# Patient Record
Sex: MALE | Race: WHITE | NOT HISPANIC OR LATINO | Employment: OTHER | RURAL
[De-identification: names, ages, dates, MRNs, and addresses within clinical notes are randomized per-mention and may not be internally consistent; named-entity substitution may affect disease eponyms.]

---

## 2017-01-05 ENCOUNTER — OFFICE VISIT (OUTPATIENT)
Dept: FAMILY MEDICINE CLINIC | Facility: CLINIC | Age: 67
End: 2017-01-05

## 2017-01-05 VITALS
HEART RATE: 99 BPM | OXYGEN SATURATION: 97 % | TEMPERATURE: 98.4 F | DIASTOLIC BLOOD PRESSURE: 77 MMHG | WEIGHT: 291.2 LBS | HEIGHT: 68 IN | BODY MASS INDEX: 44.13 KG/M2 | SYSTOLIC BLOOD PRESSURE: 132 MMHG

## 2017-01-05 DIAGNOSIS — N40.0 BENIGN PROSTATIC HYPERPLASIA WITHOUT LOWER URINARY TRACT SYMPTOMS, UNSPECIFIED MORPHOLOGY: ICD-10-CM

## 2017-01-05 DIAGNOSIS — E78.2 MIXED HYPERLIPIDEMIA: Primary | ICD-10-CM

## 2017-01-05 DIAGNOSIS — E11.9 TYPE 2 DIABETES MELLITUS WITHOUT COMPLICATION, WITHOUT LONG-TERM CURRENT USE OF INSULIN (HCC): ICD-10-CM

## 2017-01-05 DIAGNOSIS — E55.9 UNSPECIFIED VITAMIN D DEFICIENCY: ICD-10-CM

## 2017-01-05 DIAGNOSIS — R53.83 FATIGUE, UNSPECIFIED TYPE: ICD-10-CM

## 2017-01-05 PROBLEM — M15.9 OSTEOARTHRITIS OF MULTIPLE JOINTS: Status: ACTIVE | Noted: 2017-01-05

## 2017-01-05 PROBLEM — Z86.010 HX OF COLONIC POLYPS: Status: ACTIVE | Noted: 2017-01-05

## 2017-01-05 PROBLEM — I10 ESSENTIAL HYPERTENSION: Status: ACTIVE | Noted: 2017-01-05

## 2017-01-05 PROBLEM — Z86.0100 HX OF COLONIC POLYPS: Status: ACTIVE | Noted: 2017-01-05

## 2017-01-05 PROBLEM — E66.09 OBESITY DUE TO EXCESS CALORIES: Status: ACTIVE | Noted: 2017-01-05

## 2017-01-05 PROBLEM — I48.91 ATRIAL FIBRILLATION: Status: ACTIVE | Noted: 2017-01-05

## 2017-01-05 PROCEDURE — 99213 OFFICE O/P EST LOW 20 MIN: CPT | Performed by: FAMILY MEDICINE

## 2017-01-05 RX ORDER — DIAZEPAM 5 MG/1
TABLET ORAL
COMMUNITY
Start: 2016-08-17 | End: 2019-05-31

## 2017-01-05 RX ORDER — LOSARTAN POTASSIUM 100 MG/1
TABLET ORAL
COMMUNITY
End: 2017-04-17 | Stop reason: SDUPTHER

## 2017-01-05 RX ORDER — POTASSIUM CHLORIDE 750 MG/1
10 TABLET, FILM COATED, EXTENDED RELEASE ORAL DAILY
COMMUNITY
Start: 2016-12-12 | End: 2019-05-31

## 2017-01-05 RX ORDER — GLIPIZIDE 5 MG/1
TABLET ORAL
COMMUNITY
End: 2017-03-16 | Stop reason: SDUPTHER

## 2017-01-05 RX ORDER — HYDROCODONE BITARTRATE AND ACETAMINOPHEN 10; 325 MG/1; MG/1
1 TABLET ORAL EVERY 6 HOURS
Qty: 90 TABLET | Refills: 0 | Status: SHIPPED | OUTPATIENT
Start: 2017-01-05 | End: 2017-02-21 | Stop reason: SDUPTHER

## 2017-01-05 RX ORDER — CLOBETASOL PROPIONATE 0.5 MG/G
CREAM TOPICAL
COMMUNITY
Start: 2016-10-18 | End: 2017-12-13 | Stop reason: SDUPTHER

## 2017-01-05 RX ORDER — INDAPAMIDE 2.5 MG/1
TABLET, FILM COATED ORAL
COMMUNITY
End: 2017-11-16 | Stop reason: SDUPTHER

## 2017-01-05 RX ORDER — AMLODIPINE BESYLATE 5 MG/1
TABLET ORAL
COMMUNITY
End: 2018-03-01 | Stop reason: SDUPTHER

## 2017-01-05 RX ORDER — APIXABAN 5 MG/1
5 TABLET, FILM COATED ORAL EVERY 12 HOURS SCHEDULED
COMMUNITY
Start: 2016-12-19

## 2017-01-05 RX ORDER — HYDROCODONE BITARTRATE AND ACETAMINOPHEN 10; 325 MG/1; MG/1
TABLET ORAL EVERY 6 HOURS
COMMUNITY
End: 2017-01-05 | Stop reason: SDUPTHER

## 2017-01-05 NOTE — PROGRESS NOTES
"Ephraim Quinones    1950    Chief Complaint   Patient presents with   • Med Reflita SANCHEZ       Vitals:    01/05/17 0823   BP: 132/77   Pulse: 99   Temp: 98.4 °F (36.9 °C)   SpO2: 97%   Weight: 291 lb 3.2 oz (132 kg)   Height: 68\" (172.7 cm)       Knee Pain    Incident onset: [facility] for knee replacement in March. There was no injury mechanism. The pain is present in the left knee. The quality of the pain is described as aching. The pain is at a severity of 3/10. The pain is mild. The pain has been constant since onset. Associated symptoms include a loss of motion. Pertinent negatives include no inability to bear weight. The symptoms are aggravated by weight bearing. Treatments tried: Narcotics. The treatment provided mild relief.       Review of Systems   Musculoskeletal: Positive for back pain and joint swelling.        Scheduled for knee replacement March       Past Medical History   Diagnosis Date   • Arthritis    • Diabetes mellitus    • Hyperlipidemia    • Hypertension    • Obesity          Current Outpatient Prescriptions:   •  amLODIPine (NORVASC) 5 MG tablet, Take 5 mg by mouth 2 times daily , Disp: , Rfl:   •  carbidopa-levodopa (SINEMET)  MG per tablet, , Disp: , Rfl:   •  clobetasol (TEMOVATE) 0.05 % cream, , Disp: , Rfl:   •  diazePAM (VALIUM) 5 MG tablet, One tablet po q 8 hours prn spasms, muscle aches., Disp: , Rfl:   •  ELIQUIS 5 MG tablet tablet, , Disp: , Rfl:   •  glipiZIDE (GLUCOTROL) 5 MG tablet, Take 5 mg by mouth daily, Disp: , Rfl:   •  HYDROcodone-acetaminophen (NORCO)  MG per tablet, Take 1 tablet by mouth Every 6 (Six) Hours., Disp: 90 tablet, Rfl: 0  •  indapamide (LOZOL) 2.5 MG tablet, Take 2.5 mg by mouth every morning, Disp: , Rfl:   •  losartan (COZAAR) 100 MG tablet, Take 100 mg by mouth daily, Disp: , Rfl:   •  potassium chloride (K-DUR) 10 MEQ CR tablet, , Disp: , Rfl:     Allergies   Allergen Reactions   • Percocet [Oxycodone-Acetaminophen]    • Percodan " "[Oxycodone-Aspirin]        Patient Active Problem List   Diagnosis   • Type 2 diabetes mellitus without complication   • Obesity due to excess calories   • Hx of colonic polyps   • Essential hypertension   • Mixed hyperlipidemia   • Osteoarthritis of multiple joints   • Atrial fibrillation       Social History     Social History   • Marital status:      Spouse name: N/A   • Number of children: N/A   • Years of education: N/A     Social History Main Topics   • Smoking status: Former Smoker   • Smokeless tobacco: None   • Alcohol use Yes      Comment: occ   • Drug use: No   • Sexual activity: Defer     Other Topics Concern   • None     Social History Narrative   • None       Past Surgical History   Procedure Laterality Date   • Replacement total knee         Physical Exam   Constitutional:   Morbidly obese   Cardiovascular:   Atrial fib   Pulmonary/Chest: Effort normal and breath sounds normal.   Musculoskeletal:   Enlargement left knee with effusion   Neurological: He is alert.   Psychiatric: He has a normal mood and affect.   Vitals reviewed.      Vitals:    01/05/17 0823   BP: 132/77   Pulse: 99   Temp: 98.4 °F (36.9 °C)   SpO2: 97%   Weight: 291 lb 3.2 oz (132 kg)   Height: 68\" (172.7 cm)       Ephraim was seen today for med refill.    Diagnoses and all orders for this visit:    Mixed hyperlipidemia  -     TSH  -     T4, free  -     Comprehensive Metabolic Panel  -     Lipid Panel    Type 2 diabetes mellitus without complication, without long-term current use of insulin  -     Comprehensive Metabolic Panel  -     Hemoglobin A1c  -     Lipid Panel    Fatigue, unspecified type  -     TSH  -     T4, free  -     CBC & Differential    Unspecified vitamin D deficiency  -     Vitamin D 25 Hydroxy    Benign prostatic hyperplasia without lower urinary tract symptoms, unspecified morphology  -     PSA    Other orders  -     HYDROcodone-acetaminophen (NORCO)  MG per tablet; Take 1 tablet by mouth Every 6 (Six) " Hours.        Problem List Items Addressed This Visit        Endocrine    Type 2 diabetes mellitus without complication    Relevant Medications    glipiZIDE (GLUCOTROL) 5 MG tablet    Other Relevant Orders    Comprehensive Metabolic Panel    Hemoglobin A1c    Lipid Panel       Other    Mixed hyperlipidemia - Primary    Relevant Orders    TSH    T4, free    Comprehensive Metabolic Panel    Lipid Panel      Other Visit Diagnoses     Fatigue, unspecified type        Relevant Orders    TSH    T4, free    CBC & Differential    Unspecified vitamin D deficiency        Relevant Orders    Vitamin D 25 Hydroxy    Benign prostatic hyperplasia without lower urinary tract symptoms, unspecified morphology        Relevant Orders    PSA                  [facility] 10/325 refill #90; continues to have back pain and left knee pain-scheduled for knee replacement in March    Plan fasting lab return for annual physical                    Jim Hopkins MD  1/5/2017

## 2017-01-05 NOTE — MR AVS SNAPSHOT
Ephraim Quinones   1/5/2017 8:15 AM   Office Visit    Dept Phone:  [phone]   Encounter #:  [id number]    Provider:  Jim Hopkins MD   Department:  [facility]                Your Full Care Plan              Today's Medication Changes          These changes are accurate as of: 1/5/17  8:50 AM.  If you have any questions, ask your nurse or doctor.               Medication(s)that have changed:     HYDROcodone-acetaminophen  MG per tablet   Commonly known as:  NORCO   Take 1 tablet by mouth Every 6 (Six) Hours.   What changed:    - how much to take  - how to take this   Changed by:  Jim Hopkins MD            Where to Get Your Medications      You can get these medications from any pharmacy     Bring a paper prescription for each of these medications     HYDROcodone-acetaminophen  MG per tablet                  Your Updated Medication List          This list is accurate as of: 1/5/17  8:50 AM.  Always use your most recent med list.                amLODIPine 5 MG tablet   Commonly known as:  NORVASC       carbidopa-levodopa  MG per tablet   Commonly known as:  SINEMET       clobetasol 0.05 % cream   Commonly known as:  TEMOVATE       ELIQUIS 5 MG tablet tablet   Generic drug:  apixaban       glipiZIDE 5 MG tablet   Commonly known as:  GLUCOTROL       HYDROcodone-acetaminophen  MG per tablet   Commonly known as:  NORCO   Take 1 tablet by mouth Every 6 (Six) Hours.       indapamide 2.5 MG tablet   Commonly known as:  LOZOL       losartan 100 MG tablet   Commonly known as:  COZAAR       potassium chloride 10 MEQ CR tablet   Commonly known as:  K-DUR       VALIUM 5 MG tablet   Generic drug:  diazePAM               We Performed the Following     CBC & Differential     Comprehensive Metabolic Panel     Hemoglobin A1c     Lipid Panel     PSA     T4, free     TSH     Vitamin D 25 Hydroxy       You Were Diagnosed With        "   Codes Comments    Mixed hyperlipidemia    -  Primary ICD-10-CM: E78.2  ICD-9-CM: 272.2     Type 2 diabetes mellitus without complication, without long-term current use of insulin     ICD-10-CM: E11.9  ICD-9-CM: 250.00     Fatigue, unspecified type     ICD-10-CM: R53.83  ICD-9-CM: 780.79     Unspecified vitamin D deficiency     ICD-10-CM: E55.9  ICD-9-CM: 268.9     Benign prostatic hyperplasia without lower urinary tract symptoms, unspecified morphology     ICD-10-CM: N40.0  ICD-9-CM: 600.00       Instructions     None    Patient Instructions History      Upcoming Appointments     Visit Type Date Time Department    OFFICE VISIT 2017  8:15 AM JANNETH CARRILL[facility]rm Signup     [employer] technology allows you to send messages to your doctor, view your test results, renew your prescriptions, schedule appointments, and more. To sign up, go to [employer] and click on the Sign Up Now link in the New User? box. Enter your [employer] Activation Code exactly as it appears below along with the last four digits of your Social Security Number and your Date of Birth () to complete the sign-up process. If you do not sign up before the expiration date, you must request a new code.    [employer] Activation Code: [email or web address]  Expires: 2017  8:50 AM    If you have questions, you can email [email or web address] or call [phone] to talk to our [employer] staff. Remember, [employer] is NOT to be used for urgent needs. For medical emergencies, dial 911.               Other Info from Your Visit           Allergies     Percocet [Oxycodone-acetaminophen]      Percodan [Oxycodone-aspirin]        Reason for Visit     Med Refill DANIEL      Vital Signs     Blood Pressure Pulse Temperature Height Weight Oxygen Saturation    132/77 99 98.4 °F (36.9 °C) 68\" (172.7 cm) 291 lb 3.2 oz (132 kg) 97%    Body Mass Index Smoking Status                44.28 kg/m2 Former Smoker          Problems and Diagnoses Noted "     Atrial fibrillation (irregular heartbeat)    High blood pressure    History of colonic polyps    Mixed hyperlipidemia    Obesity due to excess calories    Osteoarthritis (arthritis due to wear and tear of joints)    Type 2 diabetes mellitus without complication    Tiredness        Vitamin D deficiency        Enlarged prostate

## 2017-01-06 LAB
25(OH)D3+25(OH)D2 SERPL-MCNC: 23 NG/ML (ref 30–100)
ALBUMIN SERPL-MCNC: 4.4 G/DL (ref 3.5–5)
ALBUMIN/GLOB SERPL: 1.6 G/DL (ref 1.1–2.5)
ALP SERPL-CCNC: 80 U/L (ref 24–120)
ALT SERPL-CCNC: 54 U/L (ref 0–54)
AST SERPL-CCNC: 26 U/L (ref 7–45)
BASOPHILS # BLD AUTO: 0.04 10*3/MM3 (ref 0–0.2)
BASOPHILS NFR BLD AUTO: 0.5 % (ref 0–2)
BILIRUB SERPL-MCNC: 0.9 MG/DL (ref 0.1–1)
BUN SERPL-MCNC: 31 MG/DL (ref 5–21)
BUN/CREAT SERPL: 32.6 (ref 7–25)
CALCIUM SERPL-MCNC: 9.9 MG/DL (ref 8.4–10.4)
CHLORIDE SERPL-SCNC: 96 MMOL/L (ref 98–110)
CHOLEST SERPL-MCNC: 194 MG/DL (ref 130–200)
CO2 SERPL-SCNC: 29 MMOL/L (ref 24–31)
CREAT SERPL-MCNC: 0.95 MG/DL (ref 0.5–1.4)
EOSINOPHIL # BLD AUTO: 0.16 10*3/MM3 (ref 0–0.7)
EOSINOPHIL NFR BLD AUTO: 2 % (ref 0–4)
ERYTHROCYTE [DISTWIDTH] IN BLOOD BY AUTOMATED COUNT: 13 % (ref 12–15)
GLOBULIN SER CALC-MCNC: 2.7 GM/DL
GLUCOSE SERPL-MCNC: 172 MG/DL (ref 70–100)
HBA1C MFR BLD: 6.1 %
HCT VFR BLD AUTO: 45.1 % (ref 40–52)
HDLC SERPL-MCNC: 53 MG/DL
HGB BLD-MCNC: 15.2 G/DL (ref 14–18)
IMM GRANULOCYTES # BLD: 0.03 10*3/MM3 (ref 0–0.03)
IMM GRANULOCYTES NFR BLD: 0.4 % (ref 0–5)
LDLC SERPL CALC-MCNC: 113 MG/DL (ref 0–99)
LYMPHOCYTES # BLD AUTO: 1.23 10*3/MM3 (ref 0.72–4.86)
LYMPHOCYTES NFR BLD AUTO: 15.6 % (ref 15–45)
MCH RBC QN AUTO: 32.5 PG (ref 28–32)
MCHC RBC AUTO-ENTMCNC: 33.7 G/DL (ref 33–36)
MCV RBC AUTO: 96.6 FL (ref 82–95)
MONOCYTES # BLD AUTO: 0.66 10*3/MM3 (ref 0.19–1.3)
MONOCYTES NFR BLD AUTO: 8.4 % (ref 4–12)
NEUTROPHILS # BLD AUTO: 5.74 10*3/MM3 (ref 1.87–8.4)
NEUTROPHILS NFR BLD AUTO: 73.1 % (ref 39–78)
NRBC BLD AUTO-RTO: 0 /100 WBC (ref 0–0)
PLATELET # BLD AUTO: 231 10*3/MM3 (ref 130–400)
POTASSIUM SERPL-SCNC: 4.2 MMOL/L (ref 3.5–5.3)
PROT SERPL-MCNC: 7.1 G/DL (ref 6.3–8.7)
PSA SERPL-MCNC: 1.24 NG/ML (ref 0–4)
RBC # BLD AUTO: 4.67 10*6/MM3 (ref 4.8–5.9)
SODIUM SERPL-SCNC: 140 MMOL/L (ref 135–145)
T4 FREE SERPL-MCNC: 1.27 NG/DL (ref 0.78–2.19)
TRIGL SERPL-MCNC: 139 MG/DL (ref 0–149)
TSH SERPL DL<=0.005 MIU/L-ACNC: 0.7 MIU/ML (ref 0.47–4.68)
VLDLC SERPL CALC-MCNC: 27.8 MG/DL
WBC # BLD AUTO: 7.86 10*3/MM3 (ref 4.8–10.8)

## 2017-01-25 ENCOUNTER — OFFICE VISIT (OUTPATIENT)
Dept: FAMILY MEDICINE CLINIC | Facility: CLINIC | Age: 67
End: 2017-01-25

## 2017-01-25 VITALS
HEART RATE: 62 BPM | HEIGHT: 68 IN | SYSTOLIC BLOOD PRESSURE: 139 MMHG | TEMPERATURE: 98 F | WEIGHT: 292.6 LBS | OXYGEN SATURATION: 98 % | BODY MASS INDEX: 44.34 KG/M2 | DIASTOLIC BLOOD PRESSURE: 72 MMHG

## 2017-01-25 DIAGNOSIS — Z00.00 MEDICARE ANNUAL WELLNESS VISIT, SUBSEQUENT: ICD-10-CM

## 2017-01-25 DIAGNOSIS — E11.9 DIABETES MELLITUS WITHOUT COMPLICATION (HCC): ICD-10-CM

## 2017-01-25 DIAGNOSIS — Z23 NEED FOR PROPHYLACTIC VACCINATION AGAINST STREPTOCOCCUS PNEUMONIAE (PNEUMOCOCCUS): ICD-10-CM

## 2017-01-25 DIAGNOSIS — Z12.11 SCREENING FOR COLORECTAL CANCER: Primary | ICD-10-CM

## 2017-01-25 DIAGNOSIS — Z12.12 SCREENING FOR COLORECTAL CANCER: Primary | ICD-10-CM

## 2017-01-25 LAB
EXPIRATION DATE 2: NORMAL
EXPIRATION DATE 3: NORMAL
EXPIRATION DATE: NORMAL
GASTROCULT GAST QL: NEGATIVE
HEMOCCULT SP2 STL QL: NEGATIVE
HEMOCCULT SP3 STL QL: NEGATIVE
Lab: NORMAL

## 2017-01-25 PROCEDURE — G0009 ADMIN PNEUMOCOCCAL VACCINE: HCPCS | Performed by: FAMILY MEDICINE

## 2017-01-25 PROCEDURE — 81003 URINALYSIS AUTO W/O SCOPE: CPT | Performed by: FAMILY MEDICINE

## 2017-01-25 PROCEDURE — 90670 PCV13 VACCINE IM: CPT | Performed by: FAMILY MEDICINE

## 2017-01-25 PROCEDURE — G0438 PPPS, INITIAL VISIT: HCPCS | Performed by: FAMILY MEDICINE

## 2017-01-25 NOTE — MR AVS SNAPSHOT
Ephraim Quinones   1/25/2017 2:00 PM   Office Visit    Dept Phone:  [phone]   Encounter #:  [id number]    Provider:  Jim Hopkins MD   Department:  [facility]                Your Full Care Plan              Your Updated Medication List          This list is accurate as of: 1/25/17  2:17 PM.  Always use your most recent med list.                amLODIPine 5 MG tablet   Commonly known as:  NORVASC       carbidopa-levodopa  MG per tablet   Commonly known as:  SINEMET       clobetasol 0.05 % cream   Commonly known as:  TEMOVATE       ELIQUIS 5 MG tablet tablet   Generic drug:  apixaban       glipiZIDE 5 MG tablet   Commonly known as:  GLUCOTROL       HYDROcodone-acetaminophen  MG per tablet   Commonly known as:  NORCO   Take 1 tablet by mouth Every 6 (Six) Hours.       indapamide 2.5 MG tablet   Commonly known as:  LOZOL       losartan 100 MG tablet   Commonly known as:  COZAAR       potassium chloride 10 MEQ CR tablet   Commonly known as:  K-DUR       VALIUM 5 MG tablet   Generic drug:  diazePAM               We Performed the Following     POC Occult Blood X 3, Stool       You Were Diagnosed With        Codes Comments    Screening for colorectal cancer    -  Primary ICD-10-CM: Z12.11, Z12.12  ICD-9-CM: V76.51     Need for prophylactic vaccination against Streptococcus pneumoniae (pneumococcus)     ICD-10-CM: Z23  ICD-9-CM: V03.82       Medications to be Given to You by a Medical Professional     Due       Frequency    (none) pneumococcal conj. 13-valent (PREVNAR-13) vaccine 0.5 mL  Once      Instructions        Exercising to Lose Weight  Exercising can help you to lose weight. In order to lose weight through exercise, you need to do vigorous-intensity exercise. You can tell that you are exercising with vigorous intensity if you are breathing very hard and fast and cannot hold a conversation while exercising.  Moderate-intensity  exercise helps to maintain your current weight. You can tell that you are exercising at a moderate level if you have a higher heart rate and faster breathing, but you are still able to hold a conversation.  HOW OFTEN SHOULD I EXERCISE?  Choose an activity that you enjoy and set realistic goals. Your health care provider can help you to make an activity plan that works for you. Exercise regularly as directed by your health care provider. This may include:  · Doing resistance training twice each week, such as:    Push-ups.    Sit-ups.    Lifting weights.    Using resistance bands.  · Doing a given intensity of exercise for a given amount of time. Choose from these options:    150 minutes of moderate-intensity exercise every week.    75 minutes of vigorous-intensity exercise every week.    A mix of moderate-intensity and vigorous-intensity exercise every week.  Children, pregnant women, people who are out of shape, people who are overweight, and older adults may need to consult a health care provider for individual recommendations. If you have any sort of medical condition, be sure to consult your health care provider before starting a new exercise program.  WHAT ARE SOME ACTIVITIES THAT CAN HELP ME TO LOSE WEIGHT?   · Walking at a rate of at least 4.5 miles an hour.  · Jogging or running at a rate of 5 miles per hour.  · Biking at a rate of at least 10 miles per hour.  · Lap swimming.  · Roller-skating or in-line skating.  · Cross-country skiing.  · Vigorous competitive sports, such as football, basketball, and soccer.  · Jumping rope.  · Aerobic dancing.  HOW CAN I BE MORE ACTIVE IN MY DAY-TO-DAY ACTIVITIES?  · Use the stairs instead of the elevator.  · Take a walk during your lunch break.  · If you drive, park your car farther away from work or school.  · If you take public transportation, get off one stop early and walk the rest of the way.  · Make all of your phone calls while standing up and walking  around.  · Get up, stretch, and walk around every 30 minutes throughout the day.  WHAT GUIDELINES SHOULD I FOLLOW WHILE EXERCISING?  · Do not exercise so much that you hurt yourself, feel dizzy, or get very short of breath.  · Consult your health care provider prior to starting a new exercise program.  · Wear comfortable clothes and shoes with good support.  · Drink plenty of water while you exercise to prevent dehydration or heat stroke. Body water is lost during exercise and must be replaced.  · Work out until you breathe faster and your heart beats faster.     This information is not intended to replace advice given to you by your health care provider. Make sure you discuss any questions you have with your health care provider.     Document Released: 01/20/2012 Document Revised: 01/08/2016 Document Reviewed: 05/21/2015  [employer] Patient Education ©2016 [employer]    Fall Prevention in the Home   Falls can cause injuries and can affect people from all age groups. There are many simple things that you can do to make your home safe and to help prevent falls.  WHAT CAN I DO ON THE OUTSIDE OF MY HOME?  · Regularly repair the edges of walkways and driveways and fix any cracks.  · Remove high doorway thresholds.  · Trim any shrubbery on the main path into your home.  · Use bright outdoor lighting.  · Clear walkways of debris and clutter, including tools and rocks.  · Regularly check that handrails are securely fastened and in good repair. Both sides of any steps should have handrails.  · Install guardrails along the edges of any raised decks or porches.  · Have leaves, snow, and ice cleared regularly.  · Use sand or salt on walkways during winter months.  · In the garage, clean up any spills right away, including grease or oil spills.  WHAT CAN I DO IN THE BATHROOM?  · Use night lights.  · Install grab bars by the toilet and in the tub and shower. Do not use towel bars as grab bars.  · Use non-skid mats  or decals on the floor of the tub or shower.  · If you need to sit down while you are in the shower, use a plastic, non-slip stool..  · Keep the floor dry. Immediately clean up any water that spills on the floor.  · Remove soap buildup in the tub or shower on a regular basis.  · Attach bath mats securely with double-sided non-slip rug tape.  · Remove throw rugs and other tripping hazards from the floor.  WHAT CAN I DO IN THE BEDROOM?  · Use night lights.  · Make sure that a bedside light is easy to reach.  · Do not use oversized bedding that drapes onto the floor.  · Have a firm chair that has side arms to use for getting dressed.  · Remove throw rugs and other tripping hazards from the floor.  WHAT CAN I DO IN THE KITCHEN?   · Clean up any spills right away.  · Avoid walking on wet floors.  · Place frequently used items in easy-to-reach places.  · If you need to reach for something above you, use a sturdy step stool that has a grab bar.  · Keep electrical cables out of the way.  · Do not use floor polish or wax that makes floors slippery. If you have to use wax, make sure that it is non-skid floor wax.  · Remove throw rugs and other tripping hazards from the floor.  WHAT CAN I DO IN THE STAIRWAYS?  · Do not leave any items on the stairs.  · Make sure that there are handrails on both sides of the stairs. Fix handrails that are broken or loose. Make sure that handrails are as long as the stairways.  · Check any carpeting to make sure that it is firmly attached to the stairs. Fix any carpet that is loose or worn.  · Avoid having throw rugs at the top or bottom of stairways, or secure the rugs with carpet tape to prevent them from moving.  · Make sure that you have a light switch at the top of the stairs and the bottom of the stairs. If you do not have them, have them installed.  WHAT ARE SOME OTHER FALL PREVENTION TIPS?  · Wear closed-toe shoes that fit well and support your feet. Wear shoes that have rubber soles or  low heels.  · When you use a stepladder, make sure that it is completely opened and that the sides are firmly locked. Have someone hold the ladder while you are using it. Do not climb a closed stepladder.  · Add color or contrast paint or tape to grab bars and handrails in your home. Place contrasting color strips on the first and last steps.  · Use mobility aids as needed, such as canes, walkers, scooters, and crutches.  · Turn on lights if it is dark. Replace any light bulbs that burn out.  · Set up furniture so that there are clear paths. Keep the furniture in the same spot.  · Fix any uneven floor surfaces.  · Choose a carpet design that does not hide the edge of steps of a stairway.  · Be aware of any and all pets.  · Review your medicines with your healthcare provider. Some medicines can cause dizziness or changes in blood pressure, which increase your risk of falling.  Talk with your health care provider about other ways that you can decrease your risk of falls. This may include working with a physical therapist or  to improve your strength, balance, and endurance.     This information is not intended to replace advice given to you by your health care provider. Make sure you discuss any questions you have with your health care provider.     Document Released: 2003 Document Revised: 2016 Document Reviewed: 2016  [employer] Interactive Patient Education © [employer]    Medicare Wellness  Personal Prevention Plan of Service     Date of Office Visit:  2017  Encounter Provider:  Jim Hopkins MD  Place of Service:  [facility]  Patient Name: Ephraim Quinones  :  1950    As part of the Medicare Wellness portion of your visit today, we are providing you with this personalized preventive plan of services (PPPS). This plan is based upon recommendations of the [place] Preventive Services Task Force (USPSTF) and the Advisory  Committee on Immunization Practices (ACIP).    This lists the preventive care services that should be considered, and provides dates of when you are due. Items listed as completed are up-to-date and do not require any further intervention.    Health Maintenance   Topic Date Due   • TDAP/TD VACCINES (1 - Tdap) 1969   • PNEUMOCOCCAL VACCINES (65+ LOW/MEDIUM RISK) (1 of 2 - PCV13) 2015   • HEPATITIS C SCREENING  10/15/2016   • ZOSTER VACCINE  10/15/2016   • COLONOSCOPY  10/25/2016   • DIABETIC FOOT EXAM  2017   • DIABETIC EYE EXAM  2017   • URINE MICROALBUMIN  2017   • MEDICARE ANNUAL WELLNESS  10/15/2016   • AAA SCREEN (ONE-TIME)  2017   • HEMOGLOBIN A1C  2017   • LIPID PANEL  2018   • INFLUENZA VACCINE  Completed                 Patient Instructions History      Upcoming Appointments     Visit Type Date Time Department    SUBSEQUENT MEDICARE WELLNESS 2017  2:00 PM [facility]  [employer] Signup     [employer] allows you to send messages to your doctor, view your test results, renew your prescriptions, schedule appointments, and more. To sign up, go to [employer] and click on the Sign Up Now link in the New User? box. Enter your [employer] Activation Code exactly as it appears below along with the last four digits of your Social Security Number and your Date of Birth () to complete the sign-up process. If you do not sign up before the expiration date, you must request a new code.    [employer] Activation Code: [email or web address]  Expires: 2017  4:34 AM    If you have questions, you can email [email or web address] or call [phone] to talk to our [employer] staff. Remember, [employer] is NOT to be used for urgent needs. For medical emergencies, dial 911.               Other Info from Your Visit           Allergies     Percocet [Oxycodone-acetaminophen]      Percodan [Oxycodone-aspirin]        Reason for Visit     Annual Exam  "Medicare Wellness      Vital Signs     Blood Pressure Pulse Temperature Height Weight Oxygen Saturation    139/72 62 98 °F (36.7 °C) 68\" (172.7 cm) 292 lb 9.6 oz (133 kg) 98%    Body Mass Index Smoking Status                44.49 kg/m2 Former Smoker          Problems and Diagnoses Noted     Screen for colon cancer    -  Primary    Need for pneumococcal vaccine          Immunizations Administered     Name Date    Pneumococcal Conjugate 13-Valent       Medications Administered     pneumococcal conj. 13-valent (PREVNAR-13) vaccine 0.5 mL                    Results         "

## 2017-01-25 NOTE — PROGRESS NOTES
QUICK REFERENCE INFORMATION:  The ABCs of the Annual Wellness Visit    Subsequent Medicare Wellness Visit    HEALTH RISK ASSESSMENT    Recent Hospitalizations:  No recent hospitalization(s)..        Current Medical Providers:  Patient Care Team:  Jim Hopkins MD as PCP - General  Noah Ireland MD as Consulting Physician (Orthopedic Surgery)        Smoking Status:  History   Smoking Status   • Former Smoker   Smokeless Tobacco   • Not on file       Alcohol Consumption:  History   Alcohol Use   • Yes     Comment: occ       Depression Screen:   PHQ-9 Depression Screening 1/25/2017   Little interest or pleasure in doing things 0   Feeling down, depressed, or hopeless 0   Trouble falling or staying asleep, or sleeping too much 0   Feeling tired or having little energy 0   Poor appetite or overeating 0   Feeling bad about yourself - or that you are a failure or have let yourself or your family down 0   Trouble concentrating on things, such as reading the newspaper or watching television 0   Moving or speaking so slowly that other people could have noticed. Or the opposite - being so fidgety or restless that you have been moving around a lot more than usual 0   Thoughts that you would be better off dead, or of hurting yourself in some way 0   PHQ-9 Total Score 0   If you checked off any problems, how difficult have these problems made it for you to do your work, take care of things at home, or get along with other people? Not difficult at all       Health Habits and Functional and Cognitive Screening:  Functional & Cognitive Status 1/25/2017   Do you have difficulty preparing food and eating? No   Do you have difficulty bathing yourself? No   Do you have difficulty getting dressed? No   Do you have difficulty using the toilet? No   Do you have difficulty moving around from place to place? No   In the past year have you fallen or experienced a near fall? No   Do you need help using the phone?  No   Are  "you deaf or do you have serious difficulty hearing?  No   Do you need help with transportation? No   Do you need help shopping? No   Do you need help preparing meals?  No   Do you need help with housework?  No   Do you need help with laundry? No   Do you need help taking your medications? No   Do you need help managing money? No   Do you have difficulty concentrating, remembering or making decisions? No         -- The Timed Up and Go (TUG) Test (CDC Version)                  - Testing directions adhered to:                                  1) Patients wears regular footwear and may use walking aid(s) if    needed.                                  2) Patient to sit back in standard arm chair and identify a line 10 feet    away from patient on floor.                                  3) Test time begins at \"Go\" and stops when patient reseated in arm    chair.                    - Instructions read aloud (and demonstrated as applicable) to patient:                                      When I say \"Go,\" I want you to:                                    1) Stand up from the chair.                                  2) Walk to the line on the floor (10 feet away) at your normal pace.                                  3) Turn.                                  4) Walk back to the chair at your normal pace.                                  5) Sit down again.                    - Result: 3                    - Observations during test:Normal gait            Does the patient have evidence of cognitive impairment? No    Asprin use counseling:not applicable    Finger Rub Hearing Test (right ear):passed  Finger Rub Hearing Test (left ear):passed    Recent Lab Results:  CMP:  Lab Results   Component Value Date     (H) 01/05/2017    BUN 31 (H) 01/05/2017    CREATININE 0.95 01/05/2017    EGFRIFNONA 79 01/05/2017    EGFRIFAFRI 96 01/05/2017    BCR 32.6 (H) 01/05/2017     01/05/2017    K 4.2 01/05/2017    CO2 29.0 01/05/2017 "    CALCIUM 9.9 01/05/2017    PROTENTOTREF 7.1 01/05/2017    ALBUMIN 4.40 01/05/2017    LABGLOBREF 2.7 01/05/2017    LABIL2 1.6 01/05/2017    BILITOT 0.9 01/05/2017    ALKPHOS 80 01/05/2017    AST 26 01/05/2017    ALT 54 01/05/2017     Lipid Panel:  Lab Results   Component Value Date    CHLPL 194 01/05/2017    TRIG 139 01/05/2017    HDL 53 01/05/2017    VLDL 27.8 01/05/2017     (H) 01/05/2017     LDL:     HbA1c:  Lab Results   Component Value Date    HGBA1C 6.10 01/05/2017     Urine Microalbumin:     Visual Acuity:  No exam data present    Age-appropriate Screening Schedule:  Refer to the list below for future screening recommendations based on patient's age, sex and/or medical conditions. Orders for these recommended tests are listed in the plan section. The patient has been provided with a written plan.    Health Maintenance   Topic Date Due   • TDAP/TD VACCINES (1 - Tdap) 03/19/1969   • PNEUMOCOCCAL VACCINES (65+ LOW/MEDIUM RISK) (1 of 2 - PCV13) 03/19/2015   • ZOSTER VACCINE  10/15/2016   • COLONOSCOPY  10/25/2016   • DIABETIC FOOT EXAM  01/05/2017   • DIABETIC EYE EXAM  01/05/2017   • URINE MICROALBUMIN  01/05/2017   • HEMOGLOBIN A1C  07/05/2017   • LIPID PANEL  01/05/2018   • INFLUENZA VACCINE  Completed        Subjective   History of Present Illness    Ephraim Quinones is a 66 y.o. male who presents for an Subsequent Wellness Visit. In addition, we addressed the following health issues:    The following portions of the patient's history were reviewed and updated as appropriate: allergies, current medications, past family history, past medical history, past social history, past surgical history and problem list.    Outpatient Medications Prior to Visit   Medication Sig Dispense Refill   • amLODIPine (NORVASC) 5 MG tablet Take 5 mg by mouth 2 times daily      • carbidopa-levodopa (SINEMET)  MG per tablet      • clobetasol (TEMOVATE) 0.05 % cream      • diazePAM (VALIUM) 5 MG tablet One tablet po q  8 hours prn spasms, muscle aches.     • ELIQUIS 5 MG tablet tablet      • glipiZIDE (GLUCOTROL) 5 MG tablet Take 5 mg by mouth daily     • HYDROcodone-acetaminophen (NORCO)  MG per tablet Take 1 tablet by mouth Every 6 (Six) Hours. 90 tablet 0   • indapamide (LOZOL) 2.5 MG tablet Take 2.5 mg by mouth every morning     • losartan (COZAAR) 100 MG tablet Take 100 mg by mouth daily     • potassium chloride (K-DUR) 10 MEQ CR tablet        No facility-administered medications prior to visit.        Patient Active Problem List   Diagnosis   • Type 2 diabetes mellitus without complication   • Obesity due to excess calories   • Hx of colonic polyps   • Essential hypertension   • Mixed hyperlipidemia   • Osteoarthritis of multiple joints   • Atrial fibrillation       Identification of Risk Factors:  Risk factors include: weight , cardiovascular risk and chronic pain.    Review of Systems   Constitutional: Negative.    HENT: Negative.    Cardiovascular:        On Eliquis for PAT-sees Dr. Villalta   Endocrine: Negative for polydipsia, polyphagia and polyuria.   Musculoskeletal:        Knee replacement scheduled in March   Neurological: Negative.    Psychiatric/Behavioral: Negative.        Compared to one year ago, the patient feels his physical health is the same.  Compared to one year ago, the patient feels his mental health is the same.    Objective     Physical Exam   Constitutional: He is oriented to person, place, and time.   Morbidly obese   HENT:   Head: Normocephalic.   Right Ear: External ear normal.   Left Ear: External ear normal.   Mouth/Throat: Oropharynx is clear and moist.   Eyes: EOM are normal. Pupils are equal, round, and reactive to light.   Neck:   No neck masses good carotid pulses   Cardiovascular: Normal rate, regular rhythm and normal heart sounds.    Pulmonary/Chest: Effort normal and breath sounds normal.   Abdominal: Soft. Bowel sounds are normal.   Pendulous abdomen   Genitourinary: Rectum normal,  "prostate normal and penis normal.   Lymphadenopathy:     He has no cervical adenopathy.   Neurological: He is alert and oriented to person, place, and time.   Skin: Skin is warm and dry.   Psychiatric: He has a normal mood and affect. His behavior is normal. Judgment and thought content normal.   Vitals reviewed.      Vitals:    01/25/17 1349   BP: 139/72   Pulse: 62   Temp: 98 °F (36.7 °C)   SpO2: 98%   Weight: 292 lb 9.6 oz (133 kg)   Height: 68\" (172.7 cm)   PainSc: 2  Comment: Left knee pain       Body mass index is 44.49 kg/(m^2).  Discussed the patient's BMI with him. The BMI is above average; no BMI management plan is appropriate..    Assessment/Plan   Patient Self-Management and Personalized Health Advice  The patient has been provided with information about: diet, exercise, weight management, prevention of cardiac or vascular disease and fall prevention and preventive services including:   · Advanced directives: has NO advanced directive - not interested in additional information, Colorectal cancer screening, fecal occult blood test.    Visit Diagnoses:    ICD-10-CM ICD-9-CM   1. Screening for colorectal cancer Z12.11 V76.51    Z12.12        Orders Placed This Encounter   Procedures   • POC Occult Blood X 3, Stool       Outpatient Encounter Prescriptions as of 1/25/2017   Medication Sig Dispense Refill   • amLODIPine (NORVASC) 5 MG tablet Take 5 mg by mouth 2 times daily      • carbidopa-levodopa (SINEMET)  MG per tablet      • clobetasol (TEMOVATE) 0.05 % cream      • diazePAM (VALIUM) 5 MG tablet One tablet po q 8 hours prn spasms, muscle aches.     • ELIQUIS 5 MG tablet tablet      • glipiZIDE (GLUCOTROL) 5 MG tablet Take 5 mg by mouth daily     • HYDROcodone-acetaminophen (NORCO)  MG per tablet Take 1 tablet by mouth Every 6 (Six) Hours. 90 tablet 0   • indapamide (LOZOL) 2.5 MG tablet Take 2.5 mg by mouth every morning     • losartan (COZAAR) 100 MG tablet Take 100 mg by mouth daily     • " potassium chloride (K-DUR) 10 MEQ CR tablet        No facility-administered encounter medications on file as of 1/25/2017.        Reviewed use of high risk medication in the elderly: yes  Reviewed for potential of harmful drug interactions in the elderly: yes    Follow Up:  No Follow-up on file.     An After Visit Summary and PPPS with all of these plans were given to the patient.       plan weight management fall prevention knee surgery

## 2017-01-25 NOTE — PATIENT INSTRUCTIONS
Exercising to Lose Weight  Exercising can help you to lose weight. In order to lose weight through exercise, you need to do vigorous-intensity exercise. You can tell that you are exercising with vigorous intensity if you are breathing very hard and fast and cannot hold a conversation while exercising.  Moderate-intensity exercise helps to maintain your current weight. You can tell that you are exercising at a moderate level if you have a higher heart rate and faster breathing, but you are still able to hold a conversation.  HOW OFTEN SHOULD I EXERCISE?  Choose an activity that you enjoy and set realistic goals. Your health care provider can help you to make an activity plan that works for you. Exercise regularly as directed by your health care provider. This may include:  · Doing resistance training twice each week, such as:    Push-ups.    Sit-ups.    Lifting weights.    Using resistance bands.  · Doing a given intensity of exercise for a given amount of time. Choose from these options:    150 minutes of moderate-intensity exercise every week.    75 minutes of vigorous-intensity exercise every week.    A mix of moderate-intensity and vigorous-intensity exercise every week.  Children, pregnant women, people who are out of shape, people who are overweight, and older adults may need to consult a health care provider for individual recommendations. If you have any sort of medical condition, be sure to consult your health care provider before starting a new exercise program.  WHAT ARE SOME ACTIVITIES THAT CAN HELP ME TO LOSE WEIGHT?   · Walking at a rate of at least 4.5 miles an hour.  · Jogging or running at a rate of 5 miles per hour.  · Biking at a rate of at least 10 miles per hour.  · Lap swimming.  · Roller-skating or in-line skating.  · Cross-country skiing.  · Vigorous competitive sports, such as football, basketball, and soccer.  · Jumping rope.  · Aerobic dancing.  HOW CAN I BE MORE ACTIVE IN MY DAY-TO-DAY  ACTIVITIES?  · Use the stairs instead of the elevator.  · Take a walk during your lunch break.  · If you drive, park your car farther away from work or school.  · If you take public transportation, get off one stop early and walk the rest of the way.  · Make all of your phone calls while standing up and walking around.  · Get up, stretch, and walk around every 30 minutes throughout the day.  WHAT GUIDELINES SHOULD I FOLLOW WHILE EXERCISING?  · Do not exercise so much that you hurt yourself, feel dizzy, or get very short of breath.  · Consult your health care provider prior to starting a new exercise program.  · Wear comfortable clothes and shoes with good support.  · Drink plenty of water while you exercise to prevent dehydration or heat stroke. Body water is lost during exercise and must be replaced.  · Work out until you breathe faster and your heart beats faster.     This information is not intended to replace advice given to you by your health care provider. Make sure you discuss any questions you have with your health care provider.     Document Released: 01/20/2012 Document Revised: 01/08/2016 Document Reviewed: 05/21/2015  [employer] Interactive Patient Education ©2016 [employer]    Fall Prevention in the Home   Falls can cause injuries and can affect people from all age groups. There are many simple things that you can do to make your home safe and to help prevent falls.  WHAT CAN I DO ON THE OUTSIDE OF MY HOME?  · Regularly repair the edges of walkways and driveways and fix any cracks.  · Remove high doorway thresholds.  · Trim any shrubbery on the main path into your home.  · Use bright outdoor lighting.  · Clear walkways of debris and clutter, including tools and rocks.  · Regularly check that handrails are securely fastened and in good repair. Both sides of any steps should have handrails.  · Install guardrails along the edges of any raised decks or porches.  · Have leaves, snow, and ice cleared  regularly.  · Use sand or salt on walkways during winter months.  · In the garage, clean up any spills right away, including grease or oil spills.  WHAT CAN I DO IN THE BATHROOM?  · Use night lights.  · Install grab bars by the toilet and in the tub and shower. Do not use towel bars as grab bars.  · Use non-skid mats or decals on the floor of the tub or shower.  · If you need to sit down while you are in the shower, use a plastic, non-slip stool..  · Keep the floor dry. Immediately clean up any water that spills on the floor.  · Remove soap buildup in the tub or shower on a regular basis.  · Attach bath mats securely with double-sided non-slip rug tape.  · Remove throw rugs and other tripping hazards from the floor.  WHAT CAN I DO IN THE BEDROOM?  · Use night lights.  · Make sure that a bedside light is easy to reach.  · Do not use oversized bedding that drapes onto the floor.  · Have a firm chair that has side arms to use for getting dressed.  · Remove throw rugs and other tripping hazards from the floor.  WHAT CAN I DO IN THE KITCHEN?   · Clean up any spills right away.  · Avoid walking on wet floors.  · Place frequently used items in easy-to-reach places.  · If you need to reach for something above you, use a sturdy step stool that has a grab bar.  · Keep electrical cables out of the way.  · Do not use floor polish or wax that makes floors slippery. If you have to use wax, make sure that it is non-skid floor wax.  · Remove throw rugs and other tripping hazards from the floor.  WHAT CAN I DO IN THE STAIRWAYS?  · Do not leave any items on the stairs.  · Make sure that there are handrails on both sides of the stairs. Fix handrails that are broken or loose. Make sure that handrails are as long as the stairways.  · Check any carpeting to make sure that it is firmly attached to the stairs. Fix any carpet that is loose or worn.  · Avoid having throw rugs at the top or bottom of stairways, or secure the rugs with carpet  tape to prevent them from moving.  · Make sure that you have a light switch at the top of the stairs and the bottom of the stairs. If you do not have them, have them installed.  WHAT ARE SOME OTHER FALL PREVENTION TIPS?  · Wear closed-toe shoes that fit well and support your feet. Wear shoes that have rubber soles or low heels.  · When you use a stepladder, make sure that it is completely opened and that the sides are firmly locked. Have someone hold the ladder while you are using it. Do not climb a closed stepladder.  · Add color or contrast paint or tape to grab bars and handrails in your home. Place contrasting color strips on the first and last steps.  · Use mobility aids as needed, such as canes, walkers, scooters, and crutches.  · Turn on lights if it is dark. Replace any light bulbs that burn out.  · Set up furniture so that there are clear paths. Keep the furniture in the same spot.  · Fix any uneven floor surfaces.  · Choose a carpet design that does not hide the edge of steps of a stairway.  · Be aware of any and all pets.  · Review your medicines with your healthcare provider. Some medicines can cause dizziness or changes in blood pressure, which increase your risk of falling.  Talk with your health care provider about other ways that you can decrease your risk of falls. This may include working with a physical therapist or  to improve your strength, balance, and endurance.     This information is not intended to replace advice given to you by your health care provider. Make sure you discuss any questions you have with your health care provider.     Document Released: 12/08/2003 Document Revised: 05/03/2016 Document Reviewed: 01/22/2016  [employer] Interactive Patient Education ©2016 [employer]    Medicare Wellness  Personal Prevention Plan of Service     Date of Office Visit:  01/25/2017  Encounter Provider:  Jim Hopkins MD  Place of Service:  [facility]  MEDICINE  Patient Name: Ephraim Quinones  :  1950    As part of the Medicare Wellness portion of your visit today, we are providing you with this personalized preventive plan of services (PPPS). This plan is based upon recommendations of the [place] Preventive Services Task Force (USPSTF) and the Advisory Committee on Immunization Practices (ACIP).    This lists the preventive care services that should be considered, and provides dates of when you are due. Items listed as completed are up-to-date and do not require any further intervention.    Health Maintenance   Topic Date Due   • TDAP/TD VACCINES (1 - Tdap) 1969   • PNEUMOCOCCAL VACCINES (65+ LOW/MEDIUM RISK) (1 of 2 - PCV13) 2015   • HEPATITIS C SCREENING  10/15/2016   • ZOSTER VACCINE  10/15/2016   • COLONOSCOPY  10/25/2016   • DIABETIC FOOT EXAM  2017   • DIABETIC EYE EXAM  2017   • URINE MICROALBUMIN  2017   • MEDICARE ANNUAL WELLNESS  10/15/2016   • AAA SCREEN (ONE-TIME)  2017   • HEMOGLOBIN A1C  2017   • LIPID PANEL  2018   • INFLUENZA VACCINE  Completed

## 2017-01-26 LAB
BILIRUB BLD-MCNC: NEGATIVE MG/DL
CLARITY, POC: CLEAR
COLOR UR: YELLOW
GLUCOSE UR STRIP-MCNC: NEGATIVE MG/DL
KETONES UR QL: NEGATIVE
LEUKOCYTE EST, POC: NEGATIVE
NITRITE UR-MCNC: NEGATIVE MG/ML
PH UR: 6.5 [PH] (ref 5–8)
PROT UR STRIP-MCNC: NEGATIVE MG/DL
RBC # UR STRIP: ABNORMAL /UL
SP GR UR: 1.02 (ref 1–1.03)
UROBILINOGEN UR QL: NORMAL

## 2017-02-21 RX ORDER — HYDROCODONE BITARTRATE AND ACETAMINOPHEN 10; 325 MG/1; MG/1
1 TABLET ORAL EVERY 6 HOURS
Qty: 90 TABLET | Refills: 0 | Status: SHIPPED | OUTPATIENT
Start: 2017-02-21 | End: 2017-09-20 | Stop reason: SDUPTHER

## 2017-02-21 RX ORDER — COLCHICINE 0.6 MG/1
0.6 TABLET ORAL DAILY
Qty: 30 TABLET | Refills: 0 | Status: SHIPPED | OUTPATIENT
Start: 2017-02-21 | End: 2018-04-16 | Stop reason: SDUPTHER

## 2017-03-03 ENCOUNTER — HOSPITAL ENCOUNTER (OUTPATIENT)
Dept: PREADMISSION TESTING | Age: 67
Discharge: HOME | End: 2017-03-03
Payer: MEDICARE

## 2017-03-03 ENCOUNTER — HOSPITAL ENCOUNTER (OUTPATIENT)
Dept: GENERAL RADIOLOGY | Age: 67
Discharge: HOME | End: 2017-03-03
Payer: MEDICARE

## 2017-03-03 VITALS — WEIGHT: 295 LBS | BODY MASS INDEX: 44.71 KG/M2 | HEIGHT: 68 IN

## 2017-03-03 LAB
ALBUMIN SERPL-MCNC: 4.5 G/DL (ref 3.5–5.2)
ALP BLD-CCNC: 67 U/L (ref 40–130)
ALT SERPL-CCNC: 49 U/L (ref 5–41)
ANION GAP SERPL CALCULATED.3IONS-SCNC: 20 MMOL/L (ref 7–19)
APTT: 33.9 SEC (ref 26–36.2)
AST SERPL-CCNC: 25 U/L (ref 5–40)
BASOPHILS ABSOLUTE: 0 K/UL (ref 0–0.2)
BASOPHILS RELATIVE PERCENT: 0.5 % (ref 0–1)
BILIRUB SERPL-MCNC: 0.7 MG/DL (ref 0.2–1.2)
BILIRUBIN URINE: NEGATIVE
BLOOD, URINE: NEGATIVE
BUN BLDV-MCNC: 24 MG/DL (ref 8–23)
CALCIUM SERPL-MCNC: 9.6 MG/DL (ref 8.8–10.2)
CHLORIDE BLD-SCNC: 95 MMOL/L (ref 98–111)
CLARITY: CLEAR
CO2: 25 MMOL/L (ref 22–29)
COLOR: YELLOW
CREAT SERPL-MCNC: 0.7 MG/DL (ref 0.5–1.2)
EOSINOPHILS ABSOLUTE: 0.3 K/UL (ref 0–0.6)
EOSINOPHILS RELATIVE PERCENT: 3.3 % (ref 0–5)
GFR NON-AFRICAN AMERICAN: >60
GLOBULIN: 2.6 G/DL
GLUCOSE BLD-MCNC: 127 MG/DL (ref 74–109)
GLUCOSE URINE: NEGATIVE MG/DL
HBA1C MFR BLD: 6 %
HCT VFR BLD CALC: 47.2 % (ref 42–52)
HEMOGLOBIN: 16 G/DL (ref 14–18)
INR BLD: 1.09 (ref 0.88–1.18)
KETONES, URINE: NEGATIVE MG/DL
LEUKOCYTE ESTERASE, URINE: NEGATIVE
LYMPHOCYTES ABSOLUTE: 2.2 K/UL (ref 1.1–4.5)
LYMPHOCYTES RELATIVE PERCENT: 27.8 % (ref 20–40)
MCH RBC QN AUTO: 32.7 PG (ref 27–31)
MCHC RBC AUTO-ENTMCNC: 33.9 G/DL (ref 33–37)
MCV RBC AUTO: 96.3 FL (ref 80–94)
MONOCYTES ABSOLUTE: 0.6 K/UL (ref 0–0.9)
MONOCYTES RELATIVE PERCENT: 7.9 % (ref 0–10)
NEUTROPHILS ABSOLUTE: 4.8 K/UL (ref 1.5–7.5)
NEUTROPHILS RELATIVE PERCENT: 60.1 % (ref 50–65)
NITRITE, URINE: NEGATIVE
PDW BLD-RTO: 12.5 % (ref 11.5–14.5)
PH UA: 7.5
PLATELET # BLD: 223 K/UL (ref 130–400)
PMV BLD AUTO: 11.1 FL (ref 7.4–10.4)
POTASSIUM SERPL-SCNC: 3.7 MMOL/L (ref 3.5–5)
PROTEIN UA: NEGATIVE MG/DL
PROTHROMBIN TIME: 14.1 SEC (ref 12–14.6)
RBC # BLD: 4.9 M/UL (ref 4.7–6.1)
SODIUM BLD-SCNC: 140 MMOL/L (ref 136–145)
SPECIFIC GRAVITY UA: 1.01
TOTAL PROTEIN: 7.1 G/DL (ref 6.6–8.7)
UROBILINOGEN, URINE: 0.2 E.U./DL
WBC # BLD: 7.9 K/UL (ref 4.8–10.8)

## 2017-03-03 PROCEDURE — 93005 ELECTROCARDIOGRAM TRACING: CPT

## 2017-03-03 PROCEDURE — 71020 XR CHEST STANDARD TWO VW: CPT

## 2017-03-03 PROCEDURE — 87070 CULTURE OTHR SPECIMN AEROBIC: CPT

## 2017-03-03 PROCEDURE — 85025 COMPLETE CBC W/AUTO DIFF WBC: CPT

## 2017-03-03 PROCEDURE — 85610 PROTHROMBIN TIME: CPT

## 2017-03-03 PROCEDURE — 85730 THROMBOPLASTIN TIME PARTIAL: CPT

## 2017-03-03 PROCEDURE — 83036 HEMOGLOBIN GLYCOSYLATED A1C: CPT

## 2017-03-03 PROCEDURE — 80053 COMPREHEN METABOLIC PANEL: CPT

## 2017-03-03 PROCEDURE — 81003 URINALYSIS AUTO W/O SCOPE: CPT

## 2017-03-03 RX ORDER — RANITIDINE 150 MG/1
150 TABLET ORAL DAILY
COMMUNITY

## 2017-03-03 RX ORDER — PREGABALIN 75 MG/1
75 CAPSULE ORAL ONCE
Status: CANCELLED | OUTPATIENT
Start: 2017-03-22

## 2017-03-03 RX ORDER — DEXAMETHASONE SODIUM PHOSPHATE 10 MG/ML
10 INJECTION INTRAMUSCULAR; INTRAVENOUS ONCE
Status: CANCELLED | OUTPATIENT
Start: 2017-03-22

## 2017-03-03 RX ORDER — MULTIVIT-MIN/IRON/FOLIC ACID/K 18-600-40
2000 CAPSULE ORAL DAILY
COMMUNITY

## 2017-03-03 RX ORDER — CELECOXIB 200 MG/1
200 CAPSULE ORAL ONCE
Status: CANCELLED | OUTPATIENT
Start: 2017-03-22

## 2017-03-04 LAB
EKG P AXIS: NORMAL DEGREES
EKG P-R INTERVAL: NORMAL MS
EKG Q-T INTERVAL: 378 MS
EKG QRS DURATION: 82 MS
EKG QTC CALCULATION (BAZETT): 420 MS
EKG T AXIS: -15 DEGREES
MRSA CULTURE ONLY: NORMAL

## 2017-03-16 RX ORDER — GLIPIZIDE 5 MG/1
5 TABLET, FILM COATED, EXTENDED RELEASE ORAL DAILY
Qty: 90 TABLET | Refills: 1 | Status: SHIPPED | OUTPATIENT
Start: 2017-03-16 | End: 2017-09-18 | Stop reason: SDUPTHER

## 2017-03-22 ENCOUNTER — ANESTHESIA (OUTPATIENT)
Dept: OPERATING ROOM | Age: 67
DRG: 470 | End: 2017-03-22
Payer: MEDICARE

## 2017-03-22 ENCOUNTER — ANESTHESIA EVENT (OUTPATIENT)
Dept: OPERATING ROOM | Age: 67
DRG: 470 | End: 2017-03-22
Payer: MEDICARE

## 2017-03-22 ENCOUNTER — HOSPITAL ENCOUNTER (INPATIENT)
Age: 67
LOS: 2 days | Discharge: HOME HEALTH | DRG: 470 | End: 2017-03-24
Attending: ORTHOPAEDIC SURGERY | Admitting: ORTHOPAEDIC SURGERY
Payer: MEDICARE

## 2017-03-22 ENCOUNTER — APPOINTMENT (OUTPATIENT)
Dept: GENERAL RADIOLOGY | Age: 67
DRG: 470 | End: 2017-03-22
Attending: ORTHOPAEDIC SURGERY
Payer: MEDICARE

## 2017-03-22 VITALS
DIASTOLIC BLOOD PRESSURE: 65 MMHG | RESPIRATION RATE: 6 BRPM | OXYGEN SATURATION: 82 % | TEMPERATURE: 97 F | SYSTOLIC BLOOD PRESSURE: 133 MMHG

## 2017-03-22 LAB
ABO/RH: NORMAL
ANTIBODY SCREEN: NORMAL
GLUCOSE BLD-MCNC: 143 MG/DL (ref 70–99)
GLUCOSE BLD-MCNC: 190 MG/DL (ref 70–99)
GLUCOSE BLD-MCNC: 207 MG/DL (ref 70–99)
HBA1C MFR BLD: 5.7 %
PERFORMED ON: ABNORMAL

## 2017-03-22 PROCEDURE — 3700000000 HC ANESTHESIA ATTENDED CARE: Performed by: ORTHOPAEDIC SURGERY

## 2017-03-22 PROCEDURE — 7100000001 HC PACU RECOVERY - ADDTL 15 MIN: Performed by: ORTHOPAEDIC SURGERY

## 2017-03-22 PROCEDURE — 36415 COLL VENOUS BLD VENIPUNCTURE: CPT

## 2017-03-22 PROCEDURE — 6360000002 HC RX W HCPCS: Performed by: ORTHOPAEDIC SURGERY

## 2017-03-22 PROCEDURE — 2500000003 HC RX 250 WO HCPCS: Performed by: NURSE ANESTHETIST, CERTIFIED REGISTERED

## 2017-03-22 PROCEDURE — 3700000001 HC ADD 15 MINUTES (ANESTHESIA): Performed by: ORTHOPAEDIC SURGERY

## 2017-03-22 PROCEDURE — 86901 BLOOD TYPING SEROLOGIC RH(D): CPT

## 2017-03-22 PROCEDURE — 0SRD0J9 REPLACEMENT OF LEFT KNEE JOINT WITH SYNTHETIC SUBSTITUTE, CEMENTED, OPEN APPROACH: ICD-10-PCS | Performed by: ORTHOPAEDIC SURGERY

## 2017-03-22 PROCEDURE — 3600000015 HC SURGERY LEVEL 5 ADDTL 15MIN: Performed by: ORTHOPAEDIC SURGERY

## 2017-03-22 PROCEDURE — 2580000003 HC RX 258: Performed by: ORTHOPAEDIC SURGERY

## 2017-03-22 PROCEDURE — 3600000005 HC SURGERY LEVEL 5 BASE: Performed by: ORTHOPAEDIC SURGERY

## 2017-03-22 PROCEDURE — 2580000003 HC RX 258: Performed by: ANESTHESIOLOGY

## 2017-03-22 PROCEDURE — 7100000000 HC PACU RECOVERY - FIRST 15 MIN: Performed by: ORTHOPAEDIC SURGERY

## 2017-03-22 PROCEDURE — 76942 ECHO GUIDE FOR BIOPSY: CPT | Performed by: NURSE ANESTHETIST, CERTIFIED REGISTERED

## 2017-03-22 PROCEDURE — 2720000001 HC MISC SURG SUPPLY STERILE $51-500: Performed by: ORTHOPAEDIC SURGERY

## 2017-03-22 PROCEDURE — 6360000002 HC RX W HCPCS: Performed by: NURSE ANESTHETIST, CERTIFIED REGISTERED

## 2017-03-22 PROCEDURE — 2500000003 HC RX 250 WO HCPCS: Performed by: ORTHOPAEDIC SURGERY

## 2017-03-22 PROCEDURE — 83036 HEMOGLOBIN GLYCOSYLATED A1C: CPT

## 2017-03-22 PROCEDURE — 6370000000 HC RX 637 (ALT 250 FOR IP): Performed by: ORTHOPAEDIC SURGERY

## 2017-03-22 PROCEDURE — 2720000003 HC MISC SUTURE/STAPLES/RELOADS/ETC: Performed by: ORTHOPAEDIC SURGERY

## 2017-03-22 PROCEDURE — C1776 JOINT DEVICE (IMPLANTABLE): HCPCS | Performed by: ORTHOPAEDIC SURGERY

## 2017-03-22 PROCEDURE — 73560 X-RAY EXAM OF KNEE 1 OR 2: CPT

## 2017-03-22 PROCEDURE — 86900 BLOOD TYPING SEROLOGIC ABO: CPT

## 2017-03-22 PROCEDURE — 86850 RBC ANTIBODY SCREEN: CPT

## 2017-03-22 PROCEDURE — 1210000000 HC MED SURG R&B

## 2017-03-22 PROCEDURE — 82948 REAGENT STRIP/BLOOD GLUCOSE: CPT

## 2017-03-22 PROCEDURE — C1763 CONN TISS, NON-HUMAN: HCPCS | Performed by: ORTHOPAEDIC SURGERY

## 2017-03-22 PROCEDURE — C9290 INJ, BUPIVACAINE LIPOSOME: HCPCS | Performed by: ORTHOPAEDIC SURGERY

## 2017-03-22 PROCEDURE — 6360000002 HC RX W HCPCS: Performed by: ANESTHESIOLOGY

## 2017-03-22 PROCEDURE — C1713 ANCHOR/SCREW BN/BN,TIS/BN: HCPCS | Performed by: ORTHOPAEDIC SURGERY

## 2017-03-22 PROCEDURE — 94664 DEMO&/EVAL PT USE INHALER: CPT

## 2017-03-22 DEVICE — COMPONENT ARTC SURF PS 10-11 EF 10 MM LT TIB FIX BEAR: Type: IMPLANTABLE DEVICE | Site: KNEE | Status: FUNCTIONAL

## 2017-03-22 DEVICE — DISCONTINUED USE 415964 CEMENT PALACOS R + G SING DOSE 40GR: Type: IMPLANTABLE DEVICE | Site: KNEE | Status: FUNCTIONAL

## 2017-03-22 DEVICE — PSN TIB STM 5 DEG SZ F L: Type: IMPLANTABLE DEVICE | Site: TIBIA | Status: FUNCTIONAL

## 2017-03-22 DEVICE — COMPONENT PAT DIA35MM THK9MM KNEE POLY CEM CONVENTIONAL: Type: IMPLANTABLE DEVICE | Site: PATELLA | Status: FUNCTIONAL

## 2017-03-22 DEVICE — EXTENSION STEM L30MM DIA14MM KNEE TAPR CEM PERSONA: Type: IMPLANTABLE DEVICE | Site: TIBIA | Status: FUNCTIONAL

## 2017-03-22 DEVICE — IMPL KNEE FEM PSN PS CMT MRW SZ10 L: Type: IMPLANTABLE DEVICE | Site: FEMUR | Status: FUNCTIONAL

## 2017-03-22 RX ORDER — MEPERIDINE HYDROCHLORIDE 25 MG/ML
12.5 INJECTION INTRAMUSCULAR; INTRAVENOUS; SUBCUTANEOUS EVERY 5 MIN PRN
Status: DISCONTINUED | OUTPATIENT
Start: 2017-03-22 | End: 2017-03-22 | Stop reason: HOSPADM

## 2017-03-22 RX ORDER — LIDOCAINE HYDROCHLORIDE 10 MG/ML
INJECTION, SOLUTION EPIDURAL; INFILTRATION; INTRACAUDAL; PERINEURAL PRN
Status: DISCONTINUED | OUTPATIENT
Start: 2017-03-22 | End: 2017-03-22 | Stop reason: SDUPTHER

## 2017-03-22 RX ORDER — DEXTROSE MONOHYDRATE 25 G/50ML
12.5 INJECTION, SOLUTION INTRAVENOUS PRN
Status: DISCONTINUED | OUTPATIENT
Start: 2017-03-22 | End: 2017-03-24 | Stop reason: HOSPADM

## 2017-03-22 RX ORDER — TRANEXAMIC ACID 100 MG/ML
INJECTION, SOLUTION INTRAVENOUS PRN
Status: DISCONTINUED | OUTPATIENT
Start: 2017-03-22 | End: 2017-03-22 | Stop reason: SDUPTHER

## 2017-03-22 RX ORDER — HYDROCODONE BITARTRATE AND ACETAMINOPHEN 10; 325 MG/1; MG/1
1 TABLET ORAL EVERY 6 HOURS PRN
Status: DISCONTINUED | OUTPATIENT
Start: 2017-03-22 | End: 2017-03-23

## 2017-03-22 RX ORDER — 0.9 % SODIUM CHLORIDE 0.9 %
500 INTRAVENOUS SOLUTION INTRAVENOUS PRN
Status: DISCONTINUED | OUTPATIENT
Start: 2017-03-22 | End: 2017-03-24 | Stop reason: HOSPADM

## 2017-03-22 RX ORDER — GLYCOPYRROLATE 0.2 MG/ML
INJECTION INTRAMUSCULAR; INTRAVENOUS PRN
Status: DISCONTINUED | OUTPATIENT
Start: 2017-03-22 | End: 2017-03-22 | Stop reason: SDUPTHER

## 2017-03-22 RX ORDER — DEXTROSE MONOHYDRATE 50 MG/ML
100 INJECTION, SOLUTION INTRAVENOUS PRN
Status: DISCONTINUED | OUTPATIENT
Start: 2017-03-22 | End: 2017-03-24 | Stop reason: HOSPADM

## 2017-03-22 RX ORDER — LIDOCAINE HYDROCHLORIDE 10 MG/ML
1 INJECTION, SOLUTION EPIDURAL; INFILTRATION; INTRACAUDAL; PERINEURAL
Status: DISCONTINUED | OUTPATIENT
Start: 2017-03-22 | End: 2017-03-22 | Stop reason: HOSPADM

## 2017-03-22 RX ORDER — DIPHENHYDRAMINE HYDROCHLORIDE 50 MG/ML
12.5 INJECTION INTRAMUSCULAR; INTRAVENOUS
Status: DISCONTINUED | OUTPATIENT
Start: 2017-03-22 | End: 2017-03-22 | Stop reason: HOSPADM

## 2017-03-22 RX ORDER — LABETALOL HYDROCHLORIDE 5 MG/ML
5 INJECTION, SOLUTION INTRAVENOUS EVERY 10 MIN PRN
Status: DISCONTINUED | OUTPATIENT
Start: 2017-03-22 | End: 2017-03-22 | Stop reason: HOSPADM

## 2017-03-22 RX ORDER — ROCURONIUM BROMIDE 10 MG/ML
INJECTION, SOLUTION INTRAVENOUS PRN
Status: DISCONTINUED | OUTPATIENT
Start: 2017-03-22 | End: 2017-03-22 | Stop reason: SDUPTHER

## 2017-03-22 RX ORDER — ONDANSETRON 2 MG/ML
INJECTION INTRAMUSCULAR; INTRAVENOUS PRN
Status: DISCONTINUED | OUTPATIENT
Start: 2017-03-22 | End: 2017-03-22 | Stop reason: SDUPTHER

## 2017-03-22 RX ORDER — CELECOXIB 200 MG/1
200 CAPSULE ORAL ONCE
Status: COMPLETED | OUTPATIENT
Start: 2017-03-22 | End: 2017-03-22

## 2017-03-22 RX ORDER — SODIUM CHLORIDE 9 MG/ML
INJECTION, SOLUTION INTRAVENOUS CONTINUOUS
Status: DISCONTINUED | OUTPATIENT
Start: 2017-03-22 | End: 2017-03-23

## 2017-03-22 RX ORDER — DEXAMETHASONE SODIUM PHOSPHATE 10 MG/ML
10 INJECTION INTRAMUSCULAR; INTRAVENOUS ONCE
Status: DISCONTINUED | OUTPATIENT
Start: 2017-03-22 | End: 2017-03-22 | Stop reason: HOSPADM

## 2017-03-22 RX ORDER — MIDAZOLAM HYDROCHLORIDE 1 MG/ML
INJECTION INTRAMUSCULAR; INTRAVENOUS PRN
Status: DISCONTINUED | OUTPATIENT
Start: 2017-03-22 | End: 2017-03-22 | Stop reason: SDUPTHER

## 2017-03-22 RX ORDER — FENTANYL CITRATE 50 UG/ML
25 INJECTION, SOLUTION INTRAMUSCULAR; INTRAVENOUS
Status: DISCONTINUED | OUTPATIENT
Start: 2017-03-22 | End: 2017-03-22 | Stop reason: HOSPADM

## 2017-03-22 RX ORDER — PREGABALIN 75 MG/1
75 CAPSULE ORAL ONCE
Status: COMPLETED | OUTPATIENT
Start: 2017-03-22 | End: 2017-03-22

## 2017-03-22 RX ORDER — SODIUM CHLORIDE, SODIUM LACTATE, POTASSIUM CHLORIDE, CALCIUM CHLORIDE 600; 310; 30; 20 MG/100ML; MG/100ML; MG/100ML; MG/100ML
INJECTION, SOLUTION INTRAVENOUS CONTINUOUS
Status: DISCONTINUED | OUTPATIENT
Start: 2017-03-22 | End: 2017-03-22

## 2017-03-22 RX ORDER — NICOTINE POLACRILEX 4 MG
15 LOZENGE BUCCAL PRN
Status: DISCONTINUED | OUTPATIENT
Start: 2017-03-22 | End: 2017-03-24 | Stop reason: HOSPADM

## 2017-03-22 RX ORDER — SODIUM CHLORIDE 0.9 % (FLUSH) 0.9 %
10 SYRINGE (ML) INJECTION PRN
Status: DISCONTINUED | OUTPATIENT
Start: 2017-03-22 | End: 2017-03-24 | Stop reason: HOSPADM

## 2017-03-22 RX ORDER — PROMETHAZINE HYDROCHLORIDE 25 MG/ML
6.25 INJECTION, SOLUTION INTRAMUSCULAR; INTRAVENOUS
Status: DISCONTINUED | OUTPATIENT
Start: 2017-03-22 | End: 2017-03-22 | Stop reason: HOSPADM

## 2017-03-22 RX ORDER — HYDRALAZINE HYDROCHLORIDE 20 MG/ML
5 INJECTION INTRAMUSCULAR; INTRAVENOUS EVERY 10 MIN PRN
Status: DISCONTINUED | OUTPATIENT
Start: 2017-03-22 | End: 2017-03-22 | Stop reason: HOSPADM

## 2017-03-22 RX ORDER — HYDROCHLOROTHIAZIDE 25 MG/1
25 TABLET ORAL DAILY
Status: DISCONTINUED | OUTPATIENT
Start: 2017-03-22 | End: 2017-03-24 | Stop reason: HOSPADM

## 2017-03-22 RX ORDER — MORPHINE SULFATE 4 MG/ML
4 INJECTION, SOLUTION INTRAMUSCULAR; INTRAVENOUS EVERY 5 MIN PRN
Status: DISCONTINUED | OUTPATIENT
Start: 2017-03-22 | End: 2017-03-22 | Stop reason: HOSPADM

## 2017-03-22 RX ORDER — SODIUM CHLORIDE 0.9 % (FLUSH) 0.9 %
10 SYRINGE (ML) INJECTION EVERY 12 HOURS SCHEDULED
Status: DISCONTINUED | OUTPATIENT
Start: 2017-03-22 | End: 2017-03-22 | Stop reason: HOSPADM

## 2017-03-22 RX ORDER — ONDANSETRON 2 MG/ML
INJECTION INTRAMUSCULAR; INTRAVENOUS PRN
Status: DISCONTINUED | OUTPATIENT
Start: 2017-03-22 | End: 2017-03-22

## 2017-03-22 RX ORDER — MIDAZOLAM HYDROCHLORIDE 1 MG/ML
2 INJECTION INTRAMUSCULAR; INTRAVENOUS
Status: COMPLETED | OUTPATIENT
Start: 2017-03-22 | End: 2017-03-22

## 2017-03-22 RX ORDER — SODIUM CHLORIDE 0.9 % (FLUSH) 0.9 %
10 SYRINGE (ML) INJECTION EVERY 12 HOURS SCHEDULED
Status: DISCONTINUED | OUTPATIENT
Start: 2017-03-22 | End: 2017-03-24 | Stop reason: HOSPADM

## 2017-03-22 RX ORDER — FENTANYL CITRATE 50 UG/ML
50 INJECTION, SOLUTION INTRAMUSCULAR; INTRAVENOUS
Status: DISCONTINUED | OUTPATIENT
Start: 2017-03-22 | End: 2017-03-22 | Stop reason: HOSPADM

## 2017-03-22 RX ORDER — ZOLPIDEM TARTRATE 5 MG/1
5 TABLET ORAL NIGHTLY PRN
Status: DISCONTINUED | OUTPATIENT
Start: 2017-03-22 | End: 2017-03-24 | Stop reason: HOSPADM

## 2017-03-22 RX ORDER — CLINDAMYCIN PHOSPHATE 900 MG/50ML
900 INJECTION INTRAVENOUS EVERY 8 HOURS
Status: DISCONTINUED | OUTPATIENT
Start: 2017-03-22 | End: 2017-03-24 | Stop reason: HOSPADM

## 2017-03-22 RX ORDER — FAMOTIDINE 20 MG/1
20 TABLET, FILM COATED ORAL DAILY
Status: DISCONTINUED | OUTPATIENT
Start: 2017-03-22 | End: 2017-03-24 | Stop reason: HOSPADM

## 2017-03-22 RX ORDER — SODIUM CHLORIDE 0.9 % (FLUSH) 0.9 %
10 SYRINGE (ML) INJECTION PRN
Status: DISCONTINUED | OUTPATIENT
Start: 2017-03-22 | End: 2017-03-22 | Stop reason: HOSPADM

## 2017-03-22 RX ORDER — DOCUSATE SODIUM 100 MG/1
100 CAPSULE, LIQUID FILLED ORAL 2 TIMES DAILY
Status: DISCONTINUED | OUTPATIENT
Start: 2017-03-22 | End: 2017-03-24 | Stop reason: HOSPADM

## 2017-03-22 RX ORDER — GLIPIZIDE 5 MG/1
5 TABLET ORAL DAILY
Status: DISCONTINUED | OUTPATIENT
Start: 2017-03-22 | End: 2017-03-24 | Stop reason: HOSPADM

## 2017-03-22 RX ORDER — ROPIVACAINE HYDROCHLORIDE 5 MG/ML
INJECTION, SOLUTION EPIDURAL; INFILTRATION; PERINEURAL PRN
Status: DISCONTINUED | OUTPATIENT
Start: 2017-03-22 | End: 2017-03-22 | Stop reason: SDUPTHER

## 2017-03-22 RX ORDER — TAMSULOSIN HYDROCHLORIDE 0.4 MG/1
0.4 CAPSULE ORAL DAILY
Status: DISCONTINUED | OUTPATIENT
Start: 2017-03-22 | End: 2017-03-24 | Stop reason: HOSPADM

## 2017-03-22 RX ORDER — METOCLOPRAMIDE HYDROCHLORIDE 5 MG/ML
10 INJECTION INTRAMUSCULAR; INTRAVENOUS
Status: DISCONTINUED | OUTPATIENT
Start: 2017-03-22 | End: 2017-03-22 | Stop reason: HOSPADM

## 2017-03-22 RX ORDER — ENALAPRILAT 2.5 MG/2ML
1.25 INJECTION INTRAVENOUS
Status: DISCONTINUED | OUTPATIENT
Start: 2017-03-22 | End: 2017-03-22 | Stop reason: HOSPADM

## 2017-03-22 RX ORDER — ONDANSETRON 2 MG/ML
4 INJECTION INTRAMUSCULAR; INTRAVENOUS EVERY 6 HOURS PRN
Status: DISCONTINUED | OUTPATIENT
Start: 2017-03-22 | End: 2017-03-24 | Stop reason: HOSPADM

## 2017-03-22 RX ORDER — TRAMADOL HYDROCHLORIDE 50 MG/1
50 TABLET ORAL EVERY 6 HOURS
Status: DISCONTINUED | OUTPATIENT
Start: 2017-03-22 | End: 2017-03-24 | Stop reason: HOSPADM

## 2017-03-22 RX ORDER — MORPHINE SULFATE 4 MG/ML
2 INJECTION, SOLUTION INTRAMUSCULAR; INTRAVENOUS EVERY 5 MIN PRN
Status: DISCONTINUED | OUTPATIENT
Start: 2017-03-22 | End: 2017-03-22 | Stop reason: HOSPADM

## 2017-03-22 RX ORDER — DIAZEPAM 5 MG/1
5 TABLET ORAL EVERY 6 HOURS PRN
Status: DISCONTINUED | OUTPATIENT
Start: 2017-03-22 | End: 2017-03-24 | Stop reason: HOSPADM

## 2017-03-22 RX ORDER — FENTANYL CITRATE 50 UG/ML
INJECTION, SOLUTION INTRAMUSCULAR; INTRAVENOUS PRN
Status: DISCONTINUED | OUTPATIENT
Start: 2017-03-22 | End: 2017-03-22 | Stop reason: SDUPTHER

## 2017-03-22 RX ORDER — PROPOFOL 10 MG/ML
INJECTION, EMULSION INTRAVENOUS PRN
Status: DISCONTINUED | OUTPATIENT
Start: 2017-03-22 | End: 2017-03-22 | Stop reason: SDUPTHER

## 2017-03-22 RX ADMIN — GLYCOPYRROLATE 0.4 MG: 0.2 INJECTION, SOLUTION INTRAMUSCULAR; INTRAVENOUS at 12:35

## 2017-03-22 RX ADMIN — TAMSULOSIN HYDROCHLORIDE 0.4 MG: 0.4 CAPSULE ORAL at 16:11

## 2017-03-22 RX ADMIN — HYDROMORPHONE HYDROCHLORIDE 1 MG: 1 INJECTION, SOLUTION INTRAMUSCULAR; INTRAVENOUS; SUBCUTANEOUS at 13:57

## 2017-03-22 RX ADMIN — ROPIVACAINE HYDROCHLORIDE 20 ML: 5 INJECTION, SOLUTION EPIDURAL; INFILTRATION; PERINEURAL at 08:11

## 2017-03-22 RX ADMIN — FAMOTIDINE 20 MG: 20 TABLET ORAL at 16:11

## 2017-03-22 RX ADMIN — FENTANYL CITRATE 25 MCG: 50 INJECTION INTRAMUSCULAR; INTRAVENOUS at 12:20

## 2017-03-22 RX ADMIN — FENTANYL CITRATE 100 MCG: 50 INJECTION INTRAMUSCULAR; INTRAVENOUS at 10:09

## 2017-03-22 RX ADMIN — MIDAZOLAM HYDROCHLORIDE 2 MG: 1 INJECTION, SOLUTION INTRAMUSCULAR; INTRAVENOUS at 10:03

## 2017-03-22 RX ADMIN — Medication 2 G: at 10:30

## 2017-03-22 RX ADMIN — HYDROMORPHONE HYDROCHLORIDE 1 MG: 1 INJECTION, SOLUTION INTRAMUSCULAR; INTRAVENOUS; SUBCUTANEOUS at 21:16

## 2017-03-22 RX ADMIN — SODIUM CHLORIDE, SODIUM LACTATE, POTASSIUM CHLORIDE, AND CALCIUM CHLORIDE: 600; 310; 30; 20 INJECTION, SOLUTION INTRAVENOUS at 10:45

## 2017-03-22 RX ADMIN — CLINDAMYCIN PHOSPHATE 900 MG: 18 INJECTION, SOLUTION INTRAVENOUS at 17:25

## 2017-03-22 RX ADMIN — HYDROMORPHONE HYDROCHLORIDE 0.5 MG: 1 INJECTION, SOLUTION INTRAMUSCULAR; INTRAVENOUS; SUBCUTANEOUS at 12:16

## 2017-03-22 RX ADMIN — HYDROCHLOROTHIAZIDE 25 MG: 25 TABLET ORAL at 16:11

## 2017-03-22 RX ADMIN — ONDANSETRON HYDROCHLORIDE 4 MG: 2 INJECTION, SOLUTION INTRAVENOUS at 11:55

## 2017-03-22 RX ADMIN — HYDROMORPHONE HYDROCHLORIDE 1 MG: 1 INJECTION, SOLUTION INTRAMUSCULAR; INTRAVENOUS; SUBCUTANEOUS at 15:11

## 2017-03-22 RX ADMIN — TRANEXAMIC ACID 1000 MG: 100 INJECTION, SOLUTION INTRAVENOUS at 10:15

## 2017-03-22 RX ADMIN — LIDOCAINE HYDROCHLORIDE 5 ML: 10 INJECTION, SOLUTION EPIDURAL; INFILTRATION; INTRACAUDAL; PERINEURAL at 10:09

## 2017-03-22 RX ADMIN — HYDROCODONE BITARTRATE AND ACETAMINOPHEN 1 TABLET: 10; 325 TABLET ORAL at 22:12

## 2017-03-22 RX ADMIN — NEOSTIGMINE METHYLSULFATE 3 MG: 1 INJECTION, SOLUTION INTRAMUSCULAR; INTRAVENOUS; SUBCUTANEOUS at 12:35

## 2017-03-22 RX ADMIN — HYDROCODONE BITARTRATE AND ACETAMINOPHEN 1 TABLET: 10; 325 TABLET ORAL at 16:12

## 2017-03-22 RX ADMIN — GLIPIZIDE 5 MG: 5 TABLET ORAL at 16:11

## 2017-03-22 RX ADMIN — INSULIN LISPRO 1 UNITS: 100 INJECTION, SOLUTION INTRAVENOUS; SUBCUTANEOUS at 21:16

## 2017-03-22 RX ADMIN — CEFAZOLIN 3 G: 1 INJECTION, POWDER, FOR SOLUTION INTRAMUSCULAR; INTRAVENOUS; PARENTERAL at 18:12

## 2017-03-22 RX ADMIN — DOCUSATE SODIUM 100 MG: 100 CAPSULE, LIQUID FILLED ORAL at 19:43

## 2017-03-22 RX ADMIN — ROCURONIUM BROMIDE 50 MG: 10 INJECTION INTRAVENOUS at 10:09

## 2017-03-22 RX ADMIN — HYDROMORPHONE HYDROCHLORIDE 1 MG: 1 INJECTION, SOLUTION INTRAMUSCULAR; INTRAVENOUS; SUBCUTANEOUS at 18:12

## 2017-03-22 RX ADMIN — APIXABAN 2.5 MG: 2.5 TABLET, FILM COATED ORAL at 19:43

## 2017-03-22 RX ADMIN — FENTANYL CITRATE 50 MCG: 50 INJECTION INTRAMUSCULAR; INTRAVENOUS at 10:15

## 2017-03-22 RX ADMIN — TRANEXAMIC ACID 1000 MG: 100 INJECTION, SOLUTION INTRAVENOUS at 11:55

## 2017-03-22 RX ADMIN — FENTANYL CITRATE 25 MCG: 50 INJECTION INTRAMUSCULAR; INTRAVENOUS at 12:17

## 2017-03-22 RX ADMIN — TRAMADOL HYDROCHLORIDE 50 MG: 50 TABLET ORAL at 19:43

## 2017-03-22 RX ADMIN — VITAMIN D, TAB 1000IU (100/BT) 2000 UNITS: 25 TAB at 16:12

## 2017-03-22 RX ADMIN — HYDROMORPHONE HYDROCHLORIDE 0.5 MG: 1 INJECTION, SOLUTION INTRAMUSCULAR; INTRAVENOUS; SUBCUTANEOUS at 12:20

## 2017-03-22 RX ADMIN — SODIUM CHLORIDE: 9 INJECTION, SOLUTION INTRAVENOUS at 16:15

## 2017-03-22 RX ADMIN — FENTANYL CITRATE 50 MCG: 50 INJECTION INTRAMUSCULAR; INTRAVENOUS at 10:20

## 2017-03-22 RX ADMIN — MIDAZOLAM 2 MG: 1 INJECTION INTRAMUSCULAR; INTRAVENOUS at 08:08

## 2017-03-22 RX ADMIN — SODIUM CHLORIDE, SODIUM LACTATE, POTASSIUM CHLORIDE, AND CALCIUM CHLORIDE: 600; 310; 30; 20 INJECTION, SOLUTION INTRAVENOUS at 07:55

## 2017-03-22 RX ADMIN — PREGABALIN 75 MG: 75 CAPSULE ORAL at 07:54

## 2017-03-22 RX ADMIN — PROPOFOL 200 MG: 10 INJECTION, EMULSION INTRAVENOUS at 10:09

## 2017-03-22 RX ADMIN — ROCURONIUM BROMIDE 10 MG: 10 INJECTION INTRAVENOUS at 10:25

## 2017-03-22 RX ADMIN — CELECOXIB 200 MG: 200 CAPSULE ORAL at 07:54

## 2017-03-22 RX ADMIN — ROCURONIUM BROMIDE 20 MG: 10 INJECTION INTRAVENOUS at 10:45

## 2017-03-22 ASSESSMENT — PAIN SCALES - GENERAL
PAINLEVEL_OUTOF10: 8
PAINLEVEL_OUTOF10: 5
PAINLEVEL_OUTOF10: 7
PAINLEVEL_OUTOF10: 7
PAINLEVEL_OUTOF10: 10
PAINLEVEL_OUTOF10: 8
PAINLEVEL_OUTOF10: 5
PAINLEVEL_OUTOF10: 7
PAINLEVEL_OUTOF10: 6
PAINLEVEL_OUTOF10: 6
PAINLEVEL_OUTOF10: 10
PAINLEVEL_OUTOF10: 6
PAINLEVEL_OUTOF10: 7

## 2017-03-22 ASSESSMENT — PAIN DESCRIPTION - PAIN TYPE
TYPE: ACUTE PAIN;SURGICAL PAIN
TYPE: SURGICAL PAIN

## 2017-03-22 ASSESSMENT — PAIN DESCRIPTION - LOCATION: LOCATION: KNEE

## 2017-03-22 ASSESSMENT — PAIN DESCRIPTION - PROGRESSION: CLINICAL_PROGRESSION: NOT CHANGED

## 2017-03-22 ASSESSMENT — PAIN DESCRIPTION - ORIENTATION: ORIENTATION: POSTERIOR

## 2017-03-22 ASSESSMENT — PAIN DESCRIPTION - FREQUENCY: FREQUENCY: CONTINUOUS

## 2017-03-22 ASSESSMENT — PAIN DESCRIPTION - ONSET: ONSET: ON-GOING

## 2017-03-22 ASSESSMENT — PAIN DESCRIPTION - DESCRIPTORS: DESCRIPTORS: ACHING;THROBBING

## 2017-03-23 PROBLEM — E66.01 MORBID OBESITY DUE TO EXCESS CALORIES (HCC): Status: ACTIVE | Noted: 2017-03-23

## 2017-03-23 PROBLEM — E87.6 HYPOKALEMIA: Status: ACTIVE | Noted: 2017-03-23

## 2017-03-23 PROBLEM — J41.0 SIMPLE CHRONIC BRONCHITIS (HCC): Status: ACTIVE | Noted: 2017-03-23

## 2017-03-23 LAB
ANION GAP SERPL CALCULATED.3IONS-SCNC: 13 MMOL/L (ref 7–19)
BUN BLDV-MCNC: 18 MG/DL (ref 8–23)
CALCIUM SERPL-MCNC: 8.6 MG/DL (ref 8.8–10.2)
CHLORIDE BLD-SCNC: 93 MMOL/L (ref 98–111)
CO2: 26 MMOL/L (ref 22–29)
CREAT SERPL-MCNC: 0.6 MG/DL (ref 0.5–1.2)
GFR NON-AFRICAN AMERICAN: >60
GLUCOSE BLD-MCNC: 159 MG/DL (ref 70–99)
GLUCOSE BLD-MCNC: 172 MG/DL (ref 74–109)
GLUCOSE BLD-MCNC: 180 MG/DL (ref 70–99)
GLUCOSE BLD-MCNC: 192 MG/DL (ref 70–99)
GLUCOSE BLD-MCNC: 192 MG/DL (ref 70–99)
HCT VFR BLD CALC: 41.3 % (ref 42–52)
HEMOGLOBIN: 14.2 G/DL (ref 14–18)
PERFORMED ON: ABNORMAL
POTASSIUM SERPL-SCNC: 3.4 MMOL/L (ref 3.5–5)
SODIUM BLD-SCNC: 132 MMOL/L (ref 136–145)

## 2017-03-23 PROCEDURE — 6360000002 HC RX W HCPCS: Performed by: ORTHOPAEDIC SURGERY

## 2017-03-23 PROCEDURE — G8979 MOBILITY GOAL STATUS: HCPCS

## 2017-03-23 PROCEDURE — 82948 REAGENT STRIP/BLOOD GLUCOSE: CPT

## 2017-03-23 PROCEDURE — 6370000000 HC RX 637 (ALT 250 FOR IP): Performed by: ORTHOPAEDIC SURGERY

## 2017-03-23 PROCEDURE — 1210000000 HC MED SURG R&B

## 2017-03-23 PROCEDURE — 97162 PT EVAL MOD COMPLEX 30 MIN: CPT

## 2017-03-23 PROCEDURE — 85014 HEMATOCRIT: CPT

## 2017-03-23 PROCEDURE — 6370000000 HC RX 637 (ALT 250 FOR IP): Performed by: PHYSICIAN ASSISTANT

## 2017-03-23 PROCEDURE — G8978 MOBILITY CURRENT STATUS: HCPCS

## 2017-03-23 PROCEDURE — G8988 SELF CARE GOAL STATUS: HCPCS

## 2017-03-23 PROCEDURE — 36415 COLL VENOUS BLD VENIPUNCTURE: CPT

## 2017-03-23 PROCEDURE — 2580000003 HC RX 258: Performed by: ORTHOPAEDIC SURGERY

## 2017-03-23 PROCEDURE — 85018 HEMOGLOBIN: CPT

## 2017-03-23 PROCEDURE — 2500000003 HC RX 250 WO HCPCS: Performed by: ORTHOPAEDIC SURGERY

## 2017-03-23 PROCEDURE — 97166 OT EVAL MOD COMPLEX 45 MIN: CPT

## 2017-03-23 PROCEDURE — G8987 SELF CARE CURRENT STATUS: HCPCS

## 2017-03-23 PROCEDURE — 80048 BASIC METABOLIC PNL TOTAL CA: CPT

## 2017-03-23 PROCEDURE — 6370000000 HC RX 637 (ALT 250 FOR IP): Performed by: FAMILY MEDICINE

## 2017-03-23 RX ORDER — HYDROMORPHONE HYDROCHLORIDE 2 MG/1
4 TABLET ORAL
Status: DISCONTINUED | OUTPATIENT
Start: 2017-03-23 | End: 2017-03-24 | Stop reason: HOSPADM

## 2017-03-23 RX ORDER — CLONIDINE HYDROCHLORIDE 0.1 MG/1
0.1 TABLET ORAL EVERY 4 HOURS PRN
Status: DISCONTINUED | OUTPATIENT
Start: 2017-03-23 | End: 2017-03-24 | Stop reason: HOSPADM

## 2017-03-23 RX ORDER — MORPHINE SULFATE 15 MG/1
30 TABLET, FILM COATED, EXTENDED RELEASE ORAL EVERY 12 HOURS SCHEDULED
Status: DISCONTINUED | OUTPATIENT
Start: 2017-03-23 | End: 2017-03-24 | Stop reason: HOSPADM

## 2017-03-23 RX ORDER — HYDROMORPHONE HYDROCHLORIDE 2 MG/1
2 TABLET ORAL
Status: DISCONTINUED | OUTPATIENT
Start: 2017-03-23 | End: 2017-03-24 | Stop reason: HOSPADM

## 2017-03-23 RX ORDER — POTASSIUM CHLORIDE 20 MEQ/1
20 TABLET, EXTENDED RELEASE ORAL
Status: DISCONTINUED | OUTPATIENT
Start: 2017-03-23 | End: 2017-03-24 | Stop reason: HOSPADM

## 2017-03-23 RX ADMIN — TRAMADOL HYDROCHLORIDE 50 MG: 50 TABLET ORAL at 09:29

## 2017-03-23 RX ADMIN — APIXABAN 2.5 MG: 2.5 TABLET, FILM COATED ORAL at 20:19

## 2017-03-23 RX ADMIN — DIAZEPAM 5 MG: 5 TABLET ORAL at 06:12

## 2017-03-23 RX ADMIN — HYDROMORPHONE HYDROCHLORIDE 4 MG: 2 TABLET ORAL at 08:02

## 2017-03-23 RX ADMIN — HYDROMORPHONE HYDROCHLORIDE 4 MG: 2 TABLET ORAL at 17:09

## 2017-03-23 RX ADMIN — TRAMADOL HYDROCHLORIDE 50 MG: 50 TABLET ORAL at 20:18

## 2017-03-23 RX ADMIN — DOCUSATE SODIUM 100 MG: 100 CAPSULE, LIQUID FILLED ORAL at 20:18

## 2017-03-23 RX ADMIN — TRAMADOL HYDROCHLORIDE 50 MG: 50 TABLET ORAL at 02:18

## 2017-03-23 RX ADMIN — MORPHINE SULFATE 30 MG: 15 TABLET, EXTENDED RELEASE ORAL at 08:01

## 2017-03-23 RX ADMIN — HYDROMORPHONE HYDROCHLORIDE 4 MG: 2 TABLET ORAL at 14:06

## 2017-03-23 RX ADMIN — HYDROMORPHONE HYDROCHLORIDE 1 MG: 1 INJECTION, SOLUTION INTRAMUSCULAR; INTRAVENOUS; SUBCUTANEOUS at 06:13

## 2017-03-23 RX ADMIN — CLINDAMYCIN PHOSPHATE 900 MG: 18 INJECTION, SOLUTION INTRAVENOUS at 09:28

## 2017-03-23 RX ADMIN — HYDROCODONE BITARTRATE AND ACETAMINOPHEN 1 TABLET: 10; 325 TABLET ORAL at 04:25

## 2017-03-23 RX ADMIN — GLIPIZIDE 5 MG: 5 TABLET ORAL at 09:30

## 2017-03-23 RX ADMIN — HYDROMORPHONE HYDROCHLORIDE 1 MG: 1 INJECTION, SOLUTION INTRAMUSCULAR; INTRAVENOUS; SUBCUTANEOUS at 12:27

## 2017-03-23 RX ADMIN — HYDROMORPHONE HYDROCHLORIDE 1 MG: 1 INJECTION, SOLUTION INTRAMUSCULAR; INTRAVENOUS; SUBCUTANEOUS at 00:10

## 2017-03-23 RX ADMIN — CLINDAMYCIN PHOSPHATE 900 MG: 18 INJECTION, SOLUTION INTRAVENOUS at 16:09

## 2017-03-23 RX ADMIN — VITAMIN D, TAB 1000IU (100/BT) 2000 UNITS: 25 TAB at 09:28

## 2017-03-23 RX ADMIN — HYDROCHLOROTHIAZIDE 25 MG: 25 TABLET ORAL at 09:29

## 2017-03-23 RX ADMIN — CLINDAMYCIN PHOSPHATE 900 MG: 18 INJECTION, SOLUTION INTRAVENOUS at 23:54

## 2017-03-23 RX ADMIN — DIAZEPAM 5 MG: 5 TABLET ORAL at 14:06

## 2017-03-23 RX ADMIN — APIXABAN 2.5 MG: 2.5 TABLET, FILM COATED ORAL at 09:29

## 2017-03-23 RX ADMIN — POTASSIUM CHLORIDE 20 MEQ: 20 TABLET, EXTENDED RELEASE ORAL at 09:29

## 2017-03-23 RX ADMIN — Medication 10 ML: at 20:20

## 2017-03-23 RX ADMIN — HYDROMORPHONE HYDROCHLORIDE 2 MG: 1 INJECTION, SOLUTION INTRAMUSCULAR; INTRAVENOUS; SUBCUTANEOUS at 15:53

## 2017-03-23 RX ADMIN — INSULIN LISPRO 1 UNITS: 100 INJECTION, SOLUTION INTRAVENOUS; SUBCUTANEOUS at 21:52

## 2017-03-23 RX ADMIN — FAMOTIDINE 20 MG: 20 TABLET ORAL at 09:28

## 2017-03-23 RX ADMIN — HYDROMORPHONE HYDROCHLORIDE 4 MG: 2 TABLET ORAL at 11:14

## 2017-03-23 RX ADMIN — DIAZEPAM 5 MG: 5 TABLET ORAL at 00:11

## 2017-03-23 RX ADMIN — TAMSULOSIN HYDROCHLORIDE 0.4 MG: 0.4 CAPSULE ORAL at 09:29

## 2017-03-23 RX ADMIN — CEFAZOLIN 3 G: 1 INJECTION, POWDER, FOR SOLUTION INTRAMUSCULAR; INTRAVENOUS; PARENTERAL at 02:17

## 2017-03-23 RX ADMIN — CLINDAMYCIN PHOSPHATE 900 MG: 18 INJECTION, SOLUTION INTRAVENOUS at 00:11

## 2017-03-23 RX ADMIN — HYDROMORPHONE HYDROCHLORIDE 4 MG: 2 TABLET ORAL at 20:19

## 2017-03-23 RX ADMIN — HYDROMORPHONE HYDROCHLORIDE 1 MG: 1 INJECTION, SOLUTION INTRAMUSCULAR; INTRAVENOUS; SUBCUTANEOUS at 03:10

## 2017-03-23 RX ADMIN — DOCUSATE SODIUM 100 MG: 100 CAPSULE, LIQUID FILLED ORAL at 09:29

## 2017-03-23 RX ADMIN — HYDROMORPHONE HYDROCHLORIDE 1 MG: 1 INJECTION, SOLUTION INTRAMUSCULAR; INTRAVENOUS; SUBCUTANEOUS at 09:15

## 2017-03-23 RX ADMIN — SODIUM CHLORIDE: 9 INJECTION, SOLUTION INTRAVENOUS at 02:24

## 2017-03-23 RX ADMIN — TRAMADOL HYDROCHLORIDE 50 MG: 50 TABLET ORAL at 16:00

## 2017-03-23 RX ADMIN — MORPHINE SULFATE 30 MG: 15 TABLET, EXTENDED RELEASE ORAL at 20:18

## 2017-03-23 RX ADMIN — MAGNESIUM HYDROXIDE 30 ML: 400 SUSPENSION ORAL at 16:08

## 2017-03-23 RX ADMIN — HYDROMORPHONE HYDROCHLORIDE 4 MG: 2 TABLET ORAL at 23:54

## 2017-03-23 ASSESSMENT — PAIN SCALES - GENERAL
PAINLEVEL_OUTOF10: 10
PAINLEVEL_OUTOF10: 6
PAINLEVEL_OUTOF10: 7
PAINLEVEL_OUTOF10: 10
PAINLEVEL_OUTOF10: 8
PAINLEVEL_OUTOF10: 10
PAINLEVEL_OUTOF10: 10
PAINLEVEL_OUTOF10: 8
PAINLEVEL_OUTOF10: 10
PAINLEVEL_OUTOF10: 6
PAINLEVEL_OUTOF10: 10
PAINLEVEL_OUTOF10: 10
PAINLEVEL_OUTOF10: 7
PAINLEVEL_OUTOF10: 10
PAINLEVEL_OUTOF10: 6
PAINLEVEL_OUTOF10: 7
PAINLEVEL_OUTOF10: 7
PAINLEVEL_OUTOF10: 3

## 2017-03-23 ASSESSMENT — PAIN DESCRIPTION - LOCATION
LOCATION: KNEE
LOCATION: KNEE

## 2017-03-23 ASSESSMENT — PAIN DESCRIPTION - PAIN TYPE
TYPE: SURGICAL PAIN
TYPE: SURGICAL PAIN

## 2017-03-23 ASSESSMENT — PAIN DESCRIPTION - ORIENTATION
ORIENTATION: LEFT
ORIENTATION: LEFT

## 2017-03-24 VITALS
SYSTOLIC BLOOD PRESSURE: 155 MMHG | OXYGEN SATURATION: 97 % | DIASTOLIC BLOOD PRESSURE: 85 MMHG | WEIGHT: 297 LBS | HEIGHT: 67 IN | RESPIRATION RATE: 18 BRPM | HEART RATE: 86 BPM | TEMPERATURE: 98.4 F | BODY MASS INDEX: 46.62 KG/M2

## 2017-03-24 LAB
ANION GAP SERPL CALCULATED.3IONS-SCNC: 15 MMOL/L (ref 7–19)
BUN BLDV-MCNC: 10 MG/DL (ref 8–23)
CALCIUM SERPL-MCNC: 9 MG/DL (ref 8.8–10.2)
CHLORIDE BLD-SCNC: 88 MMOL/L (ref 98–111)
CO2: 26 MMOL/L (ref 22–29)
CREAT SERPL-MCNC: 0.5 MG/DL (ref 0.5–1.2)
GFR NON-AFRICAN AMERICAN: >60
GLUCOSE BLD-MCNC: 170 MG/DL (ref 74–109)
GLUCOSE BLD-MCNC: 183 MG/DL (ref 70–99)
HCT VFR BLD CALC: 42.4 % (ref 42–52)
HEMOGLOBIN: 14.3 G/DL (ref 14–18)
IRON SATURATION: 16 % (ref 14–50)
IRON: 29 UG/DL (ref 59–158)
PERFORMED ON: ABNORMAL
POTASSIUM SERPL-SCNC: 3.6 MMOL/L (ref 3.5–5)
SODIUM BLD-SCNC: 129 MMOL/L (ref 136–145)
TOTAL IRON BINDING CAPACITY: 182 UG/DL (ref 250–400)

## 2017-03-24 PROCEDURE — 6360000002 HC RX W HCPCS: Performed by: ORTHOPAEDIC SURGERY

## 2017-03-24 PROCEDURE — 83550 IRON BINDING TEST: CPT

## 2017-03-24 PROCEDURE — 97116 GAIT TRAINING THERAPY: CPT

## 2017-03-24 PROCEDURE — 6370000000 HC RX 637 (ALT 250 FOR IP): Performed by: PHYSICIAN ASSISTANT

## 2017-03-24 PROCEDURE — 97535 SELF CARE MNGMENT TRAINING: CPT

## 2017-03-24 PROCEDURE — 82948 REAGENT STRIP/BLOOD GLUCOSE: CPT

## 2017-03-24 PROCEDURE — 6370000000 HC RX 637 (ALT 250 FOR IP): Performed by: FAMILY MEDICINE

## 2017-03-24 PROCEDURE — 85014 HEMATOCRIT: CPT

## 2017-03-24 PROCEDURE — 85018 HEMOGLOBIN: CPT

## 2017-03-24 PROCEDURE — 97530 THERAPEUTIC ACTIVITIES: CPT

## 2017-03-24 PROCEDURE — 80048 BASIC METABOLIC PNL TOTAL CA: CPT

## 2017-03-24 PROCEDURE — 6370000000 HC RX 637 (ALT 250 FOR IP): Performed by: ORTHOPAEDIC SURGERY

## 2017-03-24 PROCEDURE — 83540 ASSAY OF IRON: CPT

## 2017-03-24 PROCEDURE — 36415 COLL VENOUS BLD VENIPUNCTURE: CPT

## 2017-03-24 RX ORDER — IRON POLYSACCHARIDE COMPLEX 150 MG
150 CAPSULE ORAL 2 TIMES DAILY
Status: DISCONTINUED | OUTPATIENT
Start: 2017-03-24 | End: 2017-03-24 | Stop reason: HOSPADM

## 2017-03-24 RX ORDER — MORPHINE SULFATE 30 MG/1
30 TABLET, FILM COATED, EXTENDED RELEASE ORAL EVERY 12 HOURS SCHEDULED
Qty: 28 TABLET | Refills: 0
Start: 2017-03-24 | End: 2017-04-07

## 2017-03-24 RX ORDER — HYDROMORPHONE HYDROCHLORIDE 2 MG/1
2 TABLET ORAL EVERY 4 HOURS PRN
Qty: 80 TABLET | Refills: 0
Start: 2017-03-24 | End: 2017-03-31

## 2017-03-24 RX ORDER — LOSARTAN POTASSIUM 100 MG/1
100 TABLET ORAL DAILY
Status: DISCONTINUED | OUTPATIENT
Start: 2017-03-24 | End: 2017-03-24 | Stop reason: HOSPADM

## 2017-03-24 RX ORDER — AMLODIPINE BESYLATE 5 MG/1
5 TABLET ORAL 2 TIMES DAILY
Status: DISCONTINUED | OUTPATIENT
Start: 2017-03-24 | End: 2017-03-24 | Stop reason: HOSPADM

## 2017-03-24 RX ADMIN — HYDROMORPHONE HYDROCHLORIDE 1 MG: 1 INJECTION, SOLUTION INTRAMUSCULAR; INTRAVENOUS; SUBCUTANEOUS at 08:45

## 2017-03-24 RX ADMIN — DOCUSATE SODIUM 100 MG: 100 CAPSULE, LIQUID FILLED ORAL at 08:50

## 2017-03-24 RX ADMIN — Medication 150 MG: at 08:50

## 2017-03-24 RX ADMIN — TAMSULOSIN HYDROCHLORIDE 0.4 MG: 0.4 CAPSULE ORAL at 08:50

## 2017-03-24 RX ADMIN — MAGNESIUM HYDROXIDE 30 ML: 400 SUSPENSION ORAL at 08:51

## 2017-03-24 RX ADMIN — GLIPIZIDE 5 MG: 5 TABLET ORAL at 08:50

## 2017-03-24 RX ADMIN — LOSARTAN POTASSIUM 100 MG: 100 TABLET ORAL at 08:50

## 2017-03-24 RX ADMIN — HYDROMORPHONE HYDROCHLORIDE 4 MG: 2 TABLET ORAL at 06:35

## 2017-03-24 RX ADMIN — HYDROMORPHONE HYDROCHLORIDE 4 MG: 2 TABLET ORAL at 10:23

## 2017-03-24 RX ADMIN — TRAMADOL HYDROCHLORIDE 50 MG: 50 TABLET ORAL at 02:58

## 2017-03-24 RX ADMIN — MORPHINE SULFATE 30 MG: 15 TABLET, EXTENDED RELEASE ORAL at 08:49

## 2017-03-24 RX ADMIN — DIAZEPAM 5 MG: 5 TABLET ORAL at 02:06

## 2017-03-24 RX ADMIN — HYDROCHLOROTHIAZIDE 25 MG: 25 TABLET ORAL at 08:49

## 2017-03-24 RX ADMIN — FAMOTIDINE 20 MG: 20 TABLET ORAL at 08:50

## 2017-03-24 RX ADMIN — VITAMIN D, TAB 1000IU (100/BT) 2000 UNITS: 25 TAB at 08:50

## 2017-03-24 RX ADMIN — POTASSIUM CHLORIDE 20 MEQ: 20 TABLET, EXTENDED RELEASE ORAL at 08:50

## 2017-03-24 RX ADMIN — DIAZEPAM 5 MG: 5 TABLET ORAL at 08:49

## 2017-03-24 RX ADMIN — APIXABAN 2.5 MG: 2.5 TABLET, FILM COATED ORAL at 08:50

## 2017-03-24 RX ADMIN — AMLODIPINE BESYLATE 5 MG: 5 TABLET ORAL at 08:50

## 2017-03-24 ASSESSMENT — PAIN SCALES - GENERAL
PAINLEVEL_OUTOF10: 5
PAINLEVEL_OUTOF10: 5
PAINLEVEL_OUTOF10: 2
PAINLEVEL_OUTOF10: 10
PAINLEVEL_OUTOF10: 7
PAINLEVEL_OUTOF10: 10
PAINLEVEL_OUTOF10: 5

## 2017-04-17 RX ORDER — LOSARTAN POTASSIUM 100 MG/1
100 TABLET ORAL DAILY
Qty: 90 TABLET | Refills: 1 | Status: SHIPPED | OUTPATIENT
Start: 2017-04-17 | End: 2017-10-16 | Stop reason: SDUPTHER

## 2017-09-18 RX ORDER — GLIPIZIDE 5 MG/1
TABLET, FILM COATED, EXTENDED RELEASE ORAL
Qty: 90 TABLET | Refills: 1 | Status: SHIPPED | OUTPATIENT
Start: 2017-09-18 | End: 2018-03-16 | Stop reason: SDUPTHER

## 2017-09-20 ENCOUNTER — OFFICE VISIT (OUTPATIENT)
Dept: FAMILY MEDICINE CLINIC | Facility: CLINIC | Age: 67
End: 2017-09-20

## 2017-09-20 VITALS
BODY MASS INDEX: 44.41 KG/M2 | SYSTOLIC BLOOD PRESSURE: 112 MMHG | OXYGEN SATURATION: 98 % | HEIGHT: 68 IN | DIASTOLIC BLOOD PRESSURE: 80 MMHG | TEMPERATURE: 98.1 F | WEIGHT: 293 LBS | HEART RATE: 78 BPM

## 2017-09-20 DIAGNOSIS — Z23 NEED FOR INFLUENZA VACCINATION: ICD-10-CM

## 2017-09-20 DIAGNOSIS — R73.9 HYPERGLYCEMIA: ICD-10-CM

## 2017-09-20 DIAGNOSIS — E78.2 MIXED HYPERLIPIDEMIA: Primary | ICD-10-CM

## 2017-09-20 LAB
ALBUMIN SERPL-MCNC: 4.3 G/DL (ref 3.5–5)
ALBUMIN/GLOB SERPL: 1.8 G/DL (ref 1.1–2.5)
ALP SERPL-CCNC: 80 U/L (ref 24–120)
ALT SERPL-CCNC: 48 U/L (ref 0–54)
AST SERPL-CCNC: 27 U/L (ref 7–45)
BILIRUB SERPL-MCNC: 0.8 MG/DL (ref 0.1–1)
BUN SERPL-MCNC: 26 MG/DL (ref 5–21)
BUN/CREAT SERPL: 31 (ref 7–25)
CALCIUM SERPL-MCNC: 9.7 MG/DL (ref 8.4–10.4)
CHLORIDE SERPL-SCNC: 98 MMOL/L (ref 98–110)
CHOLEST SERPL-MCNC: 181 MG/DL (ref 130–200)
CO2 SERPL-SCNC: 29 MMOL/L (ref 24–31)
CREAT SERPL-MCNC: 0.84 MG/DL (ref 0.5–1.4)
GLOBULIN SER CALC-MCNC: 2.4 GM/DL
GLUCOSE SERPL-MCNC: 139 MG/DL (ref 70–100)
HBA1C MFR BLD: 6 %
HDLC SERPL-MCNC: 51 MG/DL
LDLC SERPL CALC-MCNC: 102 MG/DL (ref 0–99)
POTASSIUM SERPL-SCNC: 4 MMOL/L (ref 3.5–5.3)
PROT SERPL-MCNC: 6.7 G/DL (ref 6.3–8.7)
SODIUM SERPL-SCNC: 137 MMOL/L (ref 135–145)
TRIGL SERPL-MCNC: 142 MG/DL (ref 0–149)
VLDLC SERPL CALC-MCNC: 28.4 MG/DL

## 2017-09-20 PROCEDURE — 90662 IIV NO PRSV INCREASED AG IM: CPT | Performed by: FAMILY MEDICINE

## 2017-09-20 PROCEDURE — 99213 OFFICE O/P EST LOW 20 MIN: CPT | Performed by: FAMILY MEDICINE

## 2017-09-20 PROCEDURE — 90471 IMMUNIZATION ADMIN: CPT | Performed by: FAMILY MEDICINE

## 2017-09-20 RX ORDER — HYDROCODONE BITARTRATE AND ACETAMINOPHEN 10; 325 MG/1; MG/1
1 TABLET ORAL EVERY 6 HOURS
Qty: 90 TABLET | Refills: 0 | Status: SHIPPED | OUTPATIENT
Start: 2017-09-20 | End: 2017-12-05 | Stop reason: SDUPTHER

## 2017-09-20 NOTE — PROGRESS NOTES
Subjective   Ephraim Quinones is a 67 y.o. male.     Hyperlipidemia   This is a chronic problem. The current episode started more than 1 year ago. The problem is controlled. Recent lipid tests were reviewed and are normal. There are no known factors aggravating his hyperlipidemia. Pertinent negatives include no chest pain. Current antihyperlipidemic treatment includes diet change. The current treatment provides moderate improvement of lipids. Compliance problems include adherence to diet and adherence to exercise.  Risk factors for coronary artery disease include dyslipidemia, male sex, obesity and a sedentary lifestyle.       The following portions of the patient's history were reviewed and updated as appropriate: allergies, current medications, past family history, past medical history, past social history, past surgical history and problem list.    Review of Systems   Cardiovascular: Negative for chest pain and leg swelling.   Musculoskeletal:        Chronic back pain-Dr. Bustos said hold off as long as we can do surgery       Objective   Physical Exam   Constitutional: He is oriented to person, place, and time.   Morbid obesity   Cardiovascular: Normal rate and regular rhythm.    Pulmonary/Chest: Effort normal and breath sounds normal.   Neurological: He is alert and oriented to person, place, and time.   Psychiatric: He has a normal mood and affect. His behavior is normal. Judgment and thought content normal.   Nursing note and vitals reviewed.      Assessment/Plan   Ephraim was seen today for follow-up and shoulder pain.    Diagnoses and all orders for this visit:    Mixed hyperlipidemia  -     Comprehensive Metabolic Panel  -     Lipid Panel    Hyperglycemia  -     Hemoglobin A1c    Other orders  -     HYDROcodone-acetaminophen (NORCO)  MG per tablet; Take 1 tablet by mouth Every 6 (Six) Hours.         Plan-narco refill for chronic back problems fasting lab flu shot

## 2017-10-16 RX ORDER — LOSARTAN POTASSIUM 100 MG/1
TABLET ORAL
Qty: 90 TABLET | Refills: 1 | Status: SHIPPED | OUTPATIENT
Start: 2017-10-16 | End: 2018-04-30 | Stop reason: SDUPTHER

## 2017-11-16 RX ORDER — INDAPAMIDE 2.5 MG/1
TABLET, FILM COATED ORAL
Qty: 90 TABLET | Refills: 0 | Status: SHIPPED | OUTPATIENT
Start: 2017-11-16 | End: 2018-02-14 | Stop reason: SDUPTHER

## 2017-12-05 RX ORDER — HYDROCODONE BITARTRATE AND ACETAMINOPHEN 10; 325 MG/1; MG/1
1 TABLET ORAL EVERY 6 HOURS
Qty: 90 TABLET | Refills: 0 | Status: SHIPPED | OUTPATIENT
Start: 2017-12-05 | End: 2018-01-26 | Stop reason: SDUPTHER

## 2017-12-13 ENCOUNTER — OFFICE VISIT (OUTPATIENT)
Dept: FAMILY MEDICINE CLINIC | Facility: CLINIC | Age: 67
End: 2017-12-13

## 2017-12-13 VITALS
SYSTOLIC BLOOD PRESSURE: 110 MMHG | BODY MASS INDEX: 45.98 KG/M2 | HEART RATE: 67 BPM | TEMPERATURE: 97.6 F | WEIGHT: 303.4 LBS | OXYGEN SATURATION: 98 % | DIASTOLIC BLOOD PRESSURE: 62 MMHG | HEIGHT: 68 IN

## 2017-12-13 DIAGNOSIS — G47.33 OSA (OBSTRUCTIVE SLEEP APNEA): Primary | ICD-10-CM

## 2017-12-13 PROCEDURE — 99213 OFFICE O/P EST LOW 20 MIN: CPT | Performed by: FAMILY MEDICINE

## 2017-12-13 RX ORDER — CLOBETASOL PROPIONATE 0.5 MG/G
CREAM TOPICAL 2 TIMES DAILY
Qty: 15 G | Refills: 3 | Status: SHIPPED | OUTPATIENT
Start: 2017-12-13 | End: 2019-07-22 | Stop reason: SDUPTHER

## 2017-12-13 RX ORDER — MELATONIN
1000 DAILY
COMMUNITY
End: 2018-01-29 | Stop reason: DRUGHIGH

## 2017-12-13 NOTE — PROGRESS NOTES
Subjective   Ephraim Quinones is a 67 y.o. male.     Fatigue   This is a chronic problem. The current episode started more than 1 year ago. The problem occurs daily. The problem has been gradually improving. Associated symptoms include fatigue. Pertinent negatives include no chest pain. Associated symptoms comments: PHILLIP. Exacerbated by: Morbid obesity. Treatments tried: CPAP. The treatment provided moderate relief.       The following portions of the patient's history were reviewed and updated as appropriate: allergies, current medications, past family history, past medical history, past social history, past surgical history and problem list.    Review of Systems   Constitutional: Positive for fatigue.   Respiratory:        Much improved since his compliance with CPAP   Cardiovascular: Negative for chest pain and leg swelling.       Objective   Physical Exam   Constitutional: He is oriented to person, place, and time.   Morbidly obese   HENT:   Mouth/Throat: Oropharynx is clear and moist.   Eyes: Conjunctivae are normal.   Cardiovascular: Normal rate and regular rhythm.    Pulmonary/Chest: Effort normal and breath sounds normal.   Lymphadenopathy:     He has no cervical adenopathy.   Neurological: He is alert and oriented to person, place, and time.   Psychiatric: He has a normal mood and affect. His behavior is normal. Thought content normal.   Nursing note and vitals reviewed.      Assessment/Plan   Ephraim was seen today for consult.    Diagnoses and all orders for this visit:    PHILLIP (obstructive sleep apnea)     Plan above

## 2018-01-26 ENCOUNTER — OFFICE VISIT (OUTPATIENT)
Dept: FAMILY MEDICINE CLINIC | Facility: CLINIC | Age: 68
End: 2018-01-26

## 2018-01-26 VITALS
DIASTOLIC BLOOD PRESSURE: 64 MMHG | OXYGEN SATURATION: 98 % | HEIGHT: 68 IN | SYSTOLIC BLOOD PRESSURE: 120 MMHG | BODY MASS INDEX: 44.77 KG/M2 | WEIGHT: 295.4 LBS | HEART RATE: 80 BPM | TEMPERATURE: 97.8 F

## 2018-01-26 DIAGNOSIS — R53.83 FATIGUE, UNSPECIFIED TYPE: ICD-10-CM

## 2018-01-26 DIAGNOSIS — E55.9 VITAMIN D DEFICIENCY: ICD-10-CM

## 2018-01-26 DIAGNOSIS — R73.9 HYPERGLYCEMIA: ICD-10-CM

## 2018-01-26 DIAGNOSIS — Z00.00 ANNUAL PHYSICAL EXAM: Primary | ICD-10-CM

## 2018-01-26 DIAGNOSIS — Z79.899 ENCOUNTER FOR LONG-TERM (CURRENT) USE OF MEDICATIONS: ICD-10-CM

## 2018-01-26 DIAGNOSIS — E78.2 MIXED HYPERLIPIDEMIA: ICD-10-CM

## 2018-01-26 DIAGNOSIS — Z72.89 OTHER PROBLEMS RELATED TO LIFESTYLE: ICD-10-CM

## 2018-01-26 DIAGNOSIS — Z12.5 SPECIAL SCREENING FOR MALIGNANT NEOPLASM OF PROSTATE: ICD-10-CM

## 2018-01-26 PROBLEM — E66.01 MORBID OBESITY DUE TO EXCESS CALORIES: Status: RESOLVED | Noted: 2017-03-23 | Resolved: 2018-01-26

## 2018-01-26 PROBLEM — J41.0 SIMPLE CHRONIC BRONCHITIS (HCC): Status: ACTIVE | Noted: 2017-03-23

## 2018-01-26 PROBLEM — I10 ESSENTIAL HYPERTENSION: Status: RESOLVED | Noted: 2017-01-05 | Resolved: 2018-01-26

## 2018-01-26 PROBLEM — E66.09 OBESITY DUE TO EXCESS CALORIES: Status: RESOLVED | Noted: 2017-01-05 | Resolved: 2018-01-26

## 2018-01-26 PROBLEM — Z86.0101 HX OF ADENOMATOUS COLONIC POLYPS: Status: RESOLVED | Noted: 2018-01-26 | Resolved: 2018-01-26

## 2018-01-26 PROBLEM — Z86.010 HX OF ADENOMATOUS COLONIC POLYPS: Status: ACTIVE | Noted: 2018-01-26

## 2018-01-26 PROBLEM — Z86.010 HX OF ADENOMATOUS COLONIC POLYPS: Status: RESOLVED | Noted: 2018-01-26 | Resolved: 2018-01-26

## 2018-01-26 PROBLEM — E11.9 TYPE 2 DIABETES MELLITUS WITHOUT COMPLICATION (HCC): Status: RESOLVED | Noted: 2017-01-05 | Resolved: 2018-01-26

## 2018-01-26 PROBLEM — J41.0 SIMPLE CHRONIC BRONCHITIS: Status: RESOLVED | Noted: 2017-03-23 | Resolved: 2018-01-26

## 2018-01-26 PROBLEM — E87.6 HYPOKALEMIA: Status: RESOLVED | Noted: 2017-03-23 | Resolved: 2018-01-26

## 2018-01-26 PROBLEM — E66.01 MORBID OBESITY DUE TO EXCESS CALORIES (HCC): Status: ACTIVE | Noted: 2017-03-23

## 2018-01-26 PROBLEM — M15.9 OSTEOARTHRITIS OF MULTIPLE JOINTS: Status: RESOLVED | Noted: 2017-01-05 | Resolved: 2018-01-26

## 2018-01-26 PROBLEM — I48.91 ATRIAL FIBRILLATION: Status: RESOLVED | Noted: 2017-01-05 | Resolved: 2018-01-26

## 2018-01-26 PROBLEM — Z86.0101 HX OF ADENOMATOUS COLONIC POLYPS: Status: ACTIVE | Noted: 2018-01-26

## 2018-01-26 PROBLEM — E87.6 HYPOKALEMIA: Status: ACTIVE | Noted: 2017-03-23

## 2018-01-26 LAB
BILIRUB BLD-MCNC: NEGATIVE MG/DL
CLARITY, POC: CLEAR
COLOR UR: YELLOW
GLUCOSE UR STRIP-MCNC: NEGATIVE MG/DL
KETONES UR QL: NEGATIVE
LEUKOCYTE EST, POC: NEGATIVE
NITRITE UR-MCNC: NEGATIVE MG/ML
PH UR: 7.5 [PH] (ref 5–8)
PROT UR STRIP-MCNC: NEGATIVE MG/DL
RBC # UR STRIP: NEGATIVE /UL
SP GR UR: 1.01 (ref 1–1.03)
UROBILINOGEN UR QL: NORMAL

## 2018-01-26 PROCEDURE — G0439 PPPS, SUBSEQ VISIT: HCPCS | Performed by: FAMILY MEDICINE

## 2018-01-26 PROCEDURE — 96160 PT-FOCUSED HLTH RISK ASSMT: CPT | Performed by: FAMILY MEDICINE

## 2018-01-26 PROCEDURE — 81003 URINALYSIS AUTO W/O SCOPE: CPT | Performed by: FAMILY MEDICINE

## 2018-01-26 RX ORDER — HYDROCODONE BITARTRATE AND ACETAMINOPHEN 10; 325 MG/1; MG/1
1 TABLET ORAL EVERY 6 HOURS
Qty: 90 TABLET | Refills: 0 | Status: SHIPPED | OUTPATIENT
Start: 2018-01-26 | End: 2018-04-16 | Stop reason: SDUPTHER

## 2018-01-26 NOTE — PROGRESS NOTES
QUICK REFERENCE INFORMATION:  The ABCs of the Annual Wellness Visit    Subsequent Medicare Wellness Visit    HEALTH RISK ASSESSMENT    1950    Recent Hospitalizations:  No hospitalization(s) within the last year..        Current Medical Providers:  Patient Care Team:  Jim Hopkins MD as PCP - General  Noah Ireland MD as Consulting Physician (Orthopedic Surgery)        Smoking Status:  History   Smoking Status   • Former Smoker   • Packs/day: 1.00   • Years: 20.00   • Quit date: 6/1/1996   Smokeless Tobacco   • Never Used       Alcohol Consumption:  History   Alcohol Use   • Yes     Comment: occ       Depression Screen:   PHQ-2/PHQ-9 Depression Screening 1/26/2018   Little interest or pleasure in doing things 0   Feeling down, depressed, or hopeless 0   Trouble falling or staying asleep, or sleeping too much -   Feeling tired or having little energy -   Poor appetite or overeating -   Feeling bad about yourself - or that you are a failure or have let yourself or your family down -   Trouble concentrating on things, such as reading the newspaper or watching television -   Moving or speaking so slowly that other people could have noticed. Or the opposite - being so fidgety or restless that you have been moving around a lot more than usual -   Thoughts that you would be better off dead, or of hurting yourself in some way -   Total Score 0   If you checked off any problems, how difficult have these problems made it for you to do your work, take care of things at home, or get along with other people? -       Health Habits and Functional and Cognitive Screening:  Functional & Cognitive Status 1/26/2018   Do you have difficulty preparing food and eating? No   Do you have difficulty bathing yourself, getting dressed or grooming yourself? No   Do you have difficulty using the toilet? No   Do you have difficulty moving around from place to place? No   Do you have trouble with steps or getting out of a  "bed or a chair? No   In the past year have you fallen or experienced a near fall? No   Current Diet Well Balanced Diet   Dental Exam Up to date   Eye Exam Not up to date   Exercise (times per week) 4 times per week   Current Exercise Activities Include Stationary Bicycling/Spin Class   Do you need help using the phone?  No   Are you deaf or do you have serious difficulty hearing?  No   Do you need help with transportation? No   Do you need help shopping? No   Do you need help preparing meals?  No   Do you need help with housework?  No   Do you need help with laundry? No   Do you need help taking your medications? No   Do you need help managing money? No   Have you felt unusual stress, anger or loneliness in the last month? No   Who do you live with? Spouse   If you need help, do you have trouble finding someone available to you? No   Have you been bothered in the last four weeks by sexual problems? No   Do you have difficulty concentrating, remembering or making decisions? No     -- The Timed Up and Go (TUG) Test ([facility] Version)                  - Testing directions adhered to:                                  1) Patients wears regular footwear and may use walking aid(s) if    needed.                                  2) Patient to sit back in standard arm chair and identify a line 10 feet    away from patient on floor.                                  3) Test time begins at \"Go\" and stops when patient reseated in arm    chair.                    - Instructions read aloud (and demonstrated as applicable) to patient:                                      When I say \"Go,\" I want you to:                                    1) Stand up from the chair.                                  2) Walk to the line on the floor (10 feet away) at your normal pace.                                  3) Turn.                                  4) Walk back to the chair at your normal pace.                                  5) Sit down " again.                    - Result: 3                    - Observations during test:Normal gait            Does the patient have evidence of cognitive impairment? No    Aspirin use counseling: Does not need ASA (and currently is not on it)      Recent Lab Results:  CMP:  Lab Results   Component Value Date     (H) 09/20/2017    BUN 26 (H) 09/20/2017    CREATININE 0.84 09/20/2017    EGFRIFNONA 91 09/20/2017    EGFRIFAFRI 110 09/20/2017    BCR 31.0 (H) 09/20/2017     09/20/2017    K 4.0 09/20/2017    CO2 29.0 09/20/2017    CALCIUM 9.7 09/20/2017    PROTENTOTREF 6.7 09/20/2017    ALBUMIN 4.30 09/20/2017    LABGLOBREF 2.4 09/20/2017    LABIL2 1.8 09/20/2017    BILITOT 0.8 09/20/2017    ALKPHOS 80 09/20/2017    AST 27 09/20/2017    ALT 48 09/20/2017     Lipid Panel:  Lab Results   Component Value Date    TRIG 142 09/20/2017    HDL 51 09/20/2017    VLDL 28.4 09/20/2017     HbA1c:  Lab Results   Component Value Date    HGBA1C 6.00 09/20/2017       Visual Acuity:  Right eye 20/20 and Left eye 20/20 w/glasses.    Age-appropriate Screening Schedule:  Refer to the list below for future screening recommendations based on patient's age, sex and/or medical conditions. Orders for these recommended tests are listed in the plan section. The patient has been provided with a written plan.    Health Maintenance   Topic Date Due   • TDAP/TD VACCINES (1 - Tdap) 03/19/1969   • DIABETIC FOOT EXAM  01/05/2017   • DIABETIC EYE EXAM  01/05/2017   • URINE MICROALBUMIN  01/05/2017   • HEMOGLOBIN A1C  03/20/2018   • LIPID PANEL  09/20/2018   • PNEUMOCOCCAL VACCINES (65+ LOW/MEDIUM RISK) (2 of 2 - PPSV23) 08/20/2019   • COLONOSCOPY  09/23/2024   • INFLUENZA VACCINE  Completed   • ZOSTER VACCINE  Completed        Subjective   History of Present Illness    Ephraim Quinones is a 67 y.o. male who presents for an Subsequent Wellness Visit.    The following portions of the patient's history were reviewed and updated as appropriate:  allergies, current medications, past family history, past medical history, past social history, past surgical history and problem list.    Outpatient Medications Prior to Visit   Medication Sig Dispense Refill   • amLODIPine (NORVASC) 5 MG tablet Take 5 mg by mouth 2 times daily      • carbidopa-levodopa (SINEMET)  MG per tablet      • cholecalciferol (VITAMIN D3) 1000 units tablet Take 1,000 Units by mouth Daily.     • clobetasol (TEMOVATE) 0.05 % cream Apply  topically 2 (Two) Times a Day. 15 g 3   • colchicine 0.6 MG tablet Take 1 tablet by mouth Daily. 30 tablet 0   • diazePAM (VALIUM) 5 MG tablet One tablet po q 8 hours prn spasms, muscle aches.     • ELIQUIS 5 MG tablet tablet      • glipiZIDE (GLUCOTROL) 5 MG ER tablet TAKE 1 TABLET BY MOUTH DAILY. 90 tablet 1   • HYDROcodone-acetaminophen (NORCO)  MG per tablet Take 1 tablet by mouth Every 6 (Six) Hours. 90 tablet 0   • indapamide (LOZOL) 2.5 MG tablet TAKE 1 TABLET BY MOUTH EVERY MORNING 90 tablet 0   • losartan (COZAAR) 100 MG tablet TAKE 1 TABLET BY MOUTH DAILY 90 tablet 1   • potassium chloride (K-DUR) 10 MEQ CR tablet        No facility-administered medications prior to visit.        Patient Active Problem List   Diagnosis   • HTN (hypertension)       Advance Care Planning:  has NO advance directive - information provided to the patient today    Identification of Risk Factors:  Risk factors include: weight , unhealthy diet, cardiovascular risk, inactivity and increased fall risk.    Review of Systems   Eyes:        Advised I exam   Respiratory: Negative for chest tightness.    Cardiovascular: Negative for chest pain and leg swelling.   Endocrine: Negative for polydipsia, polyphagia and polyuria.   Musculoskeletal:        Skeletal system stable   All other systems reviewed and are negative.      Compared to one year ago, the patient feels his physical health is the same.  Compared to one year ago, the patient feels his mental health is the  same.    Objective     Physical Exam   Constitutional: He is oriented to person, place, and time.   Morbidly obese   HENT:   Right Ear: External ear normal.   Left Ear: External ear normal.   Mouth/Throat: Oropharynx is clear and moist.   Eyes: EOM are normal. Pupils are equal, round, and reactive to light.   Neck: No thyromegaly present.   Good carotid pulses   Cardiovascular: Normal rate and regular rhythm.    Pulmonary/Chest: Effort normal and breath sounds normal.   Abdominal: Soft. Bowel sounds are normal.   Genitourinary: Rectum normal, prostate normal and penis normal. Rectal exam shows guaiac negative stool.   Genitourinary Comments: Testicles normal no inguinal hernias or adenopathy-prostate normal no nodules raphae maintained guaiac-negative   Musculoskeletal: He exhibits no edema.    Ephraim had a diabetic foot exam performed today.   During the foot exam he had a monofilament test not performed.    Vascular Status -  His exam exhibits right foot vasculature normal. His exam exhibits no right foot edema. His exam exhibits left foot vasculature normal. His exam exhibits no left foot edema.   Skin Integrity  -  His right foot skin is intact.     Ephraim 's left foot skin is intact. .   Foot Structure and Biomechanics -  His right foot has no hallux valgus, no pes cavus, no claw toes and no hammer toes present. His left foot has no hallux valgus, no pes cavus, no claw toes and no hammer toes.      Lymphadenopathy:     He has no cervical adenopathy.   Neurological: He is alert and oriented to person, place, and time.   Skin: Skin is warm and dry.   Psychiatric: He has a normal mood and affect. His behavior is normal. Thought content normal.   Nursing note and vitals reviewed.      Vitals:    01/26/18 0757   BP: 120/64   BP Location: Left arm   Patient Position: Sitting   Cuff Size: Large Adult   Pulse: 80   Temp: 97.8 °F (36.6 °C)   TempSrc: Oral   SpO2: 98%   Weight: 134 kg (295 lb 6.4 oz)   Height: 172.7 cm  "(67.99\")   PainSc: 0-No pain       Body mass index is 44.93 kg/(m^2).  Discussed the patient's BMI with him. BMI is above normal parameters. Follow-up plan includes:  exercise counseling.    Assessment/Plan   Patient Self-Management and Personalized Health Advice  The patient has been provided with information about: diet, exercise, weight management, prevention of cardiac or vascular disease and fall prevention and preventive services including:   · Advance directive, Colorectal cancer screening, fecal occult blood test, Fall Risk plan of care done, Prostate cancer screening discussed.    Visit Diagnoses:    ICD-10-CM ICD-9-CM   1. Annual physical exam Z00.00 V70.0   2. Special screening for malignant neoplasm of prostate Z12.5 V76.44   3. Fatigue, unspecified type R53.83 780.79   4. Mixed hyperlipidemia E78.2 272.2   5. Hyperglycemia R73.9 790.29   6. Vitamin D deficiency E55.9 268.9   7. Other problems related to lifestyle Z72.89 V69.8       No orders of the defined types were placed in this encounter.      Outpatient Encounter Prescriptions as of 1/26/2018   Medication Sig Dispense Refill   • amLODIPine (NORVASC) 5 MG tablet Take 5 mg by mouth 2 times daily      • carbidopa-levodopa (SINEMET)  MG per tablet      • cholecalciferol (VITAMIN D3) 1000 units tablet Take 1,000 Units by mouth Daily.     • clobetasol (TEMOVATE) 0.05 % cream Apply  topically 2 (Two) Times a Day. 15 g 3   • colchicine 0.6 MG tablet Take 1 tablet by mouth Daily. 30 tablet 0   • diazePAM (VALIUM) 5 MG tablet One tablet po q 8 hours prn spasms, muscle aches.     • ELIQUIS 5 MG tablet tablet      • glipiZIDE (GLUCOTROL) 5 MG ER tablet TAKE 1 TABLET BY MOUTH DAILY. 90 tablet 1   • HYDROcodone-acetaminophen (NORCO)  MG per tablet Take 1 tablet by mouth Every 6 (Six) Hours. 90 tablet 0   • indapamide (LOZOL) 2.5 MG tablet TAKE 1 TABLET BY MOUTH EVERY MORNING 90 tablet 0   • losartan (COZAAR) 100 MG tablet TAKE 1 TABLET BY MOUTH DAILY " 90 tablet 1   • potassium chloride (K-DUR) 10 MEQ CR tablet        No facility-administered encounter medications on file as of 1/26/2018.        Reviewed use of high risk medication in the elderly: yes  Reviewed for potential of harmful drug interactions in the elderly: yes    Follow Up:  Return in 6 months (on 7/26/2018).     An After Visit Summary and PPPS with all of these plans were given to the patient.       plan above-encouraged to start exercising-get eyes exam  CONTROLLED SUBSTANCE TRACKING 1/26/2018   Last Gregory 1/26/2018   Last UDS 1/26/2018   Last Controlled Substance Agreement 1/26/2018

## 2018-01-26 NOTE — PATIENT INSTRUCTIONS
Exercising to Stay Healthy  Introduction  Exercising regularly is important. It has many health benefits, such as:  · Improving your overall fitness, flexibility, and endurance.  · Increasing your bone density.  · Helping with weight control.  · Decreasing your body fat.  · Increasing your muscle strength.  · Reducing stress and tension.  · Improving your overall health.  In order to become healthy and stay healthy, it is recommended that you do moderate-intensity and vigorous-intensity exercise. You can tell that you are exercising at a moderate intensity if you have a higher heart rate and faster breathing, but you are still able to hold a conversation. You can tell that you are exercising at a vigorous intensity if you are breathing much harder and faster and cannot hold a conversation while exercising.  How often should I exercise?  Choose an activity that you enjoy and set realistic goals. Your health care provider can help you to make an activity plan that works for you. Exercise regularly as directed by your health care provider. This may include:  · Doing resistance training twice each week, such as:  ¨ Push-ups.  ¨ Sit-ups.  ¨ Lifting weights.  ¨ Using resistance bands.  · Doing a given intensity of exercise for a given amount of time. Choose from these options:  ¨ 150 minutes of moderate-intensity exercise every week.  ¨ 75 minutes of vigorous-intensity exercise every week.  ¨ A mix of moderate-intensity and vigorous-intensity exercise every week.  Children, pregnant women, people who are out of shape, people who are overweight, and older adults may need to consult a health care provider for individual recommendations. If you have any sort of medical condition, be sure to consult your health care provider before starting a new exercise program.  What are some exercise ideas?  Some moderate-intensity exercise ideas include:  · Walking at a rate of 1 mile in 15  minutes.  · Biking.  · Hiking.  · Golfing.  · Dancing.  Some vigorous-intensity exercise ideas include:  · Walking at a rate of at least 4.5 miles per hour.  · Jogging or running at a rate of 5 miles per hour.  · Biking at a rate of at least 10 miles per hour.  · Lap swimming.  · Roller-skating or in-line skating.  · Cross-country skiing.  · Vigorous competitive sports, such as football, basketball, and soccer.  · Jumping rope.  · Aerobic dancing.  What are some everyday activities that can help me to get exercise?  · Yard work, such as:  ¨ Pushing a .  ¨ Raking and bagging leaves.  · Washing and waxing your car.  · Pushing a stroller.  · Shoveling snow.  · Gardening.  · Washing windows or floors.  How can I be more active in my day-to-day activities?  · Use the stairs instead of the elevator.  · Take a walk during your lunch break.  · If you drive, park your car farther away from work or school.  · If you take public transportation, get off one stop early and walk the rest of the way.  · Make all of your phone calls while standing up and walking around.  · Get up, stretch, and walk around every 30 minutes throughout the day.  What guidelines should I follow while exercising?  · Do not exercise so much that you hurt yourself, feel dizzy, or get very short of breath.  · Consult your health care provider before starting a new exercise program.  · Wear comfortable clothes and shoes with good support.  · Drink plenty of water while you exercise to prevent dehydration or heat stroke. Body water is lost during exercise and must be replaced.  · Work out until you breathe faster and your heart beats faster.  This information is not intended to replace advice given to you by your health care provider. Make sure you discuss any questions you have with your health care provider.  Document Released: 01/20/2012 Document Revised: 05/25/2017 Document Reviewed: 05/21/2015  © 2017 [employer]    Fall Prevention in the  Home  Falls can cause injuries and can affect people from all age groups. There are many simple things that you can do to make your home safe and to help prevent falls.  What can I do on the outside of my home?  · Regularly repair the edges of walkways and driveways and fix any cracks.  · Remove high doorway thresholds.  · Trim any shrubbery on the main path into your home.  · Use bright outdoor lighting.  · Clear walkways of debris and clutter, including tools and rocks.  · Regularly check that handrails are securely fastened and in good repair. Both sides of any steps should have handrails.  · Install guardrails along the edges of any raised decks or porches.  · Have leaves, snow, and ice cleared regularly.  · Use sand or salt on walkways during winter months.  · In the garage, clean up any spills right away, including grease or oil spills.  What can I do in the bathroom?  · Use night lights.  · Install grab bars by the toilet and in the tub and shower. Do not use towel bars as grab bars.  · Use non-skid mats or decals on the floor of the tub or shower.  · If you need to sit down while you are in the shower, use a plastic, non-slip stool.  · Keep the floor dry. Immediately clean up any water that spills on the floor.  · Remove soap buildup in the tub or shower on a regular basis.  · Attach bath mats securely with double-sided non-slip rug tape.  · Remove throw rugs and other tripping hazards from the floor.  What can I do in the bedroom?  · Use night lights.  · Make sure that a bedside light is easy to reach.  · Do not use oversized bedding that drapes onto the floor.  · Have a firm chair that has side arms to use for getting dressed.  · Remove throw rugs and other tripping hazards from the floor.  What can I do in the kitchen?  · Clean up any spills right away.  · Avoid walking on wet floors.  · Place frequently used items in easy-to-reach places.  · If you need to reach for something above you, use a sturdy step  stool that has a grab bar.  · Keep electrical cables out of the way.  · Do not use floor polish or wax that makes floors slippery. If you have to use wax, make sure that it is non-skid floor wax.  · Remove throw rugs and other tripping hazards from the floor.  What can I do in the stairways?  · Do not leave any items on the stairs.  · Make sure that there are handrails on both sides of the stairs. Fix handrails that are broken or loose. Make sure that handrails are as long as the stairways.  · Check any carpeting to make sure that it is firmly attached to the stairs. Fix any carpet that is loose or worn.  · Avoid having throw rugs at the top or bottom of stairways, or secure the rugs with carpet tape to prevent them from moving.  · Make sure that you have a light switch at the top of the stairs and the bottom of the stairs. If you do not have them, have them installed.  What are some other fall prevention tips?  · Wear closed-toe shoes that fit well and support your feet. Wear shoes that have rubber soles or low heels.  · When you use a stepladder, make sure that it is completely opened and that the sides are firmly locked. Have someone hold the ladder while you are using it. Do not climb a closed stepladder.  · Add color or contrast paint or tape to grab bars and handrails in your home. Place contrasting color strips on the first and last steps.  · Use mobility aids as needed, such as canes, walkers, scooters, and crutches.  · Turn on lights if it is dark. Replace any light bulbs that burn out.  · Set up furniture so that there are clear paths. Keep the furniture in the same spot.  · Fix any uneven floor surfaces.  · Choose a carpet design that does not hide the edge of steps of a stairway.  · Be aware of any and all pets.  · Review your medicines with your healthcare provider. Some medicines can cause dizziness or changes in blood pressure, which increase your risk of falling.  Talk with your health care provider  about other ways that you can decrease your risk of falls. This may include working with a physical therapist or  to improve your strength, balance, and endurance.  This information is not intended to replace advice given to you by your health care provider. Make sure you discuss any questions you have with your health care provider.  Document Released: 2003 Document Revised: 2017 Document Reviewed: 2016  [employer] Interactive Patient Education ©  [employer]    Medicare Wellness  Personal Prevention Plan of Service     Date of Office Visit:  2018  Encounter Provider:  Jim Hopkins MD  Place of Service:  [facility]  Patient Name: Ephraim Quinones  :  1950    As part of the Medicare Wellness portion of your visit today, we are providing you with this personalized preventive plan of services (PPPS). This plan is based upon recommendations of the [place] Preventive Services Task Force (USPSTF) and the Advisory Committee on Immunization Practices (ACIP).    This lists the preventive care services that should be considered, and provides dates of when you are due. Items listed as completed are up-to-date and do not require any further intervention.    Health Maintenance   Topic Date Due   • TDAP/TD VACCINES (1 - Tdap) 1969   • HEPATITIS C SCREENING  10/15/2016   • DIABETIC FOOT EXAM  2017   • DIABETIC EYE EXAM  2017   • URINE MICROALBUMIN  2017   • AAA SCREEN (ONE-TIME)  2017   • MEDICARE ANNUAL WELLNESS  2018   • HEMOGLOBIN A1C  2018   • LIPID PANEL  2018   • PNEUMOCOCCAL VACCINES (65+ LOW/MEDIUM RISK) (2 of 2 - PPSV23) 2019   • COLONOSCOPY  2024   • INFLUENZA VACCINE  Completed   • ZOSTER VACCINE  Completed       No orders of the defined types were placed in this encounter.      Return in 6 months (on 2018).

## 2018-01-27 LAB
25(OH)D3+25(OH)D2 SERPL-MCNC: 20.7 NG/ML (ref 30–100)
ALBUMIN SERPL-MCNC: 4.3 G/DL (ref 3.5–5)
ALBUMIN/GLOB SERPL: 1.6 G/DL (ref 1.1–2.5)
ALP SERPL-CCNC: 70 U/L (ref 24–120)
ALT SERPL-CCNC: 86 U/L (ref 0–54)
AST SERPL-CCNC: 46 U/L (ref 7–45)
BASOPHILS # BLD AUTO: 0.08 10*3/MM3 (ref 0–0.2)
BASOPHILS NFR BLD AUTO: 1.2 % (ref 0–2)
BILIRUB SERPL-MCNC: 0.7 MG/DL (ref 0.1–1)
BUN SERPL-MCNC: 20 MG/DL (ref 5–21)
BUN/CREAT SERPL: 23.8 (ref 7–25)
CALCIUM SERPL-MCNC: 10.7 MG/DL (ref 8.4–10.4)
CHLORIDE SERPL-SCNC: 99 MMOL/L (ref 98–110)
CHOLEST SERPL-MCNC: 196 MG/DL (ref 130–200)
CO2 SERPL-SCNC: 29 MMOL/L (ref 24–31)
CREAT SERPL-MCNC: 0.84 MG/DL (ref 0.5–1.4)
EOSINOPHIL # BLD AUTO: 0.21 10*3/MM3 (ref 0–0.7)
EOSINOPHIL NFR BLD AUTO: 3.1 % (ref 0–4)
ERYTHROCYTE [DISTWIDTH] IN BLOOD BY AUTOMATED COUNT: 12.2 % (ref 12–15)
GFR SERPLBLD CREATININE-BSD FMLA CKD-EPI: 110 ML/MIN/1.73
GFR SERPLBLD CREATININE-BSD FMLA CKD-EPI: 91 ML/MIN/1.73
GLOBULIN SER CALC-MCNC: 2.7 GM/DL
GLUCOSE SERPL-MCNC: 159 MG/DL (ref 70–100)
HBA1C MFR BLD: 6 %
HCT VFR BLD AUTO: 49 % (ref 40–52)
HCV AB S/CO SERPL IA: 0.1 S/CO RATIO (ref 0–0.9)
HDLC SERPL-MCNC: 54 MG/DL
HGB BLD-MCNC: 16.5 G/DL (ref 14–18)
IMM GRANULOCYTES # BLD: 0.03 10*3/MM3 (ref 0–0.03)
IMM GRANULOCYTES NFR BLD: 0.4 % (ref 0–5)
LDLC SERPL CALC-MCNC: 119 MG/DL (ref 0–99)
LYMPHOCYTES # BLD AUTO: 1.41 10*3/MM3 (ref 0.72–4.86)
LYMPHOCYTES NFR BLD AUTO: 20.8 % (ref 15–45)
MCH RBC QN AUTO: 32.5 PG (ref 28–32)
MCHC RBC AUTO-ENTMCNC: 33.7 G/DL (ref 33–36)
MCV RBC AUTO: 96.5 FL (ref 82–95)
MICROALBUMIN UR-MCNC: <3 UG/ML
MONOCYTES # BLD AUTO: 0.63 10*3/MM3 (ref 0.19–1.3)
MONOCYTES NFR BLD AUTO: 9.3 % (ref 4–12)
NEUTROPHILS # BLD AUTO: 4.43 10*3/MM3 (ref 1.87–8.4)
NEUTROPHILS NFR BLD AUTO: 65.2 % (ref 39–78)
NRBC BLD AUTO-RTO: 0 /100 WBC (ref 0–0)
PLATELET # BLD AUTO: 235 10*3/MM3 (ref 130–400)
POTASSIUM SERPL-SCNC: 4.7 MMOL/L (ref 3.5–5.3)
PROT SERPL-MCNC: 7 G/DL (ref 6.3–8.7)
PSA SERPL-MCNC: 0.92 NG/ML (ref 0–4)
RBC # BLD AUTO: 5.08 10*6/MM3 (ref 4.8–5.9)
SODIUM SERPL-SCNC: 138 MMOL/L (ref 135–145)
T4 FREE SERPL-MCNC: 1.16 NG/DL (ref 0.78–2.19)
TRIGL SERPL-MCNC: 114 MG/DL (ref 0–149)
TSH SERPL DL<=0.005 MIU/L-ACNC: 0.54 MIU/ML (ref 0.47–4.68)
VLDLC SERPL CALC-MCNC: 22.8 MG/DL
WBC # BLD AUTO: 6.79 10*3/MM3 (ref 4.8–10.8)

## 2018-01-29 DIAGNOSIS — R79.89 ELEVATED LFTS: Primary | ICD-10-CM

## 2018-01-29 DIAGNOSIS — E55.9 VITAMIN D DEFICIENCY: ICD-10-CM

## 2018-01-29 RX ORDER — ACETAMINOPHEN 160 MG
2000 TABLET,DISINTEGRATING ORAL DAILY
COMMUNITY
End: 2018-05-03 | Stop reason: DRUGHIGH

## 2018-02-01 DIAGNOSIS — Z12.5 SPECIAL SCREENING FOR MALIGNANT NEOPLASM OF PROSTATE: ICD-10-CM

## 2018-02-02 ENCOUNTER — TELEPHONE (OUTPATIENT)
Dept: FAMILY MEDICINE CLINIC | Facility: CLINIC | Age: 68
End: 2018-02-02

## 2018-02-02 LAB
HEMOCCULT STL QL IA: NEGATIVE
Lab: NORMAL

## 2018-02-02 RX ORDER — AZITHROMYCIN 250 MG/1
TABLET, FILM COATED ORAL
Qty: 6 TABLET | Refills: 0 | Status: SHIPPED | OUTPATIENT
Start: 2018-02-02 | End: 2018-04-16

## 2018-02-02 NOTE — TELEPHONE ENCOUNTER
SINUS INFECTION, EARS PLUGGED UP, YELLOW DRAINAGE-CANNOT LEAVE WORK.      [facility] sent to [facility]

## 2018-02-04 LAB
AMPHETAMINES UR QL SCN: NEGATIVE NG/ML
BARBITURATES UR QL SCN: NEGATIVE NG/ML
BENZODIAZ UR QL: NEGATIVE NG/ML
BZE UR QL: NEGATIVE NG/ML
CANNABINOIDS UR QL SCN: NEGATIVE NG/ML
METHADONE UR QL SCN: NEGATIVE NG/ML
OPIATES UR QL: NEGATIVE
PCP UR QL: NEGATIVE NG/ML
PROPOXYPH UR QL: NEGATIVE NG/ML

## 2018-02-14 RX ORDER — INDAPAMIDE 2.5 MG/1
TABLET, FILM COATED ORAL
Qty: 90 TABLET | Refills: 3 | Status: SHIPPED | OUTPATIENT
Start: 2018-02-14 | End: 2019-02-11 | Stop reason: SDUPTHER

## 2018-03-01 RX ORDER — AMLODIPINE BESYLATE 5 MG/1
TABLET ORAL
Qty: 180 TABLET | Refills: 3 | Status: SHIPPED | OUTPATIENT
Start: 2018-03-01 | End: 2019-03-05 | Stop reason: SDUPTHER

## 2018-03-16 RX ORDER — GLIPIZIDE 5 MG/1
TABLET, FILM COATED, EXTENDED RELEASE ORAL
Qty: 90 TABLET | Refills: 3 | Status: SHIPPED | OUTPATIENT
Start: 2018-03-16 | End: 2019-03-05 | Stop reason: SDUPTHER

## 2018-04-16 ENCOUNTER — OFFICE VISIT (OUTPATIENT)
Dept: FAMILY MEDICINE CLINIC | Facility: CLINIC | Age: 68
End: 2018-04-16

## 2018-04-16 VITALS
OXYGEN SATURATION: 98 % | WEIGHT: 309.6 LBS | SYSTOLIC BLOOD PRESSURE: 118 MMHG | HEIGHT: 68 IN | DIASTOLIC BLOOD PRESSURE: 60 MMHG | HEART RATE: 67 BPM | BODY MASS INDEX: 46.92 KG/M2 | TEMPERATURE: 98.1 F

## 2018-04-16 DIAGNOSIS — R52 PAIN: Primary | ICD-10-CM

## 2018-04-16 PROCEDURE — 99213 OFFICE O/P EST LOW 20 MIN: CPT | Performed by: FAMILY MEDICINE

## 2018-04-16 RX ORDER — COLCHICINE 0.6 MG/1
0.6 TABLET ORAL DAILY
Qty: 30 TABLET | Refills: 0 | Status: SHIPPED | OUTPATIENT
Start: 2018-04-16 | End: 2019-09-16 | Stop reason: SDUPTHER

## 2018-04-16 RX ORDER — HYDROCODONE BITARTRATE AND ACETAMINOPHEN 10; 325 MG/1; MG/1
1 TABLET ORAL EVERY 6 HOURS
Qty: 90 TABLET | Refills: 0 | Status: SHIPPED | OUTPATIENT
Start: 2018-04-16 | End: 2018-07-05 | Stop reason: SDUPTHER

## 2018-04-16 NOTE — PROGRESS NOTES
Subjective   Ephraim Quinones is a 68 y.o. male.   CONTROLLED SUBSTANCE TRACKING 1/26/2018   Last Gregory 1/26/2018   Last UDS 1/26/2018   Last Controlled Substance Agreement 1/26/2018     Back Pain   This is a chronic problem. The current episode started more than 1 year ago. The problem occurs daily. The problem has been gradually improving since onset. The pain is present in the sacro-iliac. The quality of the pain is described as aching. The pain does not radiate. The pain is moderate. The symptoms are aggravated by position. Stiffness is present at night. Pertinent negatives include no chest pain. Risk factors include obesity, poor posture and sedentary lifestyle. He has tried analgesics (Surgery) for the symptoms. The treatment provided mild relief.       The following portions of the patient's history were reviewed and updated as appropriate: allergies, current medications, past family history, past medical history, past social history, past surgical history and problem list.    Review of Systems   Cardiovascular: Negative for chest pain and leg swelling.   Musculoskeletal: Positive for back pain.       Objective   Physical Exam   Constitutional: He is oriented to person, place, and time.   Morbid obesity   Musculoskeletal: He exhibits tenderness. He exhibits no edema.   Right sacroiliac joint   Neurological: He is alert and oriented to person, place, and time. He has normal reflexes.   Psychiatric: He has a normal mood and affect. His behavior is normal. Judgment and thought content normal.   Nursing note and vitals reviewed.      Assessment/Plan   Ephraim was seen today for med refill.    Diagnoses and all orders for this visit:    Pain    Other orders  -     HYDROcodone-acetaminophen (NORCO)  MG per tablet; Take 1 tablet by mouth Every 6 (Six) Hours.  -     colchicine 0.6 MG tablet; Take 1 tablet by mouth Daily.         Plan above plus flare up with gout right foot

## 2018-04-29 ENCOUNTER — RESULTS ENCOUNTER (OUTPATIENT)
Dept: FAMILY MEDICINE CLINIC | Facility: CLINIC | Age: 68
End: 2018-04-29

## 2018-04-29 DIAGNOSIS — R79.89 ELEVATED LFTS: ICD-10-CM

## 2018-04-29 DIAGNOSIS — E55.9 VITAMIN D DEFICIENCY: ICD-10-CM

## 2018-04-30 RX ORDER — LOSARTAN POTASSIUM 100 MG/1
TABLET ORAL
Qty: 90 TABLET | Refills: 3 | Status: SHIPPED | OUTPATIENT
Start: 2018-04-30 | End: 2019-04-27 | Stop reason: SDUPTHER

## 2018-05-03 DIAGNOSIS — R73.9 HYPERGLYCEMIA: Primary | ICD-10-CM

## 2018-05-03 DIAGNOSIS — E55.9 VITAMIN D DEFICIENCY: ICD-10-CM

## 2018-05-03 LAB
25(OH)D3+25(OH)D2 SERPL-MCNC: 25.3 NG/ML (ref 30–100)
ALBUMIN SERPL-MCNC: 3.9 G/DL (ref 3.5–5)
ALBUMIN/GLOB SERPL: 1.6 G/DL (ref 1.1–2.5)
ALP SERPL-CCNC: 61 U/L (ref 24–120)
ALT SERPL-CCNC: 59 U/L (ref 0–54)
AST SERPL-CCNC: 35 U/L (ref 7–45)
BILIRUB SERPL-MCNC: 1 MG/DL (ref 0.1–1)
BUN SERPL-MCNC: 27 MG/DL (ref 5–21)
BUN/CREAT SERPL: 30.7 (ref 7–25)
CALCIUM SERPL-MCNC: 10 MG/DL (ref 8.4–10.4)
CHLORIDE SERPL-SCNC: 97 MMOL/L (ref 98–110)
CO2 SERPL-SCNC: 27 MMOL/L (ref 24–31)
CREAT SERPL-MCNC: 0.88 MG/DL (ref 0.5–1.4)
GFR SERPLBLD CREATININE-BSD FMLA CKD-EPI: 104 ML/MIN/1.73
GFR SERPLBLD CREATININE-BSD FMLA CKD-EPI: 86 ML/MIN/1.73
GLOBULIN SER CALC-MCNC: 2.5 GM/DL
GLUCOSE SERPL-MCNC: 172 MG/DL (ref 70–100)
POTASSIUM SERPL-SCNC: 4 MMOL/L (ref 3.5–5.3)
PROT SERPL-MCNC: 6.4 G/DL (ref 6.3–8.7)
SODIUM SERPL-SCNC: 136 MMOL/L (ref 135–145)

## 2018-07-05 RX ORDER — HYDROCODONE BITARTRATE AND ACETAMINOPHEN 10; 325 MG/1; MG/1
1 TABLET ORAL EVERY 6 HOURS
Qty: 90 TABLET | Refills: 0 | Status: SHIPPED | OUTPATIENT
Start: 2018-07-05 | End: 2018-09-24 | Stop reason: SDUPTHER

## 2018-08-03 ENCOUNTER — RESULTS ENCOUNTER (OUTPATIENT)
Dept: FAMILY MEDICINE CLINIC | Facility: CLINIC | Age: 68
End: 2018-08-03

## 2018-08-03 DIAGNOSIS — R73.9 HYPERGLYCEMIA: ICD-10-CM

## 2018-08-03 DIAGNOSIS — E55.9 VITAMIN D DEFICIENCY: ICD-10-CM

## 2018-08-11 LAB
25(OH)D3+25(OH)D2 SERPL-MCNC: 34.7 NG/ML (ref 30–100)
ALBUMIN SERPL-MCNC: 4 G/DL (ref 3.5–5)
ALBUMIN/GLOB SERPL: 1.6 G/DL (ref 1.1–2.5)
ALP SERPL-CCNC: 67 U/L (ref 24–120)
ALT SERPL-CCNC: 73 U/L (ref 0–54)
AST SERPL-CCNC: 40 U/L (ref 7–45)
BILIRUB SERPL-MCNC: 0.7 MG/DL (ref 0.1–1)
BUN SERPL-MCNC: 25 MG/DL (ref 5–21)
BUN/CREAT SERPL: 25.8 (ref 7–25)
CALCIUM SERPL-MCNC: 9.8 MG/DL (ref 8.4–10.4)
CHLORIDE SERPL-SCNC: 99 MMOL/L (ref 98–110)
CO2 SERPL-SCNC: 28 MMOL/L (ref 24–31)
CREAT SERPL-MCNC: 0.97 MG/DL (ref 0.5–1.4)
GLOBULIN SER CALC-MCNC: 2.5 GM/DL
GLUCOSE SERPL-MCNC: 172 MG/DL (ref 70–100)
HBA1C MFR BLD: 6.6 %
POTASSIUM SERPL-SCNC: 4.2 MMOL/L (ref 3.5–5.3)
PROT SERPL-MCNC: 6.5 G/DL (ref 6.3–8.7)
SODIUM SERPL-SCNC: 141 MMOL/L (ref 135–145)

## 2018-08-13 ENCOUNTER — TELEPHONE (OUTPATIENT)
Dept: FAMILY MEDICINE CLINIC | Facility: CLINIC | Age: 68
End: 2018-08-13

## 2018-08-13 NOTE — TELEPHONE ENCOUNTER
Contacted patient to see if he was taking the mediation Metformin.  Patient stated he was not sure and would look at his medications when he got home and contact the office.

## 2018-08-14 DIAGNOSIS — E11.9 TYPE 2 DIABETES MELLITUS WITHOUT COMPLICATION, WITHOUT LONG-TERM CURRENT USE OF INSULIN (HCC): Primary | ICD-10-CM

## 2018-08-24 RX ORDER — LANCETS 33 GAUGE
1 EACH MISCELLANEOUS DAILY
Qty: 100 EACH | Refills: 3 | Status: SHIPPED | OUTPATIENT
Start: 2018-08-24

## 2018-09-24 ENCOUNTER — TELEPHONE (OUTPATIENT)
Dept: FAMILY MEDICINE CLINIC | Facility: CLINIC | Age: 68
End: 2018-09-24

## 2018-09-24 ENCOUNTER — OFFICE VISIT (OUTPATIENT)
Dept: FAMILY MEDICINE CLINIC | Facility: CLINIC | Age: 68
End: 2018-09-24

## 2018-09-24 VITALS
SYSTOLIC BLOOD PRESSURE: 126 MMHG | DIASTOLIC BLOOD PRESSURE: 64 MMHG | WEIGHT: 308.8 LBS | TEMPERATURE: 97.8 F | OXYGEN SATURATION: 98 % | HEIGHT: 68 IN | BODY MASS INDEX: 46.8 KG/M2 | HEART RATE: 85 BPM

## 2018-09-24 DIAGNOSIS — R52 PAIN: ICD-10-CM

## 2018-09-24 DIAGNOSIS — Z23 NEED FOR INFLUENZA VACCINATION: Primary | ICD-10-CM

## 2018-09-24 PROCEDURE — 90662 IIV NO PRSV INCREASED AG IM: CPT | Performed by: FAMILY MEDICINE

## 2018-09-24 PROCEDURE — 90471 IMMUNIZATION ADMIN: CPT | Performed by: FAMILY MEDICINE

## 2018-09-24 PROCEDURE — 99213 OFFICE O/P EST LOW 20 MIN: CPT | Performed by: FAMILY MEDICINE

## 2018-09-24 RX ORDER — LANCING DEVICE
EACH MISCELLANEOUS
Refills: 0 | COMMUNITY
Start: 2018-08-24

## 2018-09-24 RX ORDER — HYDROCODONE BITARTRATE AND ACETAMINOPHEN 10; 325 MG/1; MG/1
1 TABLET ORAL EVERY 6 HOURS
Qty: 90 TABLET | Refills: 0 | Status: SHIPPED | OUTPATIENT
Start: 2018-09-24 | End: 2019-01-08 | Stop reason: SDUPTHER

## 2018-09-24 NOTE — PATIENT INSTRUCTIONS
"Fat and Cholesterol Restricted Diet  Getting too much fat and cholesterol in your diet may cause health problems. Following this diet helps keep your fat and cholesterol at normal levels. This can keep you from getting sick.  What types of fat should I choose?  · Choose monosaturated and polyunsaturated fats. These are found in foods such as olive oil, canola oil, flaxseeds, walnuts, almonds, and seeds.  · Eat more omega-3 fats. Good choices include salmon, mackerel, sardines, tuna, flaxseed oil, and ground flaxseeds.  · Limit saturated fats. These are in animal products such as meats, butter, and cream. They can also be in plant products such as palm oil, palm kernel oil, and coconut oil.  · Avoid foods with partially hydrogenated oils in them. These contain trans fats. Examples of foods that have trans fats are stick margarine, some tub margarines, cookies, crackers, and other baked goods.  What general guidelines do I need to follow?  · Check food labels. Look for the words \"trans fat\" and \"saturated fat.\"  · When preparing a meal:  ? Fill half of your plate with vegetables and green salads.  ? Fill one fourth of your plate with whole grains. Look for the word \"whole\" as the first word in the ingredient list.  ? Fill one fourth of your plate with lean protein foods.  · Eat more foods that have fiber, like apples, carrots, beans, peas, and barley.  · Eat more home-cooked foods. Eat less at restaurants and buffets.  · Limit or avoid alcohol.  · Limit foods high in starch and sugar.  · Limit fried foods.  · Cook foods without frying them. Baking, boiling, grilling, and broiling are all great options.  · Lose weight if you are overweight. Losing even a small amount of weight can help your overall health. It can also help prevent diseases such as diabetes and heart disease.  What foods can I eat?  Grains  Whole grains, such as whole wheat or whole grain breads, crackers, cereals, and pasta. Unsweetened oatmeal, " bulgur, barley, quinoa, or brown rice. Corn or whole wheat flour tortillas.  Vegetables  Fresh or frozen vegetables (raw, steamed, roasted, or grilled). Green salads.  Fruits  All fresh, canned (in natural juice), or frozen fruits.  Meat and Other Protein Products  Ground beef (85% or leaner), grass-fed beef, or beef trimmed of fat. Skinless chicken or turkey. Ground chicken or turkey. Pork trimmed of fat. All fish and seafood. Eggs. Dried beans, peas, or lentils. Unsalted nuts or seeds. Unsalted canned or dry beans.  Dairy  Low-fat dairy products, such as skim or 1% milk, 2% or reduced-fat cheeses, low-fat ricotta or cottage cheese, or plain low-fat yogurt.  Fats and Oils  Tub margarines without trans fats. Light or reduced-fat mayonnaise and salad dressings. Avocado. Olive, canola, sesame, or safflower oils. Natural peanut or almond butter (choose ones without added sugar and oil).  The items listed above may not be a complete list of recommended foods or beverages. Contact your dietitian for more options.  What foods are not recommended?  Grains  White bread. White pasta. White rice. Cornbread. Bagels, pastries, and croissants. Crackers that contain trans fat.  Vegetables  White potatoes. Corn. Creamed or fried vegetables. Vegetables in a cheese sauce.  Fruits  Dried fruits. Canned fruit in light or heavy syrup. Fruit juice.  Meat and Other Protein Products  Fatty cuts of meat. Ribs, chicken wings, almaguer, sausage, bologna, salami, chitterlings, fatback, hot dogs, bratwurst, and packaged luncheon meats. Liver and organ meats.  Dairy  Whole or 2% milk, cream, half-and-half, and cream cheese. Whole milk cheeses. Whole-fat or sweetened yogurt. Full-fat cheeses. Nondairy creamers and whipped toppings. Processed cheese, cheese spreads, or cheese curds.  Sweets and Desserts  Corn syrup, sugars, honey, and molasses. Candy. Jam and jelly. Syrup. Sweetened cereals. Cookies, pies, cakes, donuts, muffins, and ice  cream.  Fats and Oils  Butter, stick margarine, lard, shortening, ghee, or almaguer fat. Coconut, palm kernel, or palm oils.  Beverages  Alcohol. Sweetened drinks (such as sodas, lemonade, and fruit drinks or punches).  The items listed above may not be a complete list of foods and beverages to avoid. Contact your dietitian for more information.  This information is not intended to replace advice given to you by your health care provider. Make sure you discuss any questions you have with your health care provider.  Document Released: 06/18/2013 Document Revised: 08/24/2017 Document Reviewed: 03/19/2015  [employer] Interactive Patient Education © 2018 [employer]

## 2018-09-24 NOTE — PROGRESS NOTES
Subjective   Ephraim Quinones is a 68 y.o. male.     Pain   This is a chronic problem. The current episode started more than 1 year ago. The problem occurs daily. The problem has been waxing and waning. Associated symptoms include arthralgias. Associated symptoms comments: Obesity and weightbearing joints. The symptoms are aggravated by walking. He has tried oral narcotics for the symptoms. The treatment provided moderate relief.       The following portions of the patient's history were reviewed and updated as appropriate: allergies, current medications, past family history, past medical history, past social history, past surgical history and problem list.    Review of Systems   Respiratory: Positive for apnea.         This patient has ongoing sleep apnea.  He has morbid obesity fatigability hypertension all the comorbidities of sleep apnea.  He uses CPAP with regularity and needs to continue.  He has noticed improvement and I concur that the need for usage will be lifetime   Musculoskeletal: Positive for arthralgias and back pain.   Skin:        SK in front of right ear       Objective   Physical Exam   Constitutional: He is oriented to person, place, and time.   Obesity   Musculoskeletal: He exhibits tenderness.   Neurological: He is alert and oriented to person, place, and time.   Skin:   SK in front of right and left anterior   Psychiatric: He has a normal mood and affect. His behavior is normal. Judgment and thought content normal.   Nursing note and vitals reviewed.      Assessment/Plan   Ephraim was seen today for med refill and skin lesion.    Diagnoses and all orders for this visit:    Need for influenza vaccination    Pain    Other orders  -     HYDROcodone-acetaminophen (NORCO)  MG per tablet; Take 1 tablet by mouth Every 6 (Six) Hours.           Plan-diet instruction of morbid obesity plus above  CONTROLLED SUBSTANCE TRACKING 1/26/2018 4/16/2018 9/24/2018   Rohit Jenkins 1/26/2018 4/16/2018  9/24/2018   Report Number - [id number]   Last UDS 1/26/2018 1/26/2018 1/26/2018   Last Controlled Substance Agreement 1/26/2018 1/30/2018 1/30/2018

## 2018-10-14 ENCOUNTER — RESULTS ENCOUNTER (OUTPATIENT)
Dept: FAMILY MEDICINE CLINIC | Facility: CLINIC | Age: 68
End: 2018-10-14

## 2018-10-14 DIAGNOSIS — E11.9 TYPE 2 DIABETES MELLITUS WITHOUT COMPLICATION, WITHOUT LONG-TERM CURRENT USE OF INSULIN (HCC): ICD-10-CM

## 2018-10-18 LAB
BUN SERPL-MCNC: 26 MG/DL (ref 5–21)
BUN/CREAT SERPL: 30.2 (ref 7–25)
CALCIUM SERPL-MCNC: 9.8 MG/DL (ref 8.4–10.4)
CHLORIDE SERPL-SCNC: 100 MMOL/L (ref 98–110)
CO2 SERPL-SCNC: 26 MMOL/L (ref 24–31)
CREAT SERPL-MCNC: 0.86 MG/DL (ref 0.5–1.4)
GLUCOSE SERPL-MCNC: 133 MG/DL (ref 70–100)
HBA1C MFR BLD: 6.2 %
POTASSIUM SERPL-SCNC: 3.8 MMOL/L (ref 3.5–5.3)
SODIUM SERPL-SCNC: 137 MMOL/L (ref 135–145)

## 2018-11-27 DIAGNOSIS — Z23 NEED FOR SHINGLES VACCINE: Primary | ICD-10-CM

## 2018-12-03 ENCOUNTER — OFFICE VISIT (OUTPATIENT)
Dept: FAMILY MEDICINE CLINIC | Facility: CLINIC | Age: 68
End: 2018-12-03

## 2018-12-03 VITALS
OXYGEN SATURATION: 98 % | HEIGHT: 68 IN | HEART RATE: 72 BPM | SYSTOLIC BLOOD PRESSURE: 126 MMHG | TEMPERATURE: 98.2 F | WEIGHT: 298.6 LBS | BODY MASS INDEX: 45.25 KG/M2 | DIASTOLIC BLOOD PRESSURE: 74 MMHG

## 2018-12-03 DIAGNOSIS — J01.00 ACUTE NON-RECURRENT MAXILLARY SINUSITIS: Primary | ICD-10-CM

## 2018-12-03 PROCEDURE — 96372 THER/PROPH/DIAG INJ SC/IM: CPT | Performed by: FAMILY MEDICINE

## 2018-12-03 PROCEDURE — 99213 OFFICE O/P EST LOW 20 MIN: CPT | Performed by: FAMILY MEDICINE

## 2018-12-03 RX ORDER — RANITIDINE 150 MG/1
150 TABLET ORAL EVERY MORNING
COMMUNITY
End: 2020-05-12

## 2018-12-03 RX ORDER — METHYLPREDNISOLONE ACETATE 40 MG/ML
20 INJECTION, SUSPENSION INTRA-ARTICULAR; INTRALESIONAL; INTRAMUSCULAR; SOFT TISSUE ONCE
Status: COMPLETED | OUTPATIENT
Start: 2018-12-03 | End: 2018-12-03

## 2018-12-03 RX ORDER — AZITHROMYCIN 250 MG/1
TABLET, FILM COATED ORAL
Qty: 6 TABLET | Refills: 0 | Status: SHIPPED | OUTPATIENT
Start: 2018-12-03 | End: 2019-01-04 | Stop reason: SDUPTHER

## 2018-12-03 RX ORDER — METFORMIN HYDROCHLORIDE 1000 MG/1
1000 TABLET, FILM COATED, EXTENDED RELEASE ORAL DAILY
COMMUNITY
Start: 2018-11-23 | End: 2019-03-07 | Stop reason: SDUPTHER

## 2018-12-03 RX ADMIN — METHYLPREDNISOLONE ACETATE 20 MG: 40 INJECTION, SUSPENSION INTRA-ARTICULAR; INTRALESIONAL; INTRAMUSCULAR; SOFT TISSUE at 08:56

## 2018-12-03 NOTE — PROGRESS NOTES
Subjective   Ephraim Quinones is a 68 y.o. male.     Sinusitis   This is a new problem. The current episode started in the past 7 days. The problem has been gradually worsening since onset. There has been no fever. Associated symptoms include congestion, coughing, headaches, a hoarse voice, sinus pressure and a sore throat. Past treatments include saline sprays. The treatment provided no relief.       The following portions of the patient's history were reviewed and updated as appropriate: allergies, current medications, past family history, past medical history, past social history, past surgical history and problem list.    Review of Systems   HENT: Positive for congestion, hoarse voice, sinus pressure and sore throat.    Respiratory: Positive for cough.    Neurological: Positive for headaches.       Objective   Physical Exam   Constitutional: He is oriented to person, place, and time.   Morbidly obese   HENT:   Right Ear: External ear normal.   Left Ear: External ear normal.   Mouth/Throat: Oropharyngeal exudate present.   Cardiovascular: Normal rate and regular rhythm.   Pulmonary/Chest: Effort normal. He has rales.   Musculoskeletal: He exhibits no edema.   Lymphadenopathy:     He has no cervical adenopathy.   Neurological: He is alert and oriented to person, place, and time.   Skin: Skin is warm.   Psychiatric: He has a normal mood and affect. His behavior is normal. Judgment and thought content normal.   Nursing note and vitals reviewed.      Assessment/Plan   Ephraim was seen today for sinusitis and cough.    Diagnoses and all orders for this visit:    Acute non-recurrent maxillary sinusitis  -     methylPREDNISolone acetate (DEPO-medrol) injection 20 mg; Inject 0.5 mL into the appropriate muscle as directed by prescriber 1 (One) Time.    Other orders  -     azithromycin (ZITHROMAX Z-TRACIE) 250 MG tablet; Take 2 tablets the first day, then 1 tablet daily for 4 days.           Plan above plus Mucinex and  Benadryl-comorbidity of obesity an issue with pulmonary congestion advised to move

## 2018-12-07 ENCOUNTER — OFFICE VISIT (OUTPATIENT)
Dept: FAMILY MEDICINE CLINIC | Facility: CLINIC | Age: 68
End: 2018-12-07

## 2018-12-07 VITALS
HEART RATE: 93 BPM | WEIGHT: 304 LBS | DIASTOLIC BLOOD PRESSURE: 82 MMHG | OXYGEN SATURATION: 98 % | HEIGHT: 68 IN | SYSTOLIC BLOOD PRESSURE: 124 MMHG | TEMPERATURE: 97.9 F | BODY MASS INDEX: 46.07 KG/M2

## 2018-12-07 DIAGNOSIS — R05.9 COUGH: Primary | ICD-10-CM

## 2018-12-07 DIAGNOSIS — J40 BRONCHITIS: ICD-10-CM

## 2018-12-07 PROCEDURE — 96372 THER/PROPH/DIAG INJ SC/IM: CPT | Performed by: FAMILY MEDICINE

## 2018-12-07 PROCEDURE — 96160 PT-FOCUSED HLTH RISK ASSMT: CPT | Performed by: FAMILY MEDICINE

## 2018-12-07 PROCEDURE — 99213 OFFICE O/P EST LOW 20 MIN: CPT | Performed by: FAMILY MEDICINE

## 2018-12-07 RX ORDER — CEFTRIAXONE SODIUM 250 MG/1
500 INJECTION, POWDER, FOR SOLUTION INTRAMUSCULAR; INTRAVENOUS ONCE
Status: COMPLETED | OUTPATIENT
Start: 2018-12-07 | End: 2018-12-07

## 2018-12-07 RX ORDER — PREDNISONE 20 MG/1
TABLET ORAL
Qty: 12 TABLET | Refills: 0 | Status: SHIPPED | OUTPATIENT
Start: 2018-12-07 | End: 2019-04-05

## 2018-12-07 RX ORDER — CEFUROXIME AXETIL 500 MG/1
500 TABLET ORAL 2 TIMES DAILY
Qty: 14 TABLET | Refills: 0 | Status: SHIPPED | OUTPATIENT
Start: 2018-12-07 | End: 2018-12-14

## 2018-12-07 RX ADMIN — CEFTRIAXONE SODIUM 500 MG: 250 INJECTION, POWDER, FOR SOLUTION INTRAMUSCULAR; INTRAVENOUS at 10:20

## 2018-12-07 NOTE — PROGRESS NOTES
Subjective   Ephraim Quinones is a 68 y.o. male.     Cough   This is a new problem. The current episode started in the past 7 days. The problem has been gradually worsening. The cough is non-productive. Associated symptoms include shortness of breath. Pertinent negatives include no chest pain or fever. Nothing aggravates the symptoms. Treatments tried: Mucinex and antibiotics. The treatment provided mild relief. His past medical history is significant for asthma. Obesity       The following portions of the patient's history were reviewed and updated as appropriate: allergies, current medications, past family history, past medical history, past social history, past surgical history and problem list.    Review of Systems   Constitutional: Negative for fever.   Respiratory: Positive for cough and shortness of breath.    Cardiovascular: Negative for chest pain.       Objective   Physical Exam   Constitutional: He is oriented to person, place, and time.   HENT:   Right Ear: External ear normal.   Left Ear: External ear normal.   Mouth/Throat: Oropharynx is clear and moist.   Cardiovascular: Normal rate and regular rhythm.   Pulmonary/Chest: Effort normal. He has wheezes. He has rales.   Musculoskeletal: He exhibits no edema.   Neurological: He is alert and oriented to person, place, and time.   Psychiatric: He has a normal mood and affect. His behavior is normal. Judgment and thought content normal.   Nursing note and vitals reviewed.      Assessment/Plan   Patient Active Problem List   Diagnosis   • HTN (hypertension)     Ephraim was seen today for sinusitis.    Diagnoses and all orders for this visit:    Cough  -     XR Chest PA & Lateral; Future  -     cefTRIAXone (ROCEPHIN) injection 500 mg; Inject 500 mg into the appropriate muscle as directed by prescriber 1 (One) Time.    Bronchitis    Other orders  -     cefuroxime (CEFTIN) 500 MG tablet; Take 1 tablet by mouth 2 (Two) Times a Day for 7 days.  -     predniSONE  (DELTASONE) 20 MG tablet; 2 pills now and Saturday morning then 1 pill till medicine gone       No Follow-up on file.       plan above plus chest x-ray should advise high risk because of weight

## 2018-12-13 NOTE — TELEPHONE ENCOUNTER
COUGH, CONGESTION, SOME BETTER-MENTIONED INHALER?    [facility]          generic albuterol inhaler 2 sprays couple minutes apart every 6 hours as needed

## 2018-12-14 RX ORDER — ALBUTEROL SULFATE 90 UG/1
AEROSOL, METERED RESPIRATORY (INHALATION)
Qty: 1 INHALER | Refills: 3 | Status: SHIPPED | OUTPATIENT
Start: 2018-12-14 | End: 2020-05-12 | Stop reason: SDUPTHER

## 2019-01-04 ENCOUNTER — TELEPHONE (OUTPATIENT)
Dept: FAMILY MEDICINE CLINIC | Facility: CLINIC | Age: 69
End: 2019-01-04

## 2019-01-04 RX ORDER — AZITHROMYCIN 250 MG/1
TABLET, FILM COATED ORAL
Qty: 6 TABLET | Refills: 0 | Status: SHIPPED | OUTPATIENT
Start: 2019-01-04 | End: 2019-04-05

## 2019-01-08 RX ORDER — HYDROCODONE BITARTRATE AND ACETAMINOPHEN 10; 325 MG/1; MG/1
1 TABLET ORAL EVERY 6 HOURS
Qty: 90 TABLET | Refills: 0 | Status: SHIPPED | OUTPATIENT
Start: 2019-01-08 | End: 2019-04-05 | Stop reason: SDUPTHER

## 2019-01-14 ENCOUNTER — PRIOR AUTHORIZATION (OUTPATIENT)
Dept: FAMILY MEDICINE CLINIC | Facility: CLINIC | Age: 69
End: 2019-01-14

## 2019-02-11 RX ORDER — INDAPAMIDE 2.5 MG/1
TABLET, FILM COATED ORAL
Qty: 90 TABLET | Refills: 0 | Status: SHIPPED | OUTPATIENT
Start: 2019-02-11 | End: 2019-05-13 | Stop reason: SDUPTHER

## 2019-03-05 RX ORDER — GLIPIZIDE 5 MG/1
TABLET, FILM COATED, EXTENDED RELEASE ORAL
Qty: 90 TABLET | Refills: 0 | Status: SHIPPED | OUTPATIENT
Start: 2019-03-05 | End: 2019-04-08 | Stop reason: SDUPTHER

## 2019-03-05 RX ORDER — AMLODIPINE BESYLATE 5 MG/1
TABLET ORAL
Qty: 180 TABLET | Refills: 0 | Status: SHIPPED | OUTPATIENT
Start: 2019-03-05 | End: 2019-06-05 | Stop reason: SDUPTHER

## 2019-03-06 ENCOUNTER — OFFICE VISIT (OUTPATIENT)
Dept: FAMILY MEDICINE CLINIC | Facility: CLINIC | Age: 69
End: 2019-03-06

## 2019-03-06 VITALS
TEMPERATURE: 97.7 F | OXYGEN SATURATION: 97 % | BODY MASS INDEX: 48.15 KG/M2 | DIASTOLIC BLOOD PRESSURE: 76 MMHG | HEART RATE: 87 BPM | SYSTOLIC BLOOD PRESSURE: 132 MMHG | HEIGHT: 67 IN | WEIGHT: 306.8 LBS

## 2019-03-06 DIAGNOSIS — J01.00 ACUTE NON-RECURRENT MAXILLARY SINUSITIS: Primary | ICD-10-CM

## 2019-03-06 DIAGNOSIS — R73.9 HYPERGLYCEMIA: ICD-10-CM

## 2019-03-06 PROCEDURE — 99213 OFFICE O/P EST LOW 20 MIN: CPT | Performed by: FAMILY MEDICINE

## 2019-03-06 PROCEDURE — 96372 THER/PROPH/DIAG INJ SC/IM: CPT | Performed by: FAMILY MEDICINE

## 2019-03-06 RX ORDER — METHYLPREDNISOLONE ACETATE 40 MG/ML
20 INJECTION, SUSPENSION INTRA-ARTICULAR; INTRALESIONAL; INTRAMUSCULAR; SOFT TISSUE ONCE
Status: COMPLETED | OUTPATIENT
Start: 2019-03-06 | End: 2019-03-06

## 2019-03-06 RX ORDER — AMOXICILLIN 500 MG/1
500 CAPSULE ORAL 3 TIMES DAILY
Qty: 21 CAPSULE | Refills: 0 | Status: SHIPPED | OUTPATIENT
Start: 2019-03-06 | End: 2019-03-13

## 2019-03-06 RX ADMIN — METHYLPREDNISOLONE ACETATE 20 MG: 40 INJECTION, SUSPENSION INTRA-ARTICULAR; INTRALESIONAL; INTRAMUSCULAR; SOFT TISSUE at 15:29

## 2019-03-06 NOTE — PROGRESS NOTES
Subjective   Ephraim Quinones is a 68 y.o. male.     Sinusitis      Sinusitis   This is a new problem. The current episode started in the past 7 days. The problem has been waxing and waning since onset. There has been no fever. Associated symptoms include congestion, coughing, a hoarse voice, sinus pressure and sneezing. Past treatments include nothing. The treatment provided no relief.       The following portions of the patient's history were reviewed and updated as appropriate: allergies, current medications, past family history, past medical history, past social history, past surgical history and problem list.    Review of Systems   HENT: Positive for congestion, hoarse voice, sinus pressure and sneezing.    Respiratory: Positive for cough.        Objective   Physical Exam   Constitutional: He is oriented to person, place, and time.   Morbidly obese   HENT:   Left Ear: External ear normal.   Mouth/Throat: Oropharyngeal exudate present.   Cardiovascular: Normal rate and regular rhythm.   Pulmonary/Chest: Effort normal and breath sounds normal. He has no wheezes. He has no rales.   Musculoskeletal: He exhibits no edema.   Neurological: He is alert and oriented to person, place, and time.   Psychiatric: He has a normal mood and affect. His behavior is normal. Judgment and thought content normal.   Nursing note and vitals reviewed.      Assessment/Plan   Patient Active Problem List   Diagnosis   • HTN (hypertension)     Ephraim was seen today for cough and congestion.    Diagnoses and all orders for this visit:    Acute non-recurrent maxillary sinusitis  -     CBC & Differential  -     methylPREDNISolone acetate (DEPO-medrol) injection 20 mg; Inject 0.5 mL into the appropriate muscle as directed by prescriber 1 (One) Time.    Hyperglycemia  -     Hemoglobin A1c  -     Basic Metabolic Panel    Other orders  -     amoxicillin (AMOXIL) 500 MG capsule; Take 1 capsule by mouth 3 (Three) Times a Day for 7 days.        Return if symptoms worsen or fail to improve, for Next scheduled follow up.    Plan-above

## 2019-03-07 LAB
BASOPHILS # BLD AUTO: 0.06 10*3/MM3 (ref 0–0.2)
BASOPHILS NFR BLD AUTO: 1 % (ref 0–2)
BUN SERPL-MCNC: 23 MG/DL (ref 5–21)
BUN/CREAT SERPL: 28.8 (ref 7–25)
CALCIUM SERPL-MCNC: 9.6 MG/DL (ref 8.4–10.4)
CHLORIDE SERPL-SCNC: 94 MMOL/L (ref 98–110)
CO2 SERPL-SCNC: 30 MMOL/L (ref 24–31)
CREAT SERPL-MCNC: 0.8 MG/DL (ref 0.5–1.4)
EOSINOPHIL # BLD AUTO: 0.29 10*3/MM3 (ref 0–0.7)
EOSINOPHIL NFR BLD AUTO: 4.8 % (ref 0–4)
ERYTHROCYTE [DISTWIDTH] IN BLOOD BY AUTOMATED COUNT: 12.7 % (ref 12–15)
GLUCOSE SERPL-MCNC: 148 MG/DL (ref 70–100)
HBA1C MFR BLD: 6.2 %
HCT VFR BLD AUTO: 46.5 % (ref 40–52)
HGB BLD-MCNC: 15.9 G/DL (ref 14–18)
IMM GRANULOCYTES # BLD AUTO: 0.03 10*3/MM3 (ref 0–0.05)
IMM GRANULOCYTES NFR BLD AUTO: 0.5 % (ref 0–5)
LYMPHOCYTES # BLD AUTO: 1.1 10*3/MM3 (ref 0.72–4.86)
LYMPHOCYTES NFR BLD AUTO: 18.3 % (ref 15–45)
MCH RBC QN AUTO: 32.7 PG (ref 28–32)
MCHC RBC AUTO-ENTMCNC: 34.2 G/DL (ref 33–36)
MCV RBC AUTO: 95.7 FL (ref 82–95)
MONOCYTES # BLD AUTO: 0.69 10*3/MM3 (ref 0.19–1.3)
MONOCYTES NFR BLD AUTO: 11.5 % (ref 4–12)
NEUTROPHILS # BLD AUTO: 3.84 10*3/MM3 (ref 1.87–8.4)
NEUTROPHILS NFR BLD AUTO: 63.9 % (ref 39–78)
NRBC BLD AUTO-RTO: 0.5 /100 WBC (ref 0–0)
PLATELET # BLD AUTO: 214 10*3/MM3 (ref 130–400)
POTASSIUM SERPL-SCNC: 3.7 MMOL/L (ref 3.5–5.3)
RBC # BLD AUTO: 4.86 10*6/MM3 (ref 4.8–5.9)
SODIUM SERPL-SCNC: 136 MMOL/L (ref 135–145)
WBC # BLD AUTO: 6.01 10*3/MM3 (ref 4.8–10.8)

## 2019-03-07 RX ORDER — METFORMIN HYDROCHLORIDE 1000 MG/1
1000 TABLET, FILM COATED, EXTENDED RELEASE ORAL DAILY
Qty: 90 TABLET | Refills: 0 | Status: SHIPPED | OUTPATIENT
Start: 2019-03-07 | End: 2019-06-10 | Stop reason: SDUPTHER

## 2019-04-05 ENCOUNTER — OFFICE VISIT (OUTPATIENT)
Dept: FAMILY MEDICINE CLINIC | Facility: CLINIC | Age: 69
End: 2019-04-05

## 2019-04-05 VITALS
BODY MASS INDEX: 48.21 KG/M2 | DIASTOLIC BLOOD PRESSURE: 80 MMHG | HEIGHT: 67 IN | OXYGEN SATURATION: 98 % | WEIGHT: 307.2 LBS | TEMPERATURE: 97.9 F | SYSTOLIC BLOOD PRESSURE: 138 MMHG | HEART RATE: 86 BPM

## 2019-04-05 DIAGNOSIS — Z12.12 SCREENING FOR COLORECTAL CANCER: ICD-10-CM

## 2019-04-05 DIAGNOSIS — Z00.00 ANNUAL PHYSICAL EXAM: ICD-10-CM

## 2019-04-05 DIAGNOSIS — Z12.11 SCREENING FOR COLORECTAL CANCER: ICD-10-CM

## 2019-04-05 DIAGNOSIS — Z79.899 ON LONG TERM DRUG THERAPY: Primary | ICD-10-CM

## 2019-04-05 DIAGNOSIS — I10 HYPERTENSION, UNSPECIFIED TYPE: ICD-10-CM

## 2019-04-05 DIAGNOSIS — R73.9 HYPERGLYCEMIA: Primary | ICD-10-CM

## 2019-04-05 DIAGNOSIS — E66.01 MORBIDLY OBESE (HCC): ICD-10-CM

## 2019-04-05 DIAGNOSIS — E55.9 VITAMIN D DEFICIENCY: ICD-10-CM

## 2019-04-05 DIAGNOSIS — R53.83 FATIGUE, UNSPECIFIED TYPE: ICD-10-CM

## 2019-04-05 DIAGNOSIS — Z12.5 SPECIAL SCREENING FOR MALIGNANT NEOPLASM OF PROSTATE: ICD-10-CM

## 2019-04-05 DIAGNOSIS — E78.2 MIXED HYPERLIPIDEMIA: ICD-10-CM

## 2019-04-05 LAB
BILIRUB BLD-MCNC: NEGATIVE MG/DL
CLARITY, POC: CLEAR
COLOR UR: YELLOW
GLUCOSE UR STRIP-MCNC: NEGATIVE MG/DL
KETONES UR QL: NEGATIVE
LEUKOCYTE EST, POC: NEGATIVE
NITRITE UR-MCNC: NEGATIVE MG/ML
PH UR: 7 [PH] (ref 5–8)
PROT UR STRIP-MCNC: NEGATIVE MG/DL
RBC # UR STRIP: NEGATIVE /UL
SP GR UR: 1.02 (ref 1–1.03)
UROBILINOGEN UR QL: NORMAL

## 2019-04-05 PROCEDURE — 96160 PT-FOCUSED HLTH RISK ASSMT: CPT | Performed by: FAMILY MEDICINE

## 2019-04-05 PROCEDURE — 81003 URINALYSIS AUTO W/O SCOPE: CPT | Performed by: FAMILY MEDICINE

## 2019-04-05 PROCEDURE — G0439 PPPS, SUBSEQ VISIT: HCPCS | Performed by: FAMILY MEDICINE

## 2019-04-05 RX ORDER — HYDROCODONE BITARTRATE AND ACETAMINOPHEN 10; 325 MG/1; MG/1
1 TABLET ORAL EVERY 6 HOURS
Qty: 90 TABLET | Refills: 0 | Status: SHIPPED | OUTPATIENT
Start: 2019-04-05 | End: 2019-07-22 | Stop reason: SDUPTHER

## 2019-04-05 NOTE — PROGRESS NOTES
Subsequent Medicare Wellness Visit   CONTROLLED SUBSTANCE TRACKING 2018   Last Gregory 2018   Report Number - [id number]   Last UDS 2018   Last Controlled Substance Agreement 2018     The ABC's of the Annual Wellness Visit    Chief Complaint   Patient presents with   • Medicare Wellness-subsequent     Fasting.  Gregory, [employer] and Contract done today.       HPI:  Ephraim Quinones, -1950, is a 69 y.o. male who presents for a Subsequent Medicare Wellness Visit.    Recent Hospitalizations:  No hospitalization(s) within the last year..    Current Medical Providers:  Patient Care Team:  Jim Hopkins MD as PCP - Noah Linder MD as Consulting Physician (Orthopedic Surgery)    Health Habits and Functional and Cognitive Screening and Depression Screening:  Functional & Cognitive Status 2019   Do you have difficulty preparing food and eating? No   Do you have difficulty bathing yourself, getting dressed or grooming yourself? No   Do you have difficulty using the toilet? No   Do you have difficulty moving around from place to place? No   Do you have trouble with steps or getting out of a bed or a chair? No   In the past year have you fallen or experienced a near fall? No   Current Diet Diabetic Diet   Dental Exam Not up to date   Eye Exam Not up to date   Exercise (times per week) 0 times per week   Current Exercise Activities Include Yard Work   Do you need help using the phone?  No   Are you deaf or do you have serious difficulty hearing?  Yes   Do you need help with transportation? Yes   Do you need help shopping? No   Do you need help preparing meals?  No   Do you need help with housework?  No   Do you need help with laundry? No   Do you need help taking your medications? No   Do you need help managing money? No   Do you ever drive or  ride in a car without wearing a seat belt? No   Have you felt unusual stress, anger or loneliness in the last month? No   Who do you live with? Spouse   If you need help, do you have trouble finding someone available to you? No   Have you been bothered in the last four weeks by sexual problems? No   Do you have difficulty concentrating, remembering or making decisions? No       Compared to one year ago, the patient feels his physical health is worse and his mental health is worse.    Depression Screen:  PHQ-2/PHQ-9 Depression Screening 4/5/2019   Little interest or pleasure in doing things 0   Feeling down, depressed, or hopeless 0   Trouble falling or staying asleep, or sleeping too much -   Feeling tired or having little energy -   Poor appetite or overeating -   Feeling bad about yourself - or that you are a failure or have let yourself or your family down -   Trouble concentrating on things, such as reading the newspaper or watching television -   Moving or speaking so slowly that other people could have noticed. Or the opposite - being so fidgety or restless that you have been moving around a lot more than usual -   Thoughts that you would be better off dead, or of hurting yourself in some way -   Total Score 0   If you checked off any problems, how difficult have these problems made it for you to do your work, take care of things at home, or get along with other people? -         Past Medical/Family/Social History:  The following portions of the patient's history were reviewed and updated as appropriate: allergies, current medications, past family history, past medical history, past social history, past surgical history and problem list.    Allergies   Allergen Reactions   • Percocet [Oxycodone-Acetaminophen] Itching   • Percodan [Oxycodone-Aspirin] Itching         Current Outpatient Medications:   •  carbidopa-levodopa (SINEMET)  MG per tablet, , Disp: , Rfl:   •  Cholecalciferol (VITAMIN D3) 5000 units  capsule capsule, Take 5,000 Units by mouth Daily., Disp: , Rfl:   •  clobetasol (TEMOVATE) 0.05 % cream, Apply  topically 2 (Two) Times a Day., Disp: 15 g, Rfl: 3  •  colchicine 0.6 MG tablet, Take 1 tablet by mouth Daily., Disp: 30 tablet, Rfl: 0  •  diazePAM (VALIUM) 5 MG tablet, One tablet po q 8 hours prn spasms, muscle aches., Disp: , Rfl:   •  ELIQUIS 5 MG tablet tablet, Take 5 mg by mouth Every 12 (Twelve) Hours., Disp: , Rfl:   •  glipiZIDE (GLUCOTROL XL) 5 MG ER tablet, TAKE 1 TABLET BY MOUTH DAILY., Disp: 90 tablet, Rfl: 0  •  glucose blood test strip, 1 each by Other route Daily. Use as instructed, Disp: 100 each, Rfl: 3  •  HYDROcodone-acetaminophen (NORCO)  MG per tablet, Take 1 tablet by mouth Every 6 (Six) Hours., Disp: 90 tablet, Rfl: 0  •  indapamide (LOZOL) 2.5 MG tablet, TAKE 1 TABLET BY MOUTH EVERY MORNING, Disp: 90 tablet, Rfl: 0  •  Lancet Devices (EASY TOUCH LANCING DEVICE) misc, USE TO TEST DAILY, Disp: , Rfl: 0  •  losartan (COZAAR) 100 MG tablet, TAKE 1 TABLET BY MOUTH DAILY, Disp: 90 tablet, Rfl: 3  •  metFORMIN (GLUMETZA) 1000 MG (MOD) 24 hr tablet, Take 1 tablet by mouth Daily., Disp: 90 tablet, Rfl: 0  •  ONETOUCH DELICA LANCETS 33G misc, 1 each Daily., Disp: 100 each, Rfl: 3  •  potassium chloride (K-DUR) 10 MEQ CR tablet, Take 10 mEq by mouth Daily., Disp: , Rfl:   •  raNITIdine (ZANTAC) 150 MG tablet, Take 150 mg by mouth Every Morning., Disp: , Rfl:   •  albuterol 108 (90 Base) MCG/ACT inhaler, 2 sprays 2 minutes apart every 6 hours as needed, Disp: 1 inhaler, Rfl: 3  •  amLODIPine (NORVASC) 5 MG tablet, TAKE 1 TABLET BY MOUTH TWICE DAILY, Disp: 180 tablet, Rfl: 0    Aspirin use counseling: Start ASA 81 mg daily     Current medication list contains no high risk medications.  No harmful drug interactions have been identified.     Family History   Problem Relation Age of Onset   • Colon cancer Mother    • Prostate cancer Father    • No Known Problems Maternal Grandmother    • No  "Known Problems Maternal Grandfather    • No Known Problems Paternal Grandmother    • No Known Problems Paternal Grandfather        Social History     Tobacco Use   • Smoking status: Former Smoker     Packs/day: 1.00     Years: 20.00     Pack years: 20.00     Last attempt to quit: 1996     Years since quittin.8   • Smokeless tobacco: Never Used   Substance Use Topics   • Alcohol use: Yes     Comment: occ       Past Surgical History:   Procedure Laterality Date   • BACK SURGERY     • FINGER SURGERY     • REPLACEMENT TOTAL KNEE         Patient Active Problem List   Diagnosis   • HTN (hypertension)   • Morbidly obese (CMS/[facility])       Review of Systems   Constitutional:        Major emphasis on low impact exercise in weight reduction–glycemic index advised   Cardiovascular: Negative for chest pain and leg swelling.        Cardiology referral   Musculoskeletal: Positive for arthralgias and back pain.   All other systems reviewed and are negative.      Objective     Vitals:    19 0821   BP: 138/80   BP Location: Left arm   Patient Position: Sitting   Cuff Size: Large Adult   Pulse: 86   Temp: 97.9 °F (36.6 °C)   SpO2: 98%   Weight: (!) 139 kg (307 lb 3.2 oz)   Height: 170.2 cm (67\")   PainSc:   2       Patient's Body mass index is 48.11 kg/m². BMI is above normal parameters. Recommendations include: educational material and exercise counseling.      No exam data present    The patient has no evidence of cognitve impairment.     Physical Exam   Constitutional: He is oriented to person, place, and time.   Morbidly obese   HENT:   Right Ear: External ear normal.   Left Ear: External ear normal.   Mouth/Throat: Oropharynx is clear and moist.   Eyes: EOM are normal. Pupils are equal, round, and reactive to light.   Neck: No thyromegaly present.   Good carotid pulses   Cardiovascular: Normal rate and regular rhythm.   Pulmonary/Chest: Effort normal and breath sounds normal.   Abdominal: Soft. Bowel sounds are " normal. He exhibits distension.   Genitourinary: Rectum normal, prostate normal and penis normal.   Genitourinary Comments: No testicular masses inguinal hernias or adenopathy–prostate normal guaiac negative   Musculoskeletal: He exhibits no edema.   Lymphadenopathy:     He has no cervical adenopathy.   Neurological: He is oriented to person, place, and time.   Skin: Skin is warm and dry. Capillary refill takes less than 2 seconds.   Psychiatric: He has a normal mood and affect. His behavior is normal. Judgment and thought content normal.   Nursing note and vitals reviewed.      Recent Lab Results:  Lab Results   Component Value Date     (H) 03/06/2019     Lab Results   Component Value Date    TRIG 114 01/26/2018    HDL 54 01/26/2018    VLDL 22.8 01/26/2018       Assessment/Plan   Age-appropriate Screening Schedule:  Refer to the list below for future screening recommendations based on patient's age, sex and/or medical conditions.      Health Maintenance   Topic Date Due   • ZOSTER VACCINE (2 of 3) 04/16/2013   • DIABETIC EYE EXAM  10/15/2016   • DIABETIC FOOT EXAM  01/26/2019   • LIPID PANEL  01/26/2019   • URINE MICROALBUMIN  01/26/2019   • INFLUENZA VACCINE  08/01/2019   • PNEUMOCOCCAL VACCINES (65+ LOW/MEDIUM RISK) (2 of 2 - PPSV23) 08/20/2019   • HEMOGLOBIN A1C  09/06/2019   • COLONOSCOPY  09/23/2024   • TDAP/TD VACCINES  Discontinued       Medicare Risks and Personalized Health Plan:  Colon Cancer screening is due;  Cologuard Ordered  Obesity/Overweight condition;  Diet information included in the after visit summary (AVS)      CMS-Preventive Services Quick Reference  Medicare Preventive Services Addressed:  Annual Wellness Visit (AWV)  Colorectal Cancer Screening, Cologuard Test   Diabetes Screening-Lab Order for either glucose quantitative blood (except reagent strip), glucose;post glucose dose(includes glucose), or glucose tolerance test-3 specimens(includes glucose)    Advance Care  Planning:  Patient does not have an advance directive - information provided to the patient today    Diagnoses and all orders for this visit:    1. Hyperglycemia (Primary)  -     Comprehensive Metabolic Panel  -     Hemoglobin A1c  -     POC Urinalysis Dipstick, Multipro  -     Ambulatory Referral to Cardiology    2. Morbidly obese (CMS/HCC)  -     Ambulatory Referral to Cardiology    3. Vitamin D deficiency  -     Vitamin D 25 Hydroxy    4. Fatigue, unspecified type  -     TSH  -     T4, free  -     CBC & Differential    5. Mixed hyperlipidemia  -     Comprehensive Metabolic Panel  -     Lipid Panel    6. Hypertension, unspecified type  -     POC Urinalysis Dipstick, Multipro  -     Ambulatory Referral to Cardiology    7. Special screening for malignant neoplasm of prostate  -     PSA Screen    8. Screening for colorectal cancer  -     Cologuard - Stool, Per Rectum; Future    9. Annual physical exam        An After Visit Summary and PPPS with all of these plans were given to the patient.      Follow Up:  Return in 6 months (on 10/5/2019).                    Cardiac referral, weight reduction effort exercise effort

## 2019-04-05 NOTE — PATIENT INSTRUCTIONS
Exercising to Stay Healthy  Exercising regularly is important. It has many health benefits, such as:  · Improving your overall fitness, flexibility, and endurance.  · Increasing your bone density.  · Helping with weight control.  · Decreasing your body fat.  · Increasing your muscle strength.  · Reducing stress and tension.  · Improving your overall health.    In order to become healthy and stay healthy, it is recommended that you do moderate-intensity and vigorous-intensity exercise. You can tell that you are exercising at a moderate intensity if you have a higher heart rate and faster breathing, but you are still able to hold a conversation. You can tell that you are exercising at a vigorous intensity if you are breathing much harder and faster and cannot hold a conversation while exercising.  How often should I exercise?  Choose an activity that you enjoy and set realistic goals. Your health care provider can help you to make an activity plan that works for you. Exercise regularly as directed by your health care provider. This may include:  · Doing resistance training twice each week, such as:  ? Push-ups.  ? Sit-ups.  ? Lifting weights.  ? Using resistance bands.  · Doing a given intensity of exercise for a given amount of time. Choose from these options:  ? 150 minutes of moderate-intensity exercise every week.  ? 75 minutes of vigorous-intensity exercise every week.  ? A mix of moderate-intensity and vigorous-intensity exercise every week.    Children, pregnant women, people who are out of shape, people who are overweight, and older adults may need to consult a health care provider for individual recommendations. If you have any sort of medical condition, be sure to consult your health care provider before starting a new exercise program.  What are some exercise ideas?  Some moderate-intensity exercise ideas include:  · Walking at a rate of 1 mile in 15  minutes.  · Biking.  · Hiking.  · Golfing.  · Dancing.    Some vigorous-intensity exercise ideas include:  · Walking at a rate of at least 4.5 miles per hour.  · Jogging or running at a rate of 5 miles per hour.  · Biking at a rate of at least 10 miles per hour.  · Lap swimming.  · Roller-skating or in-line skating.  · Cross-country skiing.  · Vigorous competitive sports, such as football, basketball, and soccer.  · Jumping rope.  · Aerobic dancing.    What are some everyday activities that can help me to get exercise?  · Yard work, such as:  ? Pushing a .  ? Raking and bagging leaves.  · Washing and waxing your car.  · Pushing a stroller.  · Shoveling snow.  · Gardening.  · Washing windows or floors.  How can I be more active in my day-to-day activities?  · Use the stairs instead of the elevator.  · Take a walk during your lunch break.  · If you drive, park your car farther away from work or school.  · If you take public transportation, get off one stop early and walk the rest of the way.  · Make all of your phone calls while standing up and walking around.  · Get up, stretch, and walk around every 30 minutes throughout the day.  What guidelines should I follow while exercising?  · Do not exercise so much that you hurt yourself, feel dizzy, or get very short of breath.  · Consult your health care provider before starting a new exercise program.  · Wear comfortable clothes and shoes with good support.  · Drink plenty of water while you exercise to prevent dehydration or heat stroke. Body water is lost during exercise and must be replaced.  · Work out until you breathe faster and your heart beats faster.  This information is not intended to replace advice given to you by your health care provider. Make sure you discuss any questions you have with your health care provider.  Document Released: 01/20/2012 Document Revised: 05/25/2017 Document Reviewed: 05/21/2015  [employer] Interactive Patient Education © 2018  [employer]      Fall Prevention in the Home, Adult  Falls can cause injuries and can affect people from all age groups. There are many simple things that you can do to make your home safe and to help prevent falls. Ask for help when making these changes, if needed.  What actions can I take to prevent falls?  General instructions  · Use good lighting in all rooms. Replace any light bulbs that burn out.  · Turn on lights if it is dark. Use night-lights.  · Place frequently used items in easy-to-reach places. Lower the shelves around your home if necessary.  · Set up furniture so that there are clear paths around it. Avoid moving your furniture around.  · Remove throw rugs and other tripping hazards from the floor.  · Avoid walking on wet floors.  · Fix any uneven floor surfaces.  · Add color or contrast paint or tape to grab bars and handrails in your home. Place contrasting color strips on the first and last steps of stairways.  · When you use a stepladder, make sure that it is completely opened and that the sides are firmly locked. Have someone hold the ladder while you are using it. Do not climb a closed stepladder.  · Be aware of any and all pets.  What can I do in the bathroom?  · Keep the floor dry. Immediately clean up any water that spills onto the floor.  · Remove soap buildup in the tub or shower on a regular basis.  · Use non-skid mats or decals on the floor of the tub or shower.  · Attach bath mats securely with double-sided, non-slip rug tape.  · If you need to sit down while you are in the shower, use a plastic, non-slip stool.  · Install grab bars by the toilet and in the tub and shower. Do not use towel bars as grab bars.  What can I do in the bedroom?  · Make sure that a bedside light is easy to reach.  · Do not use oversized bedding that drapes onto the floor.  · Have a firm chair that has side arms to use for getting dressed.  What can I do in the kitchen?  · Clean up any spills right away.  · If  you need to reach for something above you, use a sturdy step stool that has a grab bar.  · Keep electrical cables out of the way.  · Do not use floor polish or wax that makes floors slippery. If you must use wax, make sure that it is non-skid floor wax.  What can I do in the stairways?  · Do not leave any items on the stairs.  · Make sure that you have a light switch at the top of the stairs and the bottom of the stairs. Have them installed if you do not have them.  · Make sure that there are handrails on both sides of the stairs. Fix handrails that are broken or loose. Make sure that handrails are as long as the stairways.  · Install non-slip stair treads on all stairs in your home.  · Avoid having throw rugs at the top or bottom of stairways, or secure the rugs with carpet tape to prevent them from moving.  · Choose a carpet design that does not hide the edge of steps on the stairway.  · Check any carpeting to make sure that it is firmly attached to the stairs. Fix any carpet that is loose or worn.  What can I do on the outside of my home?  · Use bright outdoor lighting.  · Regularly repair the edges of walkways and driveways and fix any cracks.  · Remove high doorway thresholds.  · Trim any shrubbery on the main path into your home.  · Regularly check that handrails are securely fastened and in good repair. Both sides of any steps should have handrails.  · Install guardrails along the edges of any raised decks or porches.  · Clear walkways of debris and clutter, including tools and rocks.  · Have leaves, snow, and ice cleared regularly.  · Use sand or salt on walkways during winter months.  · In the garage, clean up any spills right away, including grease or oil spills.  What other actions can I take?  · Wear closed-toe shoes that fit well and support your feet. Wear shoes that have rubber soles or low heels.  · Use mobility aids as needed, such as canes, walkers, scooters, and crutches.  · Review your medicines  with your health care provider. Some medicines can cause dizziness or changes in blood pressure, which increase your risk of falling.  Talk with your health care provider about other ways that you can decrease your risk of falls. This may include working with a physical therapist or  to improve your strength, balance, and endurance.  Where to find more information  · Centers for Disease Control and Prevention, [place]: https://www.cdc.gov  · [employer]: https://vv5sjue.connie.nih.gov  Contact a health care provider if:  · You are afraid of falling at home.  · You feel weak, drowsy, or dizzy at home.  · You fall at home.  Summary  · There are many simple things that you can do to make your home safe and to help prevent falls.  · Ways to make your home safe include removing tripping hazards and installing grab bars in the bathroom.  · Ask for help when making these changes in your home.  This information is not intended to replace advice given to you by your health care provider. Make sure you discuss any questions you have with your health care provider.  Document Released: 2003 Document Revised: 2018 Document Reviewed: 2018  [employer] Interactive Patient Education © 2019 [employer].    Medicare Wellness  Personal Prevention Plan of Service     Date of Office Visit:  2019  Encounter Provider:  Jim Hopkins MD  Place of Service:  [facility]  Patient Name: Ephraim Quinones  :  1950    As part of the Medicare Wellness portion of your visit today, we are providing you with this personalized preventive plan of services (PPPS). This plan is based upon recommendations of the [place] Preventive Services Task Force (USPSTF) and the Advisory Committee on Immunization Practices (ACIP).    This lists the preventive care services that should be considered, and provides dates of when you are due. Items listed as completed are  up-to-date and do not require any further intervention.    Health Maintenance   Topic Date Due   • ZOSTER VACCINE (2 of 3) 04/16/2013   • DIABETIC EYE EXAM  10/15/2016   • MEDICARE ANNUAL WELLNESS  01/26/2019   • DIABETIC FOOT EXAM  01/26/2019   • LIPID PANEL  01/26/2019   • URINE MICROALBUMIN  01/26/2019   • INFLUENZA VACCINE  08/01/2019   • PNEUMOCOCCAL VACCINES (65+ LOW/MEDIUM RISK) (2 of 2 - PPSV23) 08/20/2019   • HEMOGLOBIN A1C  09/06/2019   • COLONOSCOPY  09/23/2024   • HEPATITIS C SCREENING  Completed   • AAA SCREEN (ONE-TIME)  Completed   • TDAP/TD VACCINES  Discontinued       Orders Placed This Encounter   Procedures   • TSH   • T4, free   • Cologuard - Stool, Per Rectum     Standing Status:   Future     Standing Expiration Date:   4/5/2020   • Comprehensive Metabolic Panel   • Hemoglobin A1c   • Lipid Panel   • PSA Screen   • Vitamin D 25 Hydroxy   • POC Urinalysis Dipstick, Multipro   • CBC & Differential     Order Specific Question:   Manual Differential     Answer:   No       Return in 6 months (on 10/5/2019).        Mediterranean Diet  A Mediterranean diet refers to food and lifestyle choices that are based on the traditions of countries located on the [place]. This way of eating has been shown to help prevent certain conditions and improve outcomes for people who have chronic diseases, like kidney disease and heart disease.  What are tips for following this plan?  Lifestyle  · Cook and eat meals together with your family, when possible.  · Drink enough fluid to keep your urine clear or pale yellow.  · Be physically active every day. This includes:  ? Aerobic exercise like running or swimming.  ? Leisure activities like gardening, walking, or housework.  · Get 7–8 hours of sleep each night.  · If recommended by your health care provider, drink red wine in moderation. This means 1 glass a day for nonpregnant women and 2 glasses a day for men. A glass of wine equals 5 oz (150 mL).  Reading food  labels  · Check the serving size of packaged foods. For foods such as rice and pasta, the serving size refers to the amount of cooked product, not dry.  · Check the total fat in packaged foods. Avoid foods that have saturated fat or trans fats.  · Check the ingredients list for added sugars, such as corn syrup.  Shopping  · At the grocery store, buy most of your food from the areas near the walls of the store. This includes:  ? Fresh fruits and vegetables (produce).  ? Grains, beans, nuts, and seeds. Some of these may be available in unpackaged forms or large amounts (in bulk).  ? Fresh seafood.  ? Poultry and eggs.  ? Low-fat dairy products.  · Buy whole ingredients instead of prepackaged foods.  · Buy fresh fruits and vegetables in-season from local [employer] markets.  · Buy frozen fruits and vegetables in resealable bags.  · If you do not have access to quality fresh seafood, buy precooked frozen shrimp or canned fish, such as tuna, salmon, or sardines.  · Buy small amounts of raw or cooked vegetables, salads, or olives from the deli or salad bar at your store.  · Stock your pantry so you always have certain foods on hand, such as olive oil, canned tuna, canned tomatoes, rice, pasta, and beans.  Cooking  · Cook foods with extra-virgin olive oil instead of using butter or other vegetable oils.  · Have meat as a side dish, and have vegetables or grains as your main dish. This means having meat in small portions or adding small amounts of meat to foods like pasta or stew.  · Use beans or vegetables instead of meat in common dishes like chili or lasagna.  · [place] with different cooking methods. Try roasting or broiling vegetables instead of steaming or sautéeing them.  · Add frozen vegetables to soups, stews, pasta, or rice.  · Add nuts or seeds for added healthy fat at each meal. You can add these to yogurt, salads, or vegetable dishes.  · Marinate fish or vegetables using olive oil, lemon juice, garlic, and  fresh herbs.  Meal planning  · Plan to eat 1 vegetarian meal one day each week. Try to work up to 2 vegetarian meals, if possible.  · Eat seafood 2 or more times a week.  · Have healthy snacks readily available, such as:  ? Vegetable sticks with hummus.  ? Greek yogurt.  ? Fruit and nut trail mix.  · Eat balanced meals throughout the week. This includes:  ? Fruit: 2–3 servings a day  ? Vegetables: 4–5 servings a day  ? Low-fat dairy: 2 servings a day  ? Fish, poultry, or lean meat: 1 serving a day  ? Beans and legumes: 2 or more servings a week  ? Nuts and seeds: 1–2 servings a day  ? Whole grains: 6–8 servings a day  ? Extra-virgin olive oil: 3–4 servings a day  · Limit red meat and sweets to only a few servings a month  What are my food choices?  · Mediterranean diet  ? Recommended  ? Grains: Whole-grain pasta. Brown rice. Bulgar wheat. Polenta. Couscous. Whole-wheat bread. Oatmeal. Quinoa.  ? Vegetables: Artichokes. Beets. Broccoli. Cabbage. Carrots. Eggplant. Green beans. Chard. Kale. Spinach. Onions. Leeks. Peas. Squash. Tomatoes. Peppers. Radishes.  ? Fruits: Apples. Apricots. Avocado. Berries. Bananas. Cherries. Dates. Figs. Grapes. Erich. Melon. Oranges. Peaches. Plums. Pomegranate.  ? Meats and other protein foods: Beans. Almonds. Sunflower seeds. Pine nuts. Peanuts. Cod. [place]. Scallops. Shrimp. Tuna. Tilapia. Clams. Oysters. Eggs.  ? Dairy: Low-fat milk. Cheese. Greek yogurt.  ? Beverages: Water. Red wine. Herbal tea.  ? Fats and oils: Extra virgin olive oil. Avocado oil. Grape seed oil.  ? Sweets and desserts: Greek yogurt with honey. Baked apples. Poached pears. Trail mix.  ? Seasoning and other foods: Basil. Cilantro. Coriander. Cumin. Mint. Parsley. Melchor. Rosemary. Tarragon. Garlic. Oregano. Thyme. Pepper. Balsalmic vinegar. Tahini. Hummus. Tomato sauce. Olives. Mushrooms.  ? Limit these  ? Grains: Prepackaged pasta or rice dishes. Prepackaged cereal with added sugar.  ? Vegetables: Deep fried  potatoes (french fries).  ? Fruits: Fruit canned in syrup.  ? Meats and other protein foods: Beef. Pork. Lamb. Poultry with skin. Hot dogs. Gimenez.  ? Dairy: Ice cream. Sour cream. Whole milk.  ? Beverages: Juice. Sugar-sweetened soft drinks. Beer. Liquor and spirits.  ? Fats and oils: Butter. Canola oil. Vegetable oil. Beef fat (tallow). Lard.  ? Sweets and desserts: Cookies. Cakes. Pies. Candy.  ? Seasoning and other foods: Mayonnaise. Premade sauces and marinades.  ? The items listed may not be a complete list. Talk with your dietitian about what dietary choices are right for you.  Summary  · The Mediterranean diet includes both food and lifestyle choices.  · Eat a variety of fresh fruits and vegetables, beans, nuts, seeds, and whole grains.  · Limit the amount of red meat and sweets that you eat.  · Talk with your health care provider about whether it is safe for you to drink red wine in moderation. This means 1 glass a day for nonpregnant women and 2 glasses a day for men. A glass of wine equals 5 oz (150 mL).  This information is not intended to replace advice given to you by your health care provider. Make sure you discuss any questions you have with your health care provider.  Document Released: 08/10/2017 Document Revised: 09/12/2017 Document Reviewed: 08/10/2017  [employer] Interactive Patient Education © 2019 [employer]

## 2019-04-06 LAB
25(OH)D3+25(OH)D2 SERPL-MCNC: 26.6 NG/ML (ref 30–100)
ALBUMIN SERPL-MCNC: 4.3 G/DL (ref 3.5–5.2)
ALBUMIN/GLOB SERPL: 1.9 G/DL
ALP SERPL-CCNC: 69 U/L (ref 39–117)
ALT SERPL-CCNC: 60 U/L (ref 1–41)
AST SERPL-CCNC: 32 U/L (ref 1–40)
BASOPHILS # BLD AUTO: 0.07 10*3/MM3 (ref 0–0.2)
BASOPHILS NFR BLD AUTO: 1 % (ref 0–1.5)
BILIRUB SERPL-MCNC: 0.5 MG/DL (ref 0.2–1.2)
BUN SERPL-MCNC: 25 MG/DL (ref 8–23)
BUN/CREAT SERPL: 32.5 (ref 7–25)
CALCIUM SERPL-MCNC: 10.1 MG/DL (ref 8.6–10.5)
CHLORIDE SERPL-SCNC: 96 MMOL/L (ref 98–107)
CHOLEST SERPL-MCNC: 177 MG/DL (ref 0–200)
CO2 SERPL-SCNC: 25.2 MMOL/L (ref 22–29)
CREAT SERPL-MCNC: 0.77 MG/DL (ref 0.76–1.27)
EOSINOPHIL # BLD AUTO: 0.48 10*3/MM3 (ref 0–0.4)
EOSINOPHIL NFR BLD AUTO: 7.1 % (ref 0.3–6.2)
ERYTHROCYTE [DISTWIDTH] IN BLOOD BY AUTOMATED COUNT: 13.1 % (ref 12.3–15.4)
GLOBULIN SER CALC-MCNC: 2.3 GM/DL
GLUCOSE SERPL-MCNC: 174 MG/DL (ref 65–99)
HBA1C MFR BLD: 6.82 % (ref 4.8–5.6)
HCT VFR BLD AUTO: 49.2 % (ref 37.5–51)
HDLC SERPL-MCNC: 64 MG/DL (ref 40–60)
HGB BLD-MCNC: 15.8 G/DL (ref 13–17.7)
IMM GRANULOCYTES # BLD AUTO: 0.06 10*3/MM3 (ref 0–0.05)
IMM GRANULOCYTES NFR BLD AUTO: 0.9 % (ref 0–0.5)
LDLC SERPL CALC-MCNC: 92 MG/DL (ref 0–100)
LYMPHOCYTES # BLD AUTO: 1.46 10*3/MM3 (ref 0.7–3.1)
LYMPHOCYTES NFR BLD AUTO: 21.6 % (ref 19.6–45.3)
MCH RBC QN AUTO: 32.3 PG (ref 26.6–33)
MCHC RBC AUTO-ENTMCNC: 32.1 G/DL (ref 31.5–35.7)
MCV RBC AUTO: 100.6 FL (ref 79–97)
MONOCYTES # BLD AUTO: 0.55 10*3/MM3 (ref 0.1–0.9)
MONOCYTES NFR BLD AUTO: 8.1 % (ref 5–12)
NEUTROPHILS # BLD AUTO: 4.15 10*3/MM3 (ref 1.4–7)
NEUTROPHILS NFR BLD AUTO: 61.3 % (ref 42.7–76)
NRBC BLD AUTO-RTO: 0 /100 WBC (ref 0–0)
PLATELET # BLD AUTO: 191 10*3/MM3 (ref 140–450)
POTASSIUM SERPL-SCNC: 4.1 MMOL/L (ref 3.5–5.2)
PROT SERPL-MCNC: 6.6 G/DL (ref 6–8.5)
PSA SERPL-MCNC: 0.92 NG/ML (ref 0–4)
RBC # BLD AUTO: 4.89 10*6/MM3 (ref 4.14–5.8)
SODIUM SERPL-SCNC: 139 MMOL/L (ref 136–145)
T4 FREE SERPL-MCNC: 1.17 NG/DL (ref 0.93–1.7)
TRIGL SERPL-MCNC: 104 MG/DL (ref 0–150)
TSH SERPL DL<=0.005 MIU/L-ACNC: 0.73 MIU/ML (ref 0.27–4.2)
VLDLC SERPL CALC-MCNC: 20.8 MG/DL (ref 5–40)
WBC # BLD AUTO: 6.77 10*3/MM3 (ref 3.4–10.8)

## 2019-04-08 DIAGNOSIS — E55.9 VITAMIN D DEFICIENCY: ICD-10-CM

## 2019-04-08 DIAGNOSIS — R73.9 HYPERGLYCEMIA: Primary | ICD-10-CM

## 2019-04-08 RX ORDER — GLIPIZIDE 5 MG/1
10 TABLET, FILM COATED, EXTENDED RELEASE ORAL DAILY
Qty: 180 TABLET | Refills: 0 | Status: SHIPPED | OUTPATIENT
Start: 2019-04-08 | End: 2019-08-12 | Stop reason: SDUPTHER

## 2019-04-12 LAB — DRUGS UR: NORMAL

## 2019-04-25 DIAGNOSIS — R19.5 POSITIVE COLORECTAL CANCER SCREENING USING COLOGUARD TEST: Primary | ICD-10-CM

## 2019-04-29 RX ORDER — LOSARTAN POTASSIUM 100 MG/1
TABLET ORAL
Qty: 90 TABLET | Refills: 3 | Status: SHIPPED | OUTPATIENT
Start: 2019-04-29 | End: 2020-04-21

## 2019-05-07 ENCOUNTER — TELEPHONE (OUTPATIENT)
Dept: FAMILY MEDICINE CLINIC | Facility: CLINIC | Age: 69
End: 2019-05-07

## 2019-05-07 DIAGNOSIS — N47.1 PHIMOSIS: Primary | ICD-10-CM

## 2019-05-13 RX ORDER — INDAPAMIDE 2.5 MG/1
TABLET, FILM COATED ORAL
Qty: 90 TABLET | Refills: 3 | Status: SHIPPED | OUTPATIENT
Start: 2019-05-13 | End: 2020-05-07

## 2019-05-16 NOTE — PROGRESS NOTES
Subjective    Mr. Quinones is 69 y.o. male    Chief Complaint: Phimosis/Discuss circumcision     History of Present Illness     Genital Lesion  Patient is here for a genital lesion.  The location of the lesion is penis.  The lesion has been present for 3month(s). The description is phimosis.  The courseworsening.  Treatment response is none.  Associated symptoms include unable to retract to foreskin problem.       The following portions of the patient's history were reviewed and updated as appropriate: allergies, current medications, past family history, past medical history, past social history, past surgical history and problem list.    Review of Systems   Constitutional: Negative for appetite change, chills, fever and unexpected weight change.   HENT: Negative for congestion, ear pain, facial swelling, hearing loss, nosebleeds, trouble swallowing and voice change.    Eyes: Negative for photophobia, pain, discharge and visual disturbance.   Respiratory: Negative for cough, choking, chest tightness and shortness of breath.    Cardiovascular: Negative for chest pain and palpitations.   Gastrointestinal: Negative for abdominal distention, abdominal pain, blood in stool, constipation, diarrhea, nausea and vomiting.   Endocrine: Negative for cold intolerance, heat intolerance and polydipsia.   Genitourinary: Negative for decreased urine volume, difficulty urinating, discharge, dysuria, enuresis, flank pain, frequency, genital sores, hematuria, penile pain, penile swelling, scrotal swelling, testicular pain and urgency.   Musculoskeletal: Negative for arthralgias, joint swelling, neck pain and neck stiffness.   Skin: Negative for pallor and rash.   Allergic/Immunologic: Negative for immunocompromised state.   Neurological: Negative for dizziness, tremors, seizures, syncope, light-headedness and headaches.   Hematological: Negative for adenopathy. Does not bruise/bleed easily.   Psychiatric/Behavioral: Negative for  agitation, confusion, dysphoric mood, hallucinations, self-injury and suicidal ideas.         Current Outpatient Medications:   •  albuterol 108 (90 Base) MCG/ACT inhaler, 2 sprays 2 minutes apart every 6 hours as needed, Disp: 1 inhaler, Rfl: 3  •  amLODIPine (NORVASC) 5 MG tablet, TAKE 1 TABLET BY MOUTH TWICE DAILY, Disp: 180 tablet, Rfl: 0  •  carbidopa-levodopa (SINEMET)  MG per tablet, , Disp: , Rfl:   •  Cholecalciferol (VITAMIN D3) 5000 units capsule capsule, Take 5,000 Units by mouth Daily., Disp: , Rfl:   •  clobetasol (TEMOVATE) 0.05 % cream, Apply  topically 2 (Two) Times a Day., Disp: 15 g, Rfl: 3  •  colchicine 0.6 MG tablet, Take 1 tablet by mouth Daily., Disp: 30 tablet, Rfl: 0  •  diazePAM (VALIUM) 5 MG tablet, One tablet po q 8 hours prn spasms, muscle aches., Disp: , Rfl:   •  ELIQUIS 5 MG tablet tablet, Take 5 mg by mouth Every 12 (Twelve) Hours., Disp: , Rfl:   •  glipiZIDE (GLUCOTROL XL) 5 MG ER tablet, Take 2 tablets by mouth Daily., Disp: 180 tablet, Rfl: 0  •  glucose blood test strip, 1 each by Other route Daily. Use as instructed, Disp: 100 each, Rfl: 3  •  HYDROcodone-acetaminophen (NORCO)  MG per tablet, Take 1 tablet by mouth Every 6 (Six) Hours., Disp: 90 tablet, Rfl: 0  •  indapamide (LOZOL) 2.5 MG tablet, TAKE 1 TABLET BY MOUTH EVERY MORNING, Disp: 90 tablet, Rfl: 3  •  Lancet Devices (EASY TOUCH LANCING DEVICE) misc, USE TO TEST DAILY, Disp: , Rfl: 0  •  losartan (COZAAR) 100 MG tablet, TAKE 1 TABLET BY MOUTH DAILY, Disp: 90 tablet, Rfl: 3  •  metFORMIN (GLUMETZA) 1000 MG (MOD) 24 hr tablet, Take 1 tablet by mouth Daily., Disp: 90 tablet, Rfl: 0  •  ONETOUCH DELICA LANCETS 33G misc, 1 each Daily., Disp: 100 each, Rfl: 3  •  potassium chloride (K-DUR) 10 MEQ CR tablet, Take 10 mEq by mouth Daily., Disp: , Rfl:   •  raNITIdine (ZANTAC) 150 MG tablet, Take 150 mg by mouth Every Morning., Disp: , Rfl:     Past Medical History:   Diagnosis Date   • Anemia 5/3/2016   • Diabetes  "mellitus (CMS/HCC)    • Hyperlipidemia    • Hypertension        Past Surgical History:   Procedure Laterality Date   • BACK SURGERY     • FINGER SURGERY     • REPLACEMENT TOTAL KNEE         Social History     Socioeconomic History   • Marital status:      Spouse name: Not on file   • Number of children: Not on file   • Years of education: Not on file   • Highest education level: Not on file   Tobacco Use   • Smoking status: Former Smoker     Packs/day: 1.00     Years: 20.00     Pack years: 20.00     Last attempt to quit: 1996     Years since quittin.9   • Smokeless tobacco: Never Used   Substance and Sexual Activity   • Alcohol use: Yes     Comment: occ   • Drug use: No   • Sexual activity: Defer       Family History   Problem Relation Age of Onset   • Colon cancer Mother    • Prostate cancer Father    • No Known Problems Maternal Grandmother    • No Known Problems Maternal Grandfather    • No Known Problems Paternal Grandmother    • No Known Problems Paternal Grandfather        Objective    Temp 97.3 °F (36.3 °C)   Ht 170.2 cm (67.01\")   Wt (!) 139 kg (307 lb)   BMI 48.07 kg/m²     Physical Exam   Constitutional: He is oriented to person, place, and time. He appears well-developed and well-nourished. No distress.   HENT:   Nose: Nose normal.   Neck: Normal range of motion. Neck supple. No tracheal deviation present. No thyromegaly present.   Cardiovascular: Normal rate, regular rhythm and intact distal pulses.   No significant edema or tenderness    Pulmonary/Chest: Breath sounds normal. No accessory muscle usage. No respiratory distress.   Abdominal: Soft. Bowel sounds are normal. He exhibits no distension, no ascites and no mass. There is no hepatosplenomegaly. There is no tenderness. There is no rebound, no guarding and no CVA tenderness. No hernia.   Stool specimen is not indicated for my portion of the exam   Genitourinary: Testes normal and penis normal. Rectal exam shows no mass, no " tenderness, anal tone normal and guaiac negative stool. Tender: no nodules. Right testis shows no mass, no swelling and no tenderness. Left testis shows no mass, no swelling and no tenderness. No penile tenderness (no lesion or deformities).   Genitourinary Comments:  Phimotic foreskin unable to retract.  Epididymis without mass or tenderness. Vas Deferens is palpably normal.   Lymphadenopathy:     He has no cervical adenopathy. No inguinal adenopathy noted on the right or left side.        Right: No inguinal adenopathy present.        Left: No inguinal adenopathy present.   Neurological: He is alert and oriented to person, place, and time.   Skin: Skin is warm and dry. No rash noted. He is not diaphoretic. No pallor.   Psychiatric: He has a normal mood and affect. His behavior is normal.   Vitals reviewed.          Results for orders placed or performed in visit on 05/17/19   POC Urinalysis Dipstick, Multipro   Result Value Ref Range    Color Yellow Yellow, Straw, Dark Yellow, Alexandra    Clarity, UA Clear Clear    Glucose, UA Negative Negative, 1000 mg/dL (3+) mg/dL    Bilirubin Negative Negative    Ketones, UA Negative Negative    Specific Gravity  1.020 1.005 - 1.030    Blood, UA Negative Negative    pH, Urine 6.0 5.0 - 8.0    Protein, POC Negative Negative mg/dL    Urobilinogen, UA Normal Normal    Nitrite, UA Negative Negative    Leukocytes Negative Negative   Patient's Body mass index is 48.07 kg/m². BMI is above normal parameters. Recommendations include: educational material.    Assessment and Plan    Diagnoses and all orders for this visit:    Phimosis  -     POC Urinalysis Dipstick, Multipro    Impotence of organic origin          Patient with severely phimotic foreskin this is not responded to cream.  We will plan for circumcision on June 6.  He will have to come off of his blood thinner prior.

## 2019-05-17 ENCOUNTER — OFFICE VISIT (OUTPATIENT)
Dept: UROLOGY | Facility: CLINIC | Age: 69
End: 2019-05-17

## 2019-05-17 VITALS — HEIGHT: 67 IN | BODY MASS INDEX: 48.18 KG/M2 | WEIGHT: 307 LBS | TEMPERATURE: 97.3 F

## 2019-05-17 DIAGNOSIS — N47.1 PHIMOSIS: Primary | ICD-10-CM

## 2019-05-17 DIAGNOSIS — N52.9 IMPOTENCE OF ORGANIC ORIGIN: ICD-10-CM

## 2019-05-17 LAB
BILIRUB BLD-MCNC: NEGATIVE MG/DL
CLARITY, POC: CLEAR
COLOR UR: YELLOW
GLUCOSE UR STRIP-MCNC: NEGATIVE MG/DL
KETONES UR QL: NEGATIVE
LEUKOCYTE EST, POC: NEGATIVE
NITRITE UR-MCNC: NEGATIVE MG/ML
PH UR: 6 [PH] (ref 5–8)
PROT UR STRIP-MCNC: NEGATIVE MG/DL
RBC # UR STRIP: NEGATIVE /UL
SP GR UR: 1.02 (ref 1–1.03)
UROBILINOGEN UR QL: NORMAL

## 2019-05-17 PROCEDURE — 99203 OFFICE O/P NEW LOW 30 MIN: CPT | Performed by: UROLOGY

## 2019-05-17 PROCEDURE — 81001 URINALYSIS AUTO W/SCOPE: CPT | Performed by: UROLOGY

## 2019-05-17 RX ORDER — SODIUM CHLORIDE 9 MG/ML
100 INJECTION, SOLUTION INTRAVENOUS CONTINUOUS
Status: CANCELLED | OUTPATIENT
Start: 2019-05-17

## 2019-05-17 NOTE — PATIENT INSTRUCTIONS

## 2019-05-20 PROBLEM — N47.1 PHIMOSIS: Status: ACTIVE | Noted: 2019-05-20

## 2019-05-31 ENCOUNTER — TELEPHONE (OUTPATIENT)
Dept: FAMILY MEDICINE CLINIC | Facility: CLINIC | Age: 69
End: 2019-05-31

## 2019-05-31 ENCOUNTER — APPOINTMENT (OUTPATIENT)
Dept: PREADMISSION TESTING | Facility: HOSPITAL | Age: 69
End: 2019-05-31

## 2019-05-31 VITALS
HEART RATE: 77 BPM | OXYGEN SATURATION: 100 % | HEIGHT: 68 IN | DIASTOLIC BLOOD PRESSURE: 64 MMHG | WEIGHT: 310.41 LBS | SYSTOLIC BLOOD PRESSURE: 141 MMHG | BODY MASS INDEX: 47.04 KG/M2

## 2019-05-31 DIAGNOSIS — J06.9 UPPER RESPIRATORY TRACT INFECTION, UNSPECIFIED TYPE: Primary | ICD-10-CM

## 2019-05-31 LAB
ANION GAP SERPL CALCULATED.3IONS-SCNC: 8 MMOL/L (ref 4–13)
BUN BLD-MCNC: 21 MG/DL (ref 5–21)
BUN/CREAT SERPL: 25.6 (ref 7–25)
CALCIUM SPEC-SCNC: 8.8 MG/DL (ref 8.4–10.4)
CHLORIDE SERPL-SCNC: 101 MMOL/L (ref 98–110)
CO2 SERPL-SCNC: 28 MMOL/L (ref 24–31)
CREAT BLD-MCNC: 0.82 MG/DL (ref 0.5–1.4)
DEPRECATED RDW RBC AUTO: 41.4 FL (ref 40–54)
ERYTHROCYTE [DISTWIDTH] IN BLOOD BY AUTOMATED COUNT: 12.3 % (ref 12–15)
GFR SERPL CREATININE-BSD FRML MDRD: 93 ML/MIN/1.73
GLUCOSE BLD-MCNC: 190 MG/DL (ref 70–100)
HCT VFR BLD AUTO: 41.8 % (ref 40–52)
HGB BLD-MCNC: 14.7 G/DL (ref 14–18)
MCH RBC QN AUTO: 32.3 PG (ref 28–32)
MCHC RBC AUTO-ENTMCNC: 35.2 G/DL (ref 33–36)
MCV RBC AUTO: 91.9 FL (ref 82–95)
PLATELET # BLD AUTO: 189 10*3/MM3 (ref 130–400)
PMV BLD AUTO: 10.9 FL (ref 6–12)
POTASSIUM BLD-SCNC: 3.4 MMOL/L (ref 3.5–5.3)
RBC # BLD AUTO: 4.55 10*6/MM3 (ref 4.8–5.9)
SODIUM BLD-SCNC: 137 MMOL/L (ref 135–145)
WBC NRBC COR # BLD: 7.18 10*3/MM3 (ref 4.8–10.8)

## 2019-05-31 PROCEDURE — 80048 BASIC METABOLIC PNL TOTAL CA: CPT | Performed by: UROLOGY

## 2019-05-31 PROCEDURE — 36415 COLL VENOUS BLD VENIPUNCTURE: CPT

## 2019-05-31 PROCEDURE — 85027 COMPLETE CBC AUTOMATED: CPT | Performed by: UROLOGY

## 2019-05-31 RX ORDER — AZITHROMYCIN 250 MG/1
TABLET, FILM COATED ORAL
Qty: 6 TABLET | Refills: 0 | Status: SHIPPED | OUTPATIENT
Start: 2019-05-31 | End: 2019-05-31

## 2019-05-31 NOTE — TELEPHONE ENCOUNTER
COUGH, SORE THROAT, CONGESTION    SURGERY SCHEDULED FOR CIRCUMCISION 06.06.19. DOING PRE-OP TODAY.    PT IS REQUESTING A [facility]

## 2019-06-05 RX ORDER — AMLODIPINE BESYLATE 5 MG/1
TABLET ORAL
Qty: 180 TABLET | Refills: 0 | Status: SHIPPED | OUTPATIENT
Start: 2019-06-05 | End: 2019-09-10 | Stop reason: SDUPTHER

## 2019-06-06 ENCOUNTER — HOSPITAL ENCOUNTER (OUTPATIENT)
Facility: HOSPITAL | Age: 69
Setting detail: HOSPITAL OUTPATIENT SURGERY
Discharge: HOME | End: 2019-06-06
Attending: UROLOGY | Admitting: UROLOGY

## 2019-06-06 ENCOUNTER — ANESTHESIA EVENT (OUTPATIENT)
Dept: PERIOP | Facility: HOSPITAL | Age: 69
End: 2019-06-06

## 2019-06-06 ENCOUNTER — ANESTHESIA (OUTPATIENT)
Dept: PERIOP | Facility: HOSPITAL | Age: 69
End: 2019-06-06

## 2019-06-06 VITALS
DIASTOLIC BLOOD PRESSURE: 56 MMHG | OXYGEN SATURATION: 93 % | RESPIRATION RATE: 15 BRPM | SYSTOLIC BLOOD PRESSURE: 128 MMHG | TEMPERATURE: 98.1 F | HEART RATE: 78 BPM

## 2019-06-06 DIAGNOSIS — N47.1 PHIMOSIS: ICD-10-CM

## 2019-06-06 LAB
GLUCOSE BLDC GLUCOMTR-MCNC: 127 MG/DL (ref 70–130)
GLUCOSE BLDC GLUCOMTR-MCNC: 140 MG/DL (ref 70–130)

## 2019-06-06 PROCEDURE — 25010000002 FENTANYL CITRATE (PF) 100 MCG/2ML SOLUTION: Performed by: NURSE ANESTHETIST, CERTIFIED REGISTERED

## 2019-06-06 PROCEDURE — 88304 TISSUE EXAM BY PATHOLOGIST: CPT | Performed by: UROLOGY

## 2019-06-06 PROCEDURE — 25010000002 SUCCINYLCHOLINE PER 20 MG: Performed by: NURSE ANESTHETIST, CERTIFIED REGISTERED

## 2019-06-06 PROCEDURE — 54161 CIRCUM 28 DAYS OR OLDER: CPT | Performed by: UROLOGY

## 2019-06-06 PROCEDURE — 82962 GLUCOSE BLOOD TEST: CPT

## 2019-06-06 PROCEDURE — 25010000002 ONDANSETRON PER 1 MG: Performed by: NURSE ANESTHETIST, CERTIFIED REGISTERED

## 2019-06-06 PROCEDURE — 25010000002 PROPOFOL 10 MG/ML EMULSION: Performed by: NURSE ANESTHETIST, CERTIFIED REGISTERED

## 2019-06-06 RX ORDER — NALOXONE HCL 0.4 MG/ML
0.4 VIAL (ML) INJECTION AS NEEDED
Status: DISCONTINUED | OUTPATIENT
Start: 2019-06-06 | End: 2019-06-06 | Stop reason: HOSPADM

## 2019-06-06 RX ORDER — LABETALOL HYDROCHLORIDE 5 MG/ML
5 INJECTION, SOLUTION INTRAVENOUS
Status: DISCONTINUED | OUTPATIENT
Start: 2019-06-06 | End: 2019-06-06 | Stop reason: HOSPADM

## 2019-06-06 RX ORDER — SODIUM CHLORIDE 0.9 % (FLUSH) 0.9 %
3 SYRINGE (ML) INJECTION EVERY 12 HOURS SCHEDULED
Status: DISCONTINUED | OUTPATIENT
Start: 2019-06-06 | End: 2019-06-06 | Stop reason: HOSPADM

## 2019-06-06 RX ORDER — ROCURONIUM BROMIDE 10 MG/ML
INJECTION, SOLUTION INTRAVENOUS AS NEEDED
Status: DISCONTINUED | OUTPATIENT
Start: 2019-06-06 | End: 2019-06-06 | Stop reason: SURG

## 2019-06-06 RX ORDER — SODIUM CHLORIDE, SODIUM LACTATE, POTASSIUM CHLORIDE, CALCIUM CHLORIDE 600; 310; 30; 20 MG/100ML; MG/100ML; MG/100ML; MG/100ML
100 INJECTION, SOLUTION INTRAVENOUS CONTINUOUS
Status: DISCONTINUED | OUTPATIENT
Start: 2019-06-06 | End: 2019-06-06 | Stop reason: HOSPADM

## 2019-06-06 RX ORDER — IPRATROPIUM BROMIDE AND ALBUTEROL SULFATE 2.5; .5 MG/3ML; MG/3ML
3 SOLUTION RESPIRATORY (INHALATION) ONCE AS NEEDED
Status: DISCONTINUED | OUTPATIENT
Start: 2019-06-06 | End: 2019-06-06 | Stop reason: HOSPADM

## 2019-06-06 RX ORDER — SODIUM CHLORIDE 9 MG/ML
INJECTION, SOLUTION INTRAVENOUS AS NEEDED
Status: DISCONTINUED | OUTPATIENT
Start: 2019-06-06 | End: 2019-06-06 | Stop reason: HOSPADM

## 2019-06-06 RX ORDER — IBUPROFEN 200 MG
TABLET ORAL AS NEEDED
Status: DISCONTINUED | OUTPATIENT
Start: 2019-06-06 | End: 2019-06-06 | Stop reason: HOSPADM

## 2019-06-06 RX ORDER — MIDAZOLAM HYDROCHLORIDE 1 MG/ML
1 INJECTION INTRAMUSCULAR; INTRAVENOUS
Status: DISCONTINUED | OUTPATIENT
Start: 2019-06-06 | End: 2019-06-06 | Stop reason: HOSPADM

## 2019-06-06 RX ORDER — SUCCINYLCHOLINE CHLORIDE 20 MG/ML
INJECTION INTRAMUSCULAR; INTRAVENOUS AS NEEDED
Status: DISCONTINUED | OUTPATIENT
Start: 2019-06-06 | End: 2019-06-06 | Stop reason: SURG

## 2019-06-06 RX ORDER — SODIUM CHLORIDE 0.9 % (FLUSH) 0.9 %
3 SYRINGE (ML) INJECTION AS NEEDED
Status: DISCONTINUED | OUTPATIENT
Start: 2019-06-06 | End: 2019-06-06 | Stop reason: HOSPADM

## 2019-06-06 RX ORDER — SODIUM CHLORIDE 9 MG/ML
100 INJECTION, SOLUTION INTRAVENOUS CONTINUOUS
Status: DISCONTINUED | OUTPATIENT
Start: 2019-06-06 | End: 2019-06-06 | Stop reason: HOSPADM

## 2019-06-06 RX ORDER — LIDOCAINE HYDROCHLORIDE 20 MG/ML
INJECTION, SOLUTION INFILTRATION; PERINEURAL AS NEEDED
Status: DISCONTINUED | OUTPATIENT
Start: 2019-06-06 | End: 2019-06-06 | Stop reason: SURG

## 2019-06-06 RX ORDER — ONDANSETRON 4 MG/1
4 TABLET, FILM COATED ORAL ONCE AS NEEDED
Status: DISCONTINUED | OUTPATIENT
Start: 2019-06-06 | End: 2019-06-06 | Stop reason: HOSPADM

## 2019-06-06 RX ORDER — HYDROCODONE BITARTRATE AND ACETAMINOPHEN 10; 325 MG/1; MG/1
1 TABLET ORAL ONCE AS NEEDED
Status: DISCONTINUED | OUTPATIENT
Start: 2019-06-06 | End: 2019-06-06 | Stop reason: HOSPADM

## 2019-06-06 RX ORDER — LIDOCAINE HYDROCHLORIDE 40 MG/ML
SOLUTION TOPICAL AS NEEDED
Status: DISCONTINUED | OUTPATIENT
Start: 2019-06-06 | End: 2019-06-06 | Stop reason: SURG

## 2019-06-06 RX ORDER — HYDROCODONE BITARTRATE AND ACETAMINOPHEN 5; 325 MG/1; MG/1
1 TABLET ORAL ONCE AS NEEDED
Status: CANCELLED | OUTPATIENT
Start: 2019-06-06 | End: 2019-06-16

## 2019-06-06 RX ORDER — ONDANSETRON 2 MG/ML
4 INJECTION INTRAMUSCULAR; INTRAVENOUS ONCE AS NEEDED
Status: DISCONTINUED | OUTPATIENT
Start: 2019-06-06 | End: 2019-06-06 | Stop reason: HOSPADM

## 2019-06-06 RX ORDER — FENTANYL CITRATE 50 UG/ML
25 INJECTION, SOLUTION INTRAMUSCULAR; INTRAVENOUS AS NEEDED
Status: DISCONTINUED | OUTPATIENT
Start: 2019-06-06 | End: 2019-06-06 | Stop reason: HOSPADM

## 2019-06-06 RX ORDER — PROPOFOL 10 MG/ML
VIAL (ML) INTRAVENOUS AS NEEDED
Status: DISCONTINUED | OUTPATIENT
Start: 2019-06-06 | End: 2019-06-06 | Stop reason: SURG

## 2019-06-06 RX ORDER — HYDROCODONE BITARTRATE AND ACETAMINOPHEN 5; 325 MG/1; MG/1
1-2 TABLET ORAL EVERY 4 HOURS PRN
Qty: 12 TABLET | Refills: 0 | Status: SHIPPED | OUTPATIENT
Start: 2019-06-06 | End: 2019-07-22 | Stop reason: SDUPTHER

## 2019-06-06 RX ORDER — MIDAZOLAM HYDROCHLORIDE 1 MG/ML
2 INJECTION INTRAMUSCULAR; INTRAVENOUS
Status: DISCONTINUED | OUTPATIENT
Start: 2019-06-06 | End: 2019-06-06 | Stop reason: HOSPADM

## 2019-06-06 RX ORDER — IBUPROFEN 600 MG/1
600 TABLET ORAL ONCE AS NEEDED
Status: DISCONTINUED | OUTPATIENT
Start: 2019-06-06 | End: 2019-06-06 | Stop reason: HOSPADM

## 2019-06-06 RX ORDER — ACETAMINOPHEN 500 MG
1000 TABLET ORAL ONCE
Status: COMPLETED | OUTPATIENT
Start: 2019-06-06 | End: 2019-06-06

## 2019-06-06 RX ORDER — METOCLOPRAMIDE HYDROCHLORIDE 5 MG/ML
5 INJECTION INTRAMUSCULAR; INTRAVENOUS
Status: DISCONTINUED | OUTPATIENT
Start: 2019-06-06 | End: 2019-06-06 | Stop reason: HOSPADM

## 2019-06-06 RX ORDER — CEFAZOLIN SODIUM IN 0.9 % NACL 3 G/100 ML
3 INTRAVENOUS SOLUTION, PIGGYBACK (ML) INTRAVENOUS ONCE
Status: COMPLETED | OUTPATIENT
Start: 2019-06-06 | End: 2019-06-06

## 2019-06-06 RX ORDER — SODIUM CHLORIDE, SODIUM LACTATE, POTASSIUM CHLORIDE, CALCIUM CHLORIDE 600; 310; 30; 20 MG/100ML; MG/100ML; MG/100ML; MG/100ML
1000 INJECTION, SOLUTION INTRAVENOUS CONTINUOUS
Status: DISCONTINUED | OUTPATIENT
Start: 2019-06-06 | End: 2019-06-06 | Stop reason: HOSPADM

## 2019-06-06 RX ORDER — SODIUM CHLORIDE 0.9 % (FLUSH) 0.9 %
1-10 SYRINGE (ML) INJECTION AS NEEDED
Status: DISCONTINUED | OUTPATIENT
Start: 2019-06-06 | End: 2019-06-06 | Stop reason: HOSPADM

## 2019-06-06 RX ORDER — FENTANYL CITRATE 50 UG/ML
INJECTION, SOLUTION INTRAMUSCULAR; INTRAVENOUS AS NEEDED
Status: DISCONTINUED | OUTPATIENT
Start: 2019-06-06 | End: 2019-06-06 | Stop reason: SURG

## 2019-06-06 RX ORDER — ONDANSETRON 2 MG/ML
INJECTION INTRAMUSCULAR; INTRAVENOUS AS NEEDED
Status: DISCONTINUED | OUTPATIENT
Start: 2019-06-06 | End: 2019-06-06 | Stop reason: SURG

## 2019-06-06 RX ADMIN — ONDANSETRON HYDROCHLORIDE 4 MG: 2 SOLUTION INTRAMUSCULAR; INTRAVENOUS at 13:13

## 2019-06-06 RX ADMIN — LIDOCAINE HYDROCHLORIDE 100 MG: 20 INJECTION, SOLUTION INFILTRATION; PERINEURAL at 12:22

## 2019-06-06 RX ADMIN — FENTANYL CITRATE 100 MCG: 50 INJECTION, SOLUTION INTRAMUSCULAR; INTRAVENOUS at 12:34

## 2019-06-06 RX ADMIN — PROPOFOL 150 MG: 10 INJECTION, EMULSION INTRAVENOUS at 12:22

## 2019-06-06 RX ADMIN — ACETAMINOPHEN 1000 MG: 500 TABLET, FILM COATED ORAL at 11:50

## 2019-06-06 RX ADMIN — ROCURONIUM BROMIDE 10 MG: 10 INJECTION INTRAVENOUS at 12:22

## 2019-06-06 RX ADMIN — LIDOCAINE HYDROCHLORIDE 1 EACH: 40 SOLUTION TOPICAL at 12:23

## 2019-06-06 RX ADMIN — SUCCINYLCHOLINE CHLORIDE 140 MG: 20 INJECTION, SOLUTION INTRAMUSCULAR; INTRAVENOUS at 12:22

## 2019-06-06 RX ADMIN — SODIUM CHLORIDE, POTASSIUM CHLORIDE, SODIUM LACTATE AND CALCIUM CHLORIDE 1000 ML: 600; 310; 30; 20 INJECTION, SOLUTION INTRAVENOUS at 10:55

## 2019-06-06 RX ADMIN — Medication 3 G: at 12:33

## 2019-06-06 NOTE — OP NOTE
CIRCUMCISION  Procedure Note    Ephrami Quinones  6/6/2019    Pre-op Diagnosis:   Phimosis [N47.1]    Post-op Diagnosis:     Post-Op Diagnosis Codes:     * Phimosis [N47.1]    Procedure/CPT® Codes:      Procedure(s):  CIRCUMCISION    Surgeon(s):  Yon Sloan MD    Anesthesia: General    Staff:   Circulator: Lillian Marks RN  Scrub Person: Ondina Gonzales  Assistant: Ephraim Damian    Estimated Blood Loss: 100ml    Specimens:                A: Foreskin      Drains:      Findings: Phimotic foreskin, no glans lesions    Complications: None    Indications: 69-year-old male with phimosis options were discussed the opted for circumcision.    Description of Procedure: After informed consent was obtained, patient brought the operating room where he underwent general endotracheal anesthesia in supine position. Penis and scrotum were then shaved. Patient was prepped in the usual sterile fashion. Timeout was done to ensure the correct patient, procedure and site. He did receive preoperative marks in the holding area.     A dorsal slit was performed first clamping the foreskin and cutting then with Metzenbaum scissors.  Foreskin was retracted.  I cleaned off the glands with Betadine.  There is no glans lesions noted. I marked my distal incision leaving a 1cm coronal collar. I then reduced the foreskin and marked my proximal incision along the indentation of the glans. I made sure that there was not excessive penoscrotal tethering with these and there was not. I used a 15 blade to make my distal incision. I then used a 15 blade to make my proximal incision. The tumor connected with Metzenbaum scissors. The remaining foreskin was then cut using Metzenbaum scissors. Foreskin was sent off for pathologic analysis. Hemostasis was achieved with Bovie cautery.  There was some brisk bleeding noted and this was actually controlled with a 2-0 silk free-tie.  I then used a 3-0 chromic and did a U stitch on the ventral  aspect of the penis.I then placed 3-0 chromic sutures in the remaining quadrant fashion. The gaps were then filled in with 3-0 chromic in interrupted fashion. There was no chordee or penoscrotal tethering. I then placed Vaseline gauze over the incision and this was wrapped with a pressure type dressing. Patient was then awakened from anesthesia and transferred to the recovery room in satisfactory condition.    Yon Sloan MD     Date: 6/6/2019  Time: 1:30 PM

## 2019-06-06 NOTE — ANESTHESIA POSTPROCEDURE EVALUATION
Patient: Ephraim Quinones    Procedure Summary     Date:  06/06/19 Room / Location:   [facility] OR  /  [facility]    Anesthesia Start:  1218 Anesthesia Stop:  1330    Procedure:  CIRCUMCISION (N/A Penis) Diagnosis:       Phimosis      (Phimosis [N47.1])    Surgeon:  Yon Sloan MD Provider:  Sandeep Dubon CRNA    Anesthesia Type:  general ASA Status:  3          Anesthesia Type: general  Last vitals  BP   134/76 (06/06/19 1410)   Temp   98.1 °F (36.7 °C) (06/06/19 1348)   Pulse   80 (06/06/19 1410)   Resp   15 (06/06/19 1410)     SpO2   92 % (06/06/19 1410)     Post Anesthesia Care and Evaluation    Patient location during evaluation: PACU  Patient participation: complete - patient participated  Level of consciousness: awake and alert  Pain management: adequate  Airway patency: patent  Anesthetic complications: No anesthetic complications    Cardiovascular status: acceptable  Respiratory status: acceptable  Hydration status: acceptable    Comments: Blood pressure 134/76, pulse 80, temperature 98.1 °F (36.7 °C), temperature source Temporal, resp. rate 15, SpO2 92 %.    Pt discharged from PACU based on michael score >8

## 2019-06-06 NOTE — ANESTHESIA PREPROCEDURE EVALUATION
Anesthesia Evaluation     Patient summary reviewed   no history of anesthetic complications:  NPO Solid Status: > 8 hours  NPO Liquid Status: > 8 hours           Airway   Mallampati: II  TM distance: >3 FB  Neck ROM: full  No difficulty expected  Dental - normal exam     Pulmonary    (+) sleep apnea on CPAP,   (-) asthma, not a smoker    ROS comment: Uses albuterol following working in allergens, raked hay yesterday and used last pm  Cardiovascular     PT is on anticoagulation therapy    (+) hypertension, dysrhythmias Atrial Fib, hyperlipidemia,   (-) past MI, CAD, cardiac stents, CABG    ROS comment: Last eliquis 5/23    Neuro/Psych  (+) TIA,     (-) seizures, CVA  GI/Hepatic/Renal/Endo    (+) morbid obesity, GERD,  diabetes mellitus,   (-) liver disease, no renal disease    Musculoskeletal (-) negative ROS    Abdominal    Substance History      OB/GYN          Other                        Anesthesia Plan    ASA 3     general     intravenous induction   Anesthetic plan, all risks, benefits, and alternatives have been provided, discussed and informed consent has been obtained with: patient.

## 2019-06-06 NOTE — ANESTHESIA PROCEDURE NOTES
Airway  Urgency: elective    Airway not difficult    General Information and Staff    Patient location during procedure: OR  CRNA: Sandeep Dubon CRNA    Indications and Patient Condition  Indications for airway management: airway protection    Preoxygenated: yes  Mask difficulty assessment: 0 - not attempted    Final Airway Details  Final airway type: endotracheal airway      Successful airway: ETT  Cuffed: yes   Successful intubation technique: direct laryngoscopy  Facilitating devices/methods: intubating stylet and Bougie  Endotracheal tube insertion site: oral  Blade: Morrow  Blade size: 2  ETT size (mm): 7.5  Cormack-Lehane Classification: grade IIb - view of arytenoids or posterior of glottis only  Placement verified by: chest auscultation and capnometry   Cuff volume (mL): 8  Measured from: teeth  Number of attempts at approach: 1

## 2019-06-06 NOTE — DISCHARGE INSTRUCTIONS

## 2019-06-08 ENCOUNTER — RESULTS ENCOUNTER (OUTPATIENT)
Dept: FAMILY MEDICINE CLINIC | Facility: CLINIC | Age: 69
End: 2019-06-08

## 2019-06-08 DIAGNOSIS — E55.9 VITAMIN D DEFICIENCY: ICD-10-CM

## 2019-06-08 DIAGNOSIS — R73.9 HYPERGLYCEMIA: ICD-10-CM

## 2019-06-10 LAB
CYTO UR: NORMAL
LAB AP CASE REPORT: NORMAL
PATH REPORT.FINAL DX SPEC: NORMAL
PATH REPORT.GROSS SPEC: NORMAL

## 2019-06-10 RX ORDER — METFORMIN HYDROCHLORIDE 1000 MG/1
1000 TABLET, FILM COATED, EXTENDED RELEASE ORAL DAILY
Qty: 90 TABLET | Refills: 2 | Status: SHIPPED | OUTPATIENT
Start: 2019-06-10 | End: 2020-03-23

## 2019-06-25 LAB
25(OH)D3+25(OH)D2 SERPL-MCNC: 37.3 NG/ML (ref 30–100)
BUN SERPL-MCNC: 22 MG/DL (ref 8–23)
BUN/CREAT SERPL: 25.3 (ref 7–25)
CALCIUM SERPL-MCNC: 9.5 MG/DL (ref 8.6–10.5)
CHLORIDE SERPL-SCNC: 99 MMOL/L (ref 98–107)
CO2 SERPL-SCNC: 25.9 MMOL/L (ref 22–29)
CREAT SERPL-MCNC: 0.87 MG/DL (ref 0.76–1.27)
GLUCOSE SERPL-MCNC: 144 MG/DL (ref 65–99)
HBA1C MFR BLD: 6.7 % (ref 4.8–5.6)
POTASSIUM SERPL-SCNC: 4.3 MMOL/L (ref 3.5–5.2)
SODIUM SERPL-SCNC: 140 MMOL/L (ref 136–145)

## 2019-06-25 RX ORDER — ACETAMINOPHEN 160 MG
2000 TABLET,DISINTEGRATING ORAL DAILY
COMMUNITY

## 2019-07-03 NOTE — PROGRESS NOTES
Subjective    Mr. Quinones is 69 y.o. male    Chief Complaint: Phimosis/Discuss circumcision     History of Present Illness   Genital Lesion  Patient is here for a genital lesion.  The location of the lesion is penis.  The lesion has been present for 3month(s). The description is phimosis.  The courseworsening.  Treatment response is none.  Associated symptoms include unable to retract to foreskin problem.         The following portions of the patient's history were reviewed and updated as appropriate: allergies, current medications, past family history, past medical history, past social history, past surgical history and problem list.    Review of Systems   Constitutional: Negative for chills and fever.   Gastrointestinal: Negative for abdominal pain, anal bleeding and blood in stool.   Genitourinary: Negative for decreased urine volume, difficulty urinating, discharge, dysuria, enuresis, flank pain, frequency, genital sores, hematuria, penile pain, penile swelling, scrotal swelling, testicular pain and urgency.         Current Outpatient Medications:   •  albuterol 108 (90 Base) MCG/ACT inhaler, 2 sprays 2 minutes apart every 6 hours as needed, Disp: 1 inhaler, Rfl: 3  •  amLODIPine (NORVASC) 5 MG tablet, TAKE 1 TABLET BY MOUTH TWICE DAILY, Disp: 180 tablet, Rfl: 0  •  Cholecalciferol (VITAMIN D3) 2000 units capsule, Take 2,000 Units by mouth Daily., Disp: , Rfl:   •  Cholecalciferol (VITAMIN D3) 5000 units capsule capsule, Take 5,000 Units by mouth Daily., Disp: , Rfl:   •  clobetasol (TEMOVATE) 0.05 % cream, Apply  topically 2 (Two) Times a Day. (Patient taking differently: Apply  topically to the appropriate area as directed As Needed (for itching).), Disp: 15 g, Rfl: 3  •  colchicine 0.6 MG tablet, Take 1 tablet by mouth Daily. (Patient taking differently: Take 0.6 mg by mouth As Needed (for gout).), Disp: 30 tablet, Rfl: 0  •  ELIQUIS 5 MG tablet tablet, Take 5 mg by mouth Every 12 (Twelve) Hours., Disp: ,  Rfl:   •  glipiZIDE (GLUCOTROL XL) 5 MG ER tablet, Take 2 tablets by mouth Daily. (Patient taking differently: Take 5 mg by mouth 2 (Two) Times a Day.), Disp: 180 tablet, Rfl: 0  •  glucose blood test strip, 1 each by Other route Daily. Use as instructed, Disp: 100 each, Rfl: 3  •  HYDROcodone-acetaminophen (NORCO)  MG per tablet, Take 1 tablet by mouth Every 6 (Six) Hours. (Patient taking differently: Take 1 tablet by mouth Every 6 (Six) Hours As Needed for Moderate Pain .), Disp: 90 tablet, Rfl: 0  •  HYDROcodone-acetaminophen (NORCO) 5-325 MG per tablet, Take 1-2 tablets by mouth Every 4 (Four) Hours As Needed (Pain)., Disp: 12 tablet, Rfl: 0  •  indapamide (LOZOL) 2.5 MG tablet, TAKE 1 TABLET BY MOUTH EVERY MORNING, Disp: 90 tablet, Rfl: 3  •  Lancet Devices (EASY TOUCH LANCING DEVICE) misc, USE TO TEST DAILY, Disp: , Rfl: 0  •  losartan (COZAAR) 100 MG tablet, TAKE 1 TABLET BY MOUTH DAILY, Disp: 90 tablet, Rfl: 3  •  metFORMIN (GLUMETZA) 1000 MG (MOD) 24 hr tablet, TAKE 1 TABLET BY MOUTH DAILY., Disp: 90 tablet, Rfl: 2  •  ONETOUCH DELICA LANCETS 33G misc, 1 each Daily., Disp: 100 each, Rfl: 3  •  raNITIdine (ZANTAC) 150 MG tablet, Take 150 mg by mouth Every Morning., Disp: , Rfl:     Past Medical History:   Diagnosis Date   • A-fib (CMS/HCC)    • Acid reflux    • Anemia 5/3/2016   • Diabetes mellitus (CMS/HCC)    • Gout    • Hyperlipidemia    • Hypertension    • Sleep apnea with use of continuous positive airway pressure (CPAP)        Past Surgical History:   Procedure Laterality Date   • BACK SURGERY      x2   • CIRCUMCISION N/A 6/6/2019    Procedure: CIRCUMCISION;  Surgeon: Yon Sloan MD;  Location: [facility];  Service: Urology   • FINGER SURGERY     • REPLACEMENT TOTAL KNEE Bilateral        Social History     Socioeconomic History   • Marital status:      Spouse name: Not on file   • Number of children: Not on file   • Years of education: Not on file   • Highest education level: Not  "on file   Tobacco Use   • Smoking status: Former Smoker     Packs/day: 1.00     Years: 20.00     Pack years: 20.00     Last attempt to quit: 1996     Years since quittin.1   • Smokeless tobacco: Never Used   Substance and Sexual Activity   • Alcohol use: Yes     Comment: occ   • Drug use: No   • Sexual activity: Defer       Family History   Problem Relation Age of Onset   • Colon cancer Mother    • Prostate cancer Father    • No Known Problems Maternal Grandmother    • No Known Problems Maternal Grandfather    • No Known Problems Paternal Grandmother    • No Known Problems Paternal Grandfather        Objective    Temp 98 °F (36.7 °C)   Ht 172 cm (67.72\")   Wt (!) 141 kg (310 lb)   BMI 47.53 kg/m²     Physical Exam   Constitutional: He is oriented to person, place, and time. He appears well-developed and well-nourished. No distress.   Pulmonary/Chest: Effort normal.   Abdominal: Soft. He exhibits no distension and no mass. There is no tenderness. There is no rebound and no guarding. No hernia.   Genitourinary:   Genitourinary Comments: Wound healed with no evidence of infection   Neurological: He is alert and oriented to person, place, and time.   Skin: Skin is warm and dry. He is not diaphoretic.   Psychiatric: He has a normal mood and affect.   Vitals reviewed.          Results for orders placed or performed in visit on 19   Basic metabolic panel   Result Value Ref Range    Glucose 144 (H) 65 - 99 mg/dL    BUN 22 8 - 23 mg/dL    Creatinine 0.87 0.76 - 1.27 mg/dL    eGFR Non African Am 87 >60 mL/min/1.73    eGFR African Am 105 >60 mL/min/1.73    BUN/Creatinine Ratio 25.3 (H) 7.0 - 25.0    Sodium 140 136 - 145 mmol/L    Potassium 4.3 3.5 - 5.2 mmol/L    Chloride 99 98 - 107 mmol/L    Total CO2 25.9 22.0 - 29.0 mmol/L    Calcium 9.5 8.6 - 10.5 mg/dL   Hemoglobin A1c   Result Value Ref Range    Hemoglobin A1C 6.70 (H) 4.80 - 5.60 %   Vitamin D 25 hydroxy   Result Value Ref Range    25 Hydroxy, Vitamin " D 37.3 30.0 - 100.0 ng/ml   Patient's Body mass index is 47.53 kg/m². BMI is above normal parameters. Recommendations include: educational material.    Assessment and Plan    Diagnoses and all orders for this visit:    Phimosis  -     Cancel: POC Urinalysis Dipstick, Multipro    Impotence of organic origin    sp circumcision.  No issues postoperative.  Follow up in one year.

## 2019-07-08 ENCOUNTER — OFFICE VISIT (OUTPATIENT)
Dept: UROLOGY | Facility: CLINIC | Age: 69
End: 2019-07-08

## 2019-07-08 VITALS — WEIGHT: 310 LBS | HEIGHT: 68 IN | BODY MASS INDEX: 46.98 KG/M2 | TEMPERATURE: 98 F

## 2019-07-08 DIAGNOSIS — N47.1 PHIMOSIS: Primary | ICD-10-CM

## 2019-07-08 DIAGNOSIS — N52.9 IMPOTENCE OF ORGANIC ORIGIN: ICD-10-CM

## 2019-07-08 PROCEDURE — 99212 OFFICE O/P EST SF 10 MIN: CPT | Performed by: UROLOGY

## 2019-07-08 NOTE — PATIENT INSTRUCTIONS

## 2019-07-22 ENCOUNTER — OFFICE VISIT (OUTPATIENT)
Dept: FAMILY MEDICINE CLINIC | Facility: CLINIC | Age: 69
End: 2019-07-22

## 2019-07-22 VITALS
DIASTOLIC BLOOD PRESSURE: 74 MMHG | HEART RATE: 52 BPM | HEIGHT: 68 IN | OXYGEN SATURATION: 98 % | WEIGHT: 308.2 LBS | TEMPERATURE: 97.6 F | SYSTOLIC BLOOD PRESSURE: 128 MMHG | BODY MASS INDEX: 46.71 KG/M2

## 2019-07-22 DIAGNOSIS — R52 PAIN: Primary | ICD-10-CM

## 2019-07-22 DIAGNOSIS — M46.1 SACROILIITIS, NOT ELSEWHERE CLASSIFIED (HCC): ICD-10-CM

## 2019-07-22 PROCEDURE — 99214 OFFICE O/P EST MOD 30 MIN: CPT | Performed by: FAMILY MEDICINE

## 2019-07-22 RX ORDER — CLOBETASOL PROPIONATE 0.5 MG/G
CREAM TOPICAL AS NEEDED
Qty: 30 G | Refills: 3 | Status: SHIPPED | OUTPATIENT
Start: 2019-07-22 | End: 2021-01-22

## 2019-07-22 RX ORDER — HYDROCODONE BITARTRATE AND ACETAMINOPHEN 10; 325 MG/1; MG/1
1 TABLET ORAL EVERY 6 HOURS PRN
Qty: 90 TABLET | Refills: 0 | Status: SHIPPED | OUTPATIENT
Start: 2019-07-22 | End: 2019-10-04 | Stop reason: SDUPTHER

## 2019-07-22 NOTE — PROGRESS NOTES
Subjective    CONTROLLED SUBSTANCE TRACKING 1/26/2018 4/16/2018 9/24/2018 4/5/2019 7/22/2019   Last Gregory 1/26/2018 4/16/2018 9/24/2018 4/5/2019 7/22/2019   Report Number - [id number]   Last UDS 1/26/2018 1/26/2018 1/26/2018 4/5/2019 4/15/2019   Last Controlled Substance Agreement 1/26/2018 1/30/2018 1/30/2018 4/5/2019 4/15/2019     Ephraim Quinones is a 69 y.o. male.     69-year-old male with diabetes morbid obesity hypertension and chronic back pain      Pain   This is a chronic problem. The current episode started more than 1 year ago. The problem occurs daily. The problem has been waxing and waning. Associated symptoms include arthralgias. Pertinent negatives include no chest pain or headaches. Associated symptoms comments: No bladder or bowel symptoms. Exacerbated by: Weight. He has tried NSAIDs and oral narcotics for the symptoms. The treatment provided moderate relief.       The following portions of the patient's history were reviewed and updated as appropriate: allergies, current medications, past family history, past medical history, past social history, past surgical history and problem list.    Review of Systems   Cardiovascular: Negative for chest pain.   Musculoskeletal: Positive for arthralgias and back pain.   Neurological: Negative for dizziness and headaches.       Objective   Physical Exam   Constitutional: He is oriented to person, place, and time.   Morbidly obese   Cardiovascular: Normal rate and regular rhythm.   Pulmonary/Chest: Effort normal and breath sounds normal.   Musculoskeletal: He exhibits tenderness. He exhibits no edema.   Sacroiliac tenderness   Neurological: He is alert and oriented to person, place, and time.   Psychiatric: He has a normal mood and affect. His behavior is normal. Judgment and thought content normal.   Nursing note and vitals reviewed.      Assessment/Plan   Ephraim was seen today for med refill.    Diagnoses and all orders for this  visit:    Pain    Sacroiliitis, not elsewhere classified (CMS/HCC)    Other orders  -     HYDROcodone-acetaminophen (NORCO)  MG per tablet; Take 1 tablet by mouth Every 6 (Six) Hours As Needed for Moderate Pain .  -     clobetasol (TEMOVATE) 0.05 % cream; Apply  topically to the appropriate area as directed As Needed (for itching).       Patient advised to minimize narcotic exposure-try to leave instead of Advil twice a day- no effort with weight reduction

## 2019-08-12 RX ORDER — GLIPIZIDE 5 MG/1
10 TABLET, FILM COATED, EXTENDED RELEASE ORAL DAILY
Qty: 180 TABLET | Refills: 1 | Status: SHIPPED | OUTPATIENT
Start: 2019-08-12 | End: 2020-02-11

## 2019-09-10 RX ORDER — AMLODIPINE BESYLATE 5 MG/1
TABLET ORAL
Qty: 180 TABLET | Refills: 1 | Status: SHIPPED | OUTPATIENT
Start: 2019-09-10 | End: 2020-03-09

## 2019-09-16 RX ORDER — COLCHICINE 0.6 MG/1
0.6 TABLET ORAL AS NEEDED
Qty: 30 TABLET | Refills: 1 | Status: SHIPPED | OUTPATIENT
Start: 2019-09-16 | End: 2020-09-28

## 2019-10-04 ENCOUNTER — OFFICE VISIT (OUTPATIENT)
Dept: FAMILY MEDICINE CLINIC | Facility: CLINIC | Age: 69
End: 2019-10-04

## 2019-10-04 VITALS
HEIGHT: 68 IN | SYSTOLIC BLOOD PRESSURE: 139 MMHG | WEIGHT: 311 LBS | OXYGEN SATURATION: 98 % | DIASTOLIC BLOOD PRESSURE: 77 MMHG | BODY MASS INDEX: 47.13 KG/M2 | TEMPERATURE: 97.9 F | HEART RATE: 75 BPM

## 2019-10-04 DIAGNOSIS — R73.9 HYPERGLYCEMIA: Primary | ICD-10-CM

## 2019-10-04 DIAGNOSIS — Z23 NEED FOR INFLUENZA VACCINATION: ICD-10-CM

## 2019-10-04 DIAGNOSIS — E78.2 MIXED HYPERLIPIDEMIA: ICD-10-CM

## 2019-10-04 PROCEDURE — 90653 IIV ADJUVANT VACCINE IM: CPT | Performed by: FAMILY MEDICINE

## 2019-10-04 PROCEDURE — 99214 OFFICE O/P EST MOD 30 MIN: CPT | Performed by: FAMILY MEDICINE

## 2019-10-04 PROCEDURE — G0008 ADMIN INFLUENZA VIRUS VAC: HCPCS | Performed by: FAMILY MEDICINE

## 2019-10-04 RX ORDER — HYDROCODONE BITARTRATE AND ACETAMINOPHEN 10; 325 MG/1; MG/1
1 TABLET ORAL EVERY 6 HOURS PRN
Qty: 90 TABLET | Refills: 0 | Status: SHIPPED | OUTPATIENT
Start: 2019-10-04 | End: 2019-12-19 | Stop reason: SDUPTHER

## 2019-10-04 NOTE — PROGRESS NOTES
Subjective   Ephraim Quinones is a 69 y.o. male.     [age]-year-old male with morbid obesity atrial fib diabetes hypertension      Obesity   This is a chronic problem. The current episode started more than 1 year ago. The problem occurs daily. The problem has been unchanged. Associated symptoms include arthralgias. Pertinent negatives include no chest pain. Associated symptoms comments: Sleep apnea, hypertension diabetes. The symptoms are aggravated by eating. Treatments tried: No weight loss effort.       The following portions of the patient's history were reviewed and updated as appropriate: allergies, current medications, past family history, past medical history, past social history, past surgical history and problem list.    Review of Systems   Cardiovascular: Negative for chest pain and leg swelling.   Endocrine: Negative for polydipsia, polyphagia and polyuria.   Genitourinary: Negative for difficulty urinating.   Musculoskeletal: Positive for arthralgias and back pain.        Chronic back pain requiring analgesia       Objective   Physical Exam   Constitutional: He is oriented to person, place, and time.   Morbid obesity   Cardiovascular: Normal rate and regular rhythm.   Pulmonary/Chest: Effort normal and breath sounds normal.   Musculoskeletal: He exhibits no edema.   Neurological: He is alert and oriented to person, place, and time.   Psychiatric: He has a normal mood and affect. His behavior is normal. Judgment and thought content normal.   Nursing note and vitals reviewed.      Assessment/Plan   Patient Active Problem List   Diagnosis   • HTN (hypertension)   • Hx of adenomatous colonic polyps   • Morbidly obese (CMS/HCC)   • Phimosis   • Sacroiliitis, not elsewhere classified (CMS/HCC)     Ephraim was seen today for follow-up.    Diagnoses and all orders for this visit:    Hyperglycemia  -     Comprehensive Metabolic Panel  -     Hemoglobin A1c    Mixed hyperlipidemia  -     Comprehensive Metabolic  Panel  -     Lipid Panel    Need for influenza vaccination  -     Fluad Quad >65 years    Other orders  -     HYDROcodone-acetaminophen (NORCO)  MG per tablet; Take 1 tablet by mouth Every 6 (Six) Hours As Needed for Moderate Pain .       Return in about 3 months (around 1/4/2020).       Plan above  CONTROLLED SUBSTANCE TRACKING 1/26/2018 4/16/2018 9/24/2018 4/5/2019 7/22/2019   Last Gregory 1/26/2018 4/16/2018 9/24/2018 4/5/2019 7/22/2019   Report Number - [id number]   Last UDS 1/26/2018 1/26/2018 1/26/2018 4/5/2019 4/15/2019   Last Controlled Substance Agreement 1/26/2018 1/30/2018 1/30/2018 4/5/2019 4/15/2019         Electronically signed by Jim Hopkins MD 10/04/2019

## 2019-10-05 LAB
ALBUMIN SERPL-MCNC: 4.3 G/DL (ref 3.5–5.2)
ALBUMIN/GLOB SERPL: 2 G/DL
ALP SERPL-CCNC: 61 U/L (ref 39–117)
ALT SERPL-CCNC: 65 U/L (ref 1–41)
AST SERPL-CCNC: 33 U/L (ref 1–40)
BILIRUB SERPL-MCNC: 0.6 MG/DL (ref 0.2–1.2)
BUN SERPL-MCNC: 17 MG/DL (ref 8–23)
BUN/CREAT SERPL: 20.5 (ref 7–25)
CALCIUM SERPL-MCNC: 9.6 MG/DL (ref 8.6–10.5)
CHLORIDE SERPL-SCNC: 97 MMOL/L (ref 98–107)
CHOLEST SERPL-MCNC: 177 MG/DL (ref 0–200)
CO2 SERPL-SCNC: 24.7 MMOL/L (ref 22–29)
CREAT SERPL-MCNC: 0.83 MG/DL (ref 0.76–1.27)
GLOBULIN SER CALC-MCNC: 2.1 GM/DL
GLUCOSE SERPL-MCNC: 139 MG/DL (ref 65–99)
HBA1C MFR BLD: 6.9 % (ref 4.8–5.6)
HDLC SERPL-MCNC: 53 MG/DL (ref 40–60)
LDLC SERPL CALC-MCNC: 98 MG/DL (ref 0–100)
POTASSIUM SERPL-SCNC: 4 MMOL/L (ref 3.5–5.2)
PROT SERPL-MCNC: 6.4 G/DL (ref 6–8.5)
SODIUM SERPL-SCNC: 139 MMOL/L (ref 136–145)
TRIGL SERPL-MCNC: 132 MG/DL (ref 0–150)
VLDLC SERPL CALC-MCNC: 26.4 MG/DL

## 2019-12-19 DIAGNOSIS — R52 PAIN: Primary | ICD-10-CM

## 2019-12-19 NOTE — TELEPHONE ENCOUNTER
CONTACTED PT FOR APPT CHANGE      MED REFILL REQUEST      NORCO 10MG (1) Q 6 HRS PRN PAIN      [facility]    DANIEL 10.04.19      PT WAS ADVISED RX BE SENT TOMORROW

## 2019-12-20 RX ORDER — HYDROCODONE BITARTRATE AND ACETAMINOPHEN 10; 325 MG/1; MG/1
1 TABLET ORAL EVERY 6 HOURS PRN
Qty: 90 TABLET | Refills: 0 | Status: SHIPPED | OUTPATIENT
Start: 2019-12-20 | End: 2020-02-07 | Stop reason: SDUPTHER

## 2020-01-02 ENCOUNTER — OFFICE VISIT (OUTPATIENT)
Dept: FAMILY MEDICINE CLINIC | Facility: CLINIC | Age: 70
End: 2020-01-02

## 2020-01-02 VITALS
TEMPERATURE: 97 F | WEIGHT: 315 LBS | DIASTOLIC BLOOD PRESSURE: 78 MMHG | HEIGHT: 68 IN | SYSTOLIC BLOOD PRESSURE: 130 MMHG | HEART RATE: 82 BPM | OXYGEN SATURATION: 98 % | BODY MASS INDEX: 47.74 KG/M2

## 2020-01-02 DIAGNOSIS — E11.9 TYPE 2 DIABETES MELLITUS WITHOUT COMPLICATION, WITHOUT LONG-TERM CURRENT USE OF INSULIN (HCC): Primary | ICD-10-CM

## 2020-01-02 DIAGNOSIS — I48.91 ATRIAL FIBRILLATION, UNSPECIFIED TYPE (HCC): ICD-10-CM

## 2020-01-02 DIAGNOSIS — I10 ESSENTIAL HYPERTENSION: ICD-10-CM

## 2020-01-02 DIAGNOSIS — M46.1 SACROILIITIS, NOT ELSEWHERE CLASSIFIED (HCC): ICD-10-CM

## 2020-01-02 DIAGNOSIS — E66.01 MORBIDLY OBESE (HCC): ICD-10-CM

## 2020-01-02 PROCEDURE — 99214 OFFICE O/P EST MOD 30 MIN: CPT | Performed by: NURSE PRACTITIONER

## 2020-01-02 RX ORDER — ASPIRIN 81 MG/1
81 TABLET ORAL DAILY
COMMUNITY

## 2020-01-02 NOTE — PROGRESS NOTES
CC: 3 month follow up T2DM    History:  Ephraim Quinones is a 69 y.o. male who presents today for evaluation of the above problems.    Patient reports that he has been doing well.  Admits that he has struggled with his diet and exercise especially with the holidays.  Weight is notably up for pounds today since last visit.  Remains compliant with medications, though he notes that he does not like taking Eliquis due to the easy bruising that it causes.    HPI  ROS:  Review of Systems   Respiratory: Negative for shortness of breath.    Cardiovascular: Negative for chest pain.   Gastrointestinal: Negative for constipation and diarrhea.   Neurological: Negative for dizziness, light-headedness and headaches.       Allergies   Allergen Reactions   • Percocet [Oxycodone-Acetaminophen] Itching     Past Medical History:   Diagnosis Date   • A-fib (CMS/[facility])    • Acid reflux    • Anemia 5/3/2016   • Diabetes mellitus (CMS/[facility])    • Gout    • Hyperlipidemia    • Hypertension    • Sleep apnea with use of continuous positive airway pressure (CPAP)      Past Surgical History:   Procedure Laterality Date   • BACK SURGERY      x2   • CIRCUMCISION N/A 6/6/2019    Procedure: CIRCUMCISION;  Surgeon: Yon Sloan MD;  Location: [facility] OR;  Service: Urology   • FINGER SURGERY     • REPLACEMENT TOTAL KNEE Bilateral      Family History   Problem Relation Age of Onset   • Colon cancer Mother    • Prostate cancer Father    • No Known Problems Maternal Grandmother    • No Known Problems Maternal Grandfather    • No Known Problems Paternal Grandmother    • No Known Problems Paternal Grandfather       reports that he quit smoking about 23 years ago. He has a 20.00 pack-year smoking history. He has never used smokeless tobacco. He reports that he drinks alcohol. He reports that he does not use drugs.      Current Outpatient Medications:   •  albuterol 108 (90 Base) MCG/ACT inhaler, 2 sprays 2 minutes apart every 6 hours as needed,  "Disp: 1 inhaler, Rfl: 3  •  amLODIPine (NORVASC) 5 MG tablet, TAKE 1 TABLET BY MOUTH TWICE DAILY, Disp: 180 tablet, Rfl: 1  •  aspirin 81 MG EC tablet, Take 81 mg by mouth Daily., Disp: , Rfl:   •  Cholecalciferol (VITAMIN D3) 2000 units capsule, Take 2,000 Units by mouth Daily., Disp: , Rfl:   •  clobetasol (TEMOVATE) 0.05 % cream, Apply  topically to the appropriate area as directed As Needed (for itching)., Disp: 30 g, Rfl: 3  •  colchicine 0.6 MG tablet, Take 1 tablet by mouth As Needed (for gout)., Disp: 30 tablet, Rfl: 1  •  ELIQUIS 5 MG tablet tablet, Take 5 mg by mouth Every 12 (Twelve) Hours., Disp: , Rfl:   •  glipiZIDE (GLUCOTROL XL) 5 MG ER tablet, TAKE 2 TABLETS BY MOUTH DAILY., Disp: 180 tablet, Rfl: 1  •  glucose blood test strip, 1 each by Other route Daily. Use as instructed, Disp: 100 each, Rfl: 3  •  HYDROcodone-acetaminophen (NORCO)  MG per tablet, Take 1 tablet by mouth Every 6 (Six) Hours As Needed for Moderate Pain ., Disp: 90 tablet, Rfl: 0  •  indapamide (LOZOL) 2.5 MG tablet, TAKE 1 TABLET BY MOUTH EVERY MORNING, Disp: 90 tablet, Rfl: 3  •  Lancet Devices (EASY TOUCH LANCING DEVICE) misc, USE TO TEST DAILY, Disp: , Rfl: 0  •  losartan (COZAAR) 100 MG tablet, TAKE 1 TABLET BY MOUTH DAILY, Disp: 90 tablet, Rfl: 3  •  metFORMIN (GLUMETZA) 1000 MG (MOD) 24 hr tablet, TAKE 1 TABLET BY MOUTH DAILY., Disp: 90 tablet, Rfl: 2  •  ONETOUCH DELICA LANCETS 33G misc, 1 each Daily., Disp: 100 each, Rfl: 3  •  raNITIdine (ZANTAC) 150 MG tablet, Take 150 mg by mouth Every Morning., Disp: , Rfl:     OBJECTIVE:  /78 (BP Location: Left arm, Patient Position: Sitting, Cuff Size: Large Adult)   Pulse 82   Temp 97 °F (36.1 °C) (Temporal)   Ht 172 cm (67.72\")   Wt (!) 143 kg (315 lb)   SpO2 98%   BMI 48.29 kg/m²    Physical Exam   Constitutional: He is oriented to person, place, and time. Vital signs are normal. He appears well-developed and well-nourished.   HENT:   Right Ear: Tympanic " membrane, external ear and ear canal normal.   Left Ear: Tympanic membrane, external ear and ear canal normal.   Mouth/Throat: Oropharynx is clear and moist.   Neck: Carotid bruit is not present.   Cardiovascular: Normal rate and regular rhythm.   Pulmonary/Chest: Effort normal and breath sounds normal.   Neurological: He is alert and oriented to person, place, and time.   Psychiatric: He has a normal mood and affect. His behavior is normal.   Vitals reviewed.      Assessment/Plan    Ephraim was seen today for hyperglycemia.    Diagnoses and all orders for this visit:    Type 2 diabetes mellitus without complication, without long-term current use of insulin (CMS/[facility])  -     Comprehensive Metabolic Panel  -     Hemoglobin A1c    Essential hypertension  -     Comprehensive Metabolic Panel    Morbidly obese (CMS/[facility])    Sacroiliitis, not elsewhere classified (CMS/[facility])    Atrial fibrillation, unspecified type (CMS/[facility])    Will check labs today.  Discussed need for weight loss with patient.  Patient adamantly refuses any gym membership.  Notes that he has an exercise bike at home.  Continue meds same.    An After Visit Summary was printed and given to the patient at discharge.  Return in about 3 months (around 4/2/2020).       ANSHUL Noel January 2, 2020    Electronically signed.

## 2020-01-03 LAB
ALBUMIN SERPL-MCNC: 4.2 G/DL (ref 3.5–5.2)
ALBUMIN/GLOB SERPL: 2 G/DL
ALP SERPL-CCNC: 65 U/L (ref 39–117)
ALT SERPL-CCNC: 51 U/L (ref 1–41)
AST SERPL-CCNC: 31 U/L (ref 1–40)
BILIRUB SERPL-MCNC: 0.6 MG/DL (ref 0.2–1.2)
BUN SERPL-MCNC: 19 MG/DL (ref 8–23)
BUN/CREAT SERPL: 23.2 (ref 7–25)
CALCIUM SERPL-MCNC: 9.5 MG/DL (ref 8.6–10.5)
CHLORIDE SERPL-SCNC: 98 MMOL/L (ref 98–107)
CO2 SERPL-SCNC: 24.4 MMOL/L (ref 22–29)
CREAT SERPL-MCNC: 0.82 MG/DL (ref 0.76–1.27)
GLOBULIN SER CALC-MCNC: 2.1 GM/DL
GLUCOSE SERPL-MCNC: 166 MG/DL (ref 65–99)
HBA1C MFR BLD: 6.6 % (ref 4.8–5.6)
POTASSIUM SERPL-SCNC: 4 MMOL/L (ref 3.5–5.2)
PROT SERPL-MCNC: 6.3 G/DL (ref 6–8.5)
SODIUM SERPL-SCNC: 140 MMOL/L (ref 136–145)

## 2020-02-07 ENCOUNTER — OFFICE VISIT (OUTPATIENT)
Dept: FAMILY MEDICINE CLINIC | Facility: CLINIC | Age: 70
End: 2020-02-07

## 2020-02-07 VITALS
WEIGHT: 315 LBS | SYSTOLIC BLOOD PRESSURE: 134 MMHG | OXYGEN SATURATION: 98 % | TEMPERATURE: 97.7 F | BODY MASS INDEX: 48.29 KG/M2 | DIASTOLIC BLOOD PRESSURE: 77 MMHG | HEART RATE: 86 BPM

## 2020-02-07 DIAGNOSIS — R52 PAIN: ICD-10-CM

## 2020-02-07 DIAGNOSIS — N64.4 NIPPLE PAIN: Primary | ICD-10-CM

## 2020-02-07 PROCEDURE — 99214 OFFICE O/P EST MOD 30 MIN: CPT | Performed by: NURSE PRACTITIONER

## 2020-02-07 RX ORDER — HYDROCODONE BITARTRATE AND ACETAMINOPHEN 10; 325 MG/1; MG/1
1 TABLET ORAL EVERY 6 HOURS PRN
Qty: 90 TABLET | Refills: 0 | Status: SHIPPED | OUTPATIENT
Start: 2020-02-07 | End: 2020-03-09 | Stop reason: SDUPTHER

## 2020-02-07 NOTE — PROGRESS NOTES
"CC: med refill - norco   Left breast itching and hard    History:  Ephraim Quinones is a 69 y.o. male who presents today for evaluation of the above problems.    Has had pain and itching of left nipple for about a month.  If has been waking him up at night. No family history of breast cancer.  No discharge from nipple.   No known friction causing activity.  Also needs refill on his [place].  States that this is still working well to control his back pain as well as his right shoulder pain.  Has not noticed any decline in strength or function.         CONTROLLED SUBSTANCE TRACKING 1/26/2018 4/16/2018 9/24/2018 4/5/2019 7/22/2019 10/4/2019 2/7/2020   Last Gregory 1/26/2018 4/16/2018 9/24/2018 4/5/2019 7/22/2019 10/4/2019 2/7/2020   Report Number - [id number]   Last UDS 1/26/2018 1/26/2018 1/26/2018 4/5/2019 4/15/2019 4/5/2019 4/5/2019   Last Controlled Substance Agreement 1/26/2018 1/30/2018 1/30/2018 4/5/2019 4/15/2019 4/5/2019 4/5/2019       HPI  ROS:  Review of Systems   Constitutional: Negative for fever.   Musculoskeletal: Positive for back pain.        Right shoulder pain   Skin:        Left nipple sore and \"hard\"        Allergies   Allergen Reactions   • Percocet [Oxycodone-Acetaminophen] Itching     Past Medical History:   Diagnosis Date   • A-fib (CMS/HCC)    • Acid reflux    • Anemia 5/3/2016   • Diabetes mellitus (CMS/[facility])    • Gout    • Hyperlipidemia    • Hypertension    • Sleep apnea with use of continuous positive airway pressure (CPAP)      Past Surgical History:   Procedure Laterality Date   • BACK SURGERY      x2   • CIRCUMCISION N/A 6/6/2019    Procedure: CIRCUMCISION;  Surgeon: Yon Sloan MD;  Location: [facility];  Service: Urology   • FINGER SURGERY     • REPLACEMENT TOTAL KNEE Bilateral      Family History   Problem Relation Age of Onset   • Colon cancer Mother    • Prostate cancer Father    • No Known Problems Maternal Grandmother    • No " Known Problems Maternal Grandfather    • No Known Problems Paternal Grandmother    • No Known Problems Paternal Grandfather       reports that he quit smoking about 23 years ago. He has a 20.00 pack-year smoking history. He has never used smokeless tobacco. He reports that he drinks alcohol. He reports that he does not use drugs.      Current Outpatient Medications:   •  amLODIPine (NORVASC) 5 MG tablet, TAKE 1 TABLET BY MOUTH TWICE DAILY, Disp: 180 tablet, Rfl: 1  •  aspirin 81 MG EC tablet, Take 81 mg by mouth Daily., Disp: , Rfl:   •  Cholecalciferol (VITAMIN D3) 2000 units capsule, Take 2,000 Units by mouth Daily., Disp: , Rfl:   •  clobetasol (TEMOVATE) 0.05 % cream, Apply  topically to the appropriate area as directed As Needed (for itching)., Disp: 30 g, Rfl: 3  •  ELIQUIS 5 MG tablet tablet, Take 5 mg by mouth Every 12 (Twelve) Hours., Disp: , Rfl:   •  glipiZIDE (GLUCOTROL XL) 5 MG ER tablet, TAKE 2 TABLETS BY MOUTH DAILY., Disp: 180 tablet, Rfl: 1  •  glucose blood test strip, 1 each by Other route Daily. Use as instructed, Disp: 100 each, Rfl: 3  •  HYDROcodone-acetaminophen (NORCO)  MG per tablet, Take 1 tablet by mouth Every 6 (Six) Hours As Needed for Moderate Pain ., Disp: 90 tablet, Rfl: 0  •  indapamide (LOZOL) 2.5 MG tablet, TAKE 1 TABLET BY MOUTH EVERY MORNING, Disp: 90 tablet, Rfl: 3  •  Lancet Devices (EASY TOUCH LANCING DEVICE) misc, USE TO TEST DAILY, Disp: , Rfl: 0  •  losartan (COZAAR) 100 MG tablet, TAKE 1 TABLET BY MOUTH DAILY, Disp: 90 tablet, Rfl: 3  •  metFORMIN (GLUMETZA) 1000 MG (MOD) 24 hr tablet, TAKE 1 TABLET BY MOUTH DAILY., Disp: 90 tablet, Rfl: 2  •  ONETOUCH DELICA LANCETS 33G misc, 1 each Daily., Disp: 100 each, Rfl: 3  •  raNITIdine (ZANTAC) 150 MG tablet, Take 150 mg by mouth Every Morning., Disp: , Rfl:   •  albuterol 108 (90 Base) MCG/ACT inhaler, 2 sprays 2 minutes apart every 6 hours as needed, Disp: 1 inhaler, Rfl: 3  •  colchicine 0.6 MG tablet, Take 1 tablet by  mouth As Needed (for gout)., Disp: 30 tablet, Rfl: 1    OBJECTIVE:  /77 (BP Location: Right arm, Patient Position: Sitting, Cuff Size: Large Adult)   Pulse 86   Temp 97.7 °F (36.5 °C) (Oral)   Wt (!) 143 kg (315 lb)   SpO2 98%   BMI 48.29 kg/m²    Physical Exam   Constitutional: He is oriented to person, place, and time. Vital signs are normal. He appears well-developed and well-nourished.   Morbidly obese   Cardiovascular: Normal rate.   Pulmonary/Chest: Effort normal. Right breast exhibits no inverted nipple, no mass, no nipple discharge, no skin change and no tenderness. Left breast exhibits skin change and tenderness. Left breast exhibits no inverted nipple, no mass and no nipple discharge.   Skin of left nipple appears sclerosed.  No masses noted.  No color change. Nipple is tender to palpation.    Musculoskeletal:        Lumbar back: He exhibits tenderness (right sided sacroiliac).   Neurological: He is alert and oriented to person, place, and time.   Psychiatric: He has a normal mood and affect. His behavior is normal.   Vitals reviewed.      Assessment/Plan    Ephraim was seen today for breast problem and med refill.    Diagnoses and all orders for this visit:    Nipple pain  -     Mammo Diagnostic Bilateral With CAD; Future    Pain    Gregory reviewed and appropriate.  Norco to be refilled by Dr. Hopkins.     Will evaluate breast with mammogram.  If normal will order topical treatment.     An After Visit Summary was printed and given to the patient at discharge.  Return in about 3 months (around 5/7/2020).       ANSHUL Noel 02/07/20    Electronically signed.

## 2020-02-11 RX ORDER — GLIPIZIDE 5 MG/1
10 TABLET, FILM COATED, EXTENDED RELEASE ORAL DAILY
Qty: 180 TABLET | Refills: 0 | Status: SHIPPED | OUTPATIENT
Start: 2020-02-11 | End: 2020-08-13

## 2020-03-09 DIAGNOSIS — R52 PAIN: ICD-10-CM

## 2020-03-09 RX ORDER — HYDROCODONE BITARTRATE AND ACETAMINOPHEN 10; 325 MG/1; MG/1
1 TABLET ORAL EVERY 6 HOURS PRN
Qty: 90 TABLET | Refills: 0 | Status: SHIPPED | OUTPATIENT
Start: 2020-03-09 | End: 2020-04-09

## 2020-03-09 RX ORDER — AMLODIPINE BESYLATE 5 MG/1
TABLET ORAL
Qty: 180 TABLET | Refills: 3 | Status: SHIPPED | OUTPATIENT
Start: 2020-03-09 | End: 2021-03-05

## 2020-03-09 RX ORDER — SIMVASTATIN 20 MG
20 TABLET ORAL NIGHTLY
COMMUNITY
End: 2020-11-25 | Stop reason: SINTOL

## 2020-03-23 RX ORDER — METFORMIN HYDROCHLORIDE 1000 MG/1
1000 TABLET, FILM COATED, EXTENDED RELEASE ORAL DAILY
Qty: 90 TABLET | Refills: 1 | Status: SHIPPED | OUTPATIENT
Start: 2020-03-23 | End: 2020-05-29

## 2020-03-23 NOTE — TELEPHONE ENCOUNTER
Patient requesting refill on metformin ER 1,000.  Last seen in office on 01/02/2020 by ANSHUL Noel.

## 2020-04-08 DIAGNOSIS — R52 PAIN: ICD-10-CM

## 2020-04-09 RX ORDER — HYDROCODONE BITARTRATE AND ACETAMINOPHEN 10; 325 MG/1; MG/1
TABLET ORAL
Qty: 90 TABLET | Refills: 0 | Status: SHIPPED | OUTPATIENT
Start: 2020-04-09 | End: 2020-05-12 | Stop reason: SDUPTHER

## 2020-04-21 RX ORDER — LOSARTAN POTASSIUM 100 MG/1
TABLET ORAL
Qty: 90 TABLET | Refills: 0 | Status: SHIPPED | OUTPATIENT
Start: 2020-04-21 | End: 2020-07-21

## 2020-05-07 RX ORDER — INDAPAMIDE 2.5 MG/1
TABLET, FILM COATED ORAL
Qty: 90 TABLET | Refills: 0 | Status: SHIPPED | OUTPATIENT
Start: 2020-05-07 | End: 2020-08-10

## 2020-05-12 ENCOUNTER — TELEMEDICINE (OUTPATIENT)
Dept: FAMILY MEDICINE CLINIC | Facility: CLINIC | Age: 70
End: 2020-05-12

## 2020-05-12 DIAGNOSIS — R52 PAIN: ICD-10-CM

## 2020-05-12 PROCEDURE — 99213 OFFICE O/P EST LOW 20 MIN: CPT | Performed by: FAMILY MEDICINE

## 2020-05-12 RX ORDER — ALBUTEROL SULFATE 90 UG/1
AEROSOL, METERED RESPIRATORY (INHALATION)
Qty: 1 INHALER | Refills: 3 | Status: SHIPPED | OUTPATIENT
Start: 2020-05-12 | End: 2021-06-03 | Stop reason: SDUPTHER

## 2020-05-12 RX ORDER — HYDROCODONE BITARTRATE AND ACETAMINOPHEN 10; 325 MG/1; MG/1
1 TABLET ORAL EVERY 6 HOURS PRN
Qty: 90 TABLET | Refills: 0 | Status: SHIPPED | OUTPATIENT
Start: 2020-05-12 | End: 2020-06-11 | Stop reason: SDUPTHER

## 2020-05-12 NOTE — PROGRESS NOTES
Subjective   Ephraim Quinones is a 70 y.o. male.     70-year-old male with obesity is having a video encounter for pain maintenance.      The following portions of the patient's history were reviewed and updated as appropriate: allergies, current medications, past family history, past medical history, past social history, past surgical history and problem list.    Review of Systems   Respiratory: Positive for wheezing.    Cardiovascular: Negative for chest pain and leg swelling.   Musculoskeletal: Positive for arthralgias and back pain.        Multiple joints DJD       Objective   Physical Exam   Constitutional: He is oriented to person, place, and time.   Obesity-cognitively intact   Neurological: He is alert and oriented to person, place, and time.   Psychiatric: He has a normal mood and affect. His behavior is normal. Judgment and thought content normal.       Assessment/Plan   Ephraim was seen today for pain.    Diagnoses and all orders for this visit:    Pain  -     HYDROcodone-acetaminophen (NORCO)  MG per tablet; Take 1 tablet by mouth Every 6 (Six) Hours As Needed for Moderate Pain .    Other orders  -     albuterol sulfate  (90 Base) MCG/ACT inhaler; 2 sprays 2 minutes apart every 6 hours as needed           Plan above refills    You have chosen to receive care through a telehealth visit.  Do you consent to use a video/audio connection for your medical care today? Yes

## 2020-05-28 ENCOUNTER — OFFICE VISIT (OUTPATIENT)
Dept: FAMILY MEDICINE CLINIC | Facility: CLINIC | Age: 70
End: 2020-05-28

## 2020-05-28 VITALS
HEART RATE: 77 BPM | SYSTOLIC BLOOD PRESSURE: 138 MMHG | DIASTOLIC BLOOD PRESSURE: 82 MMHG | OXYGEN SATURATION: 95 % | HEIGHT: 67 IN | WEIGHT: 315 LBS | RESPIRATION RATE: 20 BRPM | BODY MASS INDEX: 49.44 KG/M2 | TEMPERATURE: 97.9 F

## 2020-05-28 DIAGNOSIS — E78.2 MIXED HYPERLIPIDEMIA: ICD-10-CM

## 2020-05-28 DIAGNOSIS — E11.9 TYPE 2 DIABETES MELLITUS WITHOUT COMPLICATION, WITHOUT LONG-TERM CURRENT USE OF INSULIN (HCC): ICD-10-CM

## 2020-05-28 DIAGNOSIS — Z12.5 SPECIAL SCREENING FOR MALIGNANT NEOPLASM OF PROSTATE: ICD-10-CM

## 2020-05-28 DIAGNOSIS — R53.83 FATIGUE, UNSPECIFIED TYPE: ICD-10-CM

## 2020-05-28 DIAGNOSIS — Z00.00 ANNUAL PHYSICAL EXAM: ICD-10-CM

## 2020-05-28 DIAGNOSIS — R73.9 HYPERGLYCEMIA: Primary | ICD-10-CM

## 2020-05-28 LAB
BILIRUB BLD-MCNC: NEGATIVE MG/DL
CLARITY, POC: CLEAR
COLOR UR: YELLOW
GLUCOSE UR STRIP-MCNC: NEGATIVE MG/DL
KETONES UR QL: NEGATIVE
LEUKOCYTE EST, POC: NEGATIVE
NITRITE UR-MCNC: NEGATIVE MG/ML
PH UR: 5.5 [PH] (ref 5–8)
PROT UR STRIP-MCNC: NEGATIVE MG/DL
RBC # UR STRIP: ABNORMAL /UL
SP GR UR: 1.03 (ref 1–1.03)
UROBILINOGEN UR QL: NORMAL

## 2020-05-28 PROCEDURE — G0439 PPPS, SUBSEQ VISIT: HCPCS | Performed by: FAMILY MEDICINE

## 2020-05-28 PROCEDURE — 81003 URINALYSIS AUTO W/O SCOPE: CPT | Performed by: FAMILY MEDICINE

## 2020-05-28 PROCEDURE — 96160 PT-FOCUSED HLTH RISK ASSMT: CPT | Performed by: FAMILY MEDICINE

## 2020-05-28 NOTE — PATIENT INSTRUCTIONS
Exercising to Stay Healthy  To become healthy and stay healthy, it is recommended that you do moderate-intensity and vigorous-intensity exercise. You can tell that you are exercising at a moderate intensity if your heart starts beating faster and you start breathing faster but can still hold a conversation. You can tell that you are exercising at a vigorous intensity if you are breathing much harder and faster and cannot hold a conversation while exercising.  Exercising regularly is important. It has many health benefits, such as:  · Improving overall fitness, flexibility, and endurance.  · Increasing bone density.  · Helping with weight control.  · Decreasing body fat.  · Increasing muscle strength.  · Reducing stress and tension.  · Improving overall health.  How often should I exercise?  Choose an activity that you enjoy, and set realistic goals. Your health care provider can help you make an activity plan that works for you.  Exercise regularly as told by your health care provider. This may include:  · Doing strength training two times a week, such as:  ? Lifting weights.  ? Using resistance bands.  ? Push-ups.  ? Sit-ups.  ? Yoga.  · Doing a certain intensity of exercise for a given amount of time. Choose from these options:  ? A total of 150 minutes of moderate-intensity exercise every week.  ? A total of 75 minutes of vigorous-intensity exercise every week.  ? A mix of moderate-intensity and vigorous-intensity exercise every week.  Children, pregnant women, people who have not exercised regularly, people who are overweight, and older adults may need to talk with a health care provider about what activities are safe to do. If you have a medical condition, be sure to talk with your health care provider before you start a new exercise program.  What are some exercise ideas?  Moderate-intensity exercise ideas include:  · Walking 1 mile (1.6 km) in about 15  minutes.  · Biking.  · Hiking.  · Golfing.  · Dancing.  · Water aerobics.  Vigorous-intensity exercise ideas include:  · Walking 4.5 miles (7.2 km) or more in about 1 hour.  · Jogging or running 5 miles (8 km) in about 1 hour.  · Biking 10 miles (16.1 km) or more in about 1 hour.  · Lap swimming.  · Roller-skating or in-line skating.  · Cross-country skiing.  · Vigorous competitive sports, such as football, basketball, and soccer.  · Jumping rope.  · Aerobic dancing.  What are some everyday activities that can help me to get exercise?  · Yard work, such as:  ? Pushing a .  ? Raking and bagging leaves.  · Washing your car.  · Pushing a stroller.  · Shoveling snow.  · Gardening.  · Washing windows or floors.  How can I be more active in my day-to-day activities?  · Use stairs instead of an elevator.  · Take a walk during your lunch break.  · If you drive, park your car farther away from your work or school.  · If you take public transportation, get off one stop early and walk the rest of the way.  · Stand up or walk around during all of your indoor phone calls.  · Get up, stretch, and walk around every 30 minutes throughout the day.  · Enjoy exercise with a friend. Support to continue exercising will help you keep a regular routine of activity.  What guidelines can I follow while exercising?  · Before you start a new exercise program, talk with your health care provider.  · Do not exercise so much that you hurt yourself, feel dizzy, or get very short of breath.  · Wear comfortable clothes and wear shoes with good support.  · Drink plenty of water while you exercise to prevent dehydration or heat stroke.  · Work out until your breathing and your heartbeat get faster.  Where to find more information  · U.[place] Department of Health and Human Services: www.hhs.gov  · Centers for Disease Control and Prevention (CDC): www.cdc.gov  Summary  · Exercising regularly is important. It will improve your overall fitness,  flexibility, and endurance.  · Regular exercise also will improve your overall health. It can help you control your weight, reduce stress, and improve your bone density.  · Do not exercise so much that you hurt yourself, feel dizzy, or get very short of breath.  · Before you start a new exercise program, talk with your health care provider.  This information is not intended to replace advice given to you by your health care provider. Make sure you discuss any questions you have with your health care provider.  Document Released: 01/20/2012 Document Revised: 11/30/2018 Document Reviewed: 11/08/2018  Elsevier Patient Education © 2020 [employer]      Fall Prevention in the Home, Adult  Falls can cause injuries and can affect people from all age groups. There are many simple things that you can do to make your home safe and to help prevent falls. Ask for help when making these changes, if needed.  What actions can I take to prevent falls?  General instructions  · Use good lighting in all rooms. Replace any light bulbs that burn out.  · Turn on lights if it is dark. Use night-lights.  · Place frequently used items in easy-to-reach places. Lower the shelves around your home if necessary.  · Set up furniture so that there are clear paths around it. Avoid moving your furniture around.  · Remove throw rugs and other tripping hazards from the floor.  · Avoid walking on wet floors.  · Fix any uneven floor surfaces.  · Add color or contrast paint or tape to grab bars and handrails in your home. Place contrasting color strips on the first and last steps of stairways.  · When you use a stepladder, make sure that it is completely opened and that the sides are firmly locked. Have someone hold the ladder while you are using it. Do not climb a closed stepladder.  · Be aware of any and all pets.  What can I do in the bathroom?         · Keep the floor dry. Immediately clean up any water that spills onto the floor.  · Remove soap  buildup in the tub or shower on a regular basis.  · Use non-skid mats or decals on the floor of the tub or shower.  · Attach bath mats securely with double-sided, non-slip rug tape.  · If you need to sit down while you are in the shower, use a plastic, non-slip stool.  · Install grab bars by the toilet and in the tub and shower. Do not use towel bars as grab bars.  What can I do in the bedroom?  · Make sure that a bedside light is easy to reach.  · Do not use oversized bedding that drapes onto the floor.  · Have a firm chair that has side arms to use for getting dressed.  What can I do in the kitchen?  · Clean up any spills right away.  · If you need to reach for something above you, use a sturdy step stool that has a grab bar.  · Keep electrical cables out of the way.  · Do not use floor polish or wax that makes floors slippery. If you must use wax, make sure that it is non-skid floor wax.  What can I do in the stairways?  · Do not leave any items on the stairs.  · Make sure that you have a light switch at the top of the stairs and the bottom of the stairs. Have them installed if you do not have them.  · Make sure that there are handrails on both sides of the stairs. Fix handrails that are broken or loose. Make sure that handrails are as long as the stairways.  · Install non-slip stair treads on all stairs in your home.  · Avoid having throw rugs at the top or bottom of stairways, or secure the rugs with carpet tape to prevent them from moving.  · Choose a carpet design that does not hide the edge of steps on the stairway.  · Check any carpeting to make sure that it is firmly attached to the stairs. Fix any carpet that is loose or worn.  What can I do on the outside of my home?  · Use bright outdoor lighting.  · Regularly repair the edges of walkways and driveways and fix any cracks.  · Remove high doorway thresholds.  · Trim any shrubbery on the main path into your home.  · Regularly check that handrails are  securely fastened and in good repair. Both sides of any steps should have handrails.  · Install guardrails along the edges of any raised decks or porches.  · Clear walkways of debris and clutter, including tools and rocks.  · Have leaves, snow, and ice cleared regularly.  · Use sand or salt on walkways during winter months.  · In the garage, clean up any spills right away, including grease or oil spills.  What other actions can I take?  · Wear closed-toe shoes that fit well and support your feet. Wear shoes that have rubber soles or low heels.  · Use mobility aids as needed, such as canes, walkers, scooters, and crutches.  · Review your medicines with your health care provider. Some medicines can cause dizziness or changes in blood pressure, which increase your risk of falling.  Talk with your health care provider about other ways that you can decrease your risk of falls. This may include working with a physical therapist or  to improve your strength, balance, and endurance.  Where to find more information  · Centers for Disease Control and Prevention, STEADI: https://www.cdc.gov  · National [employer] on Aging: https://ke0zdnm.connie.nih.gov  Contact a health care provider if:  · You are afraid of falling at home.  · You feel weak, drowsy, or dizzy at home.  · You fall at home.  Summary  · There are many simple things that you can do to make your home safe and to help prevent falls.  · Ways to make your home safe include removing tripping hazards and installing grab bars in the bathroom.  · Ask for help when making these changes in your home.  This information is not intended to replace advice given to you by your health care provider. Make sure you discuss any questions you have with your health care provider.  Document Released: 12/08/2003 Document Revised: 11/30/2018 Document Reviewed: 08/02/2018  [facility] Patient Education © 2020 [employer]    Medicare Wellness  Personal Prevention Plan of Service      Date of Office Visit:  2020  Encounter Provider:  Jim Hopkins MD  Place of Service:  [facility]  Patient Name: Ephraim Quinones  :  1950    As part of the Medicare Wellness portion of your visit today, we are providing you with this personalized preventive plan of services (PPPS). This plan is based upon recommendations of the [place] Preventive Services Task Force (USPSTF) and the Advisory Committee on Immunization Practices (ACIP).    This lists the preventive care services that should be considered, and provides dates of when you are due. Items listed as completed are up-to-date and do not require any further intervention.    Health Maintenance   Topic Date Due   • Pneumococcal Vaccine Once at 65 Years Old  2020 (Originally 3/19/2015)   • ZOSTER VACCINE (3 of 3) 2021 (Originally 2020)   • HEMOGLOBIN A1C  2020   • INFLUENZA VACCINE  2020   • DIABETIC EYE EXAM  10/02/2020   • LIPID PANEL  10/04/2020   • MEDICARE ANNUAL WELLNESS  2021   • DIABETIC FOOT EXAM  2021   • URINE MICROALBUMIN  2021   • COLONOSCOPY  2022   • HEPATITIS C SCREENING  Completed   • AAA SCREEN (ONE-TIME)  Completed   • TDAP/TD VACCINES  Discontinued       Orders Placed This Encounter   Procedures   • TSH   • T4, free   • Comprehensive Metabolic Panel   • Hemoglobin A1c   • Lipid Panel   • PSA Screen   • MicroAlbumin, Urine, Random - Urine, Clean Catch     Standing Status:   Future     Standing Expiration Date:   2021   • POC Urinalysis Dipstick, Multipro   • CBC & Differential     Order Specific Question:   Manual Differential     Answer:   No       Return in 6 months (on 2020).

## 2020-05-28 NOTE — PROGRESS NOTES
The ABCs of the Annual Wellness Visit  Subsequent Medicare Wellness Visit    Chief Complaint   Patient presents with   • Medicare Wellness-subsequent     Fasting, CPAP papers       Subjective   History of Present Illness:  Ephraim Quinones is a 70 y.o. male who presents for a Subsequent Medicare Wellness Visit.    HEALTH RISK ASSESSMENT    Recent Hospitalizations:  No hospitalization(s) within the last year.    Current Medical Providers:  Patient Care Team:  Jim Hopkins MD as PCP - Noah Linder MD as Consulting Physician (Orthopedic Surgery)    Smoking Status:  Social History     Tobacco Use   Smoking Status Former Smoker   • Packs/day: 1.00   • Years: 20.00   • Pack years: 20.00   • Last attempt to quit: 1996   • Years since quittin.0   Smokeless Tobacco Never Used       Alcohol Consumption:  Social History     Substance and Sexual Activity   Alcohol Use Yes    Comment: occ       Depression Screen:   PHQ-2/PHQ-9 Depression Screening 2020   Little interest or pleasure in doing things 0   Feeling down, depressed, or hopeless 0   Trouble falling or staying asleep, or sleeping too much -   Feeling tired or having little energy -   Poor appetite or overeating -   Feeling bad about yourself - or that you are a failure or have let yourself or your family down -   Trouble concentrating on things, such as reading the newspaper or watching television -   Moving or speaking so slowly that other people could have noticed. Or the opposite - being so fidgety or restless that you have been moving around a lot more than usual -   Thoughts that you would be better off dead, or of hurting yourself in some way -   Total Score 0   If you checked off any problems, how difficult have these problems made it for you to do your work, take care of things at home, or get along with other people? -       Fall Risk Screen:  MARLONADI Fall Risk Assessment was completed, and patient is at HIGH risk for  falls. Assessment completed on:1/2/2020    Health Habits and Functional and Cognitive Screening:  Functional & Cognitive Status 5/28/2020   Do you have difficulty preparing food and eating? No   Do you have difficulty bathing yourself, getting dressed or grooming yourself? No   Do you have difficulty using the toilet? No   Do you have difficulty moving around from place to place? No   Do you have trouble with steps or getting out of a bed or a chair? Yes   Current Diet Low Carb Diet   Dental Exam Not up to date   Eye Exam Not up to date   Exercise (times per week) 3 times per week   Current Exercise Activities Include Yard Work   Do you need help using the phone?  No   Are you deaf or do you have serious difficulty hearing?  Yes   Do you need help with transportation? Yes   Do you need help shopping? No   Do you need help preparing meals?  No   Do you need help with housework?  No   Do you need help with laundry? No   Do you need help taking your medications? No   Do you need help managing money? No   Do you ever drive or ride in a car without wearing a seat belt? No   Have you felt unusual stress, anger or loneliness in the last month? No   Who do you live with? Spouse   If you need help, do you have trouble finding someone available to you? No   Have you been bothered in the last four weeks by sexual problems? No   Do you have difficulty concentrating, remembering or making decisions? Yes         Does the patient have evidence of cognitive impairment? No    Asprin use counseling:Does not need ASA (and currently is not on it)    Age-appropriate Screening Schedule:  Refer to the list below for future screening recommendations based on patient's age, sex and/or medical conditions. Orders for these recommended tests are listed in the plan section. The patient has been provided with a written plan.    Health Maintenance   Topic Date Due   • ZOSTER VACCINE (3 of 3) 01/02/2021 (Originally 1/2/2020)   • HEMOGLOBIN A1C   07/02/2020   • INFLUENZA VACCINE  08/01/2020   • DIABETIC EYE EXAM  10/02/2020   • LIPID PANEL  10/04/2020   • DIABETIC FOOT EXAM  05/28/2021   • URINE MICROALBUMIN  05/28/2021   • COLONOSCOPY  05/28/2022   • TDAP/TD VACCINES  Discontinued          The following portions of the patient's history were reviewed and updated as appropriate: allergies, current medications, past family history, past medical history, past social history, past surgical history and problem list.    Outpatient Medications Prior to Visit   Medication Sig Dispense Refill   • albuterol sulfate  (90 Base) MCG/ACT inhaler 2 sprays 2 minutes apart every 6 hours as needed 1 inhaler 3   • amLODIPine (NORVASC) 5 MG tablet TAKE 1 TABLET BY MOUTH TWICE DAILY 180 tablet 3   • aspirin 81 MG EC tablet Take 81 mg by mouth Daily.     • Cholecalciferol (VITAMIN D3) 2000 units capsule Take 2,000 Units by mouth Daily.     • clobetasol (TEMOVATE) 0.05 % cream Apply  topically to the appropriate area as directed As Needed (for itching). 30 g 3   • colchicine 0.6 MG tablet Take 1 tablet by mouth As Needed (for gout). 30 tablet 1   • ELIQUIS 5 MG tablet tablet Take 5 mg by mouth Every 12 (Twelve) Hours.     • glipizide (GLUCOTROL XL) 5 MG ER tablet TAKE 2 TABLETS BY MOUTH DAILY. 180 tablet 0   • glucose blood test strip 1 each by Other route Daily. Use as instructed 100 each 3   • HYDROcodone-acetaminophen (NORCO)  MG per tablet Take 1 tablet by mouth Every 6 (Six) Hours As Needed for Moderate Pain . 90 tablet 0   • indapamide (LOZOL) 2.5 MG tablet TAKE 1 TABLET BY MOUTH EVERY MORNING 90 tablet 0   • Lancet Devices (EASY TOUCH LANCING DEVICE) misc USE TO TEST DAILY  0   • losartan (COZAAR) 100 MG tablet TAKE 1 TABLET BY MOUTH DAILY 90 tablet 0   • metFORMIN (GLUMETZA) 1000 MG (MOD) 24 hr tablet TAKE 1 TABLET BY MOUTH DAILY. 90 tablet 1   • ONETOUCH DELICA LANCETS 33G misc 1 each Daily. 100 each 3   • simvastatin (ZOCOR) 20 MG tablet Take 20 mg by mouth  "Every Night.       No facility-administered medications prior to visit.        Patient Active Problem List   Diagnosis   • Type 2 diabetes mellitus without complication (CMS/[facility])   • HTN (hypertension)   • A-fib (CMS/[facility])   • Hx of adenomatous colonic polyps   • Morbidly obese (CMS/[facility])   • Phimosis   • Sacroiliitis, not elsewhere classified (CMS/[facility])       Advanced Care Planning:  ACP discussion was declined by the patient. Patient does not have an advance directive, declines further assistance.    Review of Systems   Respiratory: Positive for apnea.         Uses CPAP nightly compliant.  Humidification has noticed major improvement in his stamina since then.  Continue for lifetime   Cardiovascular:        Sees cardiologist yearly A. fib now sinus rhythm stable   Gastrointestinal:        Colonoscopy last year precancerous polyps-repeat 3 years   Genitourinary: Negative for difficulty urinating.   All other systems reviewed and are negative.      Compared to one year ago, the patient feels his physical health is the same.  Compared to one year ago, the patient feels his mental health is the same.    Reviewed chart for potential of high risk medication in the elderly: yes  Reviewed chart for potential of harmful drug interactions in the elderly:yes    Objective         Vitals:    05/28/20 0808   BP: 138/82   BP Location: Left arm   Patient Position: Sitting   Cuff Size: Large Adult   Pulse: 77   Resp: 20   Temp: 97.9 °F (36.6 °C)   TempSrc: Oral   SpO2: 95%   Weight: (!) 143 kg (315 lb)   Height: 170.2 cm (67\")       Body mass index is 49.34 kg/m².  Discussed the patient's BMI with him. The BMI is above average; no BMI management plan is appropriate..    Physical Exam   Constitutional: He is oriented to person, place, and time.   Morbidly obese   HENT:   Right Ear: External ear normal.   Left Ear: External ear normal.   Eyes: Pupils are equal, round, and reactive to light. EOM are normal.   Neck: No thyromegaly " present.   Carotid pulses present   Cardiovascular: Normal rate and regular rhythm.   Pulmonary/Chest: Effort normal and breath sounds normal.   Abdominal: Soft. Bowel sounds are normal.   Genitourinary: Rectum normal, prostate normal and penis normal.   Genitourinary Comments: No testicular masses inguinal hernia or adenopathy-prostate 2+ no nodules   Musculoskeletal: He exhibits edema.   Lymphadenopathy:     He has no cervical adenopathy.   Neurological: He is alert and oriented to person, place, and time.   Skin: Skin is warm and dry. Capillary refill takes less than 2 seconds.   Psychiatric: He has a normal mood and affect. His behavior is normal. Judgment and thought content normal.   Nursing note and vitals reviewed.            Assessment/Plan   Medicare Risks and Personalized Health Plan  CMS Preventative Services Quick Reference  Fall Risk  Prostate Cancer Screening     The above risks/problems have been discussed with the patient.  Pertinent information has been shared with the patient in the After Visit Summary.  Follow up plans and orders are seen below in the Assessment/Plan Section.    Diagnoses and all orders for this visit:    1. Hyperglycemia (Primary)  -     Comprehensive Metabolic Panel  -     Hemoglobin A1c  -     POC Urinalysis Dipstick, Multipro  -     MicroAlbumin, Urine, Random - Urine, Clean Catch; Future  -     MicroAlbumin, Urine, Random - Urine, Clean Catch    2. Mixed hyperlipidemia  -     Comprehensive Metabolic Panel  -     Lipid Panel    3. Fatigue, unspecified type  -     TSH  -     T4, free  -     CBC & Differential    4. Special screening for malignant neoplasm of prostate  -     PSA Screen    5. Type 2 diabetes mellitus without complication, without long-term current use of insulin (CMS/[facility])  -     MicroAlbumin, Urine, Random - Urine, Clean Catch; Future  -     MicroAlbumin, Urine, Random - Urine, Clean Catch    6. Annual physical exam      Follow Up:  Return in 6 months (on  11/28/2020).     An After Visit Summary and PPPS were given to the patient.     Plan above-encouraged to lose weight will not happen

## 2020-05-29 DIAGNOSIS — E11.9 TYPE 2 DIABETES MELLITUS WITHOUT COMPLICATION, WITHOUT LONG-TERM CURRENT USE OF INSULIN (HCC): Primary | ICD-10-CM

## 2020-05-29 LAB
ALBUMIN SERPL-MCNC: 4.3 G/DL (ref 3.5–5.2)
ALBUMIN/GLOB SERPL: 2.2 G/DL
ALP SERPL-CCNC: 51 U/L (ref 39–117)
ALT SERPL-CCNC: 62 U/L (ref 1–41)
AST SERPL-CCNC: 36 U/L (ref 1–40)
BASOPHILS # BLD AUTO: 0.06 10*3/MM3 (ref 0–0.2)
BASOPHILS NFR BLD AUTO: 0.8 % (ref 0–1.5)
BILIRUB SERPL-MCNC: 0.5 MG/DL (ref 0.2–1.2)
BUN SERPL-MCNC: 20 MG/DL (ref 8–23)
BUN/CREAT SERPL: 24.1 (ref 7–25)
CALCIUM SERPL-MCNC: 9.5 MG/DL (ref 8.6–10.5)
CHLORIDE SERPL-SCNC: 98 MMOL/L (ref 98–107)
CHOLEST SERPL-MCNC: 113 MG/DL (ref 0–200)
CO2 SERPL-SCNC: 27.6 MMOL/L (ref 22–29)
CREAT SERPL-MCNC: 0.83 MG/DL (ref 0.76–1.27)
EOSINOPHIL # BLD AUTO: 0.31 10*3/MM3 (ref 0–0.4)
EOSINOPHIL NFR BLD AUTO: 4.2 % (ref 0.3–6.2)
ERYTHROCYTE [DISTWIDTH] IN BLOOD BY AUTOMATED COUNT: 12.9 % (ref 12.3–15.4)
GLOBULIN SER CALC-MCNC: 2 GM/DL
GLUCOSE SERPL-MCNC: 163 MG/DL (ref 65–99)
HBA1C MFR BLD: 7.01 % (ref 4.8–5.6)
HCT VFR BLD AUTO: 45 % (ref 37.5–51)
HDLC SERPL-MCNC: 44 MG/DL (ref 40–60)
HGB BLD-MCNC: 15.3 G/DL (ref 13–17.7)
IMM GRANULOCYTES # BLD AUTO: 0.06 10*3/MM3 (ref 0–0.05)
IMM GRANULOCYTES NFR BLD AUTO: 0.8 % (ref 0–0.5)
LDLC SERPL CALC-MCNC: 43 MG/DL (ref 0–100)
LYMPHOCYTES # BLD AUTO: 1.88 10*3/MM3 (ref 0.7–3.1)
LYMPHOCYTES NFR BLD AUTO: 25.4 % (ref 19.6–45.3)
MCH RBC QN AUTO: 32.7 PG (ref 26.6–33)
MCHC RBC AUTO-ENTMCNC: 34 G/DL (ref 31.5–35.7)
MCV RBC AUTO: 96.2 FL (ref 79–97)
MICROALBUMIN UR-MCNC: 33.3 UG/ML
MONOCYTES # BLD AUTO: 0.62 10*3/MM3 (ref 0.1–0.9)
MONOCYTES NFR BLD AUTO: 8.4 % (ref 5–12)
NEUTROPHILS # BLD AUTO: 4.47 10*3/MM3 (ref 1.7–7)
NEUTROPHILS NFR BLD AUTO: 60.4 % (ref 42.7–76)
NRBC BLD AUTO-RTO: 0 /100 WBC (ref 0–0.2)
PLATELET # BLD AUTO: 219 10*3/MM3 (ref 140–450)
POTASSIUM SERPL-SCNC: 4.1 MMOL/L (ref 3.5–5.2)
PROT SERPL-MCNC: 6.3 G/DL (ref 6–8.5)
PSA SERPL-MCNC: 0.69 NG/ML (ref 0–4)
RBC # BLD AUTO: 4.68 10*6/MM3 (ref 4.14–5.8)
SODIUM SERPL-SCNC: 139 MMOL/L (ref 136–145)
T4 FREE SERPL-MCNC: 1.33 NG/DL (ref 0.93–1.7)
TRIGL SERPL-MCNC: 130 MG/DL (ref 0–150)
TSH SERPL DL<=0.005 MIU/L-ACNC: 1.36 UIU/ML (ref 0.27–4.2)
VLDLC SERPL CALC-MCNC: 26 MG/DL (ref 5–40)
WBC # BLD AUTO: 7.4 10*3/MM3 (ref 3.4–10.8)

## 2020-05-29 RX ORDER — METFORMIN HYDROCHLORIDE 1000 MG/1
1000 TABLET, FILM COATED, EXTENDED RELEASE ORAL 2 TIMES DAILY WITH MEALS
Qty: 90 TABLET | Refills: 1
Start: 2020-05-29 | End: 2020-06-11 | Stop reason: SDUPTHER

## 2020-06-03 ENCOUNTER — RESULTS ENCOUNTER (OUTPATIENT)
Dept: FAMILY MEDICINE CLINIC | Facility: CLINIC | Age: 70
End: 2020-06-03

## 2020-06-03 DIAGNOSIS — E11.9 TYPE 2 DIABETES MELLITUS WITHOUT COMPLICATION, WITHOUT LONG-TERM CURRENT USE OF INSULIN (HCC): ICD-10-CM

## 2020-06-11 DIAGNOSIS — R52 PAIN: ICD-10-CM

## 2020-06-11 RX ORDER — HYDROCODONE BITARTRATE AND ACETAMINOPHEN 10; 325 MG/1; MG/1
1 TABLET ORAL EVERY 6 HOURS PRN
Qty: 90 TABLET | Refills: 0 | Status: SHIPPED | OUTPATIENT
Start: 2020-06-11 | End: 2020-07-14 | Stop reason: SDUPTHER

## 2020-07-14 DIAGNOSIS — R52 PAIN: ICD-10-CM

## 2020-07-14 RX ORDER — HYDROCODONE BITARTRATE AND ACETAMINOPHEN 10; 325 MG/1; MG/1
1 TABLET ORAL EVERY 6 HOURS PRN
Qty: 90 TABLET | Refills: 0 | Status: SHIPPED | OUTPATIENT
Start: 2020-07-14 | End: 2020-08-24 | Stop reason: SDUPTHER

## 2020-07-21 RX ORDER — LOSARTAN POTASSIUM 100 MG/1
TABLET ORAL
Qty: 90 TABLET | Refills: 3 | Status: SHIPPED | OUTPATIENT
Start: 2020-07-21 | End: 2021-04-19

## 2020-08-10 RX ORDER — INDAPAMIDE 2.5 MG/1
TABLET, FILM COATED ORAL
Qty: 90 TABLET | Refills: 2 | Status: SHIPPED | OUTPATIENT
Start: 2020-08-10 | End: 2021-02-02 | Stop reason: SDUPTHER

## 2020-08-13 RX ORDER — GLIPIZIDE 5 MG/1
10 TABLET, FILM COATED, EXTENDED RELEASE ORAL DAILY
Qty: 180 TABLET | Refills: 2 | Status: SHIPPED | OUTPATIENT
Start: 2020-08-13 | End: 2021-05-10

## 2020-08-18 ENCOUNTER — NURSE TRIAGE (OUTPATIENT)
Dept: CALL CENTER | Facility: HOSPITAL | Age: 70
End: 2020-08-18

## 2020-08-18 DIAGNOSIS — R52 PAIN: ICD-10-CM

## 2020-08-18 RX ORDER — HYDROCODONE BITARTRATE AND ACETAMINOPHEN 10; 325 MG/1; MG/1
TABLET ORAL
Qty: 90 TABLET | Refills: 0 | OUTPATIENT
Start: 2020-08-18

## 2020-08-18 NOTE — TELEPHONE ENCOUNTER
"Caller advised call was to notify him his Hydrocodone APAP had not been refilled. Caller states he will check with the office tomorrow about this.     Reason for Disposition  • Health Information question, no triage required and triager able to answer question    Additional Information  • Negative: [1] Caller is not with the adult (patient) AND [2] reporting urgent symptoms  • Negative: Lab result questions  • Negative: Medication questions  • Negative: Caller can't be reached by phone  • Negative: Caller has already spoken to PCP or another triager  • Negative: RN needs further essential information from caller in order to complete triage  • Negative: Requesting regular office appointment  • Negative: [1] Caller requesting NON-URGENT health information AND [2] PCP's office is the best resource    Answer Assessment - Initial Assessment Questions  1. REASON FOR CALL or QUESTION: \"What is your reason for calling today?\" or \"How can I best help you?\" or \"What question do you have that I can help answer?\"      Calling states he missed a call from PCP office.    Protocols used: INFORMATION ONLY CALL - NO TRIAGE-ADULT-      "

## 2020-08-24 ENCOUNTER — RESULTS ENCOUNTER (OUTPATIENT)
Dept: FAMILY MEDICINE CLINIC | Facility: CLINIC | Age: 70
End: 2020-08-24

## 2020-08-24 ENCOUNTER — OFFICE VISIT (OUTPATIENT)
Dept: FAMILY MEDICINE CLINIC | Facility: CLINIC | Age: 70
End: 2020-08-24

## 2020-08-24 VITALS
RESPIRATION RATE: 20 BRPM | OXYGEN SATURATION: 96 % | HEART RATE: 72 BPM | TEMPERATURE: 97.8 F | HEIGHT: 67 IN | WEIGHT: 315 LBS | SYSTOLIC BLOOD PRESSURE: 140 MMHG | BODY MASS INDEX: 49.44 KG/M2 | DIASTOLIC BLOOD PRESSURE: 72 MMHG

## 2020-08-24 DIAGNOSIS — E11.9 TYPE 2 DIABETES MELLITUS WITHOUT COMPLICATION, WITHOUT LONG-TERM CURRENT USE OF INSULIN (HCC): ICD-10-CM

## 2020-08-24 DIAGNOSIS — R52 PAIN: ICD-10-CM

## 2020-08-24 DIAGNOSIS — E66.01 MORBID (SEVERE) OBESITY DUE TO EXCESS CALORIES (HCC): Primary | ICD-10-CM

## 2020-08-24 DIAGNOSIS — Z51.81 THERAPEUTIC DRUG MONITORING: ICD-10-CM

## 2020-08-24 PROCEDURE — 99213 OFFICE O/P EST LOW 20 MIN: CPT | Performed by: FAMILY MEDICINE

## 2020-08-24 RX ORDER — HYDROCODONE BITARTRATE AND ACETAMINOPHEN 10; 325 MG/1; MG/1
1 TABLET ORAL EVERY 6 HOURS PRN
Qty: 90 TABLET | Refills: 0 | Status: SHIPPED | OUTPATIENT
Start: 2020-08-24 | End: 2020-09-28 | Stop reason: SDUPTHER

## 2020-08-24 NOTE — PROGRESS NOTES
Subjective   Ephraim Quinones is a 70 y.o. male.     70-year-old male with chronic back pain, obesity, hyperglycemia    Obesity   This is a chronic problem. The current episode started more than 1 year ago. The problem has been unchanged. Associated symptoms include arthralgias. Pertinent negatives include no chest pain. Associated symptoms comments: Secondary diabetes serum sugars related to his obesity, chronic back pain  . Nothing aggravates the symptoms. He has tried oral narcotics for the symptoms. The treatment provided moderate relief.     Vitals:    08/24/20 0833   BP: 140/72   Pulse: 72   Resp: 20   Temp: 97.8 °F (36.6 °C)   SpO2: 96%       The following portions of the patient's history were reviewed and updated as appropriate: allergies, current medications, past family history, past medical history, past social history, past surgical history and problem list.    Review of Systems   Cardiovascular: Negative for chest pain and leg swelling.   Musculoskeletal: Positive for arthralgias and back pain.       Objective   Physical Exam   Constitutional: He is oriented to person, place, and time.   Obesity   Cardiovascular: Normal rate and regular rhythm.   Pulmonary/Chest: Effort normal and breath sounds normal.   Musculoskeletal: He exhibits no edema.   Neurological: He is alert and oriented to person, place, and time. He displays normal reflexes.   Skin: Skin is warm and dry.   Psychiatric: He has a normal mood and affect. His behavior is normal. Judgment and thought content normal.   Nursing note and vitals reviewed.      Patient's Body mass index is 49.34 kg/m². BMI is above normal parameters. Recommendations include: none (medical contraindication).      Assessment/Plan   Patient Active Problem List   Diagnosis   • Type 2 diabetes mellitus without complication (CMS/HCC)   • HTN (hypertension)   • A-fib (CMS/HCC)   • Hx of adenomatous colonic polyps   • Morbidly obese (CMS/HCC)   • Phimosis   •  Sacroiliitis, not elsewhere classified (CMS/HCC)     Ephraim was seen today for hyperglycemia.    Diagnoses and all orders for this visit:    Morbid (severe) obesity due to excess calories (CMS/HCC)    Pain  -     HYDROcodone-acetaminophen (NORCO)  MG per tablet; Take 1 tablet by mouth Every 6 (Six) Hours As Needed for Moderate Pain .    Therapeutic drug monitoring  -     ToxASSURE Select 13 (MW) - Urine, Clean Catch; Future       Return in about 3 months (around 11/24/2020).       Plan above, COVID-19 safety, early flu shot, no success in weight reduction but will check diabetic control  CONTROLLED SUBSTANCE TRACKING 4/16/2018 9/24/2018 4/5/2019 7/22/2019 10/4/2019 2/7/2020 5/12/2020   Last Gregory 4/16/2018 9/24/2018 4/5/2019 7/22/2019 10/4/2019 2/7/2020 5/12/2020   Report Number [id number]   Last UDS 1/26/2018 1/26/2018 4/5/2019 4/15/2019 4/5/2019 4/5/2019 4/5/2019   Last Controlled Substance Agreement 1/30/2018 1/30/2018 4/5/2019 4/15/2019 4/5/2019 4/5/2019 4/5/2019         Electronically signed by Jim Hopkins MD 08/24/2020

## 2020-08-25 LAB — HBA1C MFR BLD: 6.5 % (ref 4.8–5.6)

## 2020-09-01 LAB — DRUGS UR: NORMAL

## 2020-09-11 ENCOUNTER — FLU SHOT (OUTPATIENT)
Dept: FAMILY MEDICINE CLINIC | Facility: CLINIC | Age: 70
End: 2020-09-11

## 2020-09-11 DIAGNOSIS — Z23 NEED FOR INFLUENZA VACCINATION: ICD-10-CM

## 2020-09-11 PROCEDURE — 90694 VACC AIIV4 NO PRSRV 0.5ML IM: CPT | Performed by: NURSE PRACTITIONER

## 2020-09-11 PROCEDURE — G0008 ADMIN INFLUENZA VIRUS VAC: HCPCS | Performed by: NURSE PRACTITIONER

## 2020-09-28 DIAGNOSIS — R52 PAIN: ICD-10-CM

## 2020-09-28 RX ORDER — HYDROCODONE BITARTRATE AND ACETAMINOPHEN 10; 325 MG/1; MG/1
1 TABLET ORAL EVERY 6 HOURS PRN
Qty: 90 TABLET | Refills: 0 | Status: SHIPPED | OUTPATIENT
Start: 2020-09-28 | End: 2020-10-29 | Stop reason: SDUPTHER

## 2020-09-28 RX ORDER — COLCHICINE 0.6 MG/1
TABLET, FILM COATED ORAL
Qty: 30 TABLET | Refills: 0 | Status: SHIPPED | OUTPATIENT
Start: 2020-09-28 | End: 2021-06-01 | Stop reason: SDUPTHER

## 2020-10-29 DIAGNOSIS — R52 PAIN: ICD-10-CM

## 2020-10-29 RX ORDER — HYDROCODONE BITARTRATE AND ACETAMINOPHEN 10; 325 MG/1; MG/1
1 TABLET ORAL EVERY 6 HOURS PRN
Qty: 90 TABLET | Refills: 0 | Status: SHIPPED | OUTPATIENT
Start: 2020-10-29 | End: 2020-11-25 | Stop reason: SDUPTHER

## 2020-11-25 ENCOUNTER — OFFICE VISIT (OUTPATIENT)
Dept: FAMILY MEDICINE CLINIC | Facility: CLINIC | Age: 70
End: 2020-11-25

## 2020-11-25 VITALS
HEIGHT: 67 IN | SYSTOLIC BLOOD PRESSURE: 136 MMHG | RESPIRATION RATE: 20 BRPM | OXYGEN SATURATION: 96 % | HEART RATE: 76 BPM | BODY MASS INDEX: 49.44 KG/M2 | WEIGHT: 315 LBS | DIASTOLIC BLOOD PRESSURE: 80 MMHG | TEMPERATURE: 97.8 F

## 2020-11-25 DIAGNOSIS — Z23 NEED FOR INFLUENZA VACCINATION: Primary | ICD-10-CM

## 2020-11-25 DIAGNOSIS — E78.2 MIXED HYPERLIPIDEMIA: ICD-10-CM

## 2020-11-25 DIAGNOSIS — E11.9 TYPE 2 DIABETES MELLITUS WITHOUT COMPLICATION, WITHOUT LONG-TERM CURRENT USE OF INSULIN (HCC): ICD-10-CM

## 2020-11-25 DIAGNOSIS — R52 PAIN: ICD-10-CM

## 2020-11-25 PROCEDURE — 99214 OFFICE O/P EST MOD 30 MIN: CPT | Performed by: FAMILY MEDICINE

## 2020-11-25 RX ORDER — HYDROCODONE BITARTRATE AND ACETAMINOPHEN 10; 325 MG/1; MG/1
1 TABLET ORAL EVERY 6 HOURS PRN
Qty: 90 TABLET | Refills: 0 | Status: SHIPPED | OUTPATIENT
Start: 2020-11-25 | End: 2020-12-29 | Stop reason: SDUPTHER

## 2020-11-25 NOTE — PROGRESS NOTES
Subjective   Ephraim Quinones is a 70 y.o. male.     70-year-old male with morbid obesity hyperlipidemia diabetes    Hyperlipidemia  This is a chronic problem. The current episode started more than 1 year ago. The problem is resistant. Recent lipid tests were reviewed and are variable. Exacerbating diseases include obesity. There are no known factors aggravating his hyperlipidemia. Pertinent negatives include no chest pain. Current antihyperlipidemic treatment includes diet change (Patient stopped Zocor 20 because of leg cramps). Compliance problems include adherence to diet and adherence to exercise.  Risk factors for coronary artery disease include dyslipidemia, male sex, hypertension, a sedentary lifestyle, obesity, family history and diabetes mellitus.       The following portions of the patient's history were reviewed and updated as appropriate: allergies, current medications, past family history, past medical history, past social history, past surgical history and problem list.    Review of Systems   Cardiovascular: Positive for palpitations. Negative for chest pain and leg swelling.        History of A. fib   Musculoskeletal: Positive for arthralgias and back pain.        Leg cramps-he thinks because of Zocor--continues to have chronic back pain from DJD       Objective   Physical Exam  Vitals signs and nursing note reviewed.   Constitutional:       Appearance: He is obese.   Eyes:      Extraocular Movements: Extraocular movements intact.      Pupils: Pupils are equal, round, and reactive to light.   Cardiovascular:      Rate and Rhythm: Normal rate and regular rhythm.      Pulses: Normal pulses.      Heart sounds: Normal heart sounds.   Pulmonary:      Effort: Pulmonary effort is normal.      Breath sounds: Normal breath sounds.   Musculoskeletal:      Right lower leg: No edema.      Left lower leg: No edema.   Neurological:      General: No focal deficit present.      Mental Status: He is alert and  oriented to person, place, and time.   Psychiatric:         Mood and Affect: Mood normal.         Behavior: Behavior normal.         Thought Content: Thought content normal.         Judgment: Judgment normal.       CONTROLLED SUBSTANCE TRACKING 4/5/2019 7/22/2019 10/4/2019 2/7/2020 5/12/2020 8/24/2020 11/25/2020   Last Gregory 4/5/2019 7/22/2019 10/4/2019 2/7/2020 5/12/2020 8/24/2020 11/25/2020   Report Number [id number] Dr Hopkins reviewed through [employer]   Last UDS 4/5/2019 4/15/2019 4/5/2019 4/5/2019 4/5/2019 8/24/2020 8/24/2020   Last Controlled Substance Agreement 4/5/2019 4/15/2019 4/5/2019 4/5/2019 4/5/2019 8/24/2020 11/25/2020       Assessment/Plan   Diagnoses and all orders for this visit:    1. Need for influenza vaccination (Primary)    2. Type 2 diabetes mellitus without complication, without long-term current use of insulin (CMS/[facility])  -     Hemoglobin A1c    3. Mixed hyperlipidemia  -     Comprehensive Metabolic Panel  -     Lipid Panel    4. Pain  -     HYDROcodone-acetaminophen (NORCO)  MG per tablet; Take 1 tablet by mouth Every 6 (Six) Hours As Needed for Moderate Pain .  Dispense: 90 tablet; Refill: 0       Plan-above-COVID-19 safety

## 2020-11-26 LAB
ALBUMIN SERPL-MCNC: 4.2 G/DL (ref 3.5–5.2)
ALBUMIN/GLOB SERPL: 2.2 G/DL
ALP SERPL-CCNC: 67 U/L (ref 39–117)
ALT SERPL-CCNC: 48 U/L (ref 1–41)
AST SERPL-CCNC: 33 U/L (ref 1–40)
BILIRUB SERPL-MCNC: 0.4 MG/DL (ref 0–1.2)
BUN SERPL-MCNC: 21 MG/DL (ref 8–23)
BUN/CREAT SERPL: 28 (ref 7–25)
CALCIUM SERPL-MCNC: 9.4 MG/DL (ref 8.6–10.5)
CHLORIDE SERPL-SCNC: 101 MMOL/L (ref 98–107)
CHOLEST SERPL-MCNC: 181 MG/DL (ref 0–200)
CO2 SERPL-SCNC: 26.6 MMOL/L (ref 22–29)
CREAT SERPL-MCNC: 0.75 MG/DL (ref 0.76–1.27)
GLOBULIN SER CALC-MCNC: 1.9 GM/DL
GLUCOSE SERPL-MCNC: 167 MG/DL (ref 65–99)
HBA1C MFR BLD: 6.2 % (ref 4.8–5.6)
HDLC SERPL-MCNC: 60 MG/DL (ref 40–60)
LDLC SERPL CALC-MCNC: 97 MG/DL (ref 0–100)
POTASSIUM SERPL-SCNC: 4.4 MMOL/L (ref 3.5–5.2)
PROT SERPL-MCNC: 6.1 G/DL (ref 6–8.5)
SODIUM SERPL-SCNC: 139 MMOL/L (ref 136–145)
TRIGL SERPL-MCNC: 136 MG/DL (ref 0–150)
VLDLC SERPL CALC-MCNC: 24 MG/DL (ref 5–40)

## 2020-12-29 DIAGNOSIS — R52 PAIN: ICD-10-CM

## 2020-12-29 RX ORDER — HYDROCODONE BITARTRATE AND ACETAMINOPHEN 10; 325 MG/1; MG/1
1 TABLET ORAL EVERY 6 HOURS PRN
Qty: 90 TABLET | Refills: 0 | Status: SHIPPED | OUTPATIENT
Start: 2020-12-29 | End: 2021-02-02 | Stop reason: SDUPTHER

## 2020-12-29 NOTE — TELEPHONE ENCOUNTER
Caller: Ephraim Quinones    Relationship: Self    Best call back number: [phone]    Medication needed:   Requested Prescriptions     Pending Prescriptions Disp Refills   • HYDROcodone-acetaminophen (NORCO)  MG per tablet 90 tablet 0     Sig: Take 1 tablet by mouth Every 6 (Six) Hours As Needed for Moderate Pain .       What is the patient's preferred pharmacy: [facility] [address] [phone] [facility] [phone]

## 2021-01-11 ENCOUNTER — TELEPHONE (OUTPATIENT)
Dept: FAMILY MEDICINE CLINIC | Facility: CLINIC | Age: 71
End: 2021-01-11

## 2021-01-22 RX ORDER — CLOBETASOL PROPIONATE 0.5 MG/G
CREAM TOPICAL AS NEEDED
Qty: 30 G | Refills: 2 | Status: SHIPPED | OUTPATIENT
Start: 2021-01-22 | End: 2022-07-07

## 2021-02-02 DIAGNOSIS — R52 PAIN: ICD-10-CM

## 2021-02-02 RX ORDER — HYDROCODONE BITARTRATE AND ACETAMINOPHEN 10; 325 MG/1; MG/1
1 TABLET ORAL EVERY 6 HOURS PRN
Qty: 90 TABLET | Refills: 0 | Status: SHIPPED | OUTPATIENT
Start: 2021-02-02 | End: 2021-03-26 | Stop reason: SDUPTHER

## 2021-02-02 RX ORDER — INDAPAMIDE 2.5 MG/1
2.5 TABLET, FILM COATED ORAL EVERY MORNING
Qty: 90 TABLET | Refills: 1 | Status: SHIPPED | OUTPATIENT
Start: 2021-02-02 | End: 2021-08-09

## 2021-02-02 NOTE — TELEPHONE ENCOUNTER
Caller: Ephraim Quinones    Relationship: Self    Best call back number:[phone]    Medication needed:   Requested Prescriptions     Pending Prescriptions Disp Refills   • HYDROcodone-acetaminophen (NORCO)  MG per tablet 90 tablet 0     Sig: Take 1 tablet by mouth Every 6 (Six) Hours As Needed for Moderate Pain .   • indapamide (LOZOL) 2.5 MG tablet 90 tablet 2     Sig: Take 1 tablet by mouth Every Morning.       What is the patient's preferred pharmacy: [facility] [address] [phone] [facility] [phone]

## 2021-03-05 RX ORDER — AMLODIPINE BESYLATE 5 MG/1
TABLET ORAL
Qty: 180 TABLET | Refills: 0 | Status: SHIPPED | OUTPATIENT
Start: 2021-03-05 | End: 2021-06-04

## 2021-03-25 DIAGNOSIS — R52 PAIN: ICD-10-CM

## 2021-03-25 RX ORDER — HYDROCODONE BITARTRATE AND ACETAMINOPHEN 10; 325 MG/1; MG/1
1 TABLET ORAL EVERY 6 HOURS PRN
Qty: 90 TABLET | Refills: 0 | Status: CANCELLED | OUTPATIENT
Start: 2021-03-25

## 2021-03-25 NOTE — TELEPHONE ENCOUNTER
Drug thereapy appt 11/25/2020  contract done 11/25/2020  UDS done 08/24/2020      Patient needs appt.  Appt scheduled.

## 2021-03-25 NOTE — TELEPHONE ENCOUNTER
Caller: Ephraim Quinones    Relationship: Self    Best call back number: [phone]     Medication needed:   Requested Prescriptions     Pending Prescriptions Disp Refills   • HYDROcodone-acetaminophen (NORCO)  MG per tablet 90 tablet 0     Sig: Take 1 tablet by mouth Every 6 (Six) Hours As Needed for Moderate Pain .       Does the patient have less than a 3 day supply:  [] Yes  [x] No    What is the patient's preferred pharmacy: [facility] [address] [phone] [facility] [phone]

## 2021-03-26 ENCOUNTER — OFFICE VISIT (OUTPATIENT)
Dept: FAMILY MEDICINE CLINIC | Facility: CLINIC | Age: 71
End: 2021-03-26

## 2021-03-26 VITALS
HEART RATE: 61 BPM | OXYGEN SATURATION: 98 % | HEIGHT: 67 IN | DIASTOLIC BLOOD PRESSURE: 73 MMHG | WEIGHT: 314.6 LBS | BODY MASS INDEX: 49.38 KG/M2 | SYSTOLIC BLOOD PRESSURE: 137 MMHG | TEMPERATURE: 97.7 F

## 2021-03-26 DIAGNOSIS — R52 PAIN: ICD-10-CM

## 2021-03-26 DIAGNOSIS — R05.9 COUGH: Primary | ICD-10-CM

## 2021-03-26 DIAGNOSIS — M46.1 SACROILIITIS, NOT ELSEWHERE CLASSIFIED (HCC): ICD-10-CM

## 2021-03-26 PROCEDURE — 99214 OFFICE O/P EST MOD 30 MIN: CPT | Performed by: NURSE PRACTITIONER

## 2021-03-26 RX ORDER — HYDROCODONE BITARTRATE AND ACETAMINOPHEN 10; 325 MG/1; MG/1
1 TABLET ORAL EVERY 6 HOURS PRN
Qty: 90 TABLET | Refills: 0 | Status: SHIPPED | OUTPATIENT
Start: 2021-03-26 | End: 2021-04-26 | Stop reason: SDUPTHER

## 2021-03-26 RX ORDER — AZITHROMYCIN 250 MG/1
TABLET, FILM COATED ORAL
Qty: 6 TABLET | Refills: 0 | Status: SHIPPED | OUTPATIENT
Start: 2021-03-26 | End: 2021-06-03

## 2021-03-26 NOTE — PROGRESS NOTES
CC: med refill    History:  Ephraim Quinones is a 71 y.o. male who presents today for evaluation of the above problems.      Here for refill of pain medication that he takes for neck and back pain. Pain is worse with activity or lying flat. Does better with sitting. It remains stable with no change in symptoms.     Started having cold symptoms yesterday.  - productive cough and congestion. Took mucinex and this did help some.   Had second covid vaccine yesterday morning.     CONTROLLED SUBSTANCE TRACKING 7/22/2019 10/4/2019 2/7/2020 5/12/2020 8/24/2020 11/25/2020 3/26/2021   Last Gregory 7/22/2019 10/4/2019 2/7/2020 5/12/2020 8/24/2020 11/25/2020 3/26/2021   Report Number [id number] Dr Hopkins reviewed through [employer] reviewed by Dolores LANDERS through [employer]   Last UDS 4/15/2019 4/5/2019 4/5/2019 4/5/2019 8/24/2020 8/24/2020 8/24/2020   Last Controlled Substance Agreement 4/15/2019 4/5/2019 4/5/2019 4/5/2019 8/24/2020 11/25/2020 11/25/2020           HPI  ROS:  Review of Systems   Constitutional: Negative for fever.   HENT: Positive for congestion and sore throat (scratchy/burning).    Respiratory: Positive for cough. Negative for shortness of breath and wheezing.    Gastrointestinal: Negative for diarrhea and nausea.       Allergies   Allergen Reactions   • Simvastatin Other (See Comments)     Leg Cramps   • Percocet [Oxycodone-Acetaminophen] Itching     Past Medical History:   Diagnosis Date   • A-fib (CMS/HCC)    • Acid reflux    • Anemia 5/3/2016   • Diabetes mellitus (CMS/[facility])    • Gout    • Hyperlipidemia    • Hypertension    • Sleep apnea with use of continuous positive airway pressure (CPAP)      Past Surgical History:   Procedure Laterality Date   • BACK SURGERY      x2   • CIRCUMCISION N/A 6/6/2019    Procedure: CIRCUMCISION;  Surgeon: Yon Sloan MD;  Location: [facility] OR;  Service: Urology   • FINGER SURGERY     • REPLACEMENT TOTAL KNEE Bilateral      Family  History   Problem Relation Age of Onset   • Colon cancer Mother    • Prostate cancer Father    • No Known Problems Maternal Grandmother    • No Known Problems Maternal Grandfather    • No Known Problems Paternal Grandmother    • No Known Problems Paternal Grandfather       reports that he quit smoking about 24 years ago. He has a 20.00 pack-year smoking history. He has never used smokeless tobacco. He reports current alcohol use. He reports that he does not use drugs.      Current Outpatient Medications:   •  albuterol sulfate  (90 Base) MCG/ACT inhaler, 2 sprays 2 minutes apart every 6 hours as needed, Disp: 1 inhaler, Rfl: 3  •  amLODIPine (NORVASC) 5 MG tablet, TAKE 1 TABLET BY MOUTH TWICE DAILY, Disp: 180 tablet, Rfl: 0  •  aspirin 81 MG EC tablet, Take 81 mg by mouth Daily., Disp: , Rfl:   •  carbidopa-levodopa (SINEMET)  MG per tablet, TAKE 1 TABLET TWO TIMES A DAY, Disp: 180 tablet, Rfl: 0  •  Cholecalciferol (VITAMIN D3) 2000 units capsule, Take 2,000 Units by mouth Daily., Disp: , Rfl:   •  clobetasol (TEMOVATE) 0.05 % cream, APPLY TOPICALLY TO THE APPROPRIATE AREA AS DIRECTED AS NEEDED (FOR ITCHING)., Disp: 30 g, Rfl: 2  •  ELIQUIS 5 MG tablet tablet, Take 5 mg by mouth Every 12 (Twelve) Hours., Disp: , Rfl:   •  glipizide (GLUCOTROL XL) 5 MG ER tablet, TAKE 2 TABLETS BY MOUTH DAILY., Disp: 180 tablet, Rfl: 2  •  glucose blood test strip, 1 each by Other route Daily. Use as instructed, Disp: 100 each, Rfl: 3  •  indapamide (LOZOL) 2.5 MG tablet, Take 1 tablet by mouth Every Morning., Disp: 90 tablet, Rfl: 1  •  Lancet Devices (EASY TOUCH LANCING DEVICE) misc, USE TO TEST DAILY, Disp: , Rfl: 0  •  losartan (COZAAR) 100 MG tablet, TAKE 1 TABLET BY MOUTH DAILY, Disp: 90 tablet, Rfl: 3  •  metFORMIN (GLUCOPHAGE) 1000 MG tablet, Take 1 tablet by mouth 2 (Two) Times a Day With Meals., Disp: 180 tablet, Rfl: 3  •  ONETOUCH DELICA LANCETS 33G misc, 1 each Daily., Disp: 100 each, Rfl: 3  •   "azithromycin (Zithromax) 250 MG tablet, Take 2 tablets the first day, then 1 tablet daily for 4 days., Disp: 6 tablet, Rfl: 0  •  Colcrys 0.6 MG tablet, TAKE 1 TABLET BY MOUTH AS NEEDED (FOR GOUT)., Disp: 30 tablet, Rfl: 0  •  HYDROcodone-acetaminophen (NORCO)  MG per tablet, Take 1 tablet by mouth Every 6 (Six) Hours As Needed for Moderate Pain ., Disp: 90 tablet, Rfl: 0    OBJECTIVE:  /73 (BP Location: Left arm, Patient Position: Sitting, Cuff Size: Large Adult)   Pulse 61   Temp 97.7 °F (36.5 °C) (Temporal)   Ht 170.2 cm (67\")   Wt (!) 143 kg (314 lb 9.6 oz)   SpO2 98%   BMI 49.27 kg/m²    Physical Exam  Vitals reviewed.   Constitutional:       Appearance: He is well-developed.   HENT:      Right Ear: Tympanic membrane, ear canal and external ear normal.      Left Ear: Tympanic membrane, ear canal and external ear normal.   Cardiovascular:      Rate and Rhythm: Normal rate and regular rhythm.   Pulmonary:      Effort: Pulmonary effort is normal.      Breath sounds: Normal breath sounds.   Neurological:      Mental Status: He is alert and oriented to person, place, and time.   Psychiatric:         Behavior: Behavior normal.         Assessment/Plan    Diagnoses and all orders for this visit:    1. Cough (Primary)  -     azithromycin (Zithromax) 250 MG tablet; Take 2 tablets the first day, then 1 tablet daily for 4 days.  Dispense: 6 tablet; Refill: 0  -     COVID-19,[facility] ROUTINE, NP/OP SWAB IN TRANSPORT MEDIA OR ESWAB 72 HR TAT - Swab, Oropharynx    2. Sacroiliitis, not elsewhere classified (CMS/HCC)    Will swab for COVID, though I do not feel this is likely. Advised mucinex, zyrtec, and flonase in addition to azithromycin.     Dr. Hopkins will refill [place]. Gregory reviewed and appropriate. Contract and UDS are up to date.     An After Visit Summary was printed and given to the patient at discharge.  Return in about 3 months (around 6/26/2021) for Next scheduled follow up.       Dolores Ba, " APRN 3/26/21    Electronically signed.

## 2021-03-27 LAB
LABCORP SARS-COV-2, NAA 2 DAY TAT: NORMAL
SARS-COV-2 RNA RESP QL NAA+PROBE: NOT DETECTED

## 2021-04-19 RX ORDER — LOSARTAN POTASSIUM 100 MG/1
TABLET ORAL
Qty: 90 TABLET | Refills: 0 | Status: SHIPPED | OUTPATIENT
Start: 2021-04-19 | End: 2021-10-18 | Stop reason: SDUPTHER

## 2021-04-26 DIAGNOSIS — R52 PAIN: ICD-10-CM

## 2021-04-26 RX ORDER — HYDROCODONE BITARTRATE AND ACETAMINOPHEN 10; 325 MG/1; MG/1
1 TABLET ORAL EVERY 6 HOURS PRN
Qty: 90 TABLET | Refills: 0 | Status: SHIPPED | OUTPATIENT
Start: 2021-04-26 | End: 2021-06-01 | Stop reason: SDUPTHER

## 2021-05-10 RX ORDER — GLIPIZIDE 5 MG/1
10 TABLET, FILM COATED, EXTENDED RELEASE ORAL DAILY
Qty: 180 TABLET | Refills: 0 | Status: SHIPPED | OUTPATIENT
Start: 2021-05-10 | End: 2021-08-11

## 2021-06-01 DIAGNOSIS — R52 PAIN: ICD-10-CM

## 2021-06-01 RX ORDER — HYDROCODONE BITARTRATE AND ACETAMINOPHEN 10; 325 MG/1; MG/1
1 TABLET ORAL EVERY 6 HOURS PRN
Qty: 90 TABLET | Refills: 0 | Status: SHIPPED | OUTPATIENT
Start: 2021-06-01 | End: 2021-06-03 | Stop reason: SDUPTHER

## 2021-06-01 RX ORDER — COLCHICINE 0.6 MG/1
TABLET ORAL
Qty: 30 TABLET | Refills: 0 | Status: SHIPPED | OUTPATIENT
Start: 2021-06-01 | End: 2022-06-14 | Stop reason: SDUPTHER

## 2021-06-03 ENCOUNTER — OFFICE VISIT (OUTPATIENT)
Dept: FAMILY MEDICINE CLINIC | Facility: CLINIC | Age: 71
End: 2021-06-03

## 2021-06-03 VITALS
HEIGHT: 67 IN | OXYGEN SATURATION: 96 % | RESPIRATION RATE: 18 BRPM | TEMPERATURE: 98 F | DIASTOLIC BLOOD PRESSURE: 82 MMHG | WEIGHT: 308.6 LBS | BODY MASS INDEX: 48.44 KG/M2 | SYSTOLIC BLOOD PRESSURE: 130 MMHG | HEART RATE: 89 BPM

## 2021-06-03 DIAGNOSIS — I48.20 CHRONIC ATRIAL FIBRILLATION (HCC): ICD-10-CM

## 2021-06-03 DIAGNOSIS — E11.65 TYPE 2 DIABETES MELLITUS WITH HYPERGLYCEMIA, WITHOUT LONG-TERM CURRENT USE OF INSULIN (HCC): ICD-10-CM

## 2021-06-03 DIAGNOSIS — E78.2 MIXED HYPERLIPIDEMIA: ICD-10-CM

## 2021-06-03 DIAGNOSIS — R53.83 FATIGUE, UNSPECIFIED TYPE: ICD-10-CM

## 2021-06-03 DIAGNOSIS — E11.9 TYPE 2 DIABETES MELLITUS WITHOUT COMPLICATION, WITHOUT LONG-TERM CURRENT USE OF INSULIN (HCC): Primary | ICD-10-CM

## 2021-06-03 DIAGNOSIS — E66.01 MORBID (SEVERE) OBESITY DUE TO EXCESS CALORIES (HCC): ICD-10-CM

## 2021-06-03 DIAGNOSIS — Z00.00 ANNUAL PHYSICAL EXAM: ICD-10-CM

## 2021-06-03 DIAGNOSIS — E79.0 HYPERURICEMIA: ICD-10-CM

## 2021-06-03 DIAGNOSIS — Z12.5 SPECIAL SCREENING FOR MALIGNANT NEOPLASM OF PROSTATE: ICD-10-CM

## 2021-06-03 DIAGNOSIS — R52 PAIN: ICD-10-CM

## 2021-06-03 LAB
BILIRUB BLD-MCNC: ABNORMAL MG/DL
CLARITY, POC: CLEAR
COLOR UR: ABNORMAL
GLUCOSE UR STRIP-MCNC: NEGATIVE MG/DL
KETONES UR QL: ABNORMAL
LEUKOCYTE EST, POC: NEGATIVE
NITRITE UR-MCNC: NEGATIVE MG/ML
PH UR: 5 [PH] (ref 5–8)
PROT UR STRIP-MCNC: ABNORMAL MG/DL
RBC # UR STRIP: ABNORMAL /UL
SP GR UR: 1.03 (ref 1–1.03)
UROBILINOGEN UR QL: NORMAL

## 2021-06-03 PROCEDURE — 81003 URINALYSIS AUTO W/O SCOPE: CPT | Performed by: FAMILY MEDICINE

## 2021-06-03 PROCEDURE — 96160 PT-FOCUSED HLTH RISK ASSMT: CPT | Performed by: FAMILY MEDICINE

## 2021-06-03 PROCEDURE — 1170F FXNL STATUS ASSESSED: CPT | Performed by: FAMILY MEDICINE

## 2021-06-03 PROCEDURE — 1159F MED LIST DOCD IN RCRD: CPT | Performed by: FAMILY MEDICINE

## 2021-06-03 PROCEDURE — G0439 PPPS, SUBSEQ VISIT: HCPCS | Performed by: FAMILY MEDICINE

## 2021-06-03 RX ORDER — HYDROCODONE BITARTRATE AND ACETAMINOPHEN 10; 325 MG/1; MG/1
1 TABLET ORAL EVERY 6 HOURS PRN
Qty: 90 TABLET | Refills: 0 | Status: SHIPPED | OUTPATIENT
Start: 2021-06-03 | End: 2021-07-29 | Stop reason: SDUPTHER

## 2021-06-03 RX ORDER — NAPROXEN SODIUM 220 MG
220 TABLET ORAL 2 TIMES DAILY PRN
COMMUNITY

## 2021-06-03 RX ORDER — ALBUTEROL SULFATE 90 UG/1
AEROSOL, METERED RESPIRATORY (INHALATION)
Qty: 18 G | Refills: 5 | Status: SHIPPED | OUTPATIENT
Start: 2021-06-03 | End: 2021-12-01 | Stop reason: SDUPTHER

## 2021-06-03 RX ORDER — HYDROCODONE BITARTRATE AND ACETAMINOPHEN 10; 325 MG/1; MG/1
1 TABLET ORAL EVERY 6 HOURS PRN
Qty: 90 TABLET | Refills: 0 | Status: CANCELLED | OUTPATIENT
Start: 2021-06-03

## 2021-06-03 RX ORDER — ALBUTEROL SULFATE 90 UG/1
AEROSOL, METERED RESPIRATORY (INHALATION)
Qty: 18 G | Refills: 5 | Status: CANCELLED | OUTPATIENT
Start: 2021-06-03

## 2021-06-03 NOTE — PATIENT INSTRUCTIONS
Fall Prevention in the Home, Adult  Falls can cause injuries and can affect people from all age groups. There are many simple things that you can do to make your home safe and to help prevent falls. Ask for help when making these changes, if needed.  What actions can I take to prevent falls?  General instructions  · Use good lighting in all rooms. Replace any light bulbs that burn out.  · Turn on lights if it is dark. Use night-lights.  · Place frequently used items in easy-to-reach places. Lower the shelves around your home if necessary.  · Set up furniture so that there are clear paths around it. Avoid moving your furniture around.  · Remove throw rugs and other tripping hazards from the floor.  · Avoid walking on wet floors.  · Fix any uneven floor surfaces.  · Add color or contrast paint or tape to grab bars and handrails in your home. Place contrasting color strips on the first and last steps of stairways.  · When you use a stepladder, make sure that it is completely opened and that the sides are firmly locked. Have someone hold the ladder while you are using it. Do not climb a closed stepladder.  · Be aware of any and all pets.  What can I do in the bathroom?         · Keep the floor dry. Immediately clean up any water that spills onto the floor.  · Remove soap buildup in the tub or shower on a regular basis.  · Use non-skid mats or decals on the floor of the tub or shower.  · Attach bath mats securely with double-sided, non-slip rug tape.  · If you need to sit down while you are in the shower, use a plastic, non-slip stool.  · Install grab bars by the toilet and in the tub and shower. Do not use towel bars as grab bars.  What can I do in the bedroom?  · Make sure that a bedside light is easy to reach.  · Do not use oversized bedding that drapes onto the floor.  · Have a firm chair that has side arms to use for getting dressed.  What can I do in the kitchen?  · Clean up any spills right away.  · If you  need to reach for something above you, use a sturdy step stool that has a grab bar.  · Keep electrical cables out of the way.  · Do not use floor polish or wax that makes floors slippery. If you must use wax, make sure that it is non-skid floor wax.  What can I do in the stairways?  · Do not leave any items on the stairs.  · Make sure that you have a light switch at the top of the stairs and the bottom of the stairs. Have them installed if you do not have them.  · Make sure that there are handrails on both sides of the stairs. Fix handrails that are broken or loose. Make sure that handrails are as long as the stairways.  · Install non-slip stair treads on all stairs in your home.  · Avoid having throw rugs at the top or bottom of stairways, or secure the rugs with carpet tape to prevent them from moving.  · Choose a carpet design that does not hide the edge of steps on the stairway.  · Check any carpeting to make sure that it is firmly attached to the stairs. Fix any carpet that is loose or worn.  What can I do on the outside of my home?  · Use bright outdoor lighting.  · Regularly repair the edges of walkways and driveways and fix any cracks.  · Remove high doorway thresholds.  · Trim any shrubbery on the main path into your home.  · Regularly check that handrails are securely fastened and in good repair. Both sides of any steps should have handrails.  · Install guardrails along the edges of any raised decks or porches.  · Clear walkways of debris and clutter, including tools and rocks.  · Have leaves, snow, and ice cleared regularly.  · Use sand or salt on walkways during winter months.  · In the garage, clean up any spills right away, including grease or oil spills.  What other actions can I take?  · Wear closed-toe shoes that fit well and support your feet. Wear shoes that have rubber soles or low heels.  · Use mobility aids as needed, such as canes, walkers, scooters, and crutches.  · Review your medicines with  your health care provider. Some medicines can cause dizziness or changes in blood pressure, which increase your risk of falling.  Talk with your health care provider about other ways that you can decrease your risk of falls. This may include working with a physical therapist or  to improve your strength, balance, and endurance.  Where to find more information  · Centers for Disease Control and Prevention, [employer]: https://www.cdc.gov  · National [place] on Aging: https://ak5shjb.connie.nih.gov  Contact a health care provider if:  · You are afraid of falling at home.  · You feel weak, drowsy, or dizzy at home.  · You fall at home.  Summary  · There are many simple things that you can do to make your home safe and to help prevent falls.  · Ways to make your home safe include removing tripping hazards and installing grab bars in the bathroom.  · Ask for help when making these changes in your home.  This information is not intended to replace advice given to you by your health care provider. Make sure you discuss any questions you have with your health care provider.  Document Revised: 2018 Document Reviewed: 2018  [employer] Patient Education ©  [employer]    Medicare Wellness  Personal Prevention Plan of Service     Date of Office Visit:  2021  Encounter Provider:  Jim Hopkins MD  Place of Service:  [facility]  Patient Name: Ephraim Quinones  :  1950    As part of the Medicare Wellness portion of your visit today, we are providing you with this personalized preventive plan of services (PPPS). This plan is based upon recommendations of the [place] Preventive Services Task Force (USPSTF) and the Advisory Committee on Immunization Practices (ACIP).    This lists the preventive care services that should be considered, and provides dates of when you are due. Items listed as completed are up-to-date and do not require any further  intervention.    Health Maintenance   Topic Date Due   • HEMOGLOBIN A1C  02/25/2021   • URINE MICROALBUMIN  05/28/2021   • ZOSTER VACCINE (3 of 3) 06/03/2021 (Originally 1/2/2020)   • Pneumococcal Vaccine 65+ (2 of 2 - PPSV23) 06/03/2022 (Originally 8/20/2019)   • INFLUENZA VACCINE  08/01/2021   • LIPID PANEL  11/25/2021   • DIABETIC EYE EXAM  02/02/2022   • COLORECTAL CANCER SCREENING  05/28/2022   • ANNUAL WELLNESS VISIT  06/03/2022   • DIABETIC FOOT EXAM  06/03/2022   • HEPATITIS C SCREENING  Completed   • COVID-19 Vaccine  Completed   • AAA SCREEN (ONE-TIME)  Completed   • TDAP/TD VACCINES  Discontinued       No orders of the defined types were placed in this encounter.      Return in 3 months (on 9/3/2021).

## 2021-06-03 NOTE — PROGRESS NOTES
The ABCs of the Annual Wellness Visit  Subsequent Medicare Wellness Visit    Chief Complaint   Patient presents with   • Medicare Wellness-subsequent     Fasting.  Drug therapy monitoring       Subjective   History of Present Illness:  Ephraim Quinones is a 71 y.o. male who presents for a Subsequent Medicare Wellness Visit.    HEALTH RISK ASSESSMENT    Recent Hospitalizations:  No hospitalization(s) within the last year.    Current Medical Providers:  Patient Care Team:  Jim Hopkins MD as PCP - Noah Linder MD as Consulting Physician (Orthopedic Surgery)    Smoking Status:  Social History     Tobacco Use   Smoking Status Former Smoker   • Packs/day: 1.00   • Years: 20.00   • Pack years: 20.00   • Quit date: 1996   • Years since quittin.0   Smokeless Tobacco Never Used       Alcohol Consumption:  Social History     Substance and Sexual Activity   Alcohol Use Yes    Comment: occ       Depression Screen:   PHQ-2/PHQ-9 Depression Screening 6/3/2021   Little interest or pleasure in doing things 0   Feeling down, depressed, or hopeless 0   Trouble falling or staying asleep, or sleeping too much -   Feeling tired or having little energy -   Poor appetite or overeating -   Feeling bad about yourself - or that you are a failure or have let yourself or your family down -   Trouble concentrating on things, such as reading the newspaper or watching television -   Moving or speaking so slowly that other people could have noticed. Or the opposite - being so fidgety or restless that you have been moving around a lot more than usual -   Thoughts that you would be better off dead, or of hurting yourself in some way -   Total Score 0   If you checked off any problems, how difficult have these problems made it for you to do your work, take care of things at home, or get along with other people? -       Fall Risk Screen:  STEADI Fall Risk Assessment was completed, and patient is at LOW risk for  falls.Assessment completed on:6/3/2021    Health Habits and Functional and Cognitive Screening:  Functional & Cognitive Status 6/3/2021   Do you have difficulty preparing food and eating? No   Do you have difficulty bathing yourself, getting dressed or grooming yourself? No   Do you have difficulty using the toilet? No   Do you have difficulty moving around from place to place? No   Do you have trouble with steps or getting out of a bed or a chair? Yes   Current Diet Well Balanced Diet   Dental Exam Up to date   Eye Exam Up to date   Exercise (times per week) 2 times per week   Current Exercise Activities Include Walking   Do you need help using the phone?  No   Are you deaf or do you have serious difficulty hearing?  Yes   Do you need help with transportation? Yes   Do you need help shopping? No   Do you need help preparing meals?  No   Do you need help with housework?  No   Do you need help with laundry? No   Do you need help taking your medications? No   Do you need help managing money? No   Do you ever drive or ride in a car without wearing a seat belt? No   Have you felt unusual stress, anger or loneliness in the last month? No   Who do you live with? Spouse   If you need help, do you have trouble finding someone available to you? No   Have you been bothered in the last four weeks by sexual problems? No   Do you have difficulty concentrating, remembering or making decisions? No         Does the patient have evidence of cognitive impairment? Yes    Asprin use counseling:Taking ASA appropriately as indicated    Age-appropriate Screening Schedule:  Refer to the list below for future screening recommendations based on patient's age, sex and/or medical conditions. Orders for these recommended tests are listed in the plan section. The patient has been provided with a written plan.    Health Maintenance   Topic Date Due   • HEMOGLOBIN A1C  02/25/2021   • URINE MICROALBUMIN  05/28/2021   • ZOSTER VACCINE (3 of 3)  06/03/2021 (Originally 1/2/2020)   • INFLUENZA VACCINE  08/01/2021   • LIPID PANEL  11/25/2021   • DIABETIC EYE EXAM  02/02/2022   • DIABETIC FOOT EXAM  06/03/2022   • TDAP/TD VACCINES  Discontinued          The following portions of the patient's history were reviewed and updated as appropriate: allergies, current medications, past family history, past medical history, past social history, past surgical history and problem list.    Outpatient Medications Prior to Visit   Medication Sig Dispense Refill   • amLODIPine (NORVASC) 5 MG tablet TAKE 1 TABLET BY MOUTH TWICE DAILY 180 tablet 0   • aspirin 81 MG EC tablet Take 81 mg by mouth Daily.     • carbidopa-levodopa (SINEMET)  MG per tablet TAKE 1 TABLET TWO TIMES A  tablet 0   • Cholecalciferol (VITAMIN D3) 2000 units capsule Take 2,000 Units by mouth Daily.     • clobetasol (TEMOVATE) 0.05 % cream APPLY TOPICALLY TO THE APPROPRIATE AREA AS DIRECTED AS NEEDED (FOR ITCHING). 30 g 2   • colchicine (Colcrys) 0.6 MG tablet TAKE 1 TABLET BY MOUTH AS NEEDED FOR FOUT (Patient taking differently: TAKE 1 TABLET BY MOUTH AS NEEDED FOR GOUT) 30 tablet 0   • ELIQUIS 5 MG tablet tablet Take 5 mg by mouth Every 12 (Twelve) Hours.     • glipizide (GLUCOTROL XL) 5 MG ER tablet TAKE 2 TABLETS BY MOUTH DAILY. 180 tablet 0   • glucose blood test strip 1 each by Other route Daily. Use as instructed 100 each 3   • indapamide (LOZOL) 2.5 MG tablet Take 1 tablet by mouth Every Morning. 90 tablet 1   • Lancet Devices (EASY TOUCH LANCING DEVICE) misc USE TO TEST DAILY  0   • losartan (COZAAR) 100 MG tablet TAKE 1 TABLET BY MOUTH DAILY 90 tablet 0   • metFORMIN (GLUCOPHAGE) 1000 MG tablet Take 1 tablet by mouth 2 (Two) Times a Day With Meals. 180 tablet 3   • naproxen sodium (ALEVE) 220 MG tablet Take 220 mg by mouth 2 (Two) Times a Day As Needed.     • ONETOUCH DELICA LANCETS 33G misc 1 each Daily. 100 each 3   • albuterol sulfate  (90 Base) MCG/ACT inhaler 2 sprays 2  "minutes apart every 6 hours as needed 1 inhaler 3   • HYDROcodone-acetaminophen (NORCO)  MG per tablet Take 1 tablet by mouth Every 6 (Six) Hours As Needed for Moderate Pain . 90 tablet 0   • azithromycin (Zithromax) 250 MG tablet Take 2 tablets the first day, then 1 tablet daily for 4 days. 6 tablet 0     No facility-administered medications prior to visit.       Patient Active Problem List   Diagnosis   • Type 2 diabetes mellitus without complication (CMS/[facility])   • HTN (hypertension)   • A-fib (CMS/[facility])   • Hx of adenomatous colonic polyps   • Morbidly obese (CMS/[facility])   • Phimosis   • Sacroiliitis, not elsewhere classified (CMS/[facility])       Advanced Care Planning:  ACP discussion was declined by the patient. Patient does not have an advance directive, declines further assistance.    Review of Systems   Respiratory: Negative for shortness of breath.    Cardiovascular:        Intermittent A. fib-continue anticoagulation and see cardiologist yearly-taught how to monitor his rhythm   Gastrointestinal:        Adenomatous polyps-due colonoscopy next year which is 3 years out   Genitourinary: Negative for difficulty urinating.   Musculoskeletal: Positive for arthralgias and back pain.   All other systems reviewed and are negative.      Compared to one year ago, the patient feels his physical health is the same.  Compared to one year ago, the patient feels his mental health is the same.    Reviewed chart for potential of high risk medication in the elderly: yes  Reviewed chart for potential of harmful drug interactions in the elderly:yes    Objective         Vitals:    06/03/21 0800   BP: 130/82   BP Location: Left arm   Patient Position: Sitting   Cuff Size: Large Adult   Pulse: 89   Resp: 18   Temp: 98 °F (36.7 °C)   TempSrc: Temporal   SpO2: 96%   Weight: (!) 140 kg (308 lb 9.6 oz)   Height: 170.2 cm (67\")   PainSc: 0-No pain       Body mass index is 48.33 kg/m².  Discussed the patient's BMI with him. The BMI is " above average; no BMI management plan is appropriate..    Physical Exam  Vitals and nursing note reviewed.   Constitutional:       Appearance: He is obese.   HENT:      Right Ear: Tympanic membrane normal.      Left Ear: Tympanic membrane and ear canal normal.   Eyes:      Extraocular Movements: Extraocular movements intact.      Pupils: Pupils are equal, round, and reactive to light.   Neck:      Vascular: No carotid bruit.   Cardiovascular:      Rate and Rhythm: Normal rate and regular rhythm.      Pulses:           Dorsalis pedis pulses are 1+ on the right side and 1+ on the left side.        Posterior tibial pulses are 1+ on the right side and 1+ on the left side.      Heart sounds: Normal heart sounds.   Pulmonary:      Effort: Pulmonary effort is normal.      Breath sounds: Normal breath sounds.   Abdominal:      Palpations: There is no mass.      Tenderness: There is no abdominal tenderness.      Comments: Pendulous   Genitourinary:     Penis: Normal.       Testes: Normal.      Prostate: Normal.      Rectum: Normal.      Comments: No testicular masses inguinal hernia or adenopathy-prostate 2+ no nodules  Musculoskeletal:      Right lower leg: No edema.      Left lower leg: No edema.      Right foot: No deformity or bunion.      Left foot: No deformity or bunion.   Feet:      Right foot:      Skin integrity: Skin integrity normal.      Toenail Condition: Right toenails are normal.      Left foot:      Skin integrity: Skin integrity normal.      Toenail Condition: Left toenails are normal.   Lymphadenopathy:      Cervical: No cervical adenopathy.   Skin:     General: Skin is warm and dry.      Capillary Refill: Capillary refill takes less than 2 seconds.   Neurological:      General: No focal deficit present.      Mental Status: He is alert and oriented to person, place, and time.   Psychiatric:         Mood and Affect: Mood normal.         Thought Content: Thought content normal.         Judgment: Judgment  normal.               Assessment/Plan   Medicare Risks and Personalized Health Plan  CMS Preventative Services Quick Reference  Fall Risk  Prostate Cancer Screening     The above risks/problems have been discussed with the patient.  Pertinent information has been shared with the patient in the After Visit Summary.  Follow up plans and orders are seen below in the Assessment/Plan Section.    Diagnoses and all orders for this visit:    1. Type 2 diabetes mellitus without complication, without long-term current use of insulin (CMS/[facility]) (Primary)  -     MicroAlbumin, Urine, Random - Urine, Clean Catch  -     Hemoglobin A1c  -     POC Urinalysis Dipstick, Multipro    2. Mixed hyperlipidemia  -     Comprehensive Metabolic Panel  -     Lipid Panel    3. Fatigue, unspecified type  -     TSH  -     T4, free  -     CBC & Differential    4. Special screening for malignant neoplasm of prostate  -     PSA Screen    5. Hyperuricemia  -     Uric Acid    6. Annual physical exam    7. Chronic atrial fibrillation (CMS/[facility])    8. Morbid (severe) obesity due to excess calories (CMS/[facility])    9. Type 2 diabetes mellitus with hyperglycemia, without long-term current use of insulin (CMS/[facility])    10. Pain  -     HYDROcodone-acetaminophen (NORCO)  MG per tablet; Take 1 tablet by mouth Every 6 (Six) Hours As Needed for Moderate Pain .  Dispense: 90 tablet; Refill: 0    Other orders  -     Cancel: albuterol sulfate  (90 Base) MCG/ACT inhaler; 2 sprays 2 minutes apart every 6 hours as needed  Dispense: 18 g; Refill: 5  -     Cancel: HYDROcodone-acetaminophen (NORCO)  MG per tablet; Take 1 tablet by mouth Every 6 (Six) Hours As Needed for Moderate Pain .  Dispense: 90 tablet; Refill: 0  -     albuterol sulfate  (90 Base) MCG/ACT inhaler; 2 sprays 2 minutes apart every 6 hours as needed  Dispense: 18 g; Refill: 5      Follow Up:  Return in 3 months (on 9/3/2021).     An After Visit Summary and PPPS were given to the  patient.         Plan-above-continue seeing cardiologist-had COVID-19 shots- talked  CONTROLLED SUBSTANCE TRACKING 10/4/2019 2/7/2020 5/12/2020 8/24/2020 11/25/2020 3/26/2021 6/3/2021   Last Gregory 10/4/2019 2/7/2020 5/12/2020 8/24/2020 11/25/2020 3/26/2021 6/3/2021   Report Number [id number] Dr Hopkins reviewed through [employer] reviewed by Dolores LANDERS through [facility] -   Last UDS 4/5/2019 4/5/2019 4/5/2019 8/24/2020 8/24/2020 8/24/2020 8/24/2020   Last Controlled Substance Agreement 4/5/2019 4/5/2019 4/5/2019 8/24/2020 11/25/2020 11/25/2020 11/25/2020    to about the 16/8-hour fast

## 2021-06-04 LAB
ALBUMIN SERPL-MCNC: 4.7 G/DL (ref 3.5–5.2)
ALBUMIN/GLOB SERPL: 2.5 G/DL
ALP SERPL-CCNC: 57 U/L (ref 39–117)
ALT SERPL-CCNC: 59 U/L (ref 1–41)
AST SERPL-CCNC: 33 U/L (ref 1–40)
BASOPHILS # BLD AUTO: 0.07 10*3/MM3 (ref 0–0.2)
BASOPHILS NFR BLD AUTO: 1 % (ref 0–1.5)
BILIRUB SERPL-MCNC: 0.6 MG/DL (ref 0–1.2)
BUN SERPL-MCNC: 20 MG/DL (ref 8–23)
BUN/CREAT SERPL: 22.7 (ref 7–25)
CALCIUM SERPL-MCNC: 10.2 MG/DL (ref 8.6–10.5)
CHLORIDE SERPL-SCNC: 101 MMOL/L (ref 98–107)
CHOLEST SERPL-MCNC: 194 MG/DL (ref 0–200)
CO2 SERPL-SCNC: 22 MMOL/L (ref 22–29)
CREAT SERPL-MCNC: 0.88 MG/DL (ref 0.76–1.27)
EOSINOPHIL # BLD AUTO: 0.2 10*3/MM3 (ref 0–0.4)
EOSINOPHIL NFR BLD AUTO: 2.9 % (ref 0.3–6.2)
ERYTHROCYTE [DISTWIDTH] IN BLOOD BY AUTOMATED COUNT: 12.5 % (ref 12.3–15.4)
GLOBULIN SER CALC-MCNC: 1.9 GM/DL
GLUCOSE SERPL-MCNC: 138 MG/DL (ref 65–99)
HBA1C MFR BLD: 6.1 % (ref 4.8–5.6)
HCT VFR BLD AUTO: 50.5 % (ref 37.5–51)
HDLC SERPL-MCNC: 46 MG/DL (ref 40–60)
HGB BLD-MCNC: 17.1 G/DL (ref 13–17.7)
IMM GRANULOCYTES # BLD AUTO: 0.04 10*3/MM3 (ref 0–0.05)
IMM GRANULOCYTES NFR BLD AUTO: 0.6 % (ref 0–0.5)
LDLC SERPL CALC-MCNC: 120 MG/DL (ref 0–100)
LYMPHOCYTES # BLD AUTO: 1.59 10*3/MM3 (ref 0.7–3.1)
LYMPHOCYTES NFR BLD AUTO: 23.2 % (ref 19.6–45.3)
MCH RBC QN AUTO: 32.6 PG (ref 26.6–33)
MCHC RBC AUTO-ENTMCNC: 33.9 G/DL (ref 31.5–35.7)
MCV RBC AUTO: 96.4 FL (ref 79–97)
MICROALBUMIN UR-MCNC: 43.9 UG/ML
MONOCYTES # BLD AUTO: 0.59 10*3/MM3 (ref 0.1–0.9)
MONOCYTES NFR BLD AUTO: 8.6 % (ref 5–12)
NEUTROPHILS # BLD AUTO: 4.37 10*3/MM3 (ref 1.7–7)
NEUTROPHILS NFR BLD AUTO: 63.7 % (ref 42.7–76)
NRBC BLD AUTO-RTO: 0 /100 WBC (ref 0–0.2)
PLATELET # BLD AUTO: 258 10*3/MM3 (ref 140–450)
POTASSIUM SERPL-SCNC: 4 MMOL/L (ref 3.5–5.2)
PROT SERPL-MCNC: 6.6 G/DL (ref 6–8.5)
PSA SERPL-MCNC: 0.79 NG/ML (ref 0–4)
RBC # BLD AUTO: 5.24 10*6/MM3 (ref 4.14–5.8)
SODIUM SERPL-SCNC: 137 MMOL/L (ref 136–145)
T4 FREE SERPL-MCNC: 1.35 NG/DL (ref 0.93–1.7)
TRIGL SERPL-MCNC: 158 MG/DL (ref 0–150)
TSH SERPL DL<=0.005 MIU/L-ACNC: 1.26 UIU/ML (ref 0.27–4.2)
URATE SERPL-MCNC: 9.5 MG/DL (ref 3.4–7)
VLDLC SERPL CALC-MCNC: 28 MG/DL (ref 5–40)
WBC # BLD AUTO: 6.86 10*3/MM3 (ref 3.4–10.8)

## 2021-06-04 RX ORDER — AMLODIPINE BESYLATE 5 MG/1
TABLET ORAL
Qty: 180 TABLET | Refills: 3 | Status: SHIPPED | OUTPATIENT
Start: 2021-06-04 | End: 2022-05-31

## 2021-06-07 DIAGNOSIS — E78.2 MIXED HYPERLIPIDEMIA: Primary | ICD-10-CM

## 2021-06-07 RX ORDER — ROSUVASTATIN CALCIUM 10 MG/1
10 TABLET, COATED ORAL NIGHTLY
Qty: 90 TABLET | Refills: 0 | Status: SHIPPED | OUTPATIENT
Start: 2021-06-07 | End: 2022-08-25 | Stop reason: SINTOL

## 2021-06-30 ENCOUNTER — EDUCATION (OUTPATIENT)
Dept: FAMILY MEDICINE CLINIC | Facility: CLINIC | Age: 71
End: 2021-06-30

## 2021-06-30 ENCOUNTER — TELEPHONE (OUTPATIENT)
Dept: FAMILY MEDICINE CLINIC | Facility: CLINIC | Age: 71
End: 2021-06-30

## 2021-06-30 NOTE — PATIENT INSTRUCTIONS
Mediterranean Diet  A Mediterranean diet refers to food and lifestyle choices that are based on the traditions of countries located on the [place]. This way of eating has been shown to help prevent certain conditions and improve outcomes for people who have chronic diseases, like kidney disease and heart disease.  What are tips for following this plan?  Lifestyle  · Cook and eat meals together with your family, when possible.  · Drink enough fluid to keep your urine clear or pale yellow.  · Be physically active every day. This includes:  ? Aerobic exercise like running or swimming.  ? Leisure activities like gardening, walking, or housework.  · Get 7-8 hours of sleep each night.  · If recommended by your health care provider, drink red wine in moderation. This means 1 glass a day for nonpregnant women and 2 glasses a day for men. A glass of wine equals 5 oz (150 mL).  Reading food labels    · Check the serving size of packaged foods. For foods such as rice and pasta, the serving size refers to the amount of cooked product, not dry.  · Check the total fat in packaged foods. Avoid foods that have saturated fat or trans fats.  · Check the ingredients list for added sugars, such as corn syrup.  Shopping  · At the grocery store, buy most of your food from the areas near the walls of the store. This includes:  ? Fresh fruits and vegetables (produce).  ? Grains, beans, nuts, and seeds. Some of these may be available in unpackaged forms or large amounts (in bulk).  ? Fresh seafood.  ? Poultry and eggs.  ? Low-fat dairy products.  · Buy whole ingredients instead of prepackaged foods.  · Buy fresh fruits and vegetables in-season from local farmers markets.  · Buy frozen fruits and vegetables in resealable bags.  · If you do not have access to quality fresh seafood, buy precooked frozen shrimp or canned fish, such as tuna, salmon, or sardines.  · Buy small amounts of raw or cooked vegetables, salads, or olives from  the deli or salad bar at your store.  · Stock your pantry so you always have certain foods on hand, such as olive oil, canned tuna, canned tomatoes, rice, pasta, and beans.  Cooking  · Cook foods with extra-virgin olive oil instead of using butter or other vegetable oils.  · Have meat as a side dish, and have vegetables or grains as your main dish. This means having meat in small portions or adding small amounts of meat to foods like pasta or stew.  · Use beans or vegetables instead of meat in common dishes like chili or lasagna.  · Freer with different cooking methods. Try roasting or broiling vegetables instead of steaming or sautéeing them.  · Add frozen vegetables to soups, stews, pasta, or rice.  · Add nuts or seeds for added healthy fat at each meal. You can add these to yogurt, salads, or vegetable dishes.  · Marinate fish or vegetables using olive oil, lemon juice, garlic, and fresh herbs.  Meal planning    · Plan to eat 1 vegetarian meal one day each week. Try to work up to 2 vegetarian meals, if possible.  · Eat seafood 2 or more times a week.  · Have healthy snacks readily available, such as:  ? Vegetable sticks with hummus.  ? Greek yogurt.  ? Fruit and nut trail mix.  · Eat balanced meals throughout the week. This includes:  ? Fruit: 2-3 servings a day  ? Vegetables: 4-5 servings a day  ? Low-fat dairy: 2 servings a day  ? Fish, poultry, or lean meat: 1 serving a day  ? Beans and legumes: 2 or more servings a week  ? Nuts and seeds: 1-2 servings a day  ? Whole grains: 6-8 servings a day  ? Extra-virgin olive oil: 3-4 servings a day  · Limit red meat and sweets to only a few servings a month  What are my food choices?  · Mediterranean diet  ? Recommended  § Grains: Whole-grain pasta. Brown rice. Bulgar wheat. Polenta. Couscous. Whole-wheat bread. Oatmeal. Quinoa.  § Vegetables: Artichokes. Beets. Broccoli. Cabbage. Carrots. Eggplant. Green beans. Chard. Kale. Spinach. Onions. Leeks. Peas. Squash.  Tomatoes. Peppers. Radishes.  § Fruits: Apples. Apricots. Avocado. Berries. Bananas. Cherries. Dates. Figs. Grapes. Erich. Melon. Oranges. Peaches. Plums. Pomegranate.  § Meats and other protein foods: Beans. Almonds. Sunflower seeds. Pine nuts. Peanuts. Cod. [place]. Scallops. Shrimp. Tuna. Tilapia. Clams. Oysters. Eggs.  § Dairy: Low-fat milk. Cheese. Greek yogurt.  § Beverages: Water. Red wine. Herbal tea.  § Fats and oils: Extra virgin olive oil. Avocado oil. Grape seed oil.  § Sweets and desserts: Greek yogurt with honey. Baked apples. Poached pears. Trail mix.  § Seasoning and other foods: Basil. Cilantro. Coriander. Cumin. Mint. Parsley. Melchor. Rosemary. Tarragon. Garlic. Oregano. Thyme. Pepper. Balsalmic vinegar. Tahini. Hummus. Tomato sauce. Olives. Mushrooms.  ? Limit these  § Grains: Prepackaged pasta or rice dishes. Prepackaged cereal with added sugar.  § Vegetables: Deep fried potatoes (french fries).  § Fruits: Fruit canned in syrup.  § Meats and other protein foods: Beef. Pork. Lamb. Poultry with skin. Hot dogs. Gimenez.  § Dairy: Ice cream. Sour cream. Whole milk.  § Beverages: Juice. Sugar-sweetened soft drinks. Beer. Liquor and spirits.  § Fats and oils: Butter. Canola oil. Vegetable oil. Beef fat (tallow). Lard.  § Sweets and desserts: Cookies. Cakes. Pies. Candy.  § Seasoning and other foods: Mayonnaise. Premade sauces and marinades.  The items listed may not be a complete list. Talk with your dietitian about what dietary choices are right for you.  Summary  · The Mediterranean diet includes both food and lifestyle choices.  · Eat a variety of fresh fruits and vegetables, beans, nuts, seeds, and whole grains.  · Limit the amount of red meat and sweets that you eat.  · Talk with your health care provider about whether it is safe for you to drink red wine in moderation. This means 1 glass a day for nonpregnant women and 2 glasses a day for men. A glass of wine equals 5 oz (150 mL).  This information  is not intended to replace advice given to you by your health care provider. Make sure you discuss any questions you have with your health care provider.  Document Revised: 08/17/2017 Document Reviewed: 08/10/2017  Elsevier Patient Education © 2020 [employer]

## 2021-07-29 DIAGNOSIS — R52 PAIN: ICD-10-CM

## 2021-07-30 ENCOUNTER — LAB (OUTPATIENT)
Dept: FAMILY MEDICINE CLINIC | Facility: CLINIC | Age: 71
End: 2021-07-30

## 2021-07-30 RX ORDER — HYDROCODONE BITARTRATE AND ACETAMINOPHEN 10; 325 MG/1; MG/1
1 TABLET ORAL EVERY 6 HOURS PRN
Qty: 90 TABLET | Refills: 0 | Status: SHIPPED | OUTPATIENT
Start: 2021-07-30 | End: 2021-09-02 | Stop reason: SDUPTHER

## 2021-08-09 RX ORDER — INDAPAMIDE 2.5 MG/1
2.5 TABLET, FILM COATED ORAL EVERY MORNING
Qty: 90 TABLET | Refills: 3 | Status: SHIPPED | OUTPATIENT
Start: 2021-08-09 | End: 2022-07-06

## 2021-08-11 RX ORDER — GLIPIZIDE 5 MG/1
10 TABLET, FILM COATED, EXTENDED RELEASE ORAL DAILY
Qty: 180 TABLET | Refills: 3 | Status: SHIPPED | OUTPATIENT
Start: 2021-08-11 | End: 2022-08-04

## 2021-09-02 ENCOUNTER — OFFICE VISIT (OUTPATIENT)
Dept: FAMILY MEDICINE CLINIC | Facility: CLINIC | Age: 71
End: 2021-09-02

## 2021-09-02 VITALS
OXYGEN SATURATION: 98 % | WEIGHT: 307 LBS | BODY MASS INDEX: 48.18 KG/M2 | HEART RATE: 80 BPM | HEIGHT: 67 IN | TEMPERATURE: 98 F | SYSTOLIC BLOOD PRESSURE: 134 MMHG | DIASTOLIC BLOOD PRESSURE: 81 MMHG

## 2021-09-02 DIAGNOSIS — M46.1 SACROILIITIS, NOT ELSEWHERE CLASSIFIED (HCC): Primary | ICD-10-CM

## 2021-09-02 DIAGNOSIS — R52 PAIN: ICD-10-CM

## 2021-09-02 PROCEDURE — 99213 OFFICE O/P EST LOW 20 MIN: CPT | Performed by: NURSE PRACTITIONER

## 2021-09-02 RX ORDER — HYDROCODONE BITARTRATE AND ACETAMINOPHEN 10; 325 MG/1; MG/1
1 TABLET ORAL EVERY 6 HOURS PRN
Qty: 90 TABLET | Refills: 0 | Status: SHIPPED | OUTPATIENT
Start: 2021-09-02 | End: 2021-10-01 | Stop reason: SDUPTHER

## 2021-09-02 NOTE — PROGRESS NOTES
CC: 3 month check - controlled medication monitoring    History:  Ephraim Quinones is a 71 y.o. male who presents today for evaluation of the above problems.      Patient reports that his Norco still works well for chronic back pain.  He states that he does try to space the medication out. He denies any change in his pain and reports no noted weakness.      HPI  ROS:  Review of Systems   Musculoskeletal: Positive for back pain.       Allergies   Allergen Reactions   • Simvastatin Other (See Comments)     Leg Cramps   • Percocet [Oxycodone-Acetaminophen] Itching     Past Medical History:   Diagnosis Date   • A-fib (CMS/[facility])    • Acid reflux    • Anemia 5/3/2016   • Diabetes mellitus (CMS/[facility])    • Gout    • Hyperlipidemia    • Hypertension    • Sleep apnea with use of continuous positive airway pressure (CPAP)      Past Surgical History:   Procedure Laterality Date   • BACK SURGERY      x2   • CIRCUMCISION N/A 6/6/2019    Procedure: CIRCUMCISION;  Surgeon: Yon Sloan MD;  Location: [facility];  Service: Urology   • FINGER SURGERY     • REPLACEMENT TOTAL KNEE Bilateral      Family History   Problem Relation Age of Onset   • Colon cancer Mother    • Prostate cancer Father    • No Known Problems Maternal Grandmother    • No Known Problems Maternal Grandfather    • No Known Problems Paternal Grandmother    • No Known Problems Paternal Grandfather       reports that he quit smoking about 25 years ago. He has a 20.00 pack-year smoking history. He has never used smokeless tobacco. He reports current alcohol use. He reports that he does not use drugs.      Current Outpatient Medications:   •  albuterol sulfate  (90 Base) MCG/ACT inhaler, 2 sprays 2 minutes apart every 6 hours as needed, Disp: 18 g, Rfl: 5  •  amLODIPine (NORVASC) 5 MG tablet, TAKE 1 TABLET BY MOUTH TWICE DAILY, Disp: 180 tablet, Rfl: 3  •  aspirin 81 MG EC tablet, Take 81 mg by mouth Daily., Disp: , Rfl:   •  carbidopa-levodopa (SINEMET)  " MG per tablet, TAKE 1 TABLET TWO TIMES A DAY, Disp: 180 tablet, Rfl: 0  •  Cholecalciferol (VITAMIN D3) 2000 units capsule, Take 2,000 Units by mouth Daily., Disp: , Rfl:   •  clobetasol (TEMOVATE) 0.05 % cream, APPLY TOPICALLY TO THE APPROPRIATE AREA AS DIRECTED AS NEEDED (FOR ITCHING)., Disp: 30 g, Rfl: 2  •  ELIQUIS 5 MG tablet tablet, Take 5 mg by mouth Every 12 (Twelve) Hours., Disp: , Rfl:   •  glipizide (GLUCOTROL XL) 5 MG ER tablet, TAKE 2 TABLETS BY MOUTH DAILY., Disp: 180 tablet, Rfl: 3  •  glucose blood test strip, 1 each by Other route Daily. Use as instructed, Disp: 100 each, Rfl: 3  •  HYDROcodone-acetaminophen (NORCO)  MG per tablet, Take 1 tablet by mouth Every 6 (Six) Hours As Needed for Moderate Pain ., Disp: 90 tablet, Rfl: 0  •  indapamide (LOZOL) 2.5 MG tablet, TAKE 1 TABLET BY MOUTH EVERY MORNING., Disp: 90 tablet, Rfl: 3  •  Lancet Devices (EASY TOUCH LANCING DEVICE) misc, USE TO TEST DAILY, Disp: , Rfl: 0  •  losartan (COZAAR) 100 MG tablet, TAKE 1 TABLET BY MOUTH DAILY, Disp: 90 tablet, Rfl: 0  •  metFORMIN (GLUCOPHAGE) 1000 MG tablet, TAKE 1 TABLET BY MOUTH 2 TIMES A DAY WITH MEALS., Disp: 180 tablet, Rfl: 3  •  naproxen sodium (ALEVE) 220 MG tablet, Take 220 mg by mouth 2 (Two) Times a Day As Needed., Disp: , Rfl:   •  ONETOUCH DELICA LANCETS 33G misc, 1 each Daily., Disp: 100 each, Rfl: 3  •  rosuvastatin (Crestor) 10 MG tablet, Take 1 tablet by mouth Every Night., Disp: 90 tablet, Rfl: 0  •  colchicine (Colcrys) 0.6 MG tablet, TAKE 1 TABLET BY MOUTH AS NEEDED FOR FOUT (Patient taking differently: TAKE 1 TABLET BY MOUTH AS NEEDED FOR GOUT), Disp: 30 tablet, Rfl: 0    OBJECTIVE:  /81 (BP Location: Left arm, Patient Position: Sitting, Cuff Size: Large Adult)   Pulse 80   Temp 98 °F (36.7 °C) (Temporal)   Ht 170.2 cm (67\")   Wt (!) 139 kg (307 lb)   SpO2 98%   BMI 48.08 kg/m²    Physical Exam  Vitals reviewed.   Constitutional:       Appearance: He is well-developed. "   Cardiovascular:      Rate and Rhythm: Normal rate.   Pulmonary:      Effort: Pulmonary effort is normal.   Neurological:      Mental Status: He is alert and oriented to person, place, and time.   Psychiatric:         Behavior: Behavior normal.         Assessment/Plan    Diagnoses and all orders for this visit:    1. Sacroiliitis, not elsewhere classified (CMS/[facility]) (Primary)      Gregory reviewed and appropriate. Contract and UDS Up to date. Dr. Hopkins will order medication.       An After Visit Summary was printed and given to the patient at discharge.  Return in about 3 months (around 12/2/2021) for Next scheduled follow up - Gregory and 6 month check.       Dolores Ba, ANSHUL 9/2/21    Electronically signed.

## 2021-10-01 DIAGNOSIS — R52 PAIN: ICD-10-CM

## 2021-10-01 NOTE — TELEPHONE ENCOUNTER
Rx Refill Note  Requested Prescriptions     Pending Prescriptions Disp Refills   • HYDROcodone-acetaminophen (NORCO)  MG per tablet 90 tablet 0     Sig: Take 1 tablet by mouth Every 6 (Six) Hours As Needed for Moderate Pain .      Last office visit with prescribing clinician: 6/3/2021      Next office visit with prescribing clinician: Visit date not found     Drug thereapy appt 9/2/21  contract done 11/25/2020  UDS done 08/24/2020    Is Refill Pharmacy correct?: Yes    Helena Gilmore MA  10/01/21, 13:47 CDT     89 yo F was in [facility], spoke to daughter at 8A, noted slurred speech, then tried to drink coffee but spilled out of side of mouth. Denies weakness/numbness, no diff. with ambulation. Pt. had last spoken aloud last night, awoke at 7A feeling well. Took 325mg of ASA PTA.

## 2021-10-04 RX ORDER — HYDROCODONE BITARTRATE AND ACETAMINOPHEN 10; 325 MG/1; MG/1
1 TABLET ORAL EVERY 6 HOURS PRN
Qty: 90 TABLET | Refills: 0 | Status: SHIPPED | OUTPATIENT
Start: 2021-10-04 | End: 2021-11-02 | Stop reason: SDUPTHER

## 2021-10-13 ENCOUNTER — FLU SHOT (OUTPATIENT)
Dept: FAMILY MEDICINE CLINIC | Facility: CLINIC | Age: 71
End: 2021-10-13

## 2021-10-13 DIAGNOSIS — Z23 NEED FOR INFLUENZA VACCINATION: Primary | ICD-10-CM

## 2021-10-13 PROCEDURE — G0008 ADMIN INFLUENZA VIRUS VAC: HCPCS | Performed by: FAMILY MEDICINE

## 2021-10-13 PROCEDURE — 90662 IIV NO PRSV INCREASED AG IM: CPT | Performed by: FAMILY MEDICINE

## 2021-10-18 RX ORDER — LOSARTAN POTASSIUM 100 MG/1
TABLET ORAL
Qty: 90 TABLET | Refills: 2 | Status: SHIPPED | OUTPATIENT
Start: 2021-10-18 | End: 2022-07-15

## 2021-10-18 NOTE — TELEPHONE ENCOUNTER
Rx Refill Note  Requested Prescriptions     Pending Prescriptions Disp Refills   • losartan (COZAAR) 100 MG tablet [Pharmacy Med Name: LOSARTAN POT 100MG] 90 tablet 0     Sig: TAKE 1 TABLET BY MOUTH DAILY      Last office visit with prescribing clinician: 9/2/2021      Next office visit with prescribing clinician: 12/1/2021            Helena Meehan MA  10/18/21, 12:07 CDT

## 2021-11-02 ENCOUNTER — TELEPHONE (OUTPATIENT)
Dept: FAMILY MEDICINE CLINIC | Facility: CLINIC | Age: 71
End: 2021-11-02

## 2021-11-02 DIAGNOSIS — R52 PAIN: ICD-10-CM

## 2021-11-02 RX ORDER — HYDROCODONE BITARTRATE AND ACETAMINOPHEN 10; 325 MG/1; MG/1
1 TABLET ORAL EVERY 6 HOURS PRN
Qty: 90 TABLET | Refills: 0 | Status: SHIPPED | OUTPATIENT
Start: 2021-11-02 | End: 2021-12-01 | Stop reason: SDUPTHER

## 2021-11-02 NOTE — TELEPHONE ENCOUNTER
BRENDA    Caller: Ephraim Quinones    Relationship: Self      Medication requested (name and dosage):    Requested Prescriptions:   Requested Prescriptions     Pending Prescriptions Disp Refills   • HYDROcodone-acetaminophen (NORCO)  MG per tablet 90 tablet 0     Sig: Take 1 tablet by mouth Every 6 (Six) Hours As Needed for Moderate Pain .        Pharmacy where request should be sent: [facility] [address] [phone] [facility] [phone]   [phone]    Additional details provided by patient: THREE DAYS LEFT  Best call back number: [phone]    Does the patient have less than a 3 day supply:  [x] Yes  [] No    Deandre Singh Rep   11/02/21 10:47 CDT

## 2021-11-05 ENCOUNTER — TELEPHONE (OUTPATIENT)
Dept: FAMILY MEDICINE CLINIC | Facility: CLINIC | Age: 71
End: 2021-11-05

## 2021-11-05 NOTE — TELEPHONE ENCOUNTER
Caller: BONY    Relationship: [facility]    Best call back number: [phone]    What form or medical record are you requesting: FAX A COPY OF LIPID TEST FROM 7/30/21    Who is requesting this form or medical record from you: [facility]      MOST RECENT LIPID PANEL  ATTN: BONY  FAX # [phone]    TIMEFRAME: BY 11/11/21

## 2021-12-01 ENCOUNTER — OFFICE VISIT (OUTPATIENT)
Dept: FAMILY MEDICINE CLINIC | Facility: CLINIC | Age: 71
End: 2021-12-01

## 2021-12-01 ENCOUNTER — TRANSCRIBE ORDERS (OUTPATIENT)
Dept: PHYSICAL THERAPY | Facility: HOSPITAL | Age: 71
End: 2021-12-01

## 2021-12-01 VITALS
WEIGHT: 307.2 LBS | TEMPERATURE: 98.2 F | BODY MASS INDEX: 48.21 KG/M2 | HEART RATE: 56 BPM | DIASTOLIC BLOOD PRESSURE: 78 MMHG | SYSTOLIC BLOOD PRESSURE: 132 MMHG | HEIGHT: 67 IN | OXYGEN SATURATION: 98 %

## 2021-12-01 DIAGNOSIS — I48.91 ATRIAL FIBRILLATION, UNSPECIFIED TYPE (HCC): ICD-10-CM

## 2021-12-01 DIAGNOSIS — Z51.81 THERAPEUTIC DRUG MONITORING: Primary | ICD-10-CM

## 2021-12-01 DIAGNOSIS — R52 PAIN: ICD-10-CM

## 2021-12-01 DIAGNOSIS — I10 PRIMARY HYPERTENSION: ICD-10-CM

## 2021-12-01 DIAGNOSIS — M51.36 DDD (DEGENERATIVE DISC DISEASE), LUMBAR: Primary | ICD-10-CM

## 2021-12-01 DIAGNOSIS — E78.5 HYPERLIPIDEMIA, UNSPECIFIED HYPERLIPIDEMIA TYPE: ICD-10-CM

## 2021-12-01 DIAGNOSIS — E11.9 TYPE 2 DIABETES MELLITUS WITHOUT COMPLICATION, WITHOUT LONG-TERM CURRENT USE OF INSULIN (HCC): ICD-10-CM

## 2021-12-01 PROCEDURE — 99214 OFFICE O/P EST MOD 30 MIN: CPT | Performed by: NURSE PRACTITIONER

## 2021-12-01 RX ORDER — METHOCARBAMOL 750 MG/1
TABLET, FILM COATED ORAL
COMMUNITY
Start: 2021-11-17 | End: 2022-08-25

## 2021-12-01 RX ORDER — HYDROCODONE BITARTRATE AND ACETAMINOPHEN 10; 325 MG/1; MG/1
1 TABLET ORAL EVERY 6 HOURS PRN
Qty: 90 TABLET | Refills: 0 | Status: SHIPPED | OUTPATIENT
Start: 2021-12-01 | End: 2022-01-04 | Stop reason: SDUPTHER

## 2021-12-01 RX ORDER — ALBUTEROL SULFATE 90 UG/1
AEROSOL, METERED RESPIRATORY (INHALATION)
Qty: 18 G | Refills: 5 | Status: SHIPPED | OUTPATIENT
Start: 2021-12-01 | End: 2023-03-01 | Stop reason: SDUPTHER

## 2021-12-01 NOTE — TELEPHONE ENCOUNTER
Rx Refill Note  Requested Prescriptions      No prescriptions requested or ordered in this encounter      Last office visit with prescribing clinician: 12/1/2021      Next office visit with prescribing clinician: Visit date not found            Helena Meehan MA  12/01/21, 08:19 CST

## 2021-12-01 NOTE — PROGRESS NOTES
CC: 6 month check T2DM, controlled med monitoring    History:  Ephraim Quinones is a 71 y.o. male who presents today for evaluation of the above problems.      Patient is here for 6 month check on t2dm.  Will be seeing opthtalmology tomorrow (may need injections in left eye) and has an MRI of his back soon. Reports that he is not doing very well, but has appointments in the works for his issues.   Needs refill of his [place] today.   CONTROLLED SUBSTANCE TRACKING 5/12/2020 8/24/2020 11/25/2020 3/26/2021 6/3/2021 9/2/2021 12/1/2021   Last Gregory 5/12/2020 8/24/2020 11/25/2020 3/26/2021 6/3/2021 9/2/2021 12/1/2021   Report Number [id number] Dr Hopkins reviewed through [facility] reviewed by Dolores LANDERS through [facility] - reviewed through [facility] reviewed through [facility]   Last UDS 4/5/2019 8/24/2020 8/24/2020 8/24/2020 8/24/2020 8/24/2020 12/1/2021   Last Controlled Substance Agreement 4/5/2019 8/24/2020 11/25/2020 11/25/2020 11/25/2020 11/25/2020 12/1/2021       HPI  ROS:  Review of Systems   Respiratory: Negative for shortness of breath.    Cardiovascular: Negative for chest pain.   Gastrointestinal: Negative for constipation.   Musculoskeletal: Positive for back pain.       Allergies   Allergen Reactions   • Simvastatin Other (See Comments)     Leg Cramps   • Percocet [Oxycodone-Acetaminophen] Itching     Past Medical History:   Diagnosis Date   • A-fib (HCC)    • Acid reflux    • Anemia 5/3/2016   • Diabetes mellitus (HCC)    • Gout    • Hyperlipidemia    • Hypertension    • Sleep apnea with use of continuous positive airway pressure (CPAP)      Past Surgical History:   Procedure Laterality Date   • BACK SURGERY      x2   • CIRCUMCISION N/A 6/6/2019    Procedure: CIRCUMCISION;  Surgeon: Yon Sloan MD;  Location: [facility];  Service: Urology   • FINGER SURGERY     • REPLACEMENT TOTAL KNEE Bilateral      Family History   Problem Relation Age of Onset   • Colon cancer Mother    • Prostate cancer Father    •  No Known Problems Maternal Grandmother    • No Known Problems Maternal Grandfather    • No Known Problems Paternal Grandmother    • No Known Problems Paternal Grandfather       reports that he quit smoking about 25 years ago. He has a 20.00 pack-year smoking history. He has never used smokeless tobacco. He reports current alcohol use. He reports that he does not use drugs.      Current Outpatient Medications:   •  albuterol sulfate  (90 Base) MCG/ACT inhaler, 2 sprays 2 minutes apart every 6 hours as needed, Disp: 18 g, Rfl: 5  •  amLODIPine (NORVASC) 5 MG tablet, TAKE 1 TABLET BY MOUTH TWICE DAILY, Disp: 180 tablet, Rfl: 3  •  aspirin 81 MG EC tablet, Take 81 mg by mouth Daily., Disp: , Rfl:   •  carbidopa-levodopa (SINEMET)  MG per tablet, TAKE 1 TABLET TWO TIMES A DAY, Disp: 180 tablet, Rfl: 0  •  Cholecalciferol (VITAMIN D3) 2000 units capsule, Take 2,000 Units by mouth Daily., Disp: , Rfl:   •  ELIQUIS 5 MG tablet tablet, Take 5 mg by mouth Every 12 (Twelve) Hours., Disp: , Rfl:   •  glipizide (GLUCOTROL XL) 5 MG ER tablet, TAKE 2 TABLETS BY MOUTH DAILY., Disp: 180 tablet, Rfl: 3  •  HYDROcodone-acetaminophen (NORCO)  MG per tablet, Take 1 tablet by mouth Every 6 (Six) Hours As Needed for Moderate Pain ., Disp: 90 tablet, Rfl: 0  •  indapamide (LOZOL) 2.5 MG tablet, TAKE 1 TABLET BY MOUTH EVERY MORNING., Disp: 90 tablet, Rfl: 3  •  losartan (COZAAR) 100 MG tablet, TAKE 1 TABLET BY MOUTH DAILY, Disp: 90 tablet, Rfl: 2  •  metFORMIN (GLUCOPHAGE) 1000 MG tablet, TAKE 1 TABLET BY MOUTH 2 TIMES A DAY WITH MEALS., Disp: 180 tablet, Rfl: 3  •  naproxen sodium (ALEVE) 220 MG tablet, Take 220 mg by mouth 2 (Two) Times a Day As Needed., Disp: , Rfl:   •  rosuvastatin (Crestor) 10 MG tablet, Take 1 tablet by mouth Every Night., Disp: 90 tablet, Rfl: 0  •  clobetasol (TEMOVATE) 0.05 % cream, APPLY TOPICALLY TO THE APPROPRIATE AREA AS DIRECTED AS NEEDED (FOR ITCHING)., Disp: 30 g, Rfl: 2  •  colchicine  "(Colcrys) 0.6 MG tablet, TAKE 1 TABLET BY MOUTH AS NEEDED FOR FOUT (Patient taking differently: TAKE 1 TABLET BY MOUTH AS NEEDED FOR GOUT), Disp: 30 tablet, Rfl: 0  •  glucose blood test strip, 1 each by Other route Daily. Use as instructed, Disp: 100 each, Rfl: 3  •  Lancet Devices (EASY TOUCH LANCING DEVICE) misc, USE TO TEST DAILY, Disp: , Rfl: 0  •  methocarbamol (ROBAXIN) 750 MG tablet, , Disp: , Rfl:   •  ONETOUCH DELICA LANCETS 33G misc, 1 each Daily., Disp: 100 each, Rfl: 3    OBJECTIVE:  /78 (BP Location: Left arm, Patient Position: Sitting, Cuff Size: Large Adult)   Pulse 56   Temp 98.2 °F (36.8 °C) (Temporal)   Ht 170.2 cm (67\")   Wt (!) 139 kg (307 lb 3.2 oz)   SpO2 98%   BMI 48.11 kg/m²    Physical Exam  Vitals reviewed.   Constitutional:       Appearance: He is well-developed.   Cardiovascular:      Rate and Rhythm: Normal rate and regular rhythm.      Heart sounds: Normal heart sounds.   Pulmonary:      Effort: Pulmonary effort is normal.      Breath sounds: Normal breath sounds.   Neurological:      Mental Status: He is alert and oriented to person, place, and time.   Psychiatric:         Behavior: Behavior normal.         Assessment/Plan    Diagnoses and all orders for this visit:    1. Therapeutic drug monitoring (Primary)  -     ToxASSURE Select 13 (MW) - Urine, Clean Catch    2. Type 2 diabetes mellitus without complication, without long-term current use of insulin (HCC)  -     CBC & Differential  -     Comprehensive Metabolic Panel  -     Hemoglobin A1c    3. Hyperlipidemia, unspecified hyperlipidemia type  -     Lipid Panel    4. Atrial fibrillation, unspecified type (HCC)  -     CBC & Differential    5. Primary hypertension  -     CBC & Differential    Other orders  -     albuterol sulfate  (90 Base) MCG/ACT inhaler; 2 sprays 2 minutes apart every 6 hours as needed  Dispense: 18 g; Refill: 5    Gregory reviewed and appropriate. Contract and UDS today. Dr. Hopkins will refill " Norco.     An After Visit Summary was printed and given to the patient at discharge.  Return in about 3 months (around 3/1/2022) for Next scheduled follow up.       ANSHUL Noel 12/1/21    Electronically signed.

## 2021-12-02 LAB
ALBUMIN SERPL-MCNC: 4.2 G/DL (ref 3.5–5.2)
ALBUMIN/GLOB SERPL: 2.1 G/DL
ALP SERPL-CCNC: 59 U/L (ref 39–117)
ALT SERPL-CCNC: 37 U/L (ref 1–41)
AST SERPL-CCNC: 22 U/L (ref 1–40)
BASOPHILS # BLD AUTO: 0.05 10*3/MM3 (ref 0–0.2)
BASOPHILS NFR BLD AUTO: 0.9 % (ref 0–1.5)
BILIRUB SERPL-MCNC: 0.6 MG/DL (ref 0–1.2)
BUN SERPL-MCNC: 20 MG/DL (ref 8–23)
BUN/CREAT SERPL: 27 (ref 7–25)
CALCIUM SERPL-MCNC: 9.5 MG/DL (ref 8.6–10.5)
CHLORIDE SERPL-SCNC: 99 MMOL/L (ref 98–107)
CHOLEST SERPL-MCNC: 174 MG/DL (ref 0–200)
CO2 SERPL-SCNC: 27.9 MMOL/L (ref 22–29)
CREAT SERPL-MCNC: 0.74 MG/DL (ref 0.76–1.27)
EOSINOPHIL # BLD AUTO: 0.19 10*3/MM3 (ref 0–0.4)
EOSINOPHIL NFR BLD AUTO: 3.3 % (ref 0.3–6.2)
ERYTHROCYTE [DISTWIDTH] IN BLOOD BY AUTOMATED COUNT: 12.6 % (ref 12.3–15.4)
GLOBULIN SER CALC-MCNC: 2 GM/DL
GLUCOSE SERPL-MCNC: 123 MG/DL (ref 65–99)
HBA1C MFR BLD: 6.3 % (ref 4.8–5.6)
HCT VFR BLD AUTO: 44.6 % (ref 37.5–51)
HDLC SERPL-MCNC: 55 MG/DL (ref 40–60)
HGB BLD-MCNC: 15.2 G/DL (ref 13–17.7)
IMM GRANULOCYTES # BLD AUTO: 0.03 10*3/MM3 (ref 0–0.05)
IMM GRANULOCYTES NFR BLD AUTO: 0.5 % (ref 0–0.5)
LDLC SERPL CALC-MCNC: 100 MG/DL (ref 0–100)
LYMPHOCYTES # BLD AUTO: 1.38 10*3/MM3 (ref 0.7–3.1)
LYMPHOCYTES NFR BLD AUTO: 23.6 % (ref 19.6–45.3)
MCH RBC QN AUTO: 32.8 PG (ref 26.6–33)
MCHC RBC AUTO-ENTMCNC: 34.1 G/DL (ref 31.5–35.7)
MCV RBC AUTO: 96.1 FL (ref 79–97)
MONOCYTES # BLD AUTO: 0.52 10*3/MM3 (ref 0.1–0.9)
MONOCYTES NFR BLD AUTO: 8.9 % (ref 5–12)
NEUTROPHILS # BLD AUTO: 3.67 10*3/MM3 (ref 1.7–7)
NEUTROPHILS NFR BLD AUTO: 62.8 % (ref 42.7–76)
NRBC BLD AUTO-RTO: 0 /100 WBC (ref 0–0.2)
PLATELET # BLD AUTO: 225 10*3/MM3 (ref 140–450)
POTASSIUM SERPL-SCNC: 4 MMOL/L (ref 3.5–5.2)
PROT SERPL-MCNC: 6.2 G/DL (ref 6–8.5)
RBC # BLD AUTO: 4.64 10*6/MM3 (ref 4.14–5.8)
SODIUM SERPL-SCNC: 139 MMOL/L (ref 136–145)
TRIGL SERPL-MCNC: 105 MG/DL (ref 0–150)
VLDLC SERPL CALC-MCNC: 19 MG/DL (ref 5–40)
WBC # BLD AUTO: 5.84 10*3/MM3 (ref 3.4–10.8)

## 2021-12-03 ENCOUNTER — HOSPITAL ENCOUNTER (OUTPATIENT)
Dept: PHYSICAL THERAPY | Facility: HOSPITAL | Age: 71
Setting detail: THERAPIES SERIES
Discharge: HOME | End: 2021-12-03

## 2021-12-03 DIAGNOSIS — M51.36 DDD (DEGENERATIVE DISC DISEASE), LUMBAR: Primary | ICD-10-CM

## 2021-12-03 PROCEDURE — 97110 THERAPEUTIC EXERCISES: CPT | Performed by: PHYSICAL THERAPIST

## 2021-12-03 PROCEDURE — 97161 PT EVAL LOW COMPLEX 20 MIN: CPT | Performed by: PHYSICAL THERAPIST

## 2021-12-04 NOTE — THERAPY EVALUATION
Outpatient Physical Therapy Ortho Initial Evaluation  [facility]     Patient Name: Ephraim Quinones  : 1950  MRN: [id number]  Today's Date: 12/3/2021      Visit Date: 2021     Attendance: 1 visits  Subjective improvement: N/A  MD appt: 2022  Recheck due: 2021      Patient Active Problem List   Diagnosis   • Type 2 diabetes mellitus without complication (HCC)   • HTN (hypertension)   • A-fib (HCC)   • Hx of adenomatous colonic polyps   • Morbidly obese (HCC)   • Phimosis   • Sacroiliitis, not elsewhere classified (HCC)        Past Medical History:   Diagnosis Date   • A-fib (HCC)    • Acid reflux    • Anemia 5/3/2016   • Diabetes mellitus (HCC)    • Gout    • Hyperlipidemia    • Hypertension    • Sleep apnea with use of continuous positive airway pressure (CPAP)         Past Surgical History:   Procedure Laterality Date   • BACK SURGERY      x2   • CIRCUMCISION N/A 2019    Procedure: CIRCUMCISION;  Surgeon: Yon Sloan MD;  Location: [facility] OR;  Service: Urology   • FINGER SURGERY     • REPLACEMENT TOTAL KNEE Bilateral        Visit Dx:     ICD-10-CM ICD-9-CM   1. DDD (degenerative disc disease), lumbar  M51.36 722.52          Patient History     Row Name 21 0900             History    Chief Complaint Difficulty Walking; Joint stiffness; Pain  -KG      Type of Pain Back pain; Lower Extremity / Leg  -KG      Date Current Problem(s) Began --  Chronic; recent exacerbation 2021  -KG      Brief Description of Current Complaint Pt presents with c/o low back and BLE pain. Pt state he had a L4-S1 laminectomy/fusion in . Pain mostly R sided but can hurt all the way across. Also states he has R sided sciatic nerve pain, travels down post hip and down post thigh. This is intermittent but mostly with standing/upright. Fell off a ladder in September which really exacerbated his pain. States it bruised pretty bad but didn’t fracture anything. Wears a  back brace which helps. Takes muscle relaxers and pain medicine as prescribed. States mornings are always better, pain gets worse throughout the day. Does use a cane if going to doctors or if he knows he’s going to be walking for longer periods. Has pain with weather changes too. 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 Can carry feed buckets but has to keep them light weight. Doesn't use more than 20#.  -KG      Patient/Caregiver Goals Relieve pain; Improve mobility; Know what to do to help the symptoms  -KG      Occupation/sports/leisure activities Pt is retired. Has small farm (cattle, goats, chickens)  -KG      What clinical tests have you had for this problem? X-ray  Has MRI scheduled for 12/4/2021  -KG      Results of Clinical Tests L4-S1 fusion, advanced disc space collopase and kyphosis at L3-L4 level  -KG              Pain     Pain Location Back; Leg  -KG      Pain at Present 3  -KG      Pain at Best 0  -KG      Pain at Worst 8  -KG      Pain Frequency Intermittent; Several days a week  -KG      Pain Description Aching; Sharp; Radiating; Tightness  -KG      What Performance Factors Make the Current Problem(s) WORSE? Standing/walking, lifting, sitting for too long  -KG      What Performance Factors Make the Current Problem(s) BETTER? heating pad, salonpas patches, sitting  -KG      Is your sleep disturbed? Yes  -KG      Is medication used to assist with sleep? Yes  -KG      What position do you sleep in? --  Mostly on L side with pillow between knees  -KG              Fall Risk Assessment    Any falls in the past year: Yes  -KG      Number of falls reported in the last 12 months 1  -KG      Factors that contributed to the fall: Other (comment)  Fell from ladder  -KG              Services    Are you currently receiving Home Health services No  -KG      Do you plan to receive Home Health services in the near future No  -KG            User Key  (r) = Recorded By, (t) = Taken By, (c) = Cosigned  By    Initials Name Provider Type    KG Haleigh Flores, PT Physical Therapist                 PT Ortho     Row Name 12/03/21 0900       Subjective Comments    Subjective Comments Refer to therapy pt history for details.  -KG       Precautions and Contraindications    Precautions A-fib, L4-S1 fusion  -KG       Posture/Observations    Posture/Observations Comments Decreased/poor overall postural awareness. Slightly slumped when seated. Wearing back brace this date for support. Supine R IC superior, R ASIS superior as best could tell. Some better after MET/LAD but not full correction. Pt overall deconditioned. Decreased abdominal tone and core stability.  -KG       Quarter Clearing    Quarter Clearing Lower Quarter Clearing  -KG       Neural Tension Signs- Lower Quarter Clearing    Slump Bilateral:; Negative  -KG    SLR Bilateral:; Negative  -KG       Sensory Screen for Light Touch- Lower Quarter Clearing    L1 (inguinal area) Bilateral:; Intact  -KG    L2 (anterior mid thigh) Bilateral:; Intact  -KG    L3 (distal anterior thigh) Bilateral:; Intact  -KG    L4 (medial lower leg/foot) Bilateral:; Intact  -KG    L5 (lateral lower leg/great toe) Bilateral:; Intact  -KG    S1 (bottom of foot) Bilateral:; Intact  -KG       SI/Hip Screen- Lower Quarter Clearing    ASIS compression Negative  -KG    ASIS distraction Negative  -KG    Taylor's/Vick's test Right:; Positive  -KG    Posterior thigh sheer Bilateral:; Negative  -KG       Special Tests/Palpation    Special Tests/Palpation Lumbar/SI  -KG       Lumbosacral Palpation    Lumbosacral Palpation? Yes  -KG    Lumbosacral Segment Right:; Tender  -KG    Gluteus Mark Bilateral:; Tender; Guarded/taut  -KG    Erector Spinae (Paraspinals) Bilateral:; Tender; Guarded/taut  R>L  -KG       General ROM    Head/Neck/Trunk Trunk Extension; Trunk Flexion; Trunk Lt Lateral Flexion; Trunk Rt Lateral Flexion; Trunk Rt Rotation; Trunk Lt Rotation  -KG    GENERAL ROM COMMENTS Bilateral  "hip, knee, ankle AROM WFL. Taut with passive hip IR>ER on R.  -KG       Head/Neck/Trunk    Trunk Extension AROM 25% full range with increased low back pain  -KG    Trunk Flexion AROM Fingertips 1\" above knee jt line with low back pain more on return to neutral  -KG    Trunk Lt Lateral Flexion AROM 50% full range with pain contralateral  -KG    Trunk Rt Lateral Flexion AROM 50% full range with pain ipsilaterally  -KG       MMT (Manual Muscle Testing)    Rt Lower Ext Rt Hip Flexion; Rt Hip Extension; Rt Hip ABduction; Rt Hip ADduction; Rt Knee Extension; Rt Knee Flexion; Rt Ankle Plantarflexion; Rt Ankle Dorsiflexion  -KG    Lt Lower Ext Lt Hip Flexion; Lt Hip Extension; Lt Hip ABduction; Lt Hip ADduction; Lt Knee Extension; Lt Knee Flexion; Lt Ankle Dorsiflexion; Lt Ankle Plantarflexion  -KG       MMT Right Lower Ext    Rt Hip Flexion MMT, Gross Movement (4/5) good  -KG    Rt Hip ABduction MMT, Gross Movement (4-/5) good minus  -KG    Rt Hip ADduction MMT, Gross Movement (4-/5) good minus  -KG    Rt Knee Extension MMT, Gross Movement (4+/5) good plus  -KG    Rt Knee Flexion MMT, Gross Movement (4+/5) good plus  -KG    Rt Ankle Plantarflexion MMT, Gross Movement (5/5) normal  -KG    Rt Ankle Dorsiflexion MMT, Gross Movement (5/5) normal  -KG       MMT Left Lower Ext    Lt Hip Flexion MMT, Gross Movement (4+/5) good plus  -KG    Lt Hip ABduction MMT, Gross Movement (4/5) good  -KG    Lt Hip ADduction MMT, Gross Movement (4+/5) good plus  -KG    Lt Knee Extension MMT, Gross Movement (5/5) normal  -KG    Lt Knee Flexion MMT, Gross Movement (5/5) normal  -KG    Lt Ankle Plantarflexion MMT, Gross Movement (5/5) normal  -KG    Lt Ankle Dorsiflexion MMT, Gross Movement (5/5) normal  -KG       Sensation    Sensation WNL? WNL  -KG    Light Touch No apparent deficits  -KG       Flexibility    Flexibility Tested? Lower Extremity  -KG       Lower Extremity Flexibility    Hamstrings Bilateral:; Moderately limited  -KG    Hip " Flexors Bilateral:; Moderately limited  -KG    Hip External Rotators Bilateral:; Mildly limited  -KG    Hip Internal Rotators Bilateral:; Mildly limited  R>L  -KG       Transfers    Comment (Transfers) Guarded with sit<>stand transfer. Difficulty with sup<>sit transfer with cues for log roll as able  -KG       Gait/Stairs (Locomotion)    Comment (Gait/Stairs) Slow, guarded gait at times. Wide JENNY. Slight toe out bilateral.  -KG          User Key  (r) = Recorded By, (t) = Taken By, (c) = Cosigned By    Initials Name Provider Type    KG Haleigh Flores, PT Physical Therapist                            Therapy Education  Education Details: HEP: see exercise flowsheet for details  Given: HEP, Symptoms/condition management, Pain management, Posture/body mechanics  Program: New  How Provided: Verbal, Demonstration, Written  Provided to: Patient  Level of Understanding: Teach back education performed, Verbalized, Demonstrated      PT OP Goals     Row Name 12/03/21 0900          PT Short Term Goals    STG Date to Achieve 12/24/21  -KG     STG 1 Demonstrate independence/compliance in performance of initial HEP  -KG     STG 2 Demonstrate independence in performance of isometric TA contraction with therex activities for functional carryover into daily activity performance  -KG     STG 3 Demonstrate improved seated PN/postural positioning with seated core/LE strengthening activities with minimal cues for correction  -KG     STG 4 Demonstrate improved lumbar flex AROM fingertips to mid shin without increased pain  -KG     STG 5 Demonstrate improved R hip abd MMT to 4/5  -KG            Long Term Goals    LTG Date to Achieve 01/14/22  -KG     LTG 1 Subjectively report 50% overall improvement or greater  -KG     LTG 2 Improve modified oswestry score to 40% or less  -KG     LTG 3 Demonstrate improved bilateral hip flex/abd MMT to 4+/5  -KG     LTG 4 Tolerate 15-20 minutes of standing functional stabilization activities without  increased pain  -KG     LTG 5 Demonstrate proper lifting/body mechanics when lifting/carrying 10-20# crate without increased pain  -KG     LTG 6 Ascend/descend training steps (4 in/6 in) 1x5 with reciprocal pattern without increased pain, handrails as needed  -KG            Time Calculation    PT Goal Re-Cert Due Date 12/24/21  -KG           User Key  (r) = Recorded By, (t) = Taken By, (c) = Cosigned By    Initials Name Provider Type    KG Haleigh Flores, PT Physical Therapist                 PT Assessment/Plan     Row Name 12/03/21 0900          PT Assessment    Functional Limitations Impaired gait; Limitation in home management; Limitations in community activities; Performance in leisure activities; Limitations in functional capacity and performance  -KG     Impairments Gait; Impaired flexibility; Impaired muscle endurance; Muscle strength; Pain; Joint mobility; Poor body mechanics; Posture; Range of motion  -KG     Assessment Comments Pt is a 71 y.o. male presenting with exacerbation of chronic low back and LE pain that is limiting performance of functional mobility and daily activities. Pt with negative neurotension signs in BLE’s but does present with moderate hamstring and rotator tightness at bilateral hips R>L. Pt’s signs/symptoms consistent with arthritic/stenotic changes. Does have history of L4-S1 fusion in 2019. Pt with decreased tolerance to supine/semi-recumbent positioning. Did better with seated activities and positioning this date. Pt with notable weakness at core and hips, as well decreased postural awareness. Pt will benefit from skilled PT services to address these deficits for improvements in overall functional mobility, stability, strength, and activity tolerance.  -KG     Please refer to paper survey for additional self-reported information Yes  -KG     Rehab Potential Good  Barries: chronicty of current condition; degenerative changes  -KG     Patient/caregiver participated in  "establishment of treatment plan and goals Yes  -KG     Patient would benefit from skilled therapy intervention Yes  -KG            PT Plan    PT Frequency 2x/week  -KG     Predicted Duration of Therapy Intervention (PT) 12 visits  -KG     Planned CPT's? PT EVAL LOW COMPLEXITY: 96149; PT RE-EVAL: 90528; PT THER PROC EA 15 MIN: 08283; PT THER ACT EA 15 MIN: 59209; PT MANUAL THERAPY EA 15 MIN: 97248; PT NEUROMUSC RE-EDUCATION EA 15 MIN: 08259; PT GAIT TRAINING EA 15 MIN: 51997; PT SELF CARE/HOME MGMT/TRAIN EA 15: 15171  -KG     Physical Therapy Interventions (Optional Details) home exercise program; joint mobilization; lumbar stabilization; manual therapy techniques; modalities; neuromuscular re-education; patient/family education; postural re-education; ROM (Range of Motion); strengthening; stretching  -KG     PT Plan Comments Focus on overall core/hip and postural stability. LE stretching. Decreased tolerance to supine/semi-recumbent positioning but tolerated sitting well. Continue manual interventions leaning over mat table. Continue LE stretching. Posture/ awareness.  -KG           User Key  (r) = Recorded By, (t) = Taken By, (c) = Cosigned By    Initials Name Provider Type    KG Haleigh Flores, PT Physical Therapist                   OP Exercises     Row Name 12/03/21 0900             Subjective Comments    Subjective Comments Refer to therapy pt history for details.  -KG              Subjective Pain    Able to rate subjective pain? yes  -KG      Pre-Treatment Pain Level 3  -KG      Post-Treatment Pain Level 3  -KG              Exercise 1    Exercise Name 1 Seated michael hamstring stretch  -KG      Reps 1 2  -KG      Time 1 30\" hold  -KG              Exercise 2    Exercise Name 2 Seated PN/pelvic tilt; posture re-ed  -KG      Time 2 5 min  -KG              Exercise 3    Exercise Name 3 Seated alt LAQ with posture  -KG      Sets 3 1  -KG      Reps 3 10  -KG            User Key  (r) = Recorded By, (t) = " Taken By, (c) = Cosigned By    Initials Name Provider Type    Haleigh Forde, PT Physical Therapist              Manual Rx (last 36 hours)     Manual Treatments     Row Name 12/03/21 0900             Manual Rx 1    Manual Rx 1 Location R hip flexor, L hamstring  -KG      Manual Rx 1 Type MET  -KG              Manual Rx 2    Manual Rx 2 Location RLE  -KG      Manual Rx 2 Type gentle LAD  -KG            User Key  (r) = Recorded By, (t) = Taken By, (c) = Cosigned By    Initials Name Provider Type    Haleigh Forde, PT Physical Therapist                            Outcome Measure Options: Modifed Owestry  Modified Oswestry  Modified Oswestry Score/Comments: 29 = 58%      Time Calculation:     Start Time: 0853  Stop Time: 0937  Time Calculation (min): 44 min  Total Timed Code Minutes- PT: 18 minute(s)     Therapy Charges for Today     Code Description Service Date Service Provider Modifiers Qty    07683017591 HC PT THER PROC EA 15 MIN 12/3/2021 Haleigh Flores, PT GP 1    15859142241 HC PT EVAL LOW COMPLEXITY 2 12/3/2021 Haleigh Flores, PT GP 1          PT G-Codes  Outcome Measure Options: Modifed Owestry  Modified Oswestry Score/Comments: 29 = 58%         Haleigh Flores PT  12/3/2021

## 2021-12-07 ENCOUNTER — HOSPITAL ENCOUNTER (OUTPATIENT)
Dept: PHYSICAL THERAPY | Facility: HOSPITAL | Age: 71
Setting detail: THERAPIES SERIES
Discharge: HOME | End: 2021-12-07

## 2021-12-07 DIAGNOSIS — M51.36 DDD (DEGENERATIVE DISC DISEASE), LUMBAR: Primary | ICD-10-CM

## 2021-12-07 LAB — DRUGS UR: NORMAL

## 2021-12-07 PROCEDURE — 97140 MANUAL THERAPY 1/> REGIONS: CPT

## 2021-12-07 PROCEDURE — 97110 THERAPEUTIC EXERCISES: CPT

## 2021-12-07 NOTE — THERAPY TREATMENT NOTE
Outpatient Physical Therapy Ortho Treatment Note  [facility], [place], operated by [facility]     Patient Name: Ephraim Quinones  : 1950  MRN: [id number]  Today's Date: 2021      Visit Date: 2021    Attendance: 2 visits  Subjective improvement: N/A  MD appt: 22  Recheck due: 21    Visit Dx:    ICD-10-CM ICD-9-CM   1. DDD (degenerative disc disease), lumbar  M51.36 722.52       Patient Active Problem List   Diagnosis   • Type 2 diabetes mellitus without complication (HCC)   • HTN (hypertension)   • A-fib (HCC)   • Hx of adenomatous colonic polyps   • Morbidly obese (HCC)   • Phimosis   • Sacroiliitis, not elsewhere classified (HCC)        Past Medical History:   Diagnosis Date   • A-fib (HCC)    • Acid reflux    • Anemia 5/3/2016   • Diabetes mellitus (HCC)    • Gout    • Hyperlipidemia    • Hypertension    • Sleep apnea with use of continuous positive airway pressure (CPAP)         Past Surgical History:   Procedure Laterality Date   • BACK SURGERY      x2   • CIRCUMCISION N/A 2019    Procedure: CIRCUMCISION;  Surgeon: Yon Sloan MD;  Location: [facility];  Service: Urology   • FINGER SURGERY     • REPLACEMENT TOTAL KNEE Bilateral         PT Ortho     Row Name 21 1000       Subjective Comments    Subjective Comments pt states that he isnt doing well today. States that he forgot his back brace, states that he wears it all day every day usually.  -KM       Precautions and Contraindications    Precautions A-fib, L4-S1 fusion  -KM       Subjective Pain    Post-Treatment Pain Level 3  -KM       Posture/Observations    Posture/Observations Comments No back brace today. Poor postural awareness. pt does not tolerate semi-recumbent or supine very well.  -KM          User Key  (r) = Recorded By, (t) = Taken By, (c) = Cosigned By    Initials Name Provider Type    Dianna Erniquez PTA Physical Therapy Assistant                             PT Assessment/Plan     Row Name 21  1000          PT Assessment    Assessment Comments Attempted LE stretches in semi-recumbent position but unable to tolerate. Tolerated seated core stab better. pt had thick muscle band tightness on R side that was quite tender/sore to touch. Did feel some better post treatment.  -KM            PT Plan    PT Frequency 2x/week  -KM     PT Plan Comments Try seated mid rows and shoulder extension TA/PN  -KM           User Key  (r) = Recorded By, (t) = Taken By, (c) = Cosigned By    Initials Name Provider Type    Dianna Enriquez PTA Physical Therapy Assistant                   OP Exercises     Row Name 12/07/21 1000             Precautions    Existing Precautions/Restrictions no known precautions/restrictions  -KM              Subjective Comments    Subjective Comments pt states that he isnt doing well today. States that he forgot his back brace, states that he wears it all day every day usually.  -KM              Subjective Pain    Able to rate subjective pain? yes  -KM      Pre-Treatment Pain Level 5  -KM      Post-Treatment Pain Level 3  -KM              Exercise 1    Exercise Name 1 Seated B HS S  -KM      Reps 1 2  -KM      Time 1 30 sec hold  -KM              Exercise 2    Exercise Name 2 Semi-recumbent manual SKTC S  -KM      Reps 2 2  -KM      Time 2 30 sec hold  -KM      Additional Comments bilateral  -KM              Exercise 3    Exercise Name 3 Seated TA/PN education  -KM              Exercise 4    Exercise Name 4 Seated TA/PN + LAQ  -KM      Sets 4 2  -KM      Reps 4 10  -KM              Exercise 5    Exercise Name 5 Seated TA/PN + march  -KM      Sets 5 2  -KM      Reps 5 10  -KM              Exercise 6    Exercise Name 6 Seated TA/PN + hip add squeezes  -KM      Sets 6 2  -KM      Reps 6 10  -KM      Time 6 5 sec hold  -KM              Exercise 7    Exercise Name 7 Seated TA/PN + hip abd  -KM      Sets 7 2  -KM      Reps 7 10  -KM      Additional Comments red  -KM              Exercise 8     Exercise Name 8 Sit to stands  -KM      Sets 8 1  -KM      Reps 8 5  -KM      Additional Comments BUE assist  -KM            User Key  (r) = Recorded By, (t) = Taken By, (c) = Cosigned By    Initials Name Provider Type    Dianna Enriquez PTA Physical Therapy Assistant                         Manual Rx (last 36 hours)     Manual Treatments     Row Name 12/07/21 1300             Manual Rx 1    Manual Rx 1 Location Lumbar (mostly R side)  -KM      Manual Rx 1 Type STM/MFR  -KM            User Key  (r) = Recorded By, (t) = Taken By, (c) = Cosigned By    Initials Name Provider Type    Dianna Enriquez PTA Physical Therapy Assistant                 PT OP Goals     Row Name 12/07/21 1000          PT Short Term Goals    STG Date to Achieve 12/24/21  -KM     STG 1 Demonstrate independence/compliance in performance of initial HEP  -KM     STG 1 Progress Ongoing  -KM     STG 2 Demonstrate independence in performance of isometric TA contraction with therex activities for functional carryover into daily activity performance  -KM     STG 2 Progress Ongoing  -KM     STG 3 Demonstrate improved seated PN/postural positioning with seated core/LE strengthening activities with minimal cues for correction  -KM     STG 3 Progress Ongoing  -KM     STG 4 Demonstrate improved lumbar flex AROM fingertips to mid shin without increased pain  -KM     STG 4 Progress Ongoing  -KM     STG 5 Demonstrate improved R hip abd MMT to 4/5  -KM     STG 5 Progress Ongoing  -KM            Long Term Goals    LTG Date to Achieve 01/14/22  -KM     LTG 1 Subjectively report 50% overall improvement or greater  -KM     LTG 1 Progress Ongoing  -KM     LTG 2 Improve modified oswestry score to 40% or less  -KM     LTG 2 Progress Ongoing  -KM     LTG 3 Demonstrate improved bilateral hip flex/abd MMT to 4+/5  -KM     LTG 3 Progress Ongoing  -KM     LTG 4 Tolerate 15-20 minutes of standing functional stabilization activities without increased pain   -KM     LTG 4 Progress Ongoing  -KM     LTG 5 Demonstrate proper lifting/body mechanics when lifting/carrying 10-20# crate without increased pain  -KM     LTG 5 Progress Ongoing  -KM     LTG 6 Ascend/descend training steps (4 in/6 in) 1x5 with reciprocal pattern without increased pain, handrails as needed  -KM     LTG 6 Progress Ongoing  -KM            Time Calculation    PT Goal Re-Cert Due Date 12/24/21  -KM           User Key  (r) = Recorded By, (t) = Taken By, (c) = Cosigned By    Initials Name Provider Type     Dianna Watkins PTA Physical Therapy Assistant                Therapy Education  Given: HEP, Symptoms/condition management, Pain management, Posture/body mechanics  Program: New  How Provided: Verbal, Demonstration, Written  Provided to: Patient  Level of Understanding: Teach back education performed, Verbalized, Demonstrated              Time Calculation:   Start Time: 1055  Stop Time: 1200  Time Calculation (min): 65 min  Therapy Charges for Today     Code Description Service Date Service Provider Modifiers Qty    [id number]  PT THER PROC EA 15 MIN 12/7/2021 Dianna Watkins PTA GP 3    [id number]  PT MANUAL THERAPY EA 15 MIN 12/7/2021 Dianna Watkins PTA GP 1                    Dianna Watkins PTA  12/7/2021

## 2021-12-10 ENCOUNTER — APPOINTMENT (OUTPATIENT)
Dept: PHYSICAL THERAPY | Facility: HOSPITAL | Age: 71
End: 2021-12-10

## 2021-12-14 ENCOUNTER — APPOINTMENT (OUTPATIENT)
Dept: PHYSICAL THERAPY | Facility: HOSPITAL | Age: 71
End: 2021-12-14

## 2021-12-16 ENCOUNTER — HOSPITAL ENCOUNTER (OUTPATIENT)
Dept: PHYSICAL THERAPY | Facility: HOSPITAL | Age: 71
Setting detail: THERAPIES SERIES
Discharge: HOME | End: 2021-12-16

## 2021-12-16 DIAGNOSIS — M51.36 DDD (DEGENERATIVE DISC DISEASE), LUMBAR: Primary | ICD-10-CM

## 2021-12-16 PROCEDURE — 97140 MANUAL THERAPY 1/> REGIONS: CPT

## 2021-12-16 PROCEDURE — 97110 THERAPEUTIC EXERCISES: CPT

## 2021-12-16 NOTE — THERAPY TREATMENT NOTE
Outpatient Physical Therapy Ortho Treatment Note  [facility]     Patient Name: Ephraim Quinones  : 1950  MRN: [id number]  Today's Date: 2021      Visit Date: 2021    Attendance: 3 visits  Subjective improvement: n/a  MD appt: 22  Recheck due: 21    Visit Dx:    ICD-10-CM ICD-9-CM   1. DDD (degenerative disc disease), lumbar  M51.36 722.52       Patient Active Problem List   Diagnosis   • Type 2 diabetes mellitus without complication (HCC)   • HTN (hypertension)   • A-fib (HCC)   • Hx of adenomatous colonic polyps   • Morbidly obese (HCC)   • Phimosis   • Sacroiliitis, not elsewhere classified (HCC)        Past Medical History:   Diagnosis Date   • A-fib (HCC)    • Acid reflux    • Anemia 5/3/2016   • Diabetes mellitus (HCC)    • Gout    • Hyperlipidemia    • Hypertension    • Sleep apnea with use of continuous positive airway pressure (CPAP)         Past Surgical History:   Procedure Laterality Date   • BACK SURGERY      x2   • CIRCUMCISION N/A 2019    Procedure: CIRCUMCISION;  Surgeon: Yon Sloan MD;  Location: [facility];  Service: Urology   • FINGER SURGERY     • REPLACEMENT TOTAL KNEE Bilateral         PT Ortho     Row Name 21 0900       Precautions and Contraindications    Precautions A-fib, L4-S1 fusion  -KM       Subjective Pain    Able to rate subjective pain? yes  -KM    Post-Treatment Pain Level 4  -KM       Posture/Observations    Posture/Observations Comments Tolerated seated postion better than supine/semi-recumbent  -KM          User Key  (r) = Recorded By, (t) = Taken By, (c) = Cosigned By    Initials Name Provider Type    Dianna Enriquez PTA Physical Therapy Assistant                             PT Assessment/Plan     Row Name 21 0900          PT Assessment    Assessment Comments pt did well with treatment today. pt able to show some better PN in chair. Complained of mosty R sided low back and hip pain. Does show  good effort. Updated HEP.  -KM            PT Plan    PT Frequency 2x/week  -KM     PT Plan Comments Could possibly progress to gentle standing alt march + TA. Could try PROII warm up  -KM           User Key  (r) = Recorded By, (t) = Taken By, (c) = Cosigned By    Initials Name Provider Type    Dianna Enriquez PTA Physical Therapy Assistant                   OP Exercises     Row Name 12/16/21 0900             Precautions    Existing Precautions/Restrictions no known precautions/restrictions  -KM              Subjective Comments    Subjective Comments pt states that his back is bothering him really bad today. States he feels that  -KM              Subjective Pain    Able to rate subjective pain? yes  -KM      Pre-Treatment Pain Level 8  -KM      Post-Treatment Pain Level 4  -KM              Exercise 1    Exercise Name 1 Seated B HS S  -KM      Reps 1 2  -KM      Time 1 30 sec hold  -KM              Exercise 2    Exercise Name 2 Seated TA/PN + LAQ  -KM      Sets 2 2  -KM      Reps 2 10  -KM              Exercise 3    Exercise Name 3 Seated TA/PN + march  -KM      Sets 3 2  -KM      Reps 3 10  -KM              Exercise 4    Exercise Name 4 Seated TA/PN + hip add squeezes  -KM      Sets 4 2  -KM      Reps 4 10  -KM      Time 4 5 sec hold  -KM              Exercise 5    Exercise Name 5 Seated TA/PN + hip abd  -KM      Sets 5 2  -KM      Reps 5 10  -KM      Additional Comments red  -KM              Exercise 6    Exercise Name 6 Seated TA/PN + mid rows  -KM      Sets 6 2  -KM      Reps 6 10  -KM      Additional Comments red  -KM              Exercise 7    Exercise Name 7 Seated TA/PN + shoulder ext  -KM      Sets 7 2  -KM      Reps 7 10  -KM      Additional Comments red  -KM              Exercise 8    Exercise Name 8 Sit to stands  -KM      Sets 8 2  -KM      Reps 8 5  -KM      Additional Comments airex in chair; no UE assist  -KM              Exercise 9    Exercise Name 9 Lateral side stepping @ HR  -KM       Sets 9 1  -KM      Reps 9 4 laps  -KM            User Key  (r) = Recorded By, (t) = Taken By, (c) = Cosigned By    Initials Name Provider Type    Dianna Enriquez PTA Physical Therapy Assistant                         Manual Rx (last 36 hours)     Manual Treatments     Row Name 12/16/21 0900             Manual Rx 1    Manual Rx 1 Location Lumbar (mostly R side)  -KM      Manual Rx 1 Type STM/MFR  +biofreeze  -KM            User Key  (r) = Recorded By, (t) = Taken By, (c) = Cosigned By    Initials Name Provider Type    Dianna Enriquez PTA Physical Therapy Assistant                 PT OP Goals     Row Name 12/16/21 0900          PT Short Term Goals    STG Date to Achieve 12/24/21  -KM     STG 1 Demonstrate independence/compliance in performance of initial HEP  -KM     STG 1 Progress Ongoing  -KM     STG 2 Demonstrate independence in performance of isometric TA contraction with therex activities for functional carryover into daily activity performance  -KM     STG 2 Progress Ongoing  -KM     STG 3 Demonstrate improved seated PN/postural positioning with seated core/LE strengthening activities with minimal cues for correction  -KM     STG 3 Progress Ongoing  -KM     STG 4 Demonstrate improved lumbar flex AROM fingertips to mid shin without increased pain  -KM     STG 4 Progress Ongoing  -KM     STG 5 Demonstrate improved R hip abd MMT to 4/5  -KM     STG 5 Progress Ongoing  -KM            Long Term Goals    LTG Date to Achieve 01/14/22  -KM     LTG 1 Subjectively report 50% overall improvement or greater  -KM     LTG 1 Progress Ongoing  -KM     LTG 2 Improve modified oswestry score to 40% or less  -KM     LTG 2 Progress Ongoing  -KM     LTG 3 Demonstrate improved bilateral hip flex/abd MMT to 4+/5  -KM     LTG 3 Progress Ongoing  -KM     LTG 4 Tolerate 15-20 minutes of standing functional stabilization activities without increased pain  -KM     LTG 4 Progress Ongoing  -KM     LTG 5 Demonstrate  proper lifting/body mechanics when lifting/carrying 10-20# crate without increased pain  -KM     LTG 5 Progress Ongoing  -KM     LTG 6 Ascend/descend training steps (4 in/6 in) 1x5 with reciprocal pattern without increased pain, handrails as needed  -KM     LTG 6 Progress Ongoing  -KM            Time Calculation    PT Goal Re-Cert Due Date 12/24/21  -KM           User Key  (r) = Recorded By, (t) = Taken By, (c) = Cosigned By    Initials Name Provider Type    Dianna Enriquez PTA Physical Therapy Assistant                Therapy Education  Education Details: seated hip abd, seated TA/PN LAQ, Seated TA/PN + march  Given: HEP, Symptoms/condition management, Pain management, Posture/body mechanics  Program: New  How Provided: Verbal, Demonstration, Written  Provided to: Patient  Level of Understanding: Teach back education performed, Verbalized, Demonstrated              Time Calculation:   Start Time: 0900  Stop Time: 0959  Time Calculation (min): 59 min  Therapy Charges for Today     Code Description Service Date Service Provider Modifiers Qty    [id number]  PT THER PROC EA 15 MIN 12/16/2021 Dianna Hernández PTA GP 3    [id number]  PT MANUAL THERAPY EA 15 MIN 12/16/2021 Dianna Hernández PTA GP 1                    Dianna Hernández PTA  12/16/2021

## 2021-12-22 ENCOUNTER — APPOINTMENT (OUTPATIENT)
Dept: PHYSICAL THERAPY | Facility: HOSPITAL | Age: 71
End: 2021-12-22

## 2021-12-28 ENCOUNTER — TELEPHONE (OUTPATIENT)
Dept: FAMILY MEDICINE CLINIC | Facility: CLINIC | Age: 71
End: 2021-12-28

## 2021-12-28 NOTE — TELEPHONE ENCOUNTER
Caller: Ephraim Quinones    Relationship: Self    Best call back number: [phone]    What is the best time to reach you: ANYTIME     Who are you requesting to speak with (clinical staff, provider,  specific staff member): JUANITA         What was the call regarding: PATIENT REQUESTING A CALL BACK TO DISCUSS SOME QUESTIONS HE HAS  ABOUT HIS FLUID PILL    Do you require a callback: YES

## 2021-12-29 ENCOUNTER — HOSPITAL ENCOUNTER (OUTPATIENT)
Dept: PHYSICAL THERAPY | Facility: HOSPITAL | Age: 71
Setting detail: THERAPIES SERIES
Discharge: HOME | End: 2021-12-29

## 2021-12-29 DIAGNOSIS — M51.36 DDD (DEGENERATIVE DISC DISEASE), LUMBAR: Primary | ICD-10-CM

## 2021-12-29 PROCEDURE — 97140 MANUAL THERAPY 1/> REGIONS: CPT | Performed by: PHYSICAL THERAPIST

## 2021-12-29 PROCEDURE — 97110 THERAPEUTIC EXERCISES: CPT | Performed by: PHYSICAL THERAPIST

## 2021-12-29 NOTE — THERAPY PROGRESS REPORT/RE-CERT
Outpatient Physical Therapy Ortho Progress Note  [facility] ER/[place]     Patient Name: Ephraim Quinones  : 1950  MRN: [id number]  Today's Date: 2021      Visit Date: 2021     Attendance: 4 visits  Subjective improvement: 25% improvement  MD appt: 2022  Recheck due: 2022    Visit Dx:    ICD-10-CM ICD-9-CM   1. DDD (degenerative disc disease), lumbar  M51.36 722.52       Patient Active Problem List   Diagnosis   • Type 2 diabetes mellitus without complication (HCC)   • HTN (hypertension)   • A-fib (HCC)   • Hx of adenomatous colonic polyps   • Morbidly obese (HCC)   • Phimosis   • Sacroiliitis, not elsewhere classified (HCC)        Past Medical History:   Diagnosis Date   • A-fib (HCC)    • Acid reflux    • Anemia 5/3/2016   • Diabetes mellitus (HCC)    • Gout    • Hyperlipidemia    • Hypertension    • Sleep apnea with use of continuous positive airway pressure (CPAP)         Past Surgical History:   Procedure Laterality Date   • BACK SURGERY      x2   • CIRCUMCISION N/A 2019    Procedure: CIRCUMCISION;  Surgeon: Yon Sloan MD;  Location: [facility] OR;  Service: Urology   • FINGER SURGERY     • REPLACEMENT TOTAL KNEE Bilateral         PT Ortho     Row Name 21 0800       Precautions and Contraindications    Precautions A-fib, L4-S1 fusion  -KG       Posture/Observations    Posture/Observations Comments Continues with decrease postural awareness. Doing better finding/maintaining PN positioning with seated activities. Better tolerance to seated positioning. Did not do any mat table activities or assessment due to pt request.  -KG       Neural Tension Signs- Lower Quarter Clearing    Slump Bilateral:; Negative  -KG       Sensory Screen for Light Touch- Lower Quarter Clearing    L1 (inguinal area) Bilateral:; Intact  -KG    L2 (anterior mid thigh) Bilateral:; Intact  -KG    L3 (distal anterior thigh) Bilateral:; Intact  -KG    L4 (medial lower leg/foot) Bilateral:;  "Intact  -KG    L5 (lateral lower leg/great toe) Bilateral:; Intact  -KG    S1 (bottom of foot) Bilateral:; Intact  -KG       Lumbosacral Palpation    Lumbosacral Segment Right:; Tender  -KG    Gluteus Mark Bilateral:; Tender; Guarded/taut  -KG    Erector Spinae (Paraspinals) Bilateral:; Tender; Guarded/taut  -KG    Lumbosacral Palpation Comments Pt very taut/TTP with thick band of tissue at R thoracolumbar paraspinals and towards lower ribs  -KG       General ROM    GENERAL ROM COMMENTS Bilateral hip, knee, ankle AROM WFL.  -KG       Head/Neck/Trunk    Trunk Extension AROM 50% full range with increased low back pain  -KG    Trunk Flexion AROM Fingertips 4\" below knee jt line with mild increase in low back pain  -KG    Trunk Lt Lateral Flexion AROM 75% full range with pain contralateral  -KG    Trunk Rt Lateral Flexion AROM 50% full range with pain ipsilaterally  -KG       MMT Right Lower Ext    Rt Hip Flexion MMT, Gross Movement (4+/5) good plus  -KG    Rt Hip ABduction MMT, Gross Movement (4/5) good  -KG    Rt Hip ADduction MMT, Gross Movement (4-/5) good minus  -KG    Rt Knee Extension MMT, Gross Movement (4+/5) good plus  -KG    Rt Knee Flexion MMT, Gross Movement (4+/5) good plus  -KG    Rt Ankle Plantarflexion MMT, Gross Movement (5/5) normal  -KG    Rt Ankle Dorsiflexion MMT, Gross Movement (5/5) normal  -KG       MMT Left Lower Ext    Lt Hip Flexion MMT, Gross Movement (4+/5) good plus  -KG    Lt Hip ABduction MMT, Gross Movement (4/5) good  -KG    Lt Hip ADduction MMT, Gross Movement (4+/5) good plus  -KG    Lt Knee Extension MMT, Gross Movement (5/5) normal  -KG    Lt Knee Flexion MMT, Gross Movement (5/5) normal  -KG    Lt Ankle Plantarflexion MMT, Gross Movement (5/5) normal  -KG    Lt Ankle Dorsiflexion MMT, Gross Movement (5/5) normal  -KG       Sensation    Sensation WNL? WNL  -KG    Light Touch No apparent deficits  -KG       Lower Extremity Flexibility    Hamstrings Bilateral:; Moderately limited  " -KG    Hip Flexors Bilateral:; Moderately limited  -KG    Hip External Rotators Moderately limited  -KG       Transfers    Comment (Transfers) Guarded with sit<>stand transfer  -KG       Gait/Stairs (Locomotion)    Comment (Gait/Stairs) Ambulates with no AD. Mildy decreased nicolas. Wide JENNY.  -KG          User Key  (r) = Recorded By, (t) = Taken By, (c) = Cosigned By    Initials Name Provider Type    KG Haleigh Flores, PT Physical Therapist                             PT Assessment/Plan     Row Name 12/29/21 0800          PT Assessment    Functional Limitations Impaired gait; Limitation in home management; Limitations in community activities; Performance in leisure activities; Limitations in functional capacity and performance  -KG     Impairments Gait; Impaired flexibility; Impaired muscle endurance; Muscle strength; Pain; Joint mobility; Poor body mechanics; Posture; Range of motion  -KG     Assessment Comments Pt progressing slowly overall with PT. Has missed ~1-2 weeks due to his wife's health issues/MD appts. Pt presents with less pain this date but reports he has good days and bad days. Pt seems to do well with seated core/hip activities. Better ability to find/maintain PN positioning with cues. Did progress towards some gentle standing therex with fair tolerance. Does demonstrate improvement in hip & lumbar mobility but still with increased pain. Pt quite TTP/taut at R thoracolumbar paraspinals and towards lower ribs. Pt still needs work on functional core/hip stabiltiy, postural awareness, functional strength, mobility/flexibility, & functional activity tolerance & will continue to benefit from skilled PT services.  -KG     Rehab Potential Good  arries: chronicty of current condition; degenerative changes  -KG     Patient/caregiver participated in establishment of treatment plan and goals Yes  -KG     Patient would benefit from skilled therapy intervention Yes  -KG            PT Plan    PT Frequency  "2x/week  -KG     Physical Therapy Interventions (Optional Details) 10 visits  -KG     PT Plan Comments Follow up on MRI results. Continue progressing seated core/postural stabilization activities. BLE strengthening. Continue gentle standing progressions. Continue manual interventions  -KG           User Key  (r) = Recorded By, (t) = Taken By, (c) = Cosigned By    Initials Name Provider Type    KG Haleigh Flores, PT Physical Therapist                   OP Exercises     Row Name 12/29/21 0800             Precautions    Existing Precautions/Restrictions no known precautions/restrictions  -KG              Subjective Comments    Subjective Comments Pt states he's not hurting too bad this morning. Overall feels like therapy is helping some. Sometimes seems like he can move a little better but still having pain. Has good days/bad days. Still wearing back brace throughout the day. states he can go down his steps better, up better too but still challenging.  -KG              Subjective Pain    Able to rate subjective pain? yes  -KG      Pre-Treatment Pain Level 2  -KG      Post-Treatment Pain Level 2  -KG              Exercise 1    Exercise Name 1 Pro II for warm up/conditioning  -KG      Time 1 5 min  -KG              Exercise 2    Exercise Name 2 Seated B HS S  -KG      Reps 2 2  -KG      Time 2 30\" hold  -KG              Exercise 3    Exercise Name 3 Sit to stands with TA  -KG      Cueing 3 Verbal  -KG      Sets 3 1  -KG      Reps 3 10  -KG      Additional Comments airex in chair; no UE assist  -KG              Exercise 4    Exercise Name 4 Seated TA/PN + LAQ  -KG      Sets 4 2  -KG      Reps 4 10  -KG              Exercise 5    Exercise Name 5 Seated TA/PN + march  -KG      Sets 5 2  -KG      Reps 5 10  -KG              Exercise 6    Exercise Name 6 Standing alt gentle marching with TA  -KG      Cueing 6 Verbal  -KG      Sets 6 1  -KG      Reps 6 10  -KG              Exercise 7    Exercise Name 7 Standing alt hip abd " SLR  -KG      Cueing 7 Verbal  -KG      Sets 7 1  -KG      Reps 7 10  -KG            User Key  (r) = Recorded By, (t) = Taken By, (c) = Cosigned By    Initials Name Provider Type    KG Haleigh Flores, PT Physical Therapist              Exercise 8   Exercise Name 8 Lateral side stepping @ HR   Sets 8 1   Reps 8 3 laps   Additional Comments B HHA   Exercise 9   Exercise Name 9 Seated TA/PN + hip abd   Sets 9 2   Reps 9 10   Additional Comments red tband   Exercise 10   Exercise Name 10 Seated TA/PN + mid rows   Sets 10 2   Reps 10 10   Additional Comments green tband   Exercise 11   Exercise Name 11 Reciprocal stairs with michael handrails   Sets 11 1x3 laps   Additional Comments michael handrail use             12/29/21 0800   Manual Rx 1   Manual Rx 1 Location Lumbar/thoracic paraspinals R>L & towards lower ribs, post hips   Manual Rx 1 Type STM/MFR; biofreeze at end             PT OP Goals     Row Name 12/29/21 0800          PT Short Term Goals    STG Date to Achieve 01/12/22  -KG     STG 1 Demonstrate independence/compliance in performance of initial HEP  -KG     STG 1 Progress Partially Met;Progressing  -KG     STG 2 Demonstrate independence in performance of isometric TA contraction with therex activities for functional carryover into daily activity performance  -KG     STG 2 Progress Progressing;Ongoing  -KG     STG 3 Demonstrate improved seated PN/postural positioning with seated core/LE strengthening activities with minimal cues for correction  -KG     STG 3 Progress Partially Met;Progressing  -KG     STG 4 Demonstrate improved lumbar flex AROM fingertips to mid shin without increased pain  -KG     STG 4 Progress Partially Met;Progressing  -KG     STG 5 Demonstrate improved R hip abd MMT to 4/5  -KG     STG 5 Progress Ongoing  -KG            Long Term Goals    LTG Date to Achieve 01/26/22  -KG     LTG 1 Subjectively report 50% overall improvement or greater  -KG     LTG 1 Progress Ongoing  -KG     LTG 2 Improve  modified oswestry score to 45% or less  -KG     LTG 2 Progress Goal Revised  -KG     LTG 3 Demonstrate improved bilateral hip flex/abd MMT to 4+/5  -KG     LTG 3 Progress Ongoing  -KG     LTG 4 Tolerate 15-20 minutes of standing functional stabilization activities without increased pain  -KG     LTG 4 Progress Ongoing  -KG     LTG 5 Demonstrate proper lifting/body mechanics when lifting/carrying 10-20# crate without increased pain  -KG     LTG 5 Progress Ongoing  -KG     LTG 6 Ascend/descend training steps (4 in/6 in) 1x5 with reciprocal pattern without increased pain, handrails as needed  -KG     LTG 6 Progress Ongoing  -KG            Time Calculation    PT Goal Re-Cert Due Date 01/19/22  -KG           User Key  (r) = Recorded By, (t) = Taken By, (c) = Cosigned By    Initials Name Provider Type    Haleigh Forde, PT Physical Therapist                     Outcome Measure Options: Modifed Owestry  Modified Oswestry  Modified Oswestry Score/Comments: 26 = 52%      Time Calculation:   Start Time: 0803  Stop Time: 0901  Time Calculation (min): 58 min     Therapy Charges for Today     Code Description Service Date Service Provider Modifiers Qty    94303306303 HC PT THER PROC EA 15 MIN 12/29/2021 Haleigh Flores, PT GP 3    47655787804 HC PT MANUAL THERAPY EA 15 MIN 12/29/2021 Haleigh Flores, PT GP 1            PT G-Codes  Outcome Measure Options: Modifed Owestry  Modified Oswestry Score/Comments: 26 = 52%         Haleigh Flores PT  12/29/2021

## 2021-12-29 NOTE — TELEPHONE ENCOUNTER
PT STOPPED BY AFTER PHYSICAL THERAPY AND INQUIRED ABOUT HIS FLUID PILL.  STATES HIS FEET HAVE BEEN SWELLING OVER THE LAST 3 WEEKS.  INQUIRING IF THE FLUID PILL DOSAGE NEEDS TO BE INCREASED.  PLEASE ADVISE. DOES HAVE FLUID ON SHINS/ANKLES-STATES HIS SHOES GET A LIL TIGHT.        [facility]

## 2022-01-04 DIAGNOSIS — R52 PAIN: ICD-10-CM

## 2022-01-04 RX ORDER — HYDROCODONE BITARTRATE AND ACETAMINOPHEN 10; 325 MG/1; MG/1
1 TABLET ORAL EVERY 6 HOURS PRN
Qty: 90 TABLET | Refills: 0 | Status: SHIPPED | OUTPATIENT
Start: 2022-01-04 | End: 2022-02-07 | Stop reason: SDUPTHER

## 2022-01-04 NOTE — TELEPHONE ENCOUNTER
Drug thereapy appt 12/1/21  contract done 12/1/21  UDS done 12/1/21    Rx Refill Note  Requested Prescriptions     Pending Prescriptions Disp Refills   • HYDROcodone-acetaminophen (NORCO)  MG per tablet 90 tablet 0     Sig: Take 1 tablet by mouth Every 6 (Six) Hours As Needed for Moderate Pain .      Last office visit with prescribing clinician: 12/1/21      Next office visit with prescribing clinician: 3/1/2022            Helena Meehan MA  01/04/22, 09:53 CST

## 2022-01-04 NOTE — TELEPHONE ENCOUNTER
Caller: Ephraim Quinones    Relationship: Self    Best call back number: [phone]     Requested Prescriptions:   Requested Prescriptions     Pending Prescriptions Disp Refills   • HYDROcodone-acetaminophen (NORCO)  MG per tablet 90 tablet 0     Sig: Take 1 tablet by mouth Every 6 (Six) Hours As Needed for Moderate Pain .        Pharmacy where request should be sent: [facility] [address] [phone] [facility] [phone] FX     Additional details provided by patient: ALMOST OUT     Does the patient have less than a 3 day supply:  [x] Yes  [] No    Deandre Levine Rep   01/04/22 09:41 CST

## 2022-01-06 ENCOUNTER — HOSPITAL ENCOUNTER (OUTPATIENT)
Dept: PHYSICAL THERAPY | Facility: HOSPITAL | Age: 72
Setting detail: THERAPIES SERIES
Discharge: HOME | End: 2022-01-06

## 2022-01-06 DIAGNOSIS — M51.36 DDD (DEGENERATIVE DISC DISEASE), LUMBAR: Primary | ICD-10-CM

## 2022-01-06 PROCEDURE — 97110 THERAPEUTIC EXERCISES: CPT

## 2022-01-06 PROCEDURE — 97140 MANUAL THERAPY 1/> REGIONS: CPT

## 2022-01-06 PROCEDURE — 97112 NEUROMUSCULAR REEDUCATION: CPT

## 2022-01-06 NOTE — THERAPY TREATMENT NOTE
Outpatient Physical Therapy Ortho Treatment Note  [facility]     Patient Name: Ephraim Quinones  : 1950  MRN: [id number]  Today's Date: 2022      Visit Date: 2022    Attendance: 5 visits  Subjective improvement: 25%  MD appt: 2022  Recheck due: 2022    Visit Dx:    ICD-10-CM ICD-9-CM   1. DDD (degenerative disc disease), lumbar  M51.36 722.52       Patient Active Problem List   Diagnosis   • Type 2 diabetes mellitus without complication (HCC)   • HTN (hypertension)   • A-fib (HCC)   • Hx of adenomatous colonic polyps   • Morbidly obese (HCC)   • Phimosis   • Sacroiliitis, not elsewhere classified (HCC)        Past Medical History:   Diagnosis Date   • A-fib (HCC)    • Acid reflux    • Anemia 5/3/2016   • Diabetes mellitus (HCC)    • Gout    • Hyperlipidemia    • Hypertension    • Sleep apnea with use of continuous positive airway pressure (CPAP)         Past Surgical History:   Procedure Laterality Date   • BACK SURGERY      x2   • CIRCUMCISION N/A 2019    Procedure: CIRCUMCISION;  Surgeon: Yon Sloan MD;  Location: [facility];  Service: Urology   • FINGER SURGERY     • REPLACEMENT TOTAL KNEE Bilateral         PT Ortho     Row Name 22 0800       Precautions and Contraindications    Precautions A-fib, L4-S1 fusion  -KM       Subjective Pain    Able to rate subjective pain? yes  -KM    Pre-Treatment Pain Level 1  -KM          User Key  (r) = Recorded By, (t) = Taken By, (c) = Cosigned By    Initials Name Provider Type    Dianna Enriquez PTA Physical Therapy Assistant                             PT Assessment/Plan     Row Name 22 0800          PT Assessment    Assessment Comments pt did fair with treatment. Does fatigue easily with standing exercises. pt did complain of pain in R hip joint and down into HS when doing recip stairs. pt tolerated new dynadisc activities fairly well, BUE assist required to keep PN  -KM            PT Plan     PT Frequency 2x/week  -KM     PT Plan Comments Try increasing standing reps to x15  -KM           User Key  (r) = Recorded By, (t) = Taken By, (c) = Cosigned By    Initials Name Provider Type    Dianna Enriquez PTA Physical Therapy Assistant                   OP Exercises     Row Name 01/06/22 0800             Precautions    Existing Precautions/Restrictions no known precautions/restrictions  -KM              Subjective Comments    Subjective Comments pt states that he forgot his MRI results at home and will bring them next visit.  -KM              Subjective Pain    Able to rate subjective pain? yes  -KM      Pre-Treatment Pain Level 1  -KM      Post-Treatment Pain Level 0  -KM              Exercise 1    Exercise Name 1 Pro II for warm up/conditioning  -KM      Time 1 8 min  -KM      Additional Comments L2.0  -KM              Exercise 2    Exercise Name 2 Seated B HS S  -KM      Reps 2 2  -KM      Time 2 30 sec hold  -KM              Exercise 3    Exercise Name 3 Sit to stands with TA  -KM      Sets 3 1  -KM      Reps 3 10  -KM      Additional Comments airex in chair; no UE assist  -KM              Exercise 4    Exercise Name 4 Dynadisc TA/PN + LAQ  -KM      Sets 4 2  -KM      Reps 4 10  -KM              Exercise 5    Exercise Name 5 Dynadisc TA/PN + march  -KM      Sets 5 2  -KM      Reps 5 10  -KM              Exercise 6    Exercise Name 6 Dynadisc TA/PN + Hip abd  -KM      Sets 6 2  -KM      Reps 6 10  -KM      Additional Comments red  -KM              Exercise 7    Exercise Name 7 Dynadisc TA/PN + hip add squeezes  -KM      Sets 7 2  -KM      Reps 7 10  -KM      Time 7 5 sec hold  -KM              Exercise 8    Exercise Name 8 Dynadisc TA/PN + mid rows  -KM      Sets 8 2  -KM      Reps 8 10  -KM      Additional Comments green  -KM              Exercise 9    Exercise Name 9 Standing hip abd + TA  -KM      Sets 9 1  -KM      Reps 9 10  -KM              Exercise 10    Exercise Name 10 Lateral side  stepping + tband above knees  -KM      Sets 10 1  -KM      Reps 10 3 laps  -KM      Additional Comments red tband  -KM              Exercise 11    Exercise Name 11 Standing alt march + TA  -KM      Sets 11 1  -KM      Reps 11 10  -KM              Exercise 12    Exercise Name 12 Recip stairs on 4 inch side  -KM      Reps 12 1x3 laps  -KM            User Key  (r) = Recorded By, (t) = Taken By, (c) = Cosigned By    Initials Name Provider Type    Dianna Enriquez PTA Physical Therapy Assistant                         Manual Rx (last 36 hours)     Manual Treatments     Row Name 01/06/22 0700             Manual Rx 1    Manual Rx 1 Location Lumbar/thoracic paraspinals R>L & towards lower ribs, post hips  -KM      Manual Rx 1 Type STM/MFR; biofreeze at end  -KM            User Key  (r) = Recorded By, (t) = Taken By, (c) = Cosigned By    Initials Name Provider Type    Dianna Enriquez PTA Physical Therapy Assistant                 PT OP Goals     Row Name 01/06/22 0800          PT Short Term Goals    STG Date to Achieve 01/12/22  -KM     STG 1 Demonstrate independence/compliance in performance of initial HEP  -KM     STG 1 Progress Partially Met; Progressing  -KM     STG 2 Demonstrate independence in performance of isometric TA contraction with therex activities for functional carryover into daily activity performance  -KM     STG 2 Progress Progressing; Ongoing  -KM     STG 3 Demonstrate improved seated PN/postural positioning with seated core/LE strengthening activities with minimal cues for correction  -KM     STG 3 Progress Partially Met; Progressing  -KM     STG 4 Demonstrate improved lumbar flex AROM fingertips to mid shin without increased pain  -KM     STG 4 Progress Partially Met; Progressing  continues with increased pain  -KM     STG 5 Demonstrate improved R hip abd MMT to 4/5  -KM     STG 5 Progress Ongoing  -KM            Long Term Goals    LTG Date to Achieve 01/26/22  -KM     LTG 1  Subjectively report 50% overall improvement or greater  -KM     LTG 1 Progress Ongoing  -KM     LTG 2 Improve modified oswestry score to 45% or less  -KM     LTG 2 Progress Goal Revised  -KM     LTG 3 Demonstrate improved bilateral hip flex/abd MMT to 4+/5  -KM     LTG 3 Progress Ongoing  -KM     LTG 4 Tolerate 15-20 minutes of standing functional stabilization activities without increased pain  -KM     LTG 4 Progress Ongoing  -KM     LTG 5 Demonstrate proper lifting/body mechanics when lifting/carrying 10-20# crate without increased pain  -KM     LTG 5 Progress Ongoing  -KM     LTG 6 Ascend/descend training steps (4 in/6 in) 1x5 with reciprocal pattern without increased pain, handrails as needed  -KM     LTG 6 Progress Ongoing  -KM            Time Calculation    PT Goal Re-Cert Due Date 01/19/22  -KM           User Key  (r) = Recorded By, (t) = Taken By, (c) = Cosigned By    Initials Name Provider Type    Dianna Enriquez PTA Physical Therapy Assistant                Therapy Education  Given: HEP, Symptoms/condition management, Pain management, Posture/body mechanics  Program: New  How Provided: Verbal, Demonstration, Written  Provided to: Patient  Level of Understanding: Teach back education performed, Verbalized, Demonstrated              Time Calculation:   Start Time: 0900  Stop Time: 0955  Time Calculation (min): 55 min  Therapy Charges for Today     Code Description Service Date Service Provider Modifiers Qty    73434615700 HC PT THER PROC EA 15 MIN 1/6/2022 Dianna Hernández PTA GP, CQ 2    21042185771 HC PT NEUROMUSC RE EDUCATION EA 15 MIN 1/6/2022 Dianna Hernández PTA GP, CQ 1    01058316961 HC PT MANUAL THERAPY EA 15 MIN 1/6/2022 Dianna Hernández PTA GP, CQ 1                    Dianna Hernández PTA  1/6/2022

## 2022-01-11 ENCOUNTER — HOSPITAL ENCOUNTER (OUTPATIENT)
Dept: PHYSICAL THERAPY | Facility: HOSPITAL | Age: 72
Setting detail: THERAPIES SERIES
Discharge: HOME | End: 2022-01-11

## 2022-01-11 DIAGNOSIS — M51.36 DDD (DEGENERATIVE DISC DISEASE), LUMBAR: Primary | ICD-10-CM

## 2022-01-11 PROCEDURE — 97112 NEUROMUSCULAR REEDUCATION: CPT

## 2022-01-11 PROCEDURE — 97140 MANUAL THERAPY 1/> REGIONS: CPT

## 2022-01-11 PROCEDURE — 97110 THERAPEUTIC EXERCISES: CPT

## 2022-01-11 NOTE — THERAPY TREATMENT NOTE
Outpatient Physical Therapy Ortho Treatment Note  [facility]     Patient Name: Ephraim Quinones  : 1950  MRN: [id number]  Today's Date: 2022      Visit Date: 2022    Attendance: 6 visits  Subjective improvement: 25% improvement  MD appt: unsure  Recheck due: 2022    Visit Dx:    ICD-10-CM ICD-9-CM   1. DDD (degenerative disc disease), lumbar  M51.36 722.52       Patient Active Problem List   Diagnosis   • Type 2 diabetes mellitus without complication (HCC)   • HTN (hypertension)   • A-fib (HCC)   • Hx of adenomatous colonic polyps   • Morbidly obese (HCC)   • Phimosis   • Sacroiliitis, not elsewhere classified (HCC)        Past Medical History:   Diagnosis Date   • A-fib (HCC)    • Acid reflux    • Anemia 5/3/2016   • Diabetes mellitus (HCC)    • Gout    • Hyperlipidemia    • Hypertension    • Sleep apnea with use of continuous positive airway pressure (CPAP)         Past Surgical History:   Procedure Laterality Date   • BACK SURGERY      x2   • CIRCUMCISION N/A 2019    Procedure: CIRCUMCISION;  Surgeon: Yon Sloan MD;  Location: [facility];  Service: Urology   • FINGER SURGERY     • REPLACEMENT TOTAL KNEE Bilateral         PT Ortho     Row Name 22 08       Subjective Comments    Subjective Comments pt states that he has been hurting the past couple of days, states that his back and hip are hurting equally today.  -KM       Precautions and Contraindications    Precautions A-fib, L4-S1 fusion  -KM       Subjective Pain    Able to rate subjective pain? no  did not give value  -KM          User Key  (r) = Recorded By, (t) = Taken By, (c) = Cosigned By    Initials Name Provider Type    Dianna Enriquez PTA Physical Therapy Assistant                             PT Assessment/Plan     Row Name 22 08          PT Assessment    Assessment Comments pt continues to be challenged keeping PN alignment on dynadisc. pt had complaints of increase  pain with most all standing therex. pt responds well to manual but was not feeling any better upon leaving treatment.  -KM            PT Plan    PT Frequency 2x/week  -KM     PT Plan Comments Cont working on keeping PN on dynadisc, may try to utilize mirror.  -KM           User Key  (r) = Recorded By, (t) = Taken By, (c) = Cosigned By    Initials Name Provider Type    Dianna Enriquez PTA Physical Therapy Assistant                   OP Exercises     Row Name 01/11/22 0800             Precautions    Existing Precautions/Restrictions no known precautions/restrictions  -KM              Subjective Comments    Subjective Comments pt states that he has been hurting the past couple of days, states that his back and hip are hurting equally today.  -KM              Subjective Pain    Able to rate subjective pain? no  did not give value  -KM              Exercise 1    Exercise Name 1 Pro II for warm up/conditioning  -KM      Time 1 8 min  -KM      Additional Comments L2.0  -KM              Exercise 2    Exercise Name 2 Seated B HS S  -KM      Reps 2 2  -KM      Time 2 30 sec hold  -KM              Exercise 3    Exercise Name 3 Dynadisc TA/PN + LAQ  -KM      Sets 3 2  -KM      Reps 3 10  -KM              Exercise 4    Exercise Name 4 Dynadisc TA/PN + march  -KM      Sets 4 2  -KM      Reps 4 10  -KM              Exercise 5    Exercise Name 5 Dynadisc TA/PN + mid rows  -KM      Sets 5 2  -KM      Reps 5 10  -KM      Additional Comments red  -KM              Exercise 6    Exercise Name 6 Airex CR/TR  -KM      Sets 6 1  -KM      Reps 6 15  -KM              Exercise 7    Exercise Name 7 Airex alt march + TA  -KM      Sets 7 1  -KM      Reps 7 15  -KM              Exercise 8    Exercise Name 8 Standing hip abd + TA  -KM      Sets 8 1  -KM      Reps 8 15  -KM              Exercise 9    Exercise Name 9 Fwd step ups  -KM      Sets 9 1  -KM      Reps 9 10 ea LE  -KM      Additional Comments 4 inch  -KM              Exercise  10    Exercise Name 10 Sit to stands from 1 airex  -KM      Sets 10 2  -KM      Reps 10 5  -KM      Additional Comments no UE assist  -KM              Exercise 11    Exercise Name 11 Lateral side stepping + tband above knees  -KM      Reps 11 3 laps at HR  -KM            User Key  (r) = Recorded By, (t) = Taken By, (c) = Cosigned By    Initials Name Provider Type    Dianna Enriquez PTA Physical Therapy Assistant                         Manual Rx (last 36 hours)     Manual Treatments     Row Name 01/11/22 0700             Manual Rx 1    Manual Rx 1 Location Lumbar/thoracic paraspinals R>L & towards lower ribs, post hips  -KM      Manual Rx 1 Type STM/MFR; biofreeze at end  -KM            User Key  (r) = Recorded By, (t) = Taken By, (c) = Cosigned By    Initials Name Provider Type    Dianna Enriquez PTA Physical Therapy Assistant                 PT OP Goals     Row Name 01/11/22 0800          PT Short Term Goals    STG Date to Achieve 01/12/22  -KM     STG 1 Demonstrate independence/compliance in performance of initial HEP  -KM     STG 1 Progress Partially Met; Progressing  -KM     STG 2 Demonstrate independence in performance of isometric TA contraction with therex activities for functional carryover into daily activity performance  -KM     STG 2 Progress Progressing; Ongoing  -KM     STG 3 Demonstrate improved seated PN/postural positioning with seated core/LE strengthening activities with minimal cues for correction  -KM     STG 3 Progress Partially Met; Progressing  -KM     STG 4 Demonstrate improved lumbar flex AROM fingertips to mid shin without increased pain  -KM     STG 4 Progress Partially Met; Progressing  continues with increased pain  -KM     STG 5 Demonstrate improved R hip abd MMT to 4/5  -KM     STG 5 Progress Ongoing  -KM            Long Term Goals    LTG Date to Achieve 01/26/22  -KM     LTG 1 Subjectively report 50% overall improvement or greater  -KM     LTG 1 Progress Ongoing   -KM     LTG 2 Improve modified oswestry score to 45% or less  -KM     LTG 2 Progress Goal Revised  -KM     LTG 3 Demonstrate improved bilateral hip flex/abd MMT to 4+/5  -KM     LTG 3 Progress Ongoing  -KM     LTG 4 Tolerate 15-20 minutes of standing functional stabilization activities without increased pain  -KM     LTG 4 Progress Ongoing  -KM     LTG 5 Demonstrate proper lifting/body mechanics when lifting/carrying 10-20# crate without increased pain  -KM     LTG 5 Progress Ongoing  -KM     LTG 6 Ascend/descend training steps (4 in/6 in) 1x5 with reciprocal pattern without increased pain, handrails as needed  -KM     LTG 6 Progress Ongoing  -KM            Time Calculation    PT Goal Re-Cert Due Date 01/19/22  -KM           User Key  (r) = Recorded By, (t) = Taken By, (c) = Cosigned By    Initials Name Provider Type    Dianna Enriquez PTA Physical Therapy Assistant                Therapy Education  Given: HEP, Symptoms/condition management, Pain management, Posture/body mechanics  Program: New  How Provided: Verbal, Demonstration, Written  Provided to: Patient  Level of Understanding: Teach back education performed, Verbalized, Demonstrated              Time Calculation:   Start Time: 0802  Stop Time: 0900  Time Calculation (min): 58 min  Therapy Charges for Today     Code Description Service Date Service Provider Modifiers Qty    28993741181 HC PT THER PROC EA 15 MIN 1/11/2022 Dianna Hernández PTA GP, CQ 2    57272287314 HC PT MANUAL THERAPY EA 15 MIN 1/11/2022 Dianna Hernández PTA GP, CQ 1    84779808390 HC PT NEUROMUSC RE EDUCATION EA 15 MIN 1/11/2022 Dianna Hernández PTA GP, CQ 1                    Dianna Hernández PTA  1/11/2022

## 2022-01-18 ENCOUNTER — HOSPITAL ENCOUNTER (OUTPATIENT)
Dept: PHYSICAL THERAPY | Facility: HOSPITAL | Age: 72
Setting detail: THERAPIES SERIES
Discharge: HOME | End: 2022-01-18

## 2022-01-18 DIAGNOSIS — M51.36 DDD (DEGENERATIVE DISC DISEASE), LUMBAR: Primary | ICD-10-CM

## 2022-01-18 PROCEDURE — 97140 MANUAL THERAPY 1/> REGIONS: CPT

## 2022-01-18 PROCEDURE — 97112 NEUROMUSCULAR REEDUCATION: CPT

## 2022-01-18 PROCEDURE — 97110 THERAPEUTIC EXERCISES: CPT

## 2022-01-18 NOTE — THERAPY TREATMENT NOTE
Outpatient Physical Therapy Ortho Treatment Note  [facility]     Patient Name: Ephraim Quinones  : 1950  MRN: [id number]  Today's Date: 2022      Visit Date: 2022    Attendance: 7 visits  Subjective improvement: 30% improvement  MD appt: unsure  Recheck due: 2022    Visit Dx:    ICD-10-CM ICD-9-CM   1. DDD (degenerative disc disease), lumbar  M51.36 722.52       Patient Active Problem List   Diagnosis   • Type 2 diabetes mellitus without complication (HCC)   • HTN (hypertension)   • A-fib (HCC)   • Hx of adenomatous colonic polyps   • Morbidly obese (HCC)   • Phimosis   • Sacroiliitis, not elsewhere classified (HCC)        Past Medical History:   Diagnosis Date   • A-fib (HCC)    • Acid reflux    • Anemia 5/3/2016   • Diabetes mellitus (HCC)    • Gout    • Hyperlipidemia    • Hypertension    • Sleep apnea with use of continuous positive airway pressure (CPAP)         Past Surgical History:   Procedure Laterality Date   • BACK SURGERY      x2   • CIRCUMCISION N/A 2019    Procedure: CIRCUMCISION;  Surgeon: Yon Sloan MD;  Location: [facility];  Service: Urology   • FINGER SURGERY     • REPLACEMENT TOTAL KNEE Bilateral         PT Ortho     Row Name 22 0800       Subjective Comments    Subjective Comments states he feels that he is making slow gradual progress  -KM       Precautions and Contraindications    Precautions A-fib, L4-S1 fusion  -KM       Subjective Pain    Able to rate subjective pain? yes  -KM    Pre-Treatment Pain Level 2  -KM          User Key  (r) = Recorded By, (t) = Taken By, (c) = Cosigned By    Initials Name Provider Type    Dianna Enriquez PTA Physical Therapy Assistant                             PT Assessment/Plan     Row Name 22 0800          PT Assessment    Assessment Comments pt is seeing slow gradual improvement. Continues to lack endurance and will lean over HR or lean up against wall if just standing. pt  complains of most of his pain starting when he walks a far distance- the farther he walks the more he hurts. pt has been able to increase number of repetitions therefore endurance has improved. pt did better when utilizing the mirror for visual feed back but still quite challenging to maintian PN on dynadisc  -KM            PT Plan    PT Frequency 2x/week  -KM     PT Plan Comments Recheck next visit. Lateral step ups 4 inch bilateral  -KM           User Key  (r) = Recorded By, (t) = Taken By, (c) = Cosigned By    Initials Name Provider Type    Dianna Enriquez, PTA Physical Therapy Assistant                   OP Exercises     Row Name 01/18/22 0800             Precautions    Existing Precautions/Restrictions no known precautions/restrictions  -KM              Subjective Comments    Subjective Comments states he feels that he is making slow gradual progress  -KM              Subjective Pain    Able to rate subjective pain? yes  -KM      Pre-Treatment Pain Level 2  -KM      Post-Treatment Pain Level 2  -KM              Exercise 1    Exercise Name 1 Pro II for warm up/conditioning  -KM      Time 1 8 min  -KM      Additional Comments L3.0  -KM              Exercise 2    Exercise Name 2 Seated B HS S  -KM      Reps 2 2  -KM      Time 2 30 sec hold  -KM              Exercise 3    Exercise Name 3 Dynadisc TA/PN + LAQ  -KM      Sets 3 2  -KM      Reps 3 10  -KM              Exercise 4    Exercise Name 4 Dynadisc TA/PN + march  -KM      Sets 4 2  -KM      Reps 4 10  -KM              Exercise 5    Exercise Name 5 Dynadisc TA/PN + mid rows  -KM      Sets 5 2  -KM      Reps 5 10  -KM      Additional Comments green  -KM              Exercise 6    Exercise Name 6 Airex CR/TR  -KM      Sets 6 2  -KM      Reps 6 10  -KM              Exercise 7    Exercise Name 7 Airex alt march + TA  -KM      Sets 7 2  -KM      Reps 7 10  -KM              Exercise 8    Exercise Name 8 Standing hip abd + TA  -KM      Sets 8 2  -KM      Reps  8 10  -KM              Exercise 9    Exercise Name 9 Fwd step ups  -KM      Sets 9 1  -KM      Reps 9 15 ea LE  -KM      Additional Comments 4 inch  -KM              Exercise 10    Exercise Name 10 Sit to stands from 1 airex  -KM      Sets 10 2  -KM      Reps 10 5  -KM      Additional Comments no UE assist  -KM              Exercise 11    Exercise Name 11 Airex beam side stepping tband above knees  -KM      Sets 11 1  -KM      Reps 11 4 laps  -KM      Additional Comments red tband  -KM              Exercise 12    Exercise Name 12 Airex beam tandem  -KM      Reps 12 1x4 laps  -KM            User Key  (r) = Recorded By, (t) = Taken By, (c) = Cosigned By    Initials Name Provider Type    Dianna Enriquez PTA Physical Therapy Assistant                         Manual Rx (last 36 hours)     Manual Treatments     Row Name 01/18/22 0700             Manual Rx 1    Manual Rx 1 Location Lumbar/thoracic paraspinals R>L & towards lower ribs, post hips  -KM      Manual Rx 1 Type STM/MFR; biofreeze at end  -KM            User Key  (r) = Recorded By, (t) = Taken By, (c) = Cosigned By    Initials Name Provider Type    Dianna Enriquez PTA Physical Therapy Assistant                 PT OP Goals     Row Name 01/18/22 0800          PT Short Term Goals    STG Date to Achieve 01/12/22  -KM     STG 1 Demonstrate independence/compliance in performance of initial HEP  -KM     STG 1 Progress Partially Met; Progressing  -KM     STG 2 Demonstrate independence in performance of isometric TA contraction with therex activities for functional carryover into daily activity performance  -KM     STG 2 Progress Progressing; Ongoing  -KM     STG 3 Demonstrate improved seated PN/postural positioning with seated core/LE strengthening activities with minimal cues for correction  -KM     STG 3 Progress Partially Met; Progressing  -KM     STG 4 Demonstrate improved lumbar flex AROM fingertips to mid shin without increased pain  -KM      STG 4 Progress Partially Met; Progressing  continues with increased pain  -KM     STG 5 Demonstrate improved R hip abd MMT to 4/5  -KM     STG 5 Progress Ongoing  -KM            Long Term Goals    LTG Date to Achieve 01/26/22  -KM     LTG 1 Subjectively report 50% overall improvement or greater  -KM     LTG 1 Progress Ongoing  -KM     LTG 2 Improve modified oswestry score to 45% or less  -KM     LTG 2 Progress Goal Revised  -KM     LTG 3 Demonstrate improved bilateral hip flex/abd MMT to 4+/5  -KM     LTG 3 Progress Ongoing  -KM     LTG 4 Tolerate 15-20 minutes of standing functional stabilization activities without increased pain  -KM     LTG 4 Progress Ongoing  -KM     LTG 5 Demonstrate proper lifting/body mechanics when lifting/carrying 10-20# crate without increased pain  -KM     LTG 5 Progress Ongoing  -KM     LTG 6 Ascend/descend training steps (4 in/6 in) 1x5 with reciprocal pattern without increased pain, handrails as needed  -KM     LTG 6 Progress Ongoing  -KM            Time Calculation    PT Goal Re-Cert Due Date 01/19/22  -KM           User Key  (r) = Recorded By, (t) = Taken By, (c) = Cosigned By    Initials Name Provider Type    Dianna Enriquez PTA Physical Therapy Assistant                Therapy Education  Given: HEP, Symptoms/condition management, Pain management, Posture/body mechanics  Program: New  How Provided: Verbal, Demonstration, Written  Provided to: Patient  Level of Understanding: Teach back education performed, Verbalized, Demonstrated              Time Calculation:   Start Time: 0801  Stop Time: 0910  Time Calculation (min): 69 min  Therapy Charges for Today     Code Description Service Date Service Provider Modifiers Qty    35669682812  PT THER PROC EA 15 MIN 1/18/2022 Dianna Hernández PTA GP, CQ 3    81805745404 HC PT NEUROMUSC RE EDUCATION EA 15 MIN 1/18/2022 Dianna Hernández, PTA GP, CQ 1    90844144258 HC PT MANUAL THERAPY EA 15 MIN 1/18/2022 Edgar  Dianna LIVINGSTON, PTA GP, CQ 1                    Dianna Watkins, PTA  1/18/2022

## 2022-01-20 ENCOUNTER — APPOINTMENT (OUTPATIENT)
Dept: PHYSICAL THERAPY | Facility: HOSPITAL | Age: 72
End: 2022-01-20

## 2022-01-27 ENCOUNTER — HOSPITAL ENCOUNTER (OUTPATIENT)
Dept: PHYSICAL THERAPY | Facility: HOSPITAL | Age: 72
Setting detail: THERAPIES SERIES
Discharge: HOME | End: 2022-01-27

## 2022-01-27 DIAGNOSIS — M51.36 DDD (DEGENERATIVE DISC DISEASE), LUMBAR: Primary | ICD-10-CM

## 2022-01-27 PROCEDURE — 97140 MANUAL THERAPY 1/> REGIONS: CPT | Performed by: PHYSICAL THERAPIST

## 2022-01-27 PROCEDURE — 97110 THERAPEUTIC EXERCISES: CPT | Performed by: PHYSICAL THERAPIST

## 2022-01-29 NOTE — THERAPY PROGRESS REPORT/RE-CERT
Outpatient Physical Therapy Ortho Progress Note  [facility]     Patient Name: Ephraim Quinones  : 1950  MRN: [id number]  Today's Date: 2022      Visit Date: 2022     Attendance: 8 visits  Subjective improvement: 35-40% improvement  MD appt:  for injections  Recheck due: 2022    Visit Dx:    ICD-10-CM ICD-9-CM   1. DDD (degenerative disc disease), lumbar  M51.36 722.52       Patient Active Problem List   Diagnosis   • Type 2 diabetes mellitus without complication (HCC)   • HTN (hypertension)   • A-fib (HCC)   • Hx of adenomatous colonic polyps   • Morbidly obese (HCC)   • Phimosis   • Sacroiliitis, not elsewhere classified (HCC)        Past Medical History:   Diagnosis Date   • A-fib (HCC)    • Acid reflux    • Anemia 5/3/2016   • Diabetes mellitus (HCC)    • Gout    • Hyperlipidemia    • Hypertension    • Sleep apnea with use of continuous positive airway pressure (CPAP)         Past Surgical History:   Procedure Laterality Date   • BACK SURGERY      x2   • CIRCUMCISION N/A 2019    Procedure: CIRCUMCISION;  Surgeon: Yon Sloan MD;  Location: [facility] OR;  Service: Urology   • FINGER SURGERY     • REPLACEMENT TOTAL KNEE Bilateral                  PT Ortho     Row Name 22 0800       Precautions and Contraindications    Precautions A-fib, L4-S1 fusion  -KG       Posture/Observations    Posture/Observations Comments Continues with decreased overall postural awareness but improving  -KG       Neural Tension Signs- Lower Quarter Clearing    Slump Bilateral:; Negative  -KG       Sensory Screen for Light Touch- Lower Quarter Clearing    L1 (inguinal area) Bilateral:; Intact  -KG    L2 (anterior mid thigh) Bilateral:; Intact  -KG    L3 (distal anterior thigh) Bilateral:; Intact  -KG    L4 (medial lower leg/foot) Bilateral:; Intact  -KG    L5 (lateral lower leg/great toe) Bilateral:; Intact  -KG    S1 (bottom of foot) Bilateral:; Intact  -KG       Lumbosacral  Palpation    Lumbosacral Segment Right:; Tender  -KG    Gluteus Mark Bilateral:; Tender; Guarded/taut  -KG    Erector Spinae (Paraspinals) Bilateral:; Tender; Guarded/taut  -KG    Lumbosacral Palpation Comments Continues with TTP/tightness at R thoracolumbar paraspinals and lower ribs. This has improved moderately since initial eval.  -KG       General ROM    GENERAL ROM COMMENTS Bilateral hip, knee, ankle AROM WFL.  -KG       Head/Neck/Trunk    Trunk Extension AROM 75% full range; notes helps some with pain  -KG    Trunk Flexion AROM Fingertips to mid shin with mild increased low back pain  -KG    Trunk Lt Lateral Flexion AROM 75% full range with pain contralateral  -KG    Trunk Rt Lateral Flexion AROM 75% full range with pain ipsilateral  -KG       MMT Right Lower Ext    Rt Hip Flexion MMT, Gross Movement (5/5) normal  -KG    Rt Hip ABduction MMT, Gross Movement (4/5) good  -KG    Rt Hip ADduction MMT, Gross Movement (4/5) good  -KG    Rt Knee Extension MMT, Gross Movement (5/5) normal  -KG    Rt Knee Flexion MMT, Gross Movement (5/5) normal  -KG    Rt Ankle Plantarflexion MMT, Gross Movement (5/5) normal  -KG    Rt Ankle Dorsiflexion MMT, Gross Movement (5/5) normal  -KG       MMT Left Lower Ext    Lt Hip Flexion MMT, Gross Movement (5/5) normal  -KG    Lt Hip ABduction MMT, Gross Movement (4/5) good  -KG    Lt Hip ADduction MMT, Gross Movement (4+/5) good plus  -KG    Lt Knee Extension MMT, Gross Movement (5/5) normal  -KG    Lt Knee Flexion MMT, Gross Movement (5/5) normal  -KG    Lt Ankle Plantarflexion MMT, Gross Movement (5/5) normal  -KG    Lt Ankle Dorsiflexion MMT, Gross Movement (5/5) normal  -KG       Sensation    Sensation WNL? WNL  -KG    Light Touch No apparent deficits  -KG       Lower Extremity Flexibility    Hamstrings Bilateral:; Moderately limited  -KG    Hip Flexors Bilateral:; Moderately limited  -KG    Hip External Rotators Moderately limited  -KG    Hip Internal Rotators Bilateral:;  Mildly limited  -KG       Gait/Stairs (Locomotion)    Comment (Gait/Stairs) Ambulates with no AD. Non-antalgic. Widened JENNY and slight LE ER/toe out  -KG          User Key  (r) = Recorded By, (t) = Taken By, (c) = Cosigned By    Initials Name Provider Type    KG Haleigh Flores, PT Physical Therapist                    PT Assessment/Plan     Row Name 01/27/22 0800          PT Assessment    Functional Limitations Limitation in home management;Limitations in community activities;Performance in leisure activities;Limitations in functional capacity and performance  -KG     Impairments Gait;Impaired flexibility;Impaired muscle endurance;Muscle strength;Pain;Joint mobility;Poor body mechanics;Posture;Range of motion  -KG     Assessment Comments Pt continues to make slow, steady progress. Has been able to tolerate increase in standing activities, reps, and therex intensity. Pt continues to report increased with standing/walking for long periods of time. Pt's overall activity tolerance and muscle endurance is improving. Pt had difficulty with L lateral step ups due to increased L hip pain but did fine on the R side. Pt doing better with maintain PN positioning seated on dynadisc but still challenging. Pt not as taut with manual work at R thoracolumbar parapsinals and near lower ribs. Better soft tissue mobility and less pain noted. Pt does note that somtimes trunk ext does help with his pain. Does this intermittently during treatment. Pt still needs work on functional core stabiltiy, hip strength/stabiltiy, postural/ awareness, & functional activity tolerance & will benefit from continued skilled PT services.  -KG     Rehab Potential Good  -KG     Patient/caregiver participated in establishment of treatment plan and goals Yes  -KG     Patient would benefit from skilled therapy intervention Yes  -KG            PT Plan    PT Frequency 2x/week  -KG     Predicted Duration of Therapy Intervention (PT) 6 visits   -KG     PT Plan Comments Will continue for about 3-4 more weeks. Continue to progress standing stability as tolerated. Continue pallof press. Continue manual work. Try hip ext SLR next  -KG           User Key  (r) = Recorded By, (t) = Taken By, (c) = Cosigned By    Initials Name Provider Type    RUSTY Haleigh Flores, PT Physical Therapist                 01/27/22 0800   Precautions   Existing Precautions/Restrictions no known precautions/restrictions   Subjective Comments   Subjective Comments Has injections on 2/25/2022. Reports 35-40% improvement. Can tell therapy has helped some with his pain. Still has pain [place] and has bad days/good days.   Subjective Pain   Able to rate subjective pain? yes   Pre-Treatment Pain Level 2   Post-Treatment Pain Level 2   Exercise 1   Exercise Name 1 Pro II for warm up/conditioning   Time 1 6 min   Additional Comments L 3.0   Exercise 2   Exercise Name 2 Seated B HS S   Reps 2 2   Time 2 30 sec hold   Exercise 3   Exercise Name 3 Lumbar/BLE strength & ROM measurements   Exercise 4   Exercise Name 4 Airex standing CR/TR   Cueing 4 Verbal   Sets 4 2   Reps 4 10   Exercise 5   Exercise Name 5 Airex marching with TA   Cueing 5 Verbal   Sets 5 2   Reps 5 10   Exercise 6   Exercise Name 6 Standing alt hip abd SLR with TA   Cueing 6 Verbal   Sets 6 2   Reps 6 10   Exercise 7   Exercise Name 7 Fwd step ups with TA   Cueing 7 Verbal   Sets 7 1   Reps 7 15 ea LE   Additional Comments 4 inch at stairs   Exercise 8   Exercise Name 8 Lateral step ups   Cueing 8 Verbal   Sets 8 1x15 R, 1x5 L (increased hip pain)   Additional Comments 4 inch step   Exercise 9   Exercise Name 9 Dynadisc seated PN/TA alt LAQ   Cueing 9 Verbal   Sets 9 2   Reps 9 10   Exercise 10   Exercise Name 10 Dynadisc seated PN/TA alt marching   Cueing 10 Verbal   Sets 10 2   Reps 10 10   Exercise 11   Exercise Name 11 Dynadisc seated PN/TA tband mid rows   Cueing 11 Verbal   Sets 11 2   Reps 11 10    Additional Comments green   Exercise 12   Exercise Name 12 Airex beam side stepping tband above knees   Reps 12 1x4 laps   Additional Comments red tband   Exercise 13   Exercise Name 13 Standing tband pallof press for functional stability   Cueing 13 Verbal; Tactile   Sets 13 1   Reps 13 15   Additional Comments red tband              Manual Rx (last 36 hours)     Manual Treatments     Row Name 01/27/22 0800             Manual Rx 1    Manual Rx 1 Location Lumbar/thoracic paraspinals R>L & towards lower ribs, post hips  -KG      Manual Rx 1 Type STM/MFR; biofreeze at end  -KG            User Key  (r) = Recorded By, (t) = Taken By, (c) = Cosigned By    Initials Name Provider Type    KG Haleigh Flores, PT Physical Therapist                     PT OP Goals     Row Name 01/27/22 0800          PT Short Term Goals    STG Date to Achieve 01/12/22  -KG     STG 1 Demonstrate independence/compliance in performance of initial HEP  -KG     STG 1 Progress Partially Met;Progressing  -KG     STG 2 Demonstrate independence in performance of isometric TA contraction with therex activities for functional carryover into daily activity performance  -KG     STG 2 Progress Met  -KG     STG 3 Demonstrate improved seated PN/postural positioning with seated core/LE strengthening activities with minimal cues for correction  -KG     STG 3 Progress Partially Met;Progressing  -KG     STG 4 Demonstrate improved lumbar flex AROM fingertips to mid shin without increased pain  -KG     STG 4 Progress Met  -KG     STG 5 Demonstrate improved R hip abd MMT to 4/5  -KG     STG 5 Progress Met  -KG            Long Term Goals    LTG Date to Achieve 02/17/22  -KG     LTG 1 Subjectively report 50% overall improvement or greater  -KG     LTG 1 Progress Ongoing  -KG     LTG 2 Improve modified oswestry score to 45% or less  -KG     LTG 2 Progress Progressing  -KG     LTG 3 Demonstrate improved bilateral hip flex/abd MMT to 4+/5  -KG     LTG 3 Progress  Partially Met  -KG     LTG 4 Tolerate 15-20 minutes of standing functional stabilization activities without increased pain  -KG     LTG 4 Progress Ongoing;Progressing  -KG     LTG 5 Demonstrate proper lifting/body mechanics when lifting/carrying 10-20# crate without increased pain  -KG     LTG 5 Progress Ongoing; Progressing  -KG     LTG 6 Ascend/descend training steps (4 in/6 in) 1x5 with reciprocal pattern without increased pain, handrails as needed  -KG     LTG 6 Progress Ongoing;Progressing  -KG            Time Calculation    PT Goal Re-Cert Due Date 02/17/22  -KG           User Key  (r) = Recorded By, (t) = Taken By, (c) = Cosigned By    Initials Name Provider Type    KG Haleigh Flores, PT Physical Therapist                        Therapy Education  Given: HEP, Symptoms/condition management, Pain management, Posture/body mechanics  Program: Reinforced  How Provided: Verbal, Demonstration, Written  Provided to: Patient  Level of Understanding: Teach back education performed, Verbalized, Demonstrated    Outcome Measure Options: Modifed Owestry  Modified Oswestry  Modified Oswestry Score/Comments: 25 = 50%      Time Calculation:   Start Time: 0806  Stop Time: 0915  Time Calculation (min): 69 min  Therapy Charges for Today     Code Description Service Date Service Provider Modifiers Qty    74712279740 HC PT THER PROC EA 15 MIN 1/27/2022 Haleigh Flores, PT GP 4    15958962036 HC PT MANUAL THERAPY EA 15 MIN 1/27/2022 Haleigh Flores, PT GP 1          PT G-Codes  Outcome Measure Options: Modifed Owestry  Modified Oswestry Score/Comments: 25 = 50%         Haleigh Flores PT  1/28/2022

## 2022-02-07 DIAGNOSIS — R52 PAIN: ICD-10-CM

## 2022-02-07 RX ORDER — HYDROCODONE BITARTRATE AND ACETAMINOPHEN 10; 325 MG/1; MG/1
1 TABLET ORAL EVERY 6 HOURS PRN
Qty: 90 TABLET | Refills: 0 | Status: SHIPPED | OUTPATIENT
Start: 2022-02-07 | End: 2022-03-01 | Stop reason: SDUPTHER

## 2022-02-07 NOTE — TELEPHONE ENCOUNTER
Drug thereapy appt 12/1/21  contract done 12/1/21  UDS done 12/1/21    Rx Refill Note  Requested Prescriptions     Pending Prescriptions Disp Refills   • HYDROcodone-acetaminophen (NORCO)  MG per tablet 90 tablet 0     Sig: Take 1 tablet by mouth Every 6 (Six) Hours As Needed for Moderate Pain .      Last office visit with prescribing clinician: 12/1/21     Next office visit with prescribing clinician: 3/1/2022            Helena Meehan MA  02/07/22, 08:16 CST

## 2022-02-07 NOTE — TELEPHONE ENCOUNTER
Rx Refill Note  Requested Prescriptions     Pending Prescriptions Disp Refills   • carbidopa-levodopa (SINEMET)  MG per tablet [Pharmacy Med Name: CARB/LEVO 25-100MG] 180 tablet 0     Sig: TAKE 1 TABLET TWO TIMES A DAY      Last office visit with prescribing clinician: 12/1/2021      Next office visit with prescribing clinician: 3/1/22         {TIP  Is Refill Pharmacy correct?:23}  Helena Meehan MA  02/07/22, 08:49 CST

## 2022-02-21 ENCOUNTER — TRANSCRIBE ORDERS (OUTPATIENT)
Dept: PHYSICAL THERAPY | Facility: HOSPITAL | Age: 72
End: 2022-02-21

## 2022-02-21 DIAGNOSIS — M54.32 LEFT SIDED SCIATICA: Primary | ICD-10-CM

## 2022-02-21 DIAGNOSIS — M96.1 POSTLAMINECTOMY SYNDROME, NOT ELSEWHERE CLASSIFIED: ICD-10-CM

## 2022-02-22 ENCOUNTER — OFFICE VISIT (OUTPATIENT)
Dept: FAMILY MEDICINE CLINIC | Facility: CLINIC | Age: 72
End: 2022-02-22

## 2022-02-22 VITALS
HEIGHT: 67 IN | TEMPERATURE: 98.4 F | BODY MASS INDEX: 48.65 KG/M2 | HEART RATE: 64 BPM | SYSTOLIC BLOOD PRESSURE: 128 MMHG | WEIGHT: 310 LBS | DIASTOLIC BLOOD PRESSURE: 80 MMHG | OXYGEN SATURATION: 98 %

## 2022-02-22 DIAGNOSIS — L03.116 CELLULITIS OF LEFT LOWER EXTREMITY: Primary | ICD-10-CM

## 2022-02-22 DIAGNOSIS — E66.01 MORBIDLY OBESE: ICD-10-CM

## 2022-02-22 PROCEDURE — 99214 OFFICE O/P EST MOD 30 MIN: CPT | Performed by: NURSE PRACTITIONER

## 2022-02-22 RX ORDER — SULFAMETHOXAZOLE AND TRIMETHOPRIM 800; 160 MG/1; MG/1
1 TABLET ORAL 2 TIMES DAILY
Qty: 14 TABLET | Refills: 0 | Status: SHIPPED | OUTPATIENT
Start: 2022-02-22 | End: 2022-03-01

## 2022-02-22 NOTE — PROGRESS NOTES
CC: left leg redness    History:  Ephraim Quinones is a 71 y.o. male who presents today for evaluation of the above problems.      Patient states that he fell two weeks ago while outside in his barn.  He had bruising of the left lower extremity, but doesn't recall any open areas. Now, this area, has turned very red, edematous and and tender. Dr. Bustos (neurosurgery) saw this shortly after injury and patient states that he ordered ultrasound and there was no clotting noted in the leg.     Patient also notes that he has been trying to lose weight, but is not having any success. States that he only drinks water, does not eat any snacks between meals. Eats sausage and eggs for breakfast, but does not have any carbs with it. At lunch eats a sandwich and at dinner eats whatever his wife prepares. Notes that probably his downfall is portion size.     HPI  ROS:  Review of Systems   Constitutional: Negative for fever.   Musculoskeletal:        Left lower leg edema and erythema with tenderness       Allergies   Allergen Reactions   • Simvastatin Other (See Comments)     Leg Cramps   • Percocet [Oxycodone-Acetaminophen] Itching     Past Medical History:   Diagnosis Date   • A-fib (HCC)    • Acid reflux    • Anemia 5/3/2016   • Diabetes mellitus (HCC)    • Gout    • Hyperlipidemia    • Hypertension    • Sleep apnea with use of continuous positive airway pressure (CPAP)      Past Surgical History:   Procedure Laterality Date   • BACK SURGERY      x2   • CIRCUMCISION N/A 6/6/2019    Procedure: CIRCUMCISION;  Surgeon: Yon Sloan MD;  Location: [facility];  Service: Urology   • FINGER SURGERY     • REPLACEMENT TOTAL KNEE Bilateral      Family History   Problem Relation Age of Onset   • Colon cancer Mother    • Prostate cancer Father    • No Known Problems Maternal Grandmother    • No Known Problems Maternal Grandfather    • No Known Problems Paternal Grandmother    • No Known Problems Paternal Grandfather       reports  that he quit smoking about 25 years ago. He has a 20.00 pack-year smoking history. He has never used smokeless tobacco. He reports current alcohol use. He reports that he does not use drugs.      Current Outpatient Medications:   •  albuterol sulfate  (90 Base) MCG/ACT inhaler, 2 sprays 2 minutes apart every 6 hours as needed, Disp: 18 g, Rfl: 5  •  amLODIPine (NORVASC) 5 MG tablet, TAKE 1 TABLET BY MOUTH TWICE DAILY, Disp: 180 tablet, Rfl: 3  •  aspirin 81 MG EC tablet, Take 81 mg by mouth Daily., Disp: , Rfl:   •  carbidopa-levodopa (SINEMET)  MG per tablet, TAKE 1 TABLET TWO TIMES A DAY, Disp: 180 tablet, Rfl: 1  •  Cholecalciferol (VITAMIN D3) 2000 units capsule, Take 2,000 Units by mouth Daily., Disp: , Rfl:   •  clobetasol (TEMOVATE) 0.05 % cream, APPLY TOPICALLY TO THE APPROPRIATE AREA AS DIRECTED AS NEEDED (FOR ITCHING)., Disp: 30 g, Rfl: 2  •  ELIQUIS 5 MG tablet tablet, Take 5 mg by mouth Every 12 (Twelve) Hours., Disp: , Rfl:   •  glipizide (GLUCOTROL XL) 5 MG ER tablet, TAKE 2 TABLETS BY MOUTH DAILY., Disp: 180 tablet, Rfl: 3  •  glucose blood test strip, 1 each by Other route Daily. Use as instructed, Disp: 100 each, Rfl: 3  •  HYDROcodone-acetaminophen (NORCO)  MG per tablet, Take 1 tablet by mouth Every 6 (Six) Hours As Needed for Moderate Pain ., Disp: 90 tablet, Rfl: 0  •  indapamide (LOZOL) 2.5 MG tablet, TAKE 1 TABLET BY MOUTH EVERY MORNING., Disp: 90 tablet, Rfl: 3  •  Lancet Devices (EASY TOUCH LANCING DEVICE) misc, USE TO TEST DAILY, Disp: , Rfl: 0  •  losartan (COZAAR) 100 MG tablet, TAKE 1 TABLET BY MOUTH DAILY, Disp: 90 tablet, Rfl: 2  •  metFORMIN (GLUCOPHAGE) 1000 MG tablet, TAKE 1 TABLET BY MOUTH 2 TIMES A DAY WITH MEALS., Disp: 180 tablet, Rfl: 3  •  methocarbamol (ROBAXIN) 750 MG tablet, , Disp: , Rfl:   •  naproxen sodium (ALEVE) 220 MG tablet, Take 220 mg by mouth 2 (Two) Times a Day As Needed., Disp: , Rfl:   •  ONETOUCH DELICA LANCETS 33G misc, 1 each Daily.,  "Disp: 100 each, Rfl: 3  •  rosuvastatin (Crestor) 10 MG tablet, Take 1 tablet by mouth Every Night., Disp: 90 tablet, Rfl: 0  •  colchicine (Colcrys) 0.6 MG tablet, TAKE 1 TABLET BY MOUTH AS NEEDED FOR FOUT (Patient taking differently: TAKE 1 TABLET BY MOUTH AS NEEDED FOR GOUT), Disp: 30 tablet, Rfl: 0  •  sulfamethoxazole-trimethoprim (Bactrim DS) 800-160 MG per tablet, Take 1 tablet by mouth 2 (Two) Times a Day for 7 days., Disp: 14 tablet, Rfl: 0    OBJECTIVE:  /80 (BP Location: Left arm, Patient Position: Sitting, Cuff Size: Large Adult)   Pulse 64   Temp 98.4 °F (36.9 °C) (Temporal)   Ht 170.2 cm (67\")   Wt (!) 141 kg (310 lb)   SpO2 98%   BMI 48.55 kg/m²    Physical Exam  Vitals reviewed.   Constitutional:       Appearance: He is well-developed.   Cardiovascular:      Rate and Rhythm: Normal rate.   Pulmonary:      Effort: Pulmonary effort is normal.   Skin:     Comments: Left lower extremity erythema and edema from mid shin to ankle.   Neurological:      Mental Status: He is alert and oriented to person, place, and time.   Psychiatric:         Behavior: Behavior normal.         Assessment/Plan    Diagnoses and all orders for this visit:    1. Cellulitis of left lower extremity (Primary)  -     sulfamethoxazole-trimethoprim (Bactrim DS) 800-160 MG per tablet; Take 1 tablet by mouth 2 (Two) Times a Day for 7 days.  Dispense: 14 tablet; Refill: 0    2. Morbidly obese (HCC)    Patient's insurance does not appear to cover saxenda or wegovy, however did discuss ozempic as an option to help with appetite control and weight loss. Wife is in chemo currently, so he wants to wait for now, but will consider this option.    An After Visit Summary was printed and given to the patient at discharge.  Return in about 1 week (around 3/1/2022) for Next scheduled follow up, Recheck.       Dolores Ba, ANSHUL 2/22/22    Electronically signed.  "

## 2022-02-28 ENCOUNTER — APPOINTMENT (OUTPATIENT)
Dept: PHYSICAL THERAPY | Facility: HOSPITAL | Age: 72
End: 2022-02-28

## 2022-03-01 ENCOUNTER — OFFICE VISIT (OUTPATIENT)
Dept: FAMILY MEDICINE CLINIC | Facility: CLINIC | Age: 72
End: 2022-03-01

## 2022-03-01 VITALS
SYSTOLIC BLOOD PRESSURE: 128 MMHG | DIASTOLIC BLOOD PRESSURE: 78 MMHG | BODY MASS INDEX: 49.38 KG/M2 | TEMPERATURE: 97.1 F | WEIGHT: 314.6 LBS | HEIGHT: 67 IN | HEART RATE: 80 BPM | OXYGEN SATURATION: 98 %

## 2022-03-01 DIAGNOSIS — E11.9 TYPE 2 DIABETES MELLITUS WITHOUT COMPLICATION, WITHOUT LONG-TERM CURRENT USE OF INSULIN: Primary | ICD-10-CM

## 2022-03-01 DIAGNOSIS — R52 PAIN: ICD-10-CM

## 2022-03-01 DIAGNOSIS — R73.9 HYPERGLYCEMIA: ICD-10-CM

## 2022-03-01 PROCEDURE — 99213 OFFICE O/P EST LOW 20 MIN: CPT | Performed by: FAMILY MEDICINE

## 2022-03-01 RX ORDER — HYDROCODONE BITARTRATE AND ACETAMINOPHEN 10; 325 MG/1; MG/1
1 TABLET ORAL EVERY 6 HOURS PRN
Qty: 90 TABLET | Refills: 0 | Status: SHIPPED | OUTPATIENT
Start: 2022-03-01 | End: 2022-04-01 | Stop reason: SDUPTHER

## 2022-03-01 NOTE — PROGRESS NOTES
"Ephraim Quinones    1950    Chief Complaint   Patient presents with   • Med Refill     yvan, contract and uds up to date   • Diabetes     3 month follow up       Vitals:    03/01/22 0757   BP: 128/78   BP Location: Left arm   Patient Position: Sitting   Cuff Size: Large Adult   Pulse: 80   Temp: 97.1 °F (36.2 °C)   TempSrc: Temporal   SpO2: 98%   Weight: (!) 143 kg (314 lb 9.6 oz)   Height: 170.2 cm (67\")   PainSc: 0-No pain       71-year-old morbidly obese diabetic with chronic back pain from degenerative arthritis and disc disease  Of the spine    Review of Systems   Eyes:        Diabetic retinopathy   Respiratory: Negative for shortness of breath.    Cardiovascular: Negative for chest pain and leg swelling.        History of A. fib   Endocrine: Negative for polydipsia, polyphagia and polyuria.   Musculoskeletal: Positive for arthralgias and back pain.       Past Medical History:   Diagnosis Date   • A-fib ([facility])    • Acid reflux    • Anemia 5/3/2016   • Diabetes mellitus (HCC)    • Gout    • Hyperlipidemia    • Hypertension    • Sleep apnea with use of continuous positive airway pressure (CPAP)          Current Outpatient Medications:   •  albuterol sulfate  (90 Base) MCG/ACT inhaler, 2 sprays 2 minutes apart every 6 hours as needed, Disp: 18 g, Rfl: 5  •  amLODIPine (NORVASC) 5 MG tablet, TAKE 1 TABLET BY MOUTH TWICE DAILY, Disp: 180 tablet, Rfl: 3  •  aspirin 81 MG EC tablet, Take 81 mg by mouth Daily., Disp: , Rfl:   •  carbidopa-levodopa (SINEMET)  MG per tablet, TAKE 1 TABLET TWO TIMES A DAY, Disp: 180 tablet, Rfl: 1  •  Cholecalciferol (VITAMIN D3) 2000 units capsule, Take 2,000 Units by mouth Daily., Disp: , Rfl:   •  clobetasol (TEMOVATE) 0.05 % cream, APPLY TOPICALLY TO THE APPROPRIATE AREA AS DIRECTED AS NEEDED (FOR ITCHING)., Disp: 30 g, Rfl: 2  •  ELIQUIS 5 MG tablet tablet, Take 5 mg by mouth Every 12 (Twelve) Hours., Disp: , Rfl:   •  glipizide (GLUCOTROL XL) 5 MG ER tablet, TAKE " 2 TABLETS BY MOUTH DAILY., Disp: 180 tablet, Rfl: 3  •  glucose blood test strip, 1 each by Other route Daily. Use as instructed, Disp: 100 each, Rfl: 3  •  HYDROcodone-acetaminophen (NORCO)  MG per tablet, Take 1 tablet by mouth Every 6 (Six) Hours As Needed for Moderate Pain ., Disp: 90 tablet, Rfl: 0  •  indapamide (LOZOL) 2.5 MG tablet, TAKE 1 TABLET BY MOUTH EVERY MORNING., Disp: 90 tablet, Rfl: 3  •  Lancet Devices (EASY TOUCH LANCING DEVICE) misc, USE TO TEST DAILY, Disp: , Rfl: 0  •  losartan (COZAAR) 100 MG tablet, TAKE 1 TABLET BY MOUTH DAILY, Disp: 90 tablet, Rfl: 2  •  metFORMIN (GLUCOPHAGE) 1000 MG tablet, TAKE 1 TABLET BY MOUTH 2 TIMES A DAY WITH MEALS., Disp: 180 tablet, Rfl: 3  •  methocarbamol (ROBAXIN) 750 MG tablet, , Disp: , Rfl:   •  naproxen sodium (ALEVE) 220 MG tablet, Take 220 mg by mouth 2 (Two) Times a Day As Needed., Disp: , Rfl:   •  ONETOUCH DELICA LANCETS 33G misc, 1 each Daily., Disp: 100 each, Rfl: 3  •  rosuvastatin (Crestor) 10 MG tablet, Take 1 tablet by mouth Every Night., Disp: 90 tablet, Rfl: 0  •  sulfamethoxazole-trimethoprim (Bactrim DS) 800-160 MG per tablet, Take 1 tablet by mouth 2 (Two) Times a Day for 7 days., Disp: 14 tablet, Rfl: 0  •  colchicine (Colcrys) 0.6 MG tablet, TAKE 1 TABLET BY MOUTH AS NEEDED FOR FOUT (Patient taking differently: TAKE 1 TABLET BY MOUTH AS NEEDED FOR GOUT), Disp: 30 tablet, Rfl: 0    Allergies   Allergen Reactions   • Simvastatin Other (See Comments)     Leg Cramps   • Percocet [Oxycodone-Acetaminophen] Itching       Patient Active Problem List   Diagnosis   • Type 2 diabetes mellitus without complication (HCC)   • HTN (hypertension)   • A-fib (HCC)   • Hx of adenomatous colonic polyps   • Morbidly obese (HCC)   • Phimosis   • Sacroiliitis, not elsewhere classified (HCC)       Social History     Socioeconomic History   • Marital status:    Tobacco Use   • Smoking status: Former Smoker     Packs/day: 1.00     Years: 20.00      Pack years: 20.00     Quit date: 1996     Years since quittin.7   • Smokeless tobacco: Never Used   Substance and Sexual Activity   • Alcohol use: Yes     Comment: occ   • Drug use: No   • Sexual activity: Defer       Past Surgical History:   Procedure Laterality Date   • BACK SURGERY      x2   • CIRCUMCISION N/A 2019    Procedure: CIRCUMCISION;  Surgeon: Yon Sloan MD;  Location: [facility] OR;  Service: Urology   • FINGER SURGERY     • REPLACEMENT TOTAL KNEE Bilateral        Physical Exam  Vitals and nursing note reviewed.   Constitutional:       Appearance: He is obese.   Eyes:      Extraocular Movements: Extraocular movements intact.      Pupils: Pupils are equal, round, and reactive to light.   Cardiovascular:      Rate and Rhythm: Normal rate and regular rhythm.   Pulmonary:      Effort: Pulmonary effort is normal.      Breath sounds: Normal breath sounds.   Musculoskeletal:      Right lower leg: No edema.      Left lower leg: No edema.   Skin:     General: Skin is warm and dry.   Neurological:      General: No focal deficit present.      Mental Status: He is alert and oriented to person, place, and time.   Psychiatric:         Mood and Affect: Mood normal.         Behavior: Behavior normal.         Thought Content: Thought content normal.         Judgment: Judgment normal.       CONTROLLED SUBSTANCE TRACKING 2020 2020 3/26/2021 6/3/2021 2021 2021 3/1/2022   Last Gregory 2020 2020 3/26/2021 6/3/2021 2021 2021 3/1/2022   Report Number [id number] Dr Hopkins reviewed through [facility] reviewed by Dolores LANDERS through [facility] - reviewed through [facility] reviewed through [facility] reviewed through [facility]   Last UDS 2020   Last Controlled Substance Agreement 2020       Vitals:    22 0757   BP: 128/78   BP Location: Left arm   Patient  "Position: Sitting   Cuff Size: Large Adult   Pulse: 80   Temp: 97.1 °F (36.2 °C)   TempSrc: Temporal   SpO2: 98%   Weight: (!) 143 kg (314 lb 9.6 oz)   Height: 170.2 cm (67\")     Patient's Body mass index is 49.27 kg/m². indicating that he is morbidly obese (BMI > 40 or > 35 with obesity - related health condition). Obesity-related health conditions include the following: hypertension, diabetes mellitus and osteoarthritis. Obesity is worsening. BMI is is above average; BMI management plan is completed. We discussed portion control and increasing exercise..      Diagnoses and all orders for this visit:    1. Type 2 diabetes mellitus without complication, without long-term current use of insulin (HCC) (Primary)    2. Hyperglycemia  -     Hemoglobin A1c  -     Basic Metabolic Panel    3. Pain  -     HYDROcodone-acetaminophen (NORCO)  MG per tablet; Take 1 tablet by mouth Every 6 (Six) Hours As Needed for Moderate Pain .  Dispense: 90 tablet; Refill: 0    Back-surgical past laminectomies fusion for DJD--also, the patient has recently observed retinopathy related to his diabetes    Problems Addressed this Visit        Endocrine and Metabolic    Type 2 diabetes mellitus without complication (HCC) - Primary      Other Visit Diagnoses     Hyperglycemia        Relevant Orders    Hemoglobin A1c    Basic Metabolic Panel    Pain        Relevant Medications    HYDROcodone-acetaminophen (NORCO)  MG per tablet      Diagnoses       Codes Comments    Type 2 diabetes mellitus without complication, without long-term current use of insulin (HCC)    -  Primary ICD-10-CM: E11.9  ICD-9-CM: 250.00     Hyperglycemia     ICD-10-CM: R73.9  ICD-9-CM: 790.29     Pain     ICD-10-CM: R52  ICD-9-CM: 780.96           Health Maintenance:  Immunization History   Administered Date(s) Administered   • COVID-19 (MODERNA) 1st, 2nd, 3rd Dose Only 02/25/2021, 03/25/2021, 11/01/2021   • FLUAD TRI 65YR+ 10/04/2019   • Fluad Quad 65+ 10/04/2019, " 09/11/2020   • Fluzone High Dose =>65 Years ([employer] ONLY) 10/06/2016, 09/20/2017, 09/24/2018, 10/04/2019   • Fluzone High-Dose 65+yrs 10/13/2021   • Pneumococcal Conjugate 13-Valent (PCV13) 01/25/2017   • Pneumococcal Polysaccharide (PPSV23) 08/20/2014   • Shingrix 11/07/2019   • Zostavax 02/19/2013                Plan above-up-to-date on immunizations                  Electronically signed by Jim Hopkins MD 03/01/2022

## 2022-03-02 LAB
BUN SERPL-MCNC: 24 MG/DL (ref 8–23)
BUN/CREAT SERPL: 21.4 (ref 7–25)
CALCIUM SERPL-MCNC: 9.5 MG/DL (ref 8.6–10.5)
CHLORIDE SERPL-SCNC: 101 MMOL/L (ref 98–107)
CO2 SERPL-SCNC: 24.1 MMOL/L (ref 22–29)
CREAT SERPL-MCNC: 1.12 MG/DL (ref 0.76–1.27)
EGFR GENE MUT ANL BLD/T: 70.2 ML/MIN/1.73
GLUCOSE SERPL-MCNC: 129 MG/DL (ref 65–99)
HBA1C MFR BLD: 6.2 % (ref 4.8–5.6)
POTASSIUM SERPL-SCNC: 4.8 MMOL/L (ref 3.5–5.2)
SODIUM SERPL-SCNC: 138 MMOL/L (ref 136–145)

## 2022-03-07 ENCOUNTER — APPOINTMENT (OUTPATIENT)
Dept: PHYSICAL THERAPY | Facility: HOSPITAL | Age: 72
End: 2022-03-07

## 2022-03-28 ENCOUNTER — APPOINTMENT (OUTPATIENT)
Dept: PHYSICAL THERAPY | Facility: HOSPITAL | Age: 72
End: 2022-03-28

## 2022-03-30 ENCOUNTER — APPOINTMENT (OUTPATIENT)
Dept: PHYSICAL THERAPY | Facility: HOSPITAL | Age: 72
End: 2022-03-30

## 2022-04-01 DIAGNOSIS — R52 PAIN: ICD-10-CM

## 2022-04-01 RX ORDER — HYDROCODONE BITARTRATE AND ACETAMINOPHEN 10; 325 MG/1; MG/1
1 TABLET ORAL EVERY 6 HOURS PRN
Qty: 90 TABLET | Refills: 0 | Status: SHIPPED | OUTPATIENT
Start: 2022-04-01 | End: 2022-05-02 | Stop reason: SDUPTHER

## 2022-04-01 NOTE — TELEPHONE ENCOUNTER
Drug thereapy appt 3/1/22  contract done 12/1/21  UDS done 12/1/21    Rx Refill Note  Requested Prescriptions     Pending Prescriptions Disp Refills   • HYDROcodone-acetaminophen (NORCO)  MG per tablet 90 tablet 0     Sig: Take 1 tablet by mouth Every 6 (Six) Hours As Needed for Moderate Pain .      Last office visit with prescribing clinician: 3/1/2022      Next office visit with prescribing clinician: 5/27/2022            Helena Meehan MA  04/01/22, 10:28 CDT

## 2022-04-01 NOTE — TELEPHONE ENCOUNTER
Caller: Ephraim Quinones    Relationship: Self    Best call back number: [phone]    Requested Prescriptions:   Requested Prescriptions     Pending Prescriptions Disp Refills   • HYDROcodone-acetaminophen (NORCO)  MG per tablet 90 tablet 0     Sig: Take 1 tablet by mouth Every 6 (Six) Hours As Needed for Moderate Pain .        Pharmacy where request should be sent: [facility] [address] [phone] [facility] [phone] FX         Does the patient have less than a 3 day supply:  [x] Yes  [] No    Deandre Kirk Rep   04/01/22 09:07 CDT

## 2022-04-05 ENCOUNTER — HOSPITAL ENCOUNTER (OUTPATIENT)
Dept: PHYSICAL THERAPY | Facility: HOSPITAL | Age: 72
Setting detail: THERAPIES SERIES
Discharge: HOME | End: 2022-04-05

## 2022-04-05 DIAGNOSIS — M96.1 POSTLAMINECTOMY SYNDROME, NOT ELSEWHERE CLASSIFIED: ICD-10-CM

## 2022-04-05 DIAGNOSIS — M54.32 LEFT SIDED SCIATICA: Primary | ICD-10-CM

## 2022-04-05 PROCEDURE — 97161 PT EVAL LOW COMPLEX 20 MIN: CPT | Performed by: PHYSICAL THERAPIST

## 2022-04-05 PROCEDURE — 97110 THERAPEUTIC EXERCISES: CPT | Performed by: PHYSICAL THERAPIST

## 2022-04-05 PROCEDURE — 97140 MANUAL THERAPY 1/> REGIONS: CPT | Performed by: PHYSICAL THERAPIST

## 2022-04-06 NOTE — THERAPY EVALUATION
Outpatient Physical Therapy Ortho Initial Evaluation  [facility]     Patient Name: Ephraim Quinones  : 1950  MRN: [id number]  Today's Date: 2022      Visit Date: 2022     Attendance: 1 visits  Subjective improvement: N/A  MD appt: TBS  Recheck due: 2022      Patient Active Problem List   Diagnosis   • Type 2 diabetes mellitus without complication (HCC)   • HTN (hypertension)   • A-fib (HCC)   • Hx of adenomatous colonic polyps   • Morbidly obese (HCC)   • Phimosis   • Sacroiliitis, not elsewhere classified (HCC)        Past Medical History:   Diagnosis Date   • A-fib (HCC)    • Acid reflux    • Anemia 5/3/2016   • Diabetes mellitus (HCC)    • Gout    • Hyperlipidemia    • Hypertension    • Sleep apnea with use of continuous positive airway pressure (CPAP)         Past Surgical History:   Procedure Laterality Date   • BACK SURGERY      x2   • CIRCUMCISION N/A 2019    Procedure: CIRCUMCISION;  Surgeon: Yon Sloan MD;  Location: [facility] OR;  Service: Urology   • FINGER SURGERY     • REPLACEMENT TOTAL KNEE Bilateral        Visit Dx:     ICD-10-CM ICD-9-CM   1. Left sided sciatica  M54.32 724.3   2. Postlaminectomy syndrome, not elsewhere classified  M96.1 722.80          Patient History     Row Name 22 0800             History    Chief Complaint Difficulty Walking;Difficulty with daily activities;Fatigue/poor endurance;Joint stiffness;Muscle weakness;Pain  -KG      Type of Pain Back pain;Lower Extremity / Leg  RLE  -KG      Date Current Problem(s) Began --  Chronic  -KG      Brief Description of Current Complaint Pt presents with c/o low back and intermittent RLE pain. Pt with history of L4-S1 laminectomy/fusion in . States therapy seemed to really help when he had it earlier this year. Wife has renal CA and has been undergoing chemo treatments therefore he didn’t have time to come to PT. Wife finished up her chemo last week so he can start back again.  MD wants him to attend 1x/week. States his back has felt better over the past few weeks. Hasn’t worn his back brace in 2 weeks and is taking less pain meds. Pain mostly R side of low back and R hip. States the pain can travel down R posterior thigh but not all the time. Not doing as much lifting as he used to. Son & son in law help him now with picking up feed and helping around farm. Has some pain with weather changes.  -KG      Patient/Caregiver Goals Relieve pain;Improve mobility;Improve strength;Know what to do to help the symptoms  -KG      Occupation/sports/leisure activities Pt is retired. Has small farm (cattle, goats, chickens)  -KG      What clinical tests have you had for this problem? MRI  -KG      Results of Clinical Tests Dec 2021: Moderate to severe multilevel degenerative jt and disc disease most markedly at L3-L4. Mod to severe central and foraminal stenosis on R at L3-4.  -KG              Pain     Pain Location Back;Leg  -KG      Pain at Present 0  -KG      Pain at Best 0  -KG      Pain at Worst 9  Most of the time it's at a 5-6/10  -KG      Pain Frequency Intermittent;Several days a week  -KG      Pain Description Aching;Sharp;Sore;Discomfort  -KG      What Performance Factors Make the Current Problem(s) WORSE? Standing, walking for short periods. lifting. Feeding livestock sometimes. has pain laying on his back.  -KG      What Performance Factors Make the Current Problem(s) BETTER? Rest/sitting down, doing some his exercises  -KG      Is your sleep disturbed? Yes  At times; pain in RLE at night  -KG      What position do you sleep in? Left sidelying  Let's right leg hang off bed to ease up his pain  -KG              Fall Risk Assessment    Any falls in the past year: Yes  -KG      Number of falls reported in the last 12 months 2; fell from ladder, fell from step  -KG      Factors that contributed to the fall: Lost balance;Tripped  -KG              Services    Are you currently receiving Home  Health services No  -KG      Do you plan to receive Home Health services in the near future No  -KG            User Key  (r) = Recorded By, (t) = Taken By, (c) = Cosigned By    Initials Name Provider Type    RUSTY Haleigh Flores, PT Physical Therapist                 PT Ortho     Row Name 04/05/22 0800       Subjective Comments    Subjective Comments Refer to therapy pt history for details.  -KG       Precautions and Contraindications    Precautions A-fib, L4-S1 fusion  -KG       Subjective Pain    Able to rate subjective pain? yes  -KG    Pre-Treatment Pain Level 0  -KG       Posture/Observations    Posture/Observations Comments Decreased overall postural awareness. Does well finding better PN positioning with cues while seated. Impaired core/postural stability. Supine L IC and LLE short. Some better after LAD. Not very tolerant of semi-recumbent or supine positioning  -KG       Quarter Clearing    Quarter Clearing Lower Quarter Clearing  -KG       Neural Tension Signs- Lower Quarter Clearing    Slump Bilateral:;Negative  -KG    SLR Bilateral:;Negative  -KG       Sensory Screen for Light Touch- Lower Quarter Clearing    L1 (inguinal area) Bilateral:;Intact  -KG    L2 (anterior mid thigh) Bilateral:;Intact  -KG    L3 (distal anterior thigh) Bilateral:;Intact  -KG    L4 (medial lower leg/foot) Bilateral:;Intact  -KG    L5 (lateral lower leg/great toe) Bilateral:;Intact  -KG    S1 (bottom of foot) Bilateral:;Intact  -KG       SI/Hip Screen- Lower Quarter Clearing    ASIS compression Negative  -KG    ASIS distraction Negative  -KG    Taylor's/Vick's test Right:;Positive  -KG    Posterior thigh sheer Negative  -KG       Special Tests/Palpation    Special Tests/Palpation Lumbar/SI  -KG       Lumbosacral Palpation    Lumbosacral Palpation? Yes  -KG    Erector Spinae (Paraspinals) Bilateral:;Tender;Guarded/taut  -KG    Lumbosacral Palpation Comments TTP/taut at R thoracolumbar parapspinals and laterally towards R lower  ribs. Muscle knot/tightness at R posterior hip/glute area.  -KG       General ROM    Head/Neck/Trunk Trunk Extension;Trunk Flexion;Trunk Lt Lateral Flexion;Trunk Rt Lateral Flexion  -KG    GENERAL ROM COMMENTS Bilateral hip, knee, ankle AROM WFL  -KG       Head/Neck/Trunk    Trunk Extension AROM 75% full range; mild discomfort in low back  -KG    Trunk Flexion AROM Fingertips to mid shin with increased LBP and pain on return to neutral  -KG    Trunk Lt Lateral Flexion AROM 75% full range no pain  -KG    Trunk Rt Lateral Flexion AROM 50% full range with pain ipsilateral  -KG       MMT (Manual Muscle Testing)    Rt Lower Ext Rt Hip Flexion;Rt Hip ABduction;Rt Hip ADduction;Rt Knee Extension;Rt Knee Flexion;Rt Ankle Plantarflexion;Rt Ankle Dorsiflexion  -KG    Lt Lower Ext Lt Hip Flexion;Lt Hip ABduction;Lt Hip ADduction;Lt Knee Extension;Lt Knee Flexion;Lt Ankle Plantarflexion;Lt Ankle Dorsiflexion  -KG       MMT Right Lower Ext    Rt Hip Flexion MMT, Gross Movement (4/5) good  -KG    Rt Hip ABduction MMT, Gross Movement (4/5) good  -KG    Rt Hip ADduction MMT, Gross Movement (4/5) good  -KG    Rt Knee Extension MMT, Gross Movement (5/5) normal  -KG    Rt Knee Flexion MMT, Gross Movement (5/5) normal  -KG    Rt Ankle Plantarflexion MMT, Gross Movement (5/5) normal  -KG    Rt Ankle Dorsiflexion MMT, Gross Movement (4+/5) good plus  -KG       MMT Left Lower Ext    Lt Hip Flexion MMT, Gross Movement (4+/5) good plus  -KG    Lt Hip ABduction MMT, Gross Movement (4/5) good  -KG    Lt Hip ADduction MMT, Gross Movement (4/5) good  -KG    Lt Knee Extension MMT, Gross Movement (5/5) normal  -KG    Lt Knee Flexion MMT, Gross Movement (5/5) normal  -KG    Lt Ankle Plantarflexion MMT, Gross Movement (5/5) normal  -KG    Lt Ankle Dorsiflexion MMT, Gross Movement (5/5) normal  -KG       Sensation    Sensation WNL? WNL  -KG    Light Touch No apparent deficits  -KG       Flexibility    Flexibility Tested? Lower Extremity  -KG        Lower Extremity Flexibility    Hamstrings Bilateral:;Moderately limited  -KG    Hip Flexors Bilateral:;Moderately limited  -KG    Hip External Rotators Bilateral:;Moderately limited  R>L  -KG    Hip Internal Rotators Bilateral:;Mildly limited  -KG       Gait/Stairs (Locomotion)    Comment, (Gait/Stairs) Non antalgic gait with no AD. Slight decrease in nicolas. Widened JENNY. Slight toe out bilaterally  -KG          User Key  (r) = Recorded By, (t) = Taken By, (c) = Cosigned By    Initials Name Provider Type    KG Haleigh Flores, PT Physical Therapist                            Therapy Education  Education Details: HEP: see exercise flowsheet for details  Given: Pain management, HEP, Symptoms/condition management, Posture/body mechanics  Program: New  How Provided: Verbal, Written, Demonstration  Provided to: Patient  Level of Understanding: Teach back education performed, Verbalized, Demonstrated      PT OP Goals     Row Name 04/05/22 0800          PT Short Term Goals    STG Date to Achieve 04/26/22  -KG     STG 1 Demonstrate independence/compliance in performance of initial HEP  -KG     STG 2 Demonstrate independence in performance of isometric TA contraction in various positions with therex for functional carryover into daily activity performance  -KG     STG 3 Demonstrate improved seated PN/postural positioning with seated core/LE strengthening activities with minimal cues for correction  -KG     STG 4 Demonstrate improved lumbar flex AROM fingertips to mid shin without increased pain  -KG            Long Term Goals    LTG Date to Achieve 05/17/22  -KG     LTG 1 Subjectively report 50% overall improvement or greater  -KG     LTG 2 Improve modified oswestry score to 35% or less  -KG     LTG 3 Demonstrate improved bilateral hip flex/abd MMT to 5/5  -KG     LTG 4 Tolerate 15-20 minutes of standing functional stabilization activities without increased pain  -KG     LTG 5 Demonstrate proper lifting/body mechanics  when lifting/carrying 10-20# crate without increased pain  -KG            Time Calculation    PT Goal Re-Cert Due Date 04/26/22  -KG           User Key  (r) = Recorded By, (t) = Taken By, (c) = Cosigned By    Initials Name Provider Type    KG Haleigh Flores, PT Physical Therapist                 PT Assessment/Plan     Row Name 04/05/22 0800          PT Assessment    Functional Limitations Limitation in home management;Limitations in community activities;Performance in leisure activities;Limitations in functional capacity and performance  -KG     Impairments Gait;Impaired flexibility;Impaired muscle endurance;Muscle strength;Pain;Joint mobility;Poor body mechanics;Posture;Range of motion  -KG     Assessment Comments Pt is a 72 y.o. male presenting with chronic low back and RLE pain that is limiting performance of functional mobility and daily activities. Had therapy earlier this year which he reports helped him quite a bit; had to hold due to needing to care of his wife. Has kept up with some of his HEP.  Pt with negative neurotension signs in BLE’s but does present with moderate hamstring and rotator tightness at bilateral hips R>L. Pt’s signs/symptoms consistent with arthritic/stenotic changes and MRI indicative of that. Does have history of L4-S1 fusion in 2007. Pt with decreased tolerance to supine/semi-recumbent positioning but does well with seated position/activities performed this date. Pt with notable weakness at core and hips, as well decreased postural awareness. Pt will benefit from skilled PT services to address these deficits for improvements in overall functional mobility, stability, strength, and activity tolerance.  -KG     Rehab Potential Good  -KG     Patient/caregiver participated in establishment of treatment plan and goals Yes  -KG     Patient would benefit from skilled therapy intervention Yes  -KG            PT Plan    PT Frequency 1x/week  -KG     Predicted Duration of Therapy Intervention  "(PT) 8 visits  -KG     Planned CPT's? PT EVAL LOW COMPLEXITY: 71239;PT THER PROC EA 15 MIN: 41180;PT THER ACT EA 15 MIN: 04317;PT MANUAL THERAPY EA 15 MIN: 59613;PT NEUROMUSC RE-EDUCATION EA 15 MIN: 30946  -KG     Physical Therapy Interventions (Optional Details) balance training;home exercise program;joint mobilization;lumbar stabilization;manual therapy techniques;modalities;neuromuscular re-education;patient/family education;postural re-education;ROM (Range of Motion);strengthening;stretching  -KG     PT Plan Comments Work on overall core/postural stability, funcitonal hip strength/stability. Continue seated activities and progress to standing stability as able. Not tolerant to supine positioning. Posture/body mechani education. LE stretching. Manual/mobilizations prn.  -KG           User Key  (r) = Recorded By, (t) = Taken By, (c) = Cosigned By    Initials Name Provider Type    KG Haleigh Flores, PT Physical Therapist                   OP Exercises     Row Name 04/05/22 0800             Subjective Comments    Subjective Comments Refer to therapy pt history for details.  -KG              Subjective Pain    Able to rate subjective pain? yes  -KG      Pre-Treatment Pain Level 0  -KG              Exercise 1    Exercise Name 1 Seated michael hamstring stretch  -KG      Cueing 1 Verbal  -KG      Reps 1 2  -KG      Time 1 30\" hold  -KG      Additional Comments foot on stool  -KG              Exercise 2    Exercise Name 2 Seated modified figure 4 stretch  -KG      Cueing 2 Verbal  -KG      Additional Comments with strap; ~mid shin  -KG              Exercise 3    Exercise Name 3 Seated PN/TA education  -KG              Exercise 4    Exercise Name 4 Seated PN/TA hip abd  -KG      Cueing 4 Verbal  -KG      Sets 4 1  -KG      Reps 4 15  -KG      Additional Comments red tband  -KG              Exercise 5    Exercise Name 5 Seated PN/TA hip add squeeze  -KG      Reps 5 Review/demo for HEP  -KG            User Key  (r) = " Recorded By, (t) = Taken By, (c) = Cosigned By    Initials Name Provider Type    Haleigh Forde, PT Physical Therapist              Manual Rx (last 36 hours)     Manual Treatments     Row Name 04/05/22 0800             Manual Rx 1    Manual Rx 1 Location Lumbothoracic paraspinals & towards ribs, R posterior hip,  -KG      Manual Rx 1 Type SMT/MFR; biofreeze at end  -KG            User Key  (r) = Recorded By, (t) = Taken By, (c) = Cosigned By    Initials Name Provider Type    Haleigh Forde, PT Physical Therapist                            Outcome Measure Options: Modified Oswestry (20 = 40%)         Time Calculation:     Start Time: 0810  Stop Time: 0855  Time Calculation (min): 45 min  Total Timed Code Minutes- PT: 25 minute(s)   Therapy Charges for Today     Code Description Service Date Service Provider Modifiers Qty    80900231296 HC PT EVAL LOW COMPLEXITY 1 4/5/2022 Haleigh Flores, PT GP 1    98223106234 HC PT THER PROC EA 15 MIN 4/5/2022 Haleigh Flores, PT GP 1    79908658858 HC PT MANUAL THERAPY EA 15 MIN 4/5/2022 Haleigh Flores, PT GP 1              PT G-Codes  Outcome Measure Options: Modified Oswestry (20 = 40%)         Haleigh Flores PT  4/6/2022

## 2022-04-11 ENCOUNTER — HOSPITAL ENCOUNTER (OUTPATIENT)
Dept: PHYSICAL THERAPY | Facility: HOSPITAL | Age: 72
Setting detail: THERAPIES SERIES
Discharge: HOME | End: 2022-04-11

## 2022-04-11 DIAGNOSIS — M96.1 POSTLAMINECTOMY SYNDROME, NOT ELSEWHERE CLASSIFIED: ICD-10-CM

## 2022-04-11 DIAGNOSIS — M54.32 LEFT SIDED SCIATICA: Primary | ICD-10-CM

## 2022-04-11 PROCEDURE — 97140 MANUAL THERAPY 1/> REGIONS: CPT | Performed by: PHYSICAL THERAPIST

## 2022-04-11 PROCEDURE — 97110 THERAPEUTIC EXERCISES: CPT | Performed by: PHYSICAL THERAPIST

## 2022-04-11 NOTE — THERAPY TREATMENT NOTE
"    Outpatient Physical Therapy Ortho Treatment Note   [facility]     Patient Name: Ephraim Quinones  : 1950  MRN: [id number]  Today's Date: 2022      Visit Date: 2022     Attendance: 2 visits  Subjective improvement: \"maybe a little improvement\"  MD appt: TBS  Recheck due: 2022    Visit Dx:    ICD-10-CM ICD-9-CM   1. Left sided sciatica  M54.32 724.3   2. Postlaminectomy syndrome, not elsewhere classified  M96.1 722.80       Patient Active Problem List   Diagnosis   • Type 2 diabetes mellitus without complication (HCC)   • HTN (hypertension)   • A-fib (HCC)   • Hx of adenomatous colonic polyps   • Morbidly obese (HCC)   • Phimosis   • Sacroiliitis, not elsewhere classified (HCC)        Past Medical History:   Diagnosis Date   • A-fib (HCC)    • Acid reflux    • Anemia 5/3/2016   • Diabetes mellitus (HCC)    • Gout    • Hyperlipidemia    • Hypertension    • Sleep apnea with use of continuous positive airway pressure (CPAP)         Past Surgical History:   Procedure Laterality Date   • BACK SURGERY      x2   • CIRCUMCISION N/A 2019    Procedure: CIRCUMCISION;  Surgeon: Yon Sloan MD;  Location: [facility] OR;  Service: Urology   • FINGER SURGERY     • REPLACEMENT TOTAL KNEE Bilateral         PT Ortho     Row Name 22 0800       Precautions and Contraindications    Precautions A-fib, L4-S1 fusion  -KG       Posture/Observations    Posture/Observations Comments Intermittent posutral cues throughout. Doing better finding PN with seated activities but needs cues to maintain.  -KG          User Key  (r) = Recorded By, (t) = Taken By, (c) = Cosigned By    Initials Name Provider Type    Haleigh Forde, PT Physical Therapist                             PT Assessment/Plan     Row Name 22 0800          PT Assessment    Assessment Comments Pt did well with treatment. Attempted pro II but had some increased discomfort at L hip so deferred. Did well with seated " "core stability; needed cues to maintain better PN positioning with performance. Able to tolerate some gentle standing activities. Taut/TTP at L>R post hip this date but responded well to manual  -KG     Rehab Potential Good  -KG            PT Plan    PT Frequency 1x/week  -KG     PT Plan Comments Continue standing progressions as tolerated. Try gentle standing hip ext SLR and possible sit<>stand with TA. Continue manual.  -KG           User Key  (r) = Recorded By, (t) = Taken By, (c) = Cosigned By    Initials Name Provider Type    Haleigh Forde, PT Physical Therapist                 Modalities     Row Name 04/11/22 0800             Subjective Pain    Post-Treatment Pain Level 1  -KG            User Key  (r) = Recorded By, (t) = Taken By, (c) = Cosigned By    Initials Name Provider Type    Haleigh Forde, PT Physical Therapist               OP Exercises     Row Name 04/11/22 0800             Subjective Comments    Subjective Comments Pt states since he was here last his R heel hasn't hurt any since we worked on his back. L hip is bothering him today and states it's usualy his R.  -KG              Subjective Pain    Able to rate subjective pain? yes  -KG      Pre-Treatment Pain Level 2  -KG      Post-Treatment Pain Level 1  -KG              Exercise 1    Exercise Name 1 Seated michael hamstring stretch  -KG      Cueing 1 Verbal  -KG      Reps 1 2  -KG      Time 1 30\" hold  -KG      Additional Comments foot on stool  -KG              Exercise 2    Exercise Name 2 Seated modified figure 4 stretch  -KG      Cueing 2 Verbal  -KG      Reps 2 2  -KG      Time 2 30\" hold  -KG              Exercise 3    Exercise Name 3 Seated PN/TA for review  -KG              Exercise 4    Exercise Name 4 Seated PN/TA hip abd  -KG      Cueing 4 Verbal  -KG      Sets 4 2  -KG      Reps 4 10  -KG      Time 4 pause  -KG      Additional Comments green tband  -KG              Exercise 5    Exercise Name 5 Seated PN/TA hip add squeeze  " -KG      Reps 5 increased michael hip pain so deferred  -KG              Exercise 6    Exercise Name 6 Seated PN/TA hip alt LAQ  -KG      Cueing 6 Verbal;Tactile  -KG      Sets 6 2  -KG      Reps 6 10  -KG              Exercise 7    Exercise Name 7 Seated PN/TA hip alt marching  -KG      Cueing 7 Verbal;Tactile  -KG      Sets 7 2  -KG      Reps 7 10  -KG              Exercise 8    Exercise Name 8 Standing gentle alt march with TA  -KG      Cueing 8 Verbal  -KG      Sets 8 1  -KG      Reps 8 15  -KG              Exercise 9    Exercise Name 9 Standing gentle alt hip abd SLR wit hTA  -KG      Cueing 9 Verbal  -KG      Sets 9 1  -KG      Reps 9 10  -KG              Exercise 10    Exercise Name 10 Standing tband pallof press for stability  -KG      Cueing 10 Verbal  -KG      Sets 10 1  -KG      Reps 10 15 ea direction  -KG      Additional Comments green tband  -KG            User Key  (r) = Recorded By, (t) = Taken By, (c) = Cosigned By    Initials Name Provider Type    KG Haleigh Flores, PT Physical Therapist                         Manual Rx (last 36 hours)     Manual Treatments     Row Name 04/11/22 0800             Manual Rx 1    Manual Rx 1 Location Lumbothoracic paraspinals & towards ribs, bilateral post hips  -KG      Manual Rx 1 Type SMT/MFR; biofreeze at end  -KG            User Key  (r) = Recorded By, (t) = Taken By, (c) = Cosigned By    Initials Name Provider Type    KG Haleigh Flores, PT Physical Therapist                 PT OP Goals     Row Name 04/11/22 0800          PT Short Term Goals    STG Date to Achieve 04/26/22  -KG     STG 1 Demonstrate independence/compliance in performance of initial HEP  -KG     STG 1 Progress Progressing  -KG     STG 2 Demonstrate independence in performance of isometric TA contraction in various positions with therex for functional carryover into daily activity performance  -KG     STG 2 Progress Progressing  -KG     STG 3 Demonstrate improved seated PN/postural positioning  with seated core/LE strengthening activities with minimal cues for correction  -KG     STG 3 Progress Progressing  -KG     STG 4 Demonstrate improved lumbar flex AROM fingertips to mid shin without increased pain  -KG     STG 4 Progress Progressing  -KG            Long Term Goals    LTG Date to Achieve 05/17/22  -KG     LTG 1 Subjectively report 50% overall improvement or greater  -KG     LTG 2 Improve modified oswestry score to 35% or less  -KG     LTG 3 Demonstrate improved bilateral hip flex/abd MMT to 5/5  -KG     LTG 4 Tolerate 15-20 minutes of standing functional stabilization activities without increased pain  -KG     LTG 5 Demonstrate proper lifting/body mechanics when lifting/carrying 10-20# crate without increased pain  -KG            Time Calculation    PT Goal Re-Cert Due Date 04/26/22  -KG           User Key  (r) = Recorded By, (t) = Taken By, (c) = Cosigned By    Initials Name Provider Type    Haleigh Forde, PT Physical Therapist                Therapy Education  Education Details: Added seated LAQ, marching with TA; standing marching and alt hip abd SLR with TA  Given: Pain management, HEP, Symptoms/condition management, Posture/body mechanics  Program: Progressed  How Provided: Verbal, Written, Demonstration  Provided to: Patient  Level of Understanding: Teach back education performed, Verbalized, Demonstrated              Time Calculation:   Start Time: 0802  Stop Time: 0852  Time Calculation (min): 50 min  Therapy Charges for Today     Code Description Service Date Service Provider Modifiers Qty    86711507688  PT THER PROC EA 15 MIN 4/11/2022 Haleigh Flores, PT GP 2    [id number]  PT MANUAL THERAPY EA 15 MIN 4/11/2022 Haleigh Flores, PT GP 1                    Haleigh Flores, PT  4/11/2022

## 2022-04-18 ENCOUNTER — HOSPITAL ENCOUNTER (OUTPATIENT)
Dept: PHYSICAL THERAPY | Facility: HOSPITAL | Age: 72
Setting detail: THERAPIES SERIES
Discharge: HOME | End: 2022-04-18

## 2022-04-18 DIAGNOSIS — M54.32 LEFT SIDED SCIATICA: Primary | ICD-10-CM

## 2022-04-18 DIAGNOSIS — M96.1 POSTLAMINECTOMY SYNDROME, NOT ELSEWHERE CLASSIFIED: ICD-10-CM

## 2022-04-18 PROCEDURE — 97140 MANUAL THERAPY 1/> REGIONS: CPT | Performed by: PHYSICAL THERAPIST

## 2022-04-18 PROCEDURE — 97110 THERAPEUTIC EXERCISES: CPT | Performed by: PHYSICAL THERAPIST

## 2022-04-19 NOTE — THERAPY TREATMENT NOTE
"    Outpatient Physical Therapy Ortho Treatment Note  [facility]/[place]     Patient Name: Ephraim Quinones  : 1950  MRN: [id number]  Today's Date: 2022      Visit Date: 2022     Attendance: 3 visits  Subjective improvement: \"pain not as bad anymore\"  MD appt: TBS  Recheck due: 2022    Visit Dx:    ICD-10-CM ICD-9-CM   1. Left sided sciatica  M54.32 724.3   2. Postlaminectomy syndrome, not elsewhere classified  M96.1 722.80       Patient Active Problem List   Diagnosis   • Type 2 diabetes mellitus without complication (HCC)   • HTN (hypertension)   • A-fib (HCC)   • Hx of adenomatous colonic polyps   • Morbidly obese (HCC)   • Phimosis   • Sacroiliitis, not elsewhere classified (HCC)        Past Medical History:   Diagnosis Date   • A-fib (HCC)    • Acid reflux    • Anemia 5/3/2016   • Diabetes mellitus (HCC)    • Gout    • Hyperlipidemia    • Hypertension    • Sleep apnea with use of continuous positive airway pressure (CPAP)         Past Surgical History:   Procedure Laterality Date   • BACK SURGERY      x2   • CIRCUMCISION N/A 2019    Procedure: CIRCUMCISION;  Surgeon: Yon Sloan MD;  Location: [facility] OR;  Service: Urology   • FINGER SURGERY     • REPLACEMENT TOTAL KNEE Bilateral                  PT Ortho     Row Name 22 0800       Subjective Comments    Subjective Comments Pt states his back is doing better. Seeing some improvement since starting PT. Continues with HEP compliance.  -KG       Precautions and Contraindications    Precautions A-fib, L4-S1 fusion  -KG       Subjective Pain    Post-Treatment Pain Level 0  -KG       Posture/Observations    Posture/Observations Comments Standing postural cues needed. Diong well wtih seated posture/PN  -KG          User Key  (r) = Recorded By, (t) = Taken By, (c) = Cosigned By    Initials Name Provider Type    Haleigh Forde PT Physical Therapist                         PT Assessment/Plan     Row Name 22 " "0800          PT Assessment    Assessment Comments Pt progressing well. Progressed to dynadisc for seated stability. Challenged but did well with cues. Some RLE soreness/weakness reported with step ups.  -KG            PT Plan    PT Frequency 1x/week  -KG     PT Plan Comments Recheck next visit. Continue seated dynadisc stability. Continue step ups.  -KG           User Key  (r) = Recorded By, (t) = Taken By, (c) = Cosigned By    Initials Name Provider Type    KG Haleigh Flores, PT Physical Therapist                   OP Exercises     Row Name 04/18/22 0800             Subjective Pain    Able to rate subjective pain? yes  -KG      Pre-Treatment Pain Level 0  -KG              Exercise 1    Exercise Name 1 Seated michael hamstring stretch  -KG      Cueing 1 Verbal  -KG      Reps 1 2  -KG      Time 1 30\" hold  -KG      Additional Comments foot on stool  -KG              Exercise 2    Exercise Name 2 Seated modified figure 4 stretch  -KG      Cueing 2 Verbal  -KG      Reps 2 2  -KG      Time 2 30\" hold  -KG              Exercise 3    Exercise Name 3 Airex standing marching with TA  -KG      Cueing 3 Verbal  -KG      Sets 3 2  -KG      Reps 3 10  -KG              Exercise 4    Exercise Name 4 Standing alt hip abd SLR with TA  -KG      Cueing 4 Verbal  -KG      Sets 4 2  -KG      Reps 4 10  -KG              Exercise 5    Exercise Name 5 Standing alt hip ext SLR with TA  -KG      Cueing 5 Verbal  -KG      Sets 5 1  -KG      Reps 5 10  -KG              Exercise 6    Exercise Name 6 Dynadisc seated PN/TA alt LAQ  -KG      Cueing 6 Verbal  -KG      Sets 6 2  -KG      Reps 6 10  -KG      Additional Comments DD/airex  -KG              Exercise 7    Exercise Name 7 Dynadisc seated PN/TA alt marching  -KG      Cueing 7 Verbal  -KG      Sets 7 2  -KG      Reps 7 10  -KG      Additional Comments DD/airex  -KG              Exercise 8    Exercise Name 8 Dynadisc seated PN/TA tband mid rows  -KG      Cueing 8 Verbal  -KG      Sets 8 2  " -KG      Reps 8 10  -KG      Additional Comments DD/airex  -KG              Exercise 9    Exercise Name 9 Sit <>stands from airex in chair with TA  -KG      Cueing 9 Verbal  -KG      Sets 9 1  -KG      Reps 9 10  -KG              Exercise 10    Exercise Name 10 Standing tband pallof press for stability  -KG      Cueing 10 Verbal  -KG      Sets 10 1  -KG      Reps 10 20 ea direction  -KG      Additional Comments green tband  -KG            User Key  (r) = Recorded By, (t) = Taken By, (c) = Cosigned By    Initials Name Provider Type    KG Haleigh Flores, PT Physical Therapist                         Manual Rx (last 36 hours)     Manual Treatments     Row Name 04/18/22 0800             Manual Rx 1    Manual Rx 1 Location Lumbothoracic paraspinals & towards ribs, bilateral post hips  -KG      Manual Rx 1 Type SMT/MFR; biofreeze at end  -KG            User Key  (r) = Recorded By, (t) = Taken By, (c) = Cosigned By    Initials Name Provider Type    KG Haleigh Flores, PT Physical Therapist                 PT OP Goals     Row Name 04/18/22 0800          PT Short Term Goals    STG Date to Achieve 04/26/22  -KG     STG 1 Demonstrate independence/compliance in performance of initial HEP  -KG     STG 1 Progress Met;Ongoing  -KG     STG 2 Demonstrate independence in performance of isometric TA contraction in various positions with therex for functional carryover into daily activity performance  -KG     STG 2 Progress Progressing  -KG     STG 3 Demonstrate improved seated PN/postural positioning with seated core/LE strengthening activities with minimal cues for correction  -KG     STG 3 Progress Progressing  -KG     STG 4 Demonstrate improved lumbar flex AROM fingertips to mid shin without increased pain  -KG     STG 4 Progress Progressing  -KG            Long Term Goals    LTG Date to Achieve 05/17/22  -KG     LTG 1 Subjectively report 50% overall improvement or greater  -KG     LTG 2 Improve modified oswestry score to 35%  or less  -KG     LTG 3 Demonstrate improved bilateral hip flex/abd MMT to 5/5  -KG     LTG 4 Tolerate 15-20 minutes of standing functional stabilization activities without increased pain  -KG     LTG 5 Demonstrate proper lifting/body mechanics when lifting/carrying 10-20# crate without increased pain  -KG            Time Calculation    PT Goal Re-Cert Due Date 04/26/22  -KG           User Key  (r) = Recorded By, (t) = Taken By, (c) = Cosigned By    Initials Name Provider Type    KG Haleigh Flores, PT Physical Therapist                Therapy Education  Education Details: Added sit<>stands  Given: HEP, Symptoms/condition management, Pain management, Posture/body mechanics  Program: Progressed, Reinforced  How Provided: Verbal, Written, Demonstration  Provided to: Patient  Level of Understanding: Teach back education performed, Verbalized, Demonstrated              Time Calculation:   Start Time: 0801  Stop Time: 0847  Time Calculation (min): 46 min     Therapy Charges for Today     Code Description Service Date Service Provider Modifiers Qty    77050986479  PT THER PROC EA 15 MIN 4/18/2022 Haleigh Flores, PT GP 2    [id number]  PT MANUAL THERAPY EA 15 MIN 4/18/2022 Haleigh Flores, PT GP 1                    Haleigh Flores, PT  4/19/2022

## 2022-04-25 ENCOUNTER — HOSPITAL ENCOUNTER (OUTPATIENT)
Dept: PHYSICAL THERAPY | Facility: HOSPITAL | Age: 72
Setting detail: THERAPIES SERIES
Discharge: HOME | End: 2022-04-25

## 2022-04-25 DIAGNOSIS — M54.32 LEFT SIDED SCIATICA: Primary | ICD-10-CM

## 2022-04-25 DIAGNOSIS — M96.1 POSTLAMINECTOMY SYNDROME, NOT ELSEWHERE CLASSIFIED: ICD-10-CM

## 2022-04-25 PROCEDURE — 97110 THERAPEUTIC EXERCISES: CPT | Performed by: PHYSICAL THERAPIST

## 2022-04-25 PROCEDURE — 97140 MANUAL THERAPY 1/> REGIONS: CPT | Performed by: PHYSICAL THERAPIST

## 2022-04-26 NOTE — THERAPY PROGRESS REPORT/RE-CERT
Outpatient Physical Therapy Ortho Progress Note  [facility]/[place]     Patient Name: Ephraim Quinones  : 1950  MRN: [id number]  Today's Date: 2022      Visit Date: 2022     Attendance: 4 visits  Subjective improvement: 50-60% improvement  MD appt: TBS  Recheck due: 2022    Visit Dx:    ICD-10-CM ICD-9-CM   1. Left sided sciatica  M54.32 724.3   2. Postlaminectomy syndrome, not elsewhere classified  M96.1 722.80       Patient Active Problem List   Diagnosis   • Type 2 diabetes mellitus without complication (HCC)   • HTN (hypertension)   • A-fib (HCC)   • Hx of adenomatous colonic polyps   • Morbidly obese (HCC)   • Phimosis   • Sacroiliitis, not elsewhere classified (HCC)        Past Medical History:   Diagnosis Date   • A-fib (HCC)    • Acid reflux    • Anemia 5/3/2016   • Diabetes mellitus (HCC)    • Gout    • Hyperlipidemia    • Hypertension    • Sleep apnea with use of continuous positive airway pressure (CPAP)         Past Surgical History:   Procedure Laterality Date   • BACK SURGERY      x2   • CIRCUMCISION N/A 2019    Procedure: CIRCUMCISION;  Surgeon: Yon Sloan MD;  Location: [facility] OR;  Service: Urology   • FINGER SURGERY     • REPLACEMENT TOTAL KNEE Bilateral         PT Ortho     Row Name 22 0800       Subjective Comments    Subjective Comments Pt states he feels like things are getting better. 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 Still cautious wtih lifting, doesn't do too much. Reports 50-60% improvement. Still having some pain into R lateral thigh but better.  -KG       Precautions and Contraindications    Precautions A-fib, L4-S1 fusion  -KG       Posture/Observations    Posture/Observations Comments Improving overall postural awareness both seated and standing. Doing better with PN/postural positioning with seated dynadisc activities. Tends to hinge at hips and lift with back with lifting/ education. Cues needed to  correct but did well once cued.  -KG       Neural Tension Signs- Lower Quarter Clearing    Slump Bilateral:;Negative  -KG       Sensory Screen for Light Touch- Lower Quarter Clearing    L1 (inguinal area) Bilateral:;Intact  -KG    L2 (anterior mid thigh) Bilateral:;Intact  -KG    L3 (distal anterior thigh) Bilateral:;Intact  -KG    L4 (medial lower leg/foot) Bilateral:;Intact  -KG    L5 (lateral lower leg/great toe) Bilateral:;Intact  -KG    S1 (bottom of foot) Bilateral:;Intact  -KG       Lumbosacral Palpation    Erector Spinae (Paraspinals) Bilateral:;Tender;Guarded/taut  -KG    Lumbosacral Palpation Comments TTP/taut at R thoracolumbar parapspinals and laterally towards R lower ribs but much improved. Muscle knot/tightness at R>L posterior hip/glute area. Tender in this area as well.  -KG       General ROM    GENERAL ROM COMMENTS Bilateral hip, knee, ankle AROM WFL  -KG       Head/Neck/Trunk    Trunk Extension AROM 75% full range; mild discomfort in low back  -KG    Trunk Flexion AROM Fingertips to mid shin with no pain; pain on return to neutral  -KG    Trunk Lt Lateral Flexion AROM 75% full range no pain  -KG    Trunk Rt Lateral Flexion AROM 75% full range no pain  -KG       MMT Right Lower Ext    Rt Hip Flexion MMT, Gross Movement (4+/5) good plus  -KG    Rt Hip ABduction MMT, Gross Movement (4/5) good  -KG    Rt Hip ADduction MMT, Gross Movement (4/5) good  -KG    Rt Knee Extension MMT, Gross Movement (5/5) normal  -KG    Rt Knee Flexion MMT, Gross Movement (5/5) normal  -KG    Rt Ankle Plantarflexion MMT, Gross Movement (5/5) normal  -KG    Rt Ankle Dorsiflexion MMT, Gross Movement (4+/5) good plus  -KG       MMT Left Lower Ext    Lt Hip Flexion MMT, Gross Movement (4+/5) good plus  -KG    Lt Hip ABduction MMT, Gross Movement (4/5) good  -KG    Lt Hip ADduction MMT, Gross Movement (4/5) good  -KG    Lt Knee Extension MMT, Gross Movement (5/5) normal  -KG    Lt Knee Flexion MMT, Gross Movement (5/5) normal   -KG    Lt Ankle Plantarflexion MMT, Gross Movement (5/5) normal  -KG    Lt Ankle Dorsiflexion MMT, Gross Movement (5/5) normal  -KG       Sensation    Sensation WNL? WNL  -KG    Light Touch No apparent deficits  -KG       Lower Extremity Flexibility    Hamstrings Bilateral:;Moderately limited  -KG    Hip Flexors Bilateral:;Moderately limited  -KG    Hip External Rotators Bilateral:;Moderately limited  -KG    Hip Internal Rotators Bilateral:;Mildly limited  -KG       Transfers    Comment, (Transfers) Less gaurded wtih sit<>stand transfers.  -KG       Gait/Stairs (Locomotion)    Comment, (Gait/Stairs) Non antalgic gait with no AD. improving nicolas. Widened JENNY. Slight toe out bilaterally  -KG          User Key  (r) = Recorded By, (t) = Taken By, (c) = Cosigned By    Initials Name Provider Type    Haleigh Forde, PT Physical Therapist                             PT Assessment/Plan     Row Name 04/25/22 0800          PT Assessment    Functional Limitations Limitation in home management;Limitations in community activities;Performance in leisure activities;Limitations in functional capacity and performance  -KG     Impairments Gait;Impaired flexibility;Impaired muscle endurance;Muscle strength;Pain;Joint mobility;Poor body mechanics;Posture;Range of motion  -KG     Assessment Comments Pt is making good, steady progress with PT. Demonstrates good improvements in overall postural awareness, functional strength/stability, & functional activity tolerance. Pt doing well with seated dynadisc activities but occasional needs cuing to maintain good PN. Good tolerance to standing progressions. Does have some mild R thigh pain with step ups but otherwise tolerates well. Needed cues to decrease strain on low back with lifting/ education while lifting lightly weighted crate from ~8” stool. Less rest breaks needed and having less pain overall with therex and functional activities at home. Pt making good gains but  "still needs work on functional strength, postural/core stability,  education, and activity tolerance & will benefit from continued skilled PT.  -KG     Rehab Potential Good  -KG     Patient/caregiver participated in establishment of treatment plan and goals Yes  -KG     Patient would benefit from skilled therapy intervention Yes  -KG            PT Plan    PT Frequency 1x/week  -KG     Predicted Duration of Therapy Intervention (PT) 5 visits  -KG     PT Plan Comments Resume pallof press. Continue working on body/lifting mechanics. Could try resisted gait. Work towards seated pball core. Continue progressing dynamic stability. Continue manual.  -KG           User Key  (r) = Recorded By, (t) = Taken By, (c) = Cosigned By    Initials Name Provider Type    KG Haleigh Flores, PT Physical Therapist                   OP Exercises     Row Name 04/25/22 0800             Subjective Comments    Subjective Comments Pt states  -KG              Subjective Pain    Able to rate subjective pain? yes  -KG      Pre-Treatment Pain Level 0  -KG      Post-Treatment Pain Level 0  -KG              Exercise 1    Exercise Name 1 Seated michael hamstring stretch  -KG      Cueing 1 Verbal  -KG      Reps 1 2  -KG      Time 1 30\" hold  -KG      Additional Comments foot on stool  -KG              Exercise 2    Exercise Name 2 Seated modified figure 4 stretch  -KG      Cueing 2 Verbal  -KG      Reps 2 2  -KG      Time 2 30\" hold  -KG              Exercise 3    Exercise Name 3 Airex standing marching with TA  -KG      Cueing 3 Verbal  -KG      Sets 3 2  -KG      Reps 3 10  -KG              Exercise 4    Exercise Name 4 Standing alt hip abd SLR  -KG      Cueing 4 Verbal  -KG      Sets 4 1  -KG      Reps 4 10  -KG              Exercise 5    Exercise Name 5 Standing alt hip ext SLR with TA  -KG      Cueing 5 Verbal  -KG      Sets 5 1  -KG      Reps 5 10  -KG              Exercise 6    Exercise Name 6 Lumbar/BLE strength & ROM measurements  " "-KG              Exercise 7    Exercise Name 7 Dynadisc seated PN/TA alt LAQ  -KG      Cueing 7 Verbal  -KG      Sets 7 2  -KG      Reps 7 10  -KG      Additional Comments DD/airex  -KG              Exercise 8    Exercise Name 8 Dynadisc seated PN/TA alt marching  -KG      Cueing 8 Verbal  -KG      Sets 8 2  -KG      Reps 8 10  -KG      Additional Comments DD/airex  -KG              Exercise 9    Exercise Name 9 Dynadisc seated PN/TA hip abd  -KG      Cueing 9 Verbal  -KG      Sets 9 2  -KG      Reps 9 10  -KG      Additional Comments DD/airex; blue tband  -KG              Exercise 10    Exercise Name 10 Fwd step ups with TA  -KG      Cueing 10 Verbal  -KG      Sets 10 1  -KG      Reps 10 10 ea LE  -KG      Additional Comments 4\" step at stairs  -KG              Exercise 11    Exercise Name 11 Lateral step ups  -KG      Cueing 11 Verbal  -KG      Sets 11 1  -KG      Reps 11 10 ea LE  -KG      Additional Comments 4\" step, B HHA at rail  -KG              Exercise 12    Exercise Name 12 Lifting mechanics education; crate lift from stool<>waist  -KG      Cueing 12 Verbal;Demo  -KG            User Key  (r) = Recorded By, (t) = Taken By, (c) = Cosigned By    Initials Name Provider Type    KG Haleigh Flores, PT Physical Therapist                     Manual Rx (last 36 hours)     Manual Treatments     Row Name 04/25/22 0800             Manual Rx 1    Manual Rx 1 Location Lumbothoracic paraspinals & towards ribs, bilateral post hips  -KG      Manual Rx 1 Type SMT/MFR; biofreeze at end  -KG            User Key  (r) = Recorded By, (t) = Taken By, (c) = Cosigned By    Initials Name Provider Type    Haleigh Forde, PT Physical Therapist                     PT OP Goals     Row Name 04/25/22 0800          PT Short Term Goals    STG Date to Achieve 04/26/22  -KG     STG 1 Demonstrate independence/compliance in performance of initial HEP  -KG     STG 1 Progress Met;Ongoing  -KG     STG 2 Demonstrate independence in " performance of isometric TA contraction in various positions with therex for functional carryover into daily activity performance  -KG     STG 2 Progress Met  -KG     STG 3 Demonstrate improved seated PN/postural positioning with seated core/LE strengthening activities with minimal cues for correction  -KG     STG 3 Progress Met;Ongoing  -KG     STG 4 Demonstrate improved lumbar flex AROM fingertips to mid shin without increased pain  -KG     STG 4 Progress Partially Met;Progressing  Met but with pain  -KG            Long Term Goals    LTG Date to Achieve 05/17/22  -KG     LTG 1 Subjectively report 50% overall improvement or greater  -KG     LTG 1 Progress Met  -KG     LTG 2 Improve modified oswestry score to 35% or less  -KG     LTG 2 Progress Progressing  -KG     LTG 3 Demonstrate improved bilateral hip flex/abd MMT to 5/5  -KG     LTG 3 Progress Progressing  -KG     LTG 4 Tolerate 15-20 minutes of standing functional stabilization activities without increased pain  -KG     LTG 4 Progress Progressing  -KG     LTG 5 Demonstrate proper lifting/body mechanics when lifting/carrying 10-20# crate without increased pain  -KG            Time Calculation    PT Goal Re-Cert Due Date 05/16/22  -KG           User Key  (r) = Recorded By, (t) = Taken By, (c) = Cosigned By    Initials Name Provider Type    KG Haleigh Flores, PT Physical Therapist                Therapy Education  Education Details: Lifting/body mechanics  Given: HEP, Symptoms/condition management, Pain management, Posture/body mechanics  Program: Progressed, Reinforced  How Provided: Verbal, Written, Demonstration  Provided to: Patient  Level of Understanding: Teach back education performed, Verbalized, Demonstrated    Outcome Measure Options: Modified Oswestry  Modified Oswestry  Modified Oswestry Score/Comments: 21 = 42%      Time Calculation:   Start Time: 0803  Stop Time: 0847  Time Calculation (min): 44 min     Therapy Charges for Today     Code  Description Service Date Service Provider Modifiers Qty    01039082843 HC PT THER PROC EA 15 MIN 4/25/2022 Haleigh Flores, PT GP 2    [id number] HC PT MANUAL THERAPY EA 15 MIN 4/25/2022 Haleigh Flores, PT GP 1          PT G-Codes  Outcome Measure Options: Modified Oswestry  Modified Oswestry Score/Comments: 21 = 42%         Haleigh Flores, PT  4/26/2022

## 2022-05-02 ENCOUNTER — HOSPITAL ENCOUNTER (OUTPATIENT)
Dept: PHYSICAL THERAPY | Facility: HOSPITAL | Age: 72
Setting detail: THERAPIES SERIES
Discharge: HOME | End: 2022-05-02

## 2022-05-02 DIAGNOSIS — M96.1 POSTLAMINECTOMY SYNDROME, NOT ELSEWHERE CLASSIFIED: ICD-10-CM

## 2022-05-02 DIAGNOSIS — R52 PAIN: ICD-10-CM

## 2022-05-02 DIAGNOSIS — M54.32 LEFT SIDED SCIATICA: Primary | ICD-10-CM

## 2022-05-02 PROCEDURE — 97140 MANUAL THERAPY 1/> REGIONS: CPT | Performed by: PHYSICAL THERAPIST

## 2022-05-02 PROCEDURE — 97110 THERAPEUTIC EXERCISES: CPT | Performed by: PHYSICAL THERAPIST

## 2022-05-02 RX ORDER — HYDROCODONE BITARTRATE AND ACETAMINOPHEN 10; 325 MG/1; MG/1
1 TABLET ORAL EVERY 6 HOURS PRN
Qty: 90 TABLET | Refills: 0 | Status: SHIPPED | OUTPATIENT
Start: 2022-05-02 | End: 2022-05-27 | Stop reason: SDUPTHER

## 2022-05-02 NOTE — THERAPY TREATMENT NOTE
Outpatient Physical Therapy Ortho Treatment Note  [facility]/[place]     Patient Name: Ephraim Quinones  : 1950  MRN: [id number]  Today's Date: 2022      Visit Date: 2022     Attendance: 5 visits  Subjective improvement: 50-60% improvement  MD appt: TBS  Recheck due: 2022    Visit Dx:    ICD-10-CM ICD-9-CM   1. Left sided sciatica  M54.32 724.3   2. Postlaminectomy syndrome, not elsewhere classified  M96.1 722.80       Patient Active Problem List   Diagnosis   • Type 2 diabetes mellitus without complication (HCC)   • HTN (hypertension)   • A-fib (HCC)   • Hx of adenomatous colonic polyps   • Morbidly obese (HCC)   • Phimosis   • Sacroiliitis, not elsewhere classified (HCC)        Past Medical History:   Diagnosis Date   • A-fib (HCC)    • Acid reflux    • Anemia 5/3/2016   • Diabetes mellitus (HCC)    • Gout    • Hyperlipidemia    • Hypertension    • Sleep apnea with use of continuous positive airway pressure (CPAP)         Past Surgical History:   Procedure Laterality Date   • BACK SURGERY      x2   • CIRCUMCISION N/A 2019    Procedure: CIRCUMCISION;  Surgeon: Yon Sloan MD;  Location: [facility] OR;  Service: Urology   • FINGER SURGERY     • REPLACEMENT TOTAL KNEE Bilateral         PT Ortho     Row Name 22 0800       Precautions and Contraindications    Precautions A-fib, L4-S1 fusion  -KG       Subjective Pain    Able to rate subjective pain? yes  -KG    Pre-Treatment Pain Level 0  -KG       Posture/Observations    Posture/Observations Comments Not as many cues for standing posture/therex form. Does self-correct posture, edwardo with tband paloff press activity  -KG          User Key  (r) = Recorded By, (t) = Taken By, (c) = Cosigned By    Initials Name Provider Type    Haleigh Forde PT Physical Therapist                             PT Assessment/Plan     Row Name 22 0800          PT Assessment    Assessment Comments Pt continues to progress well.  "Started/added seated sciatic nerve glide with AP's to help with some his neurotension in RLE. Doing well with standing progressions. Only c/o pain are with step ups with RLE. Continues with TTP/tightness at R>L posterior hips but responds well to manual  -KG            PT Plan    PT Frequency 1x/week  -KG     PT Plan Comments Try resisted gait next visit. Resume lifitng mechanics education  -KG           User Key  (r) = Recorded By, (t) = Taken By, (c) = Cosigned By    Initials Name Provider Type    KG Haleigh Flores, PT Physical Therapist                   OP Exercises     Row Name 05/02/22 0800             Subjective Comments    Subjective Comments Pt states his back is feeling good this morning. Still gets some pain the back of his R thigh. Mostly if trying to get up stairs.  -KG              Subjective Pain    Able to rate subjective pain? yes  -KG      Pre-Treatment Pain Level 0  -KG      Post-Treatment Pain Level 0  -KG              Exercise 1    Exercise Name 1 Seated michael hamstring stretch  -KG      Cueing 1 Verbal  -KG      Reps 1 2  -KG      Time 1 30\" hold  -KG      Additional Comments foot on stool  -KG              Exercise 2    Exercise Name 2 Seated modified figure 4 stretch  -KG      Cueing 2 Verbal  -KG      Reps 2 2  -KG      Time 2 30\" hold  -KG              Exercise 3    Exercise Name 3 Airex standing marching with TA  -KG      Cueing 3 Verbal  -KG      Sets 3 2  -KG      Reps 3 10  -KG              Exercise 4    Exercise Name 4 Airex beam sidestepping  -KG      Cueing 4 Verbal  -KG      Sets 4 1  -KG      Reps 4 4 laps  -KG      Additional Comments green tband loop just below knees  -KG              Exercise 5    Exercise Name 5 Airex beam tandem gait  -KG      Cueing 5 Verbal  -KG      Sets 5 1  -KG      Reps 5 4 laps  -KG              Exercise 6    Exercise Name 6 Fwd step ups  -KG      Cueing 6 Verbal  -KG      Reps 6 1x15 L, 1x10 R  -KG      Additional Comments 4\" at stairs  -KG           " "   Exercise 7    Exercise Name 7 Lateral step ups with tA  -KG      Cueing 7 Verbal  -KG      Sets 7 1  -KG      Reps 7 10 ea LE  -KG      Additional Comments 4\" step with B HHA at rail  -KG              Exercise 8    Exercise Name 8 Standing tband pallof press for stability  -KG      Cueing 8 Verbal  -KG      Sets 8 1  -KG      Reps 8 20 ea directoion  -KG      Additional Comments green tband  -KG              Exercise 9    Exercise Name 9 Seated sciatic nerve glide with AP's  -KG      Cueing 9 Verbal  -KG      Sets 9 3  -KG      Reps 9 10 AP's on RLE  -KG              Exercise 10    Exercise Name 10 Dynadisc seated PN/TA alt LAQ  -KG      Cueing 10 Verbal  -KG      Sets 10 2  -KG      Reps 10 10  -KG      Additional Comments DD/airex  -KG              Exercise 11    Exercise Name 11 Dynadisc seated PN/TA alt marching  -KG      Cueing 11 Verbal  -KG      Sets 11 2  -KG      Reps 11 10  -KG      Additional Comments DD/airex  -KG            User Key  (r) = Recorded By, (t) = Taken By, (c) = Cosigned By    Initials Name Provider Type    KG Haleigh Flores, PT Physical Therapist                         Manual Rx (last 36 hours)     Manual Treatments     Row Name 05/02/22 0800             Manual Rx 1    Manual Rx 1 Location Lumbothoracic paraspinals & towards ribs, greater emphasis on bilateral post hips  -KG      Manual Rx 1 Type SMT/MFR; biofreeze at end  -KG            User Key  (r) = Recorded By, (t) = Taken By, (c) = Cosigned By    Initials Name Provider Type    KG Haleigh Flores, PT Physical Therapist                 PT OP Goals     Row Name 05/02/22 0800          PT Short Term Goals    STG Date to Achieve 04/26/22  -KG     STG 1 Demonstrate independence/compliance in performance of initial HEP  -KG     STG 1 Progress Met;Ongoing  -KG     STG 2 Demonstrate independence in performance of isometric TA contraction in various positions with therex for functional carryover into daily activity performance  -KG     " STG 2 Progress Met  -KG     STG 3 Demonstrate improved seated PN/postural positioning with seated core/LE strengthening activities with minimal cues for correction  -KG     STG 3 Progress Met;Ongoing  -KG     STG 4 Demonstrate improved lumbar flex AROM fingertips to mid shin without increased pain  -KG     STG 4 Progress Partially Met;Progressing  Met but with pain  -KG            Long Term Goals    LTG Date to Achieve 05/17/22  -KG     LTG 1 Subjectively report 50% overall improvement or greater  -KG     LTG 1 Progress Met  -KG     LTG 2 Improve modified oswestry score to 35% or less  -KG     LTG 2 Progress Progressing  -KG     LTG 3 Demonstrate improved bilateral hip flex/abd MMT to 5/5  -KG     LTG 3 Progress Progressing  -KG     LTG 4 Tolerate 15-20 minutes of standing functional stabilization activities without increased pain  -KG     LTG 4 Progress Progressing  -KG     LTG 5 Demonstrate proper lifting/body mechanics when lifting/carrying 10-20# crate without increased pain  -KG     LTG 5 Progress Progressing  -KG            Time Calculation    PT Goal Re-Cert Due Date 05/16/22  -KG           User Key  (r) = Recorded By, (t) = Taken By, (c) = Cosigned By    Initials Name Provider Type    KG Haleigh Flores, PT Physical Therapist                Therapy Education  Education Details: Seated sciatic nerve glides with AP's  Given: HEP, Symptoms/condition management, Pain management, Posture/body mechanics  Program: Progressed, Reinforced  How Provided: Verbal, Written, Demonstration  Provided to: Patient  Level of Understanding: Teach back education performed, Verbalized, Demonstrated              Time Calculation:   Start Time: 0801  Stop Time: 0846  Time Calculation (min): 45 min  Therapy Charges for Today     Code Description Service Date Service Provider Modifiers Qty    62941283645 HC PT THER PROC EA 15 MIN 5/2/2022 Haleigh Flores, PT GP 2    14131274528 HC PT MANUAL THERAPY EA 15 MIN 5/2/2022 Mark  Haleigh SCHMITT, PT GP 1                    Haleigh Flores, PT  5/2/2022

## 2022-05-02 NOTE — TELEPHONE ENCOUNTER
Rx Refill Note  Requested Prescriptions     Pending Prescriptions Disp Refills   • HYDROcodone-acetaminophen (NORCO)  MG per tablet 90 tablet 0     Sig: Take 1 tablet by mouth Every 6 (Six) Hours As Needed for Moderate Pain .      Last office visit with prescribing clinician: 3/1/2022      Next office visit with prescribing clinician: 5/27/2022            Deandre Nassar Rep  05/02/22, 08:03 CDT     Drug thereapy appt 3/1/22  contract done 12/1/21  UDS done 12/1/21

## 2022-05-09 ENCOUNTER — HOSPITAL ENCOUNTER (OUTPATIENT)
Dept: PHYSICAL THERAPY | Facility: HOSPITAL | Age: 72
Setting detail: THERAPIES SERIES
Discharge: HOME | End: 2022-05-09

## 2022-05-09 DIAGNOSIS — M96.1 POSTLAMINECTOMY SYNDROME, NOT ELSEWHERE CLASSIFIED: ICD-10-CM

## 2022-05-09 DIAGNOSIS — M54.32 LEFT SIDED SCIATICA: Primary | ICD-10-CM

## 2022-05-09 PROCEDURE — 97110 THERAPEUTIC EXERCISES: CPT | Performed by: PHYSICAL THERAPIST

## 2022-05-09 PROCEDURE — 97140 MANUAL THERAPY 1/> REGIONS: CPT | Performed by: PHYSICAL THERAPIST

## 2022-05-09 NOTE — THERAPY TREATMENT NOTE
Outpatient Physical Therapy Ortho Treatment Note  [facility]/[place]     Patient Name: Ephraim Quinones  : 1950  MRN: [id number]  Today's Date: 2022      Visit Date: 2022    Attendance: 5 visits  Subjective improvement: 60-70% improvement  MD appt: 2022  Recheck due: 2022    Visit Dx:    ICD-10-CM ICD-9-CM   1. Left sided sciatica  M54.32 724.3   2. Postlaminectomy syndrome, not elsewhere classified  M96.1 722.80       Patient Active Problem List   Diagnosis   • Type 2 diabetes mellitus without complication (HCC)   • HTN (hypertension)   • A-fib (HCC)   • Hx of adenomatous colonic polyps   • Morbidly obese (HCC)   • Phimosis   • Sacroiliitis, not elsewhere classified (HCC)        Past Medical History:   Diagnosis Date   • A-fib (HCC)    • Acid reflux    • Anemia 5/3/2016   • Diabetes mellitus (HCC)    • Gout    • Hyperlipidemia    • Hypertension    • Sleep apnea with use of continuous positive airway pressure (CPAP)         Past Surgical History:   Procedure Laterality Date   • BACK SURGERY      x2   • CIRCUMCISION N/A 2019    Procedure: CIRCUMCISION;  Surgeon: Yon Sloan MD;  Location: [facility] OR;  Service: Urology   • FINGER SURGERY     • REPLACEMENT TOTAL KNEE Bilateral         PT Ortho     Row Name 22 0800       Precautions and Contraindications    Precautions A-fib, L4-S1 fusion  -KG       Subjective Pain    Post-Treatment Pain Level 0  -KG       Posture/Observations    Posture/Observations Comments Quite a bit less TTP at bilateral post hips and lumbar paraspinals this date. Intermittent cues for posture needed  -KG          User Key  (r) = Recorded By, (t) = Taken By, (c) = Cosigned By    Initials Name Provider Type    Haleigh Forde, PT Physical Therapist                             PT Assessment/Plan     Row Name 22 0800          PT Assessment    Assessment Comments Pt did well wtih treatment. Better body mechancis noted with lift &  "carry of weighted crate. Able to progress stabilization activities without increased pain. Did have some mild discomfort in low back with cybex leg press but performed well. Much less TTP at lumbar paraspinals and michael post hips. Pt returns to MD on 5/11/2022  -KG            PT Plan    PT Frequency 1x/week  -KG     PT Plan Comments Follow up on MD appt. Pt to call and let us know if anything changes. Will plan to d/c next visit to independent management pending MD assessment.  -KG           User Key  (r) = Recorded By, (t) = Taken By, (c) = Cosigned By    Initials Name Provider Type    KG Haleigh Flores, PT Physical Therapist                   OP Exercises     Row Name 05/09/22 0800             Subjective Comments    Subjective Comments Pt states overall he's doing pretty good. Doesn't know how much more better he's going to get. Overall states he's really pleased with his progress. Goes back to MD on 5/11  -KG              Subjective Pain    Able to rate subjective pain? yes  -KG      Pre-Treatment Pain Level 0  -KG      Post-Treatment Pain Level 0  -KG              Exercise 1    Exercise Name 1 Seated michael hamstring stretch  -KG      Cueing 1 Verbal  -KG      Reps 1 2  -KG      Time 1 30\" hold  -KG      Additional Comments foot on stool  -KG              Exercise 2    Exercise Name 2 Seated modified figure 4 stretch  -KG      Cueing 2 Verbal  -KG      Reps 2 2  -KG      Time 2 30\" hold  -KG              Exercise 3    Exercise Name 3 Airex standing marching with TA  -KG      Cueing 3 Verbal  -KG      Sets 3 2  -KG      Reps 3 10  -KG              Exercise 4    Exercise Name 4 Fwd step ups wtih TA  -KG      Cueing 4 Verbal  -KG      Sets 4 1  -KG      Reps 4 10 ea LE  -KG      Additional Comments 4\" step  -KG              Exercise 5    Exercise Name 5 Lateral step up & overs with TA  -KG      Cueing 5 Verbal  -KG      Sets 5 1  -KG      Reps 5 10  -KG      Additional Comments 4\" step  -KG              Exercise 6    " Exercise Name 6 CC: resisted gait fwd, bwd  -KG      Cueing 6 Verbal  -KG      Sets 6 1  -KG      Reps 6 5 laps ea  -KG      Additional Comments 2 plates  -KG              Exercise 7    Exercise Name 7 Cybex LP2 with TA  -KG      Cueing 7 Verbal  -KG      Sets 7 2  -KG      Reps 7 10  -KG      Additional Comments 70#; seat 4  -KG              Exercise 8    Exercise Name 8 Dynadisc seated PN/TA alt marching  -KG      Cueing 8 Verbal  -KG      Sets 8 2  -KG      Reps 8 10  -KG      Additional Comments DD/airex  -KG              Exercise 9    Exercise Name 9 Dynadisc seated PN/TA alt LAQ  -KG      Cueing 9 Verbal  -KG      Sets 9 2  -KG      Reps 9 10  -KG      Additional Comments DD/airex  -KG              Exercise 10    Exercise Name 10 Dynadisc seated PN/TA tband mid rows  -KG      Cueing 10 Verbal  -KG      Sets 10 1  -KG      Reps 10 25  -KG      Additional Comments blue tband  -KG              Exercise 11    Exercise Name 11 Standing tband pallof press for stability  -KG      Cueing 11 Verbal  -KG      Sets 11 1  -KG      Reps 11 20 ea direction  -KG      Additional Comments blue tband  -KG              Exercise 12    Exercise Name 12 Airex beam sidestepping  -KG      Cueing 12 Verbal  -KG      Reps 12 1x4 laps  -KG      Additional Comments green tband loop at knees  -KG              Exercise 13    Exercise Name 13 Lifting mechanics education; crate lift/carry from foot stool<>waist and 10 ft & back to treatment table  -KG      Cueing 13 Verbal  -KG      Sets 13 1  -KG      Reps 13 4 laps  -KG      Additional Comments wire crate + 7#  -KG            User Key  (r) = Recorded By, (t) = Taken By, (c) = Cosigned By    Initials Name Provider Type    Haleigh Forde, PT Physical Therapist                         Manual Rx (last 36 hours)     Manual Treatments     Row Name 05/09/22 0800             Manual Rx 1    Manual Rx 1 Location Lumbothoracic paraspinals & towards ribs, greater emphasis on bilateral post hips   -KG      Manual Rx 1 Type SMT/MFR; biofreeze at end  -KG            User Key  (r) = Recorded By, (t) = Taken By, (c) = Cosigned By    Initials Name Provider Type    RUSTY Haleigh Flores, PT Physical Therapist                 PT OP Goals     Row Name 05/09/22 0800          PT Short Term Goals    STG Date to Achieve 04/26/22  -KG     STG 1 Demonstrate independence/compliance in performance of initial HEP  -KG     STG 1 Progress Met;Ongoing  -KG     STG 2 Demonstrate independence in performance of isometric TA contraction in various positions with therex for functional carryover into daily activity performance  -KG     STG 2 Progress Met  -KG     STG 3 Demonstrate improved seated PN/postural positioning with seated core/LE strengthening activities with minimal cues for correction  -KG     STG 3 Progress Met;Ongoing  -KG     STG 4 Demonstrate improved lumbar flex AROM fingertips to mid shin without increased pain  -KG     STG 4 Progress Partially Met;Progressing  Met but with pain  -KG            Long Term Goals    LTG Date to Achieve 05/17/22  -KG     LTG 1 Subjectively report 50% overall improvement or greater  -KG     LTG 1 Progress Met  -KG     LTG 2 Improve modified oswestry score to 35% or less  -KG     LTG 2 Progress Progressing  -KG     LTG 3 Demonstrate improved bilateral hip flex/abd MMT to 5/5  -KG     LTG 3 Progress Progressing  -KG     LTG 4 Tolerate 15-20 minutes of standing functional stabilization activities without increased pain  -KG     LTG 4 Progress Met;Ongoing  -KG     LTG 5 Demonstrate proper lifting/body mechanics when lifting/carrying 10-20# crate without increased pain  -KG     LTG 5 Progress Met  -KG            Time Calculation    PT Goal Re-Cert Due Date 05/16/22  -KG           User Key  (r) = Recorded By, (t) = Taken By, (c) = Cosigned By    Initials Name Provider Type    RUSTY Haleigh Flores, PT Physical Therapist                Therapy Education  Given: HEP, Symptoms/condition management,  Pain management, Posture/body mechanics  Program: Reinforced  How Provided: Verbal, Written, Demonstration  Provided to: Patient  Level of Understanding: Teach back education performed, Verbalized, Demonstrated              Time Calculation:   Start Time: 0801  Stop Time: 0850  Time Calculation (min): 49 min  Therapy Charges for Today     Code Description Service Date Service Provider Modifiers Qty    [id number]  PT THER PROC EA 15 MIN 5/9/2022 Haleigh Flores, PT GP 2    [id number]  PT MANUAL THERAPY EA 15 MIN 5/9/2022 Haleigh Flores, PT GP 1                    Haleigh Flores, PT  5/9/2022

## 2022-05-16 ENCOUNTER — APPOINTMENT (OUTPATIENT)
Dept: PHYSICAL THERAPY | Facility: HOSPITAL | Age: 72
End: 2022-05-16

## 2022-05-27 ENCOUNTER — OFFICE VISIT (OUTPATIENT)
Dept: FAMILY MEDICINE CLINIC | Facility: CLINIC | Age: 72
End: 2022-05-27

## 2022-05-27 VITALS
RESPIRATION RATE: 18 BRPM | BODY MASS INDEX: 49.44 KG/M2 | OXYGEN SATURATION: 98 % | HEIGHT: 67 IN | SYSTOLIC BLOOD PRESSURE: 118 MMHG | HEART RATE: 76 BPM | DIASTOLIC BLOOD PRESSURE: 70 MMHG | WEIGHT: 315 LBS | TEMPERATURE: 97.8 F

## 2022-05-27 DIAGNOSIS — R73.9 HYPERGLYCEMIA: Primary | ICD-10-CM

## 2022-05-27 DIAGNOSIS — R52 PAIN: ICD-10-CM

## 2022-05-27 DIAGNOSIS — E78.5 HYPERLIPIDEMIA, UNSPECIFIED HYPERLIPIDEMIA TYPE: ICD-10-CM

## 2022-05-27 PROCEDURE — 99214 OFFICE O/P EST MOD 30 MIN: CPT | Performed by: FAMILY MEDICINE

## 2022-05-27 RX ORDER — HYDROCODONE BITARTRATE AND ACETAMINOPHEN 10; 325 MG/1; MG/1
1 TABLET ORAL EVERY 6 HOURS PRN
Qty: 90 TABLET | Refills: 0 | Status: SHIPPED | OUTPATIENT
Start: 2022-05-27 | End: 2022-07-05 | Stop reason: SDUPTHER

## 2022-05-27 NOTE — PROGRESS NOTES
Subjective   Ephraim Quinones is a 72 y.o. male.     72-year-old male with chronic back pain hyperlipidemia obesity and hyperglycemia      The following portions of the patient's history were reviewed and updated as appropriate: allergies, current medications, past family history, past medical history, past social history, past surgical history and problem list.    Review of Systems   Respiratory: Negative for shortness of breath.    Cardiovascular: Negative for chest pain and leg swelling.   Musculoskeletal: Positive for arthralgias and back pain.   Neurological: Negative for dizziness.       Objective   Physical Exam  Vitals and nursing note reviewed.   Constitutional:       Appearance: He is obese.   Eyes:      Extraocular Movements: Extraocular movements intact.      Pupils: Pupils are equal, round, and reactive to light.   Cardiovascular:      Rate and Rhythm: Normal rate and regular rhythm.   Pulmonary:      Effort: Pulmonary effort is normal.      Breath sounds: Normal breath sounds.   Musculoskeletal:      Right lower leg: No edema.      Left lower leg: No edema.   Skin:     General: Skin is warm and dry.   Neurological:      General: No focal deficit present.      Mental Status: He is alert and oriented to person, place, and time.   Psychiatric:         Mood and Affect: Mood normal.         Behavior: Behavior normal.         Thought Content: Thought content normal.         Judgment: Judgment normal.       CONTROLLED SUBSTANCE TRACKING 11/25/2020 3/26/2021 6/3/2021 9/2/2021 12/1/2021 3/1/2022 5/27/2022   Last Gregory 11/25/2020 3/26/2021 6/3/2021 9/2/2021 12/1/2021 3/1/2022 5/27/2022   Report Number Dr Hopkins reviewed through [facility] reviewed by Dolores LANDERS through [facility] - reviewed through EPIC reviewed through [facility] reviewed through epic -   Last UDS 8/24/2020 8/24/2020 8/24/2020 8/24/2020 12/1/2021 12/1/2021 12/1/2021   Last Controlled Substance Agreement 11/25/2020 11/25/2020 11/25/2020 11/25/2020  12/1/2021 12/1/2021 12/1/2021       Assessment & Plan   Diagnoses and all orders for this visit:    1. Hyperglycemia (Primary)  -     Hemoglobin A1c    2. Hyperlipidemia, unspecified hyperlipidemia type  -     Comprehensive Metabolic Panel  -     Lipid Panel    3. Pain  -     HYDROcodone-acetaminophen (NORCO)  MG per tablet; Take 1 tablet by mouth Every 6 (Six) Hours As Needed for Moderate Pain .  Dispense: 90 tablet; Refill: 0

## 2022-05-31 LAB
ALBUMIN SERPL-MCNC: NORMAL G/DL
ALP SERPL-CCNC: NORMAL U/L
ALT SERPL-CCNC: NORMAL U/L
AST SERPL-CCNC: NORMAL U/L
BILIRUB SERPL-MCNC: NORMAL MG/DL
BUN SERPL-MCNC: NORMAL MG/DL
CALCIUM SERPL-MCNC: NORMAL MG/DL
CHLORIDE SERPL-SCNC: NORMAL MMOL/L
CHOLEST SERPL-MCNC: 179 MG/DL (ref 0–200)
CO2 SERPL-SCNC: NORMAL MMOL/L
CREAT SERPL-MCNC: NORMAL MG/DL
GLUCOSE SERPL-MCNC: NORMAL MG/DL
HBA1C MFR BLD: 6.5 % (ref 4.8–5.6)
HDLC SERPL-MCNC: 55 MG/DL (ref 40–60)
LDLC SERPL CALC-MCNC: 105 MG/DL (ref 0–100)
POTASSIUM SERPL-SCNC: NORMAL MMOL/L
PROT SERPL-MCNC: NORMAL G/DL
REQUEST PROBLEM: NORMAL
SODIUM SERPL-SCNC: NORMAL MMOL/L
TRIGL SERPL-MCNC: 108 MG/DL (ref 0–150)
VLDLC SERPL CALC-MCNC: 19 MG/DL (ref 5–40)

## 2022-05-31 RX ORDER — AMLODIPINE BESYLATE 5 MG/1
TABLET ORAL
Qty: 180 TABLET | Refills: 0 | Status: SHIPPED | OUTPATIENT
Start: 2022-05-31 | End: 2022-08-25 | Stop reason: ALTCHOICE

## 2022-05-31 NOTE — TELEPHONE ENCOUNTER
Rx Refill Note  Requested Prescriptions     Pending Prescriptions Disp Refills   • amLODIPine (NORVASC) 5 MG tablet [Pharmacy Med Name: AMLODIPINE 5MG] 180 tablet 2     Sig: TAKE 1 TABLET BY MOUTH TWICE DAILY      Last office visit with prescribing clinician: 5/27/2022      Next office visit with prescribing clinician: 8/25/22      {TIP  Please add Last Relevant Lab 5/27/22    Helena Meehan MA  05/31/22, 10:29 CDT

## 2022-06-02 ENCOUNTER — TELEPHONE (OUTPATIENT)
Dept: FAMILY MEDICINE CLINIC | Facility: CLINIC | Age: 72
End: 2022-06-02

## 2022-06-02 LAB
ALBUMIN SERPL-MCNC: 4.1 G/DL (ref 3.5–5.2)
ALBUMIN/GLOB SERPL: 1.6 G/DL
ALP SERPL-CCNC: 56 U/L (ref 39–117)
ALT SERPL-CCNC: 35 U/L (ref 1–41)
AST SERPL-CCNC: 19 U/L (ref 1–40)
BILIRUB SERPL-MCNC: 0.5 MG/DL (ref 0–1.2)
BUN SERPL-MCNC: 23 MG/DL (ref 8–23)
BUN/CREAT SERPL: 29.1 (ref 7–25)
CALCIUM SERPL-MCNC: 9.4 MG/DL (ref 8.6–10.5)
CHLORIDE SERPL-SCNC: 101 MMOL/L (ref 98–107)
CHOLEST SERPL-MCNC: 187 MG/DL (ref 0–200)
CO2 SERPL-SCNC: 23.2 MMOL/L (ref 22–29)
CREAT SERPL-MCNC: 0.79 MG/DL (ref 0.76–1.27)
EGFRCR SERPLBLD CKD-EPI 2021: 94.4 ML/MIN/1.73
GLOBULIN SER CALC-MCNC: 2.6 GM/DL
GLUCOSE SERPL-MCNC: 153 MG/DL (ref 65–99)
HDLC SERPL-MCNC: 53 MG/DL (ref 40–60)
LDLC SERPL CALC-MCNC: 112 MG/DL (ref 0–100)
POTASSIUM SERPL-SCNC: 4.1 MMOL/L (ref 3.5–5.2)
PROT SERPL-MCNC: 6.7 G/DL (ref 6–8.5)
SODIUM SERPL-SCNC: 137 MMOL/L (ref 136–145)
TRIGL SERPL-MCNC: 122 MG/DL (ref 0–150)
VLDLC SERPL CALC-MCNC: 22 MG/DL (ref 5–40)

## 2022-06-02 NOTE — TELEPHONE ENCOUNTER
Patient called back regarding lab results.  Patient states he does not any crestor and not sure when he stopped taking.  Advised patient was listed in medication list and that he said was taking when asked.

## 2022-06-03 NOTE — TELEPHONE ENCOUNTER
Rx Refill Note  Requested Prescriptions     Pending Prescriptions Disp Refills   • metFORMIN (GLUCOPHAGE) 1000 MG tablet [Pharmacy Med Name: METFORMIN 1000MG] 180 tablet 2     Sig: TAKE 1 TABLET BY MOUTH 2 TIMES A DAY WITH MEALS.      Last office visit with prescribing clinician: 2/22/2022      Next office visit with prescribing clinician: Visit date not found   CPE scheduled for 08/2022    Please add Last Relevant Lab Date if appropriate: 05/27/2022    Is Refill Pharmacy correct?: yes    Helena Gilmore MA  06/03/22, 09:24 CDT

## 2022-06-07 ENCOUNTER — TELEPHONE (OUTPATIENT)
Dept: FAMILY MEDICINE CLINIC | Facility: CLINIC | Age: 72
End: 2022-06-07

## 2022-06-07 NOTE — TELEPHONE ENCOUNTER
Patient would like to speak to Helena,did not provide sufficent details as to why he would like to speak with her.\    Please advise @ [phone]

## 2022-06-08 RX ORDER — ATORVASTATIN CALCIUM 40 MG/1
40 TABLET, FILM COATED ORAL NIGHTLY
Qty: 30 TABLET | Refills: 0 | Status: SHIPPED | OUTPATIENT
Start: 2022-06-08 | End: 2022-08-25 | Stop reason: SINTOL

## 2022-06-08 NOTE — TELEPHONE ENCOUNTER
Patient calling back regarding lab results and cholesterol medication.  Patient states he believes he has tried crestor in the past, stopped due to muscle aches.  He is willing to try different medication.

## 2022-06-14 RX ORDER — COLCHICINE 0.6 MG/1
TABLET ORAL
Qty: 30 TABLET | Refills: 0 | Status: SHIPPED | OUTPATIENT
Start: 2022-06-14 | End: 2022-11-11 | Stop reason: SDUPTHER

## 2022-06-14 NOTE — TELEPHONE ENCOUNTER
Rx Refill Note  Requested Prescriptions     Pending Prescriptions Disp Refills   • colchicine (Colcrys) 0.6 MG tablet 30 tablet 0     Sig: TAKE 1 TABLET BY MOUTH AS NEEDED FOR GOUT      Last office visit with prescribing clinician: 5/27/2022      Next office visit with prescribing clinician: 8/25/2022            Deandre Nassar Rep  06/14/22, 15:06 CDT

## 2022-07-05 DIAGNOSIS — R52 PAIN: ICD-10-CM

## 2022-07-05 RX ORDER — HYDROCODONE BITARTRATE AND ACETAMINOPHEN 10; 325 MG/1; MG/1
1 TABLET ORAL EVERY 6 HOURS PRN
Qty: 90 TABLET | Refills: 0 | Status: SHIPPED | OUTPATIENT
Start: 2022-07-05 | End: 2022-08-03 | Stop reason: SDUPTHER

## 2022-07-05 RX ORDER — HYDROCODONE BITARTRATE AND ACETAMINOPHEN 10; 325 MG/1; MG/1
1 TABLET ORAL EVERY 6 HOURS PRN
Qty: 90 TABLET | Refills: 0 | Status: CANCELLED | OUTPATIENT
Start: 2022-07-05

## 2022-07-05 NOTE — TELEPHONE ENCOUNTER
Pt states pain legs/arms/between shoulders while taking Lipitor-quite taking last week and been better but not gone away.  Is there something else he can try?  Will also need refill on pain med.          Drug thereapy appt 05/27/2022  contract done 12/1/21  UDS done 12/1/21    [facility]

## 2022-07-06 RX ORDER — INDAPAMIDE 2.5 MG/1
2.5 TABLET, FILM COATED ORAL EVERY MORNING
Qty: 90 TABLET | Refills: 0 | Status: SHIPPED | OUTPATIENT
Start: 2022-07-06 | End: 2022-10-07

## 2022-07-06 NOTE — TELEPHONE ENCOUNTER
Rx Refill Note  Requested Prescriptions     Pending Prescriptions Disp Refills   • indapamide (LOZOL) 2.5 MG tablet [Pharmacy Med Name: INDAPAMIDE 2.5MG] 90 tablet 2     Sig: TAKE 1 TABLET BY MOUTH EVERY MORNING.      Last office visit with prescribing clinician: 2/22/2022      Next office visit with prescribing clinician: Visit date not found   CPE done 06/03/2021    {TIP  Please add Last Relevant Lab Date if appropriate: 05/27/2022  {TIP  Is Refill Pharmacy correct?: yes    Helena Gilmore MA  07/06/22, 10:00 CDT

## 2022-07-07 RX ORDER — CLOBETASOL PROPIONATE 0.5 MG/G
CREAM TOPICAL
Qty: 30 G | Refills: 0 | Status: SHIPPED | OUTPATIENT
Start: 2022-07-07 | End: 2023-02-06 | Stop reason: SDUPTHER

## 2022-07-07 NOTE — TELEPHONE ENCOUNTER
Rx Refill Note  Requested Prescriptions     Pending Prescriptions Disp Refills   • clobetasol (TEMOVATE) 0.05 % cream [Pharmacy Med Name: CLOBETASOL CRE 0.05%] 30 g 1     Sig: APPLY TOPICALLY TO THE APPROPRIATE AREA AS DIRECTED FOR ITCHING AS NEEDED      Last office visit with prescribing clinician: 5/27/2022      Next office visit with prescribing clinician: 8/25/2022   CPE done 06/03/201       {TIP  Is Refill Pharmacy correct?: yes    Helena Gilmore MA  07/07/22, 09:14 CDT

## 2022-07-15 RX ORDER — LOSARTAN POTASSIUM 100 MG/1
TABLET ORAL
Qty: 90 TABLET | Refills: 0 | Status: SHIPPED | OUTPATIENT
Start: 2022-07-15 | End: 2022-10-13

## 2022-07-15 NOTE — TELEPHONE ENCOUNTER
Rx Refill Note  Requested Prescriptions     Pending Prescriptions Disp Refills   • losartan (COZAAR) 100 MG tablet [Pharmacy Med Name: LOSARTAN POT 100MG] 90 tablet 1     Sig: TAKE 1 TABLET BY MOUTH DAILY      Last office visit with prescribing clinician: 2/22/2022      Next office visit with prescribing clinician: 8/25/22           Helena Meehan MA  07/15/22, 09:44 CDT

## 2022-08-03 DIAGNOSIS — R52 PAIN: ICD-10-CM

## 2022-08-03 RX ORDER — HYDROCODONE BITARTRATE AND ACETAMINOPHEN 10; 325 MG/1; MG/1
1 TABLET ORAL EVERY 6 HOURS PRN
Qty: 90 TABLET | Refills: 0 | Status: SHIPPED | OUTPATIENT
Start: 2022-08-03 | End: 2022-08-31 | Stop reason: SDUPTHER

## 2022-08-03 NOTE — TELEPHONE ENCOUNTER
Caller: Joni Ephraim MICHAEL    Relationship: Self    Best call back number: [phone]    Requested Prescriptions:   Requested Prescriptions     Pending Prescriptions Disp Refills   • HYDROcodone-acetaminophen (NORCO)  MG per tablet 90 tablet 0     Sig: Take 1 tablet by mouth Every 6 (Six) Hours As Needed for Moderate Pain .        Pharmacy where request should be sent: [facility] [address] [phone] [facility] [phone] [facility]         Does the patient have less than a 3 day supply:  [] Yes  [x] No    Deandre Raza Rep   08/03/22 11:14 CDT

## 2022-08-03 NOTE — TELEPHONE ENCOUNTER
Drug thereapy appt 05/27/2022  contract done 12/1/21  UDS done 12/1/21    Rx Refill Note  Requested Prescriptions     Pending Prescriptions Disp Refills   • HYDROcodone-acetaminophen (NORCO)  MG per tablet 90 tablet 0     Sig: Take 1 tablet by mouth Every 6 (Six) Hours As Needed for Moderate Pain .      Last office visit with prescribing clinician: 5/27/2022      Next office visit with prescribing clinician: 8/25/2022            Helena Meehan MA  08/03/22, 15:29 CDT

## 2022-08-04 RX ORDER — GLIPIZIDE 5 MG/1
10 TABLET, FILM COATED, EXTENDED RELEASE ORAL DAILY
Qty: 180 TABLET | Refills: 0 | Status: SHIPPED | OUTPATIENT
Start: 2022-08-04 | End: 2022-10-19 | Stop reason: SDUPTHER

## 2022-08-04 NOTE — TELEPHONE ENCOUNTER
Rx Refill Note  Requested Prescriptions     Pending Prescriptions Disp Refills   • glipizide (GLUCOTROL XL) 5 MG ER tablet [Pharmacy Med Name: GLIPIZIDE ER 5MG] 180 tablet 2     Sig: TAKE 2 TABLETS BY MOUTH DAILY.      Last office visit with prescribing clinician: 2/22/2022      Next office visit with prescribing clinician: 08/25/2022    CPE done 06/03/2022    {TIP  Please add Last Relevant Lab Date if appropriate:   {TIP  Is Refill Pharmacy correct?: yes    Helena Gilmore MA  08/04/22, 11:12 CDT

## 2022-08-25 ENCOUNTER — OFFICE VISIT (OUTPATIENT)
Dept: FAMILY MEDICINE CLINIC | Facility: CLINIC | Age: 72
End: 2022-08-25

## 2022-08-25 VITALS
HEART RATE: 68 BPM | OXYGEN SATURATION: 98 % | DIASTOLIC BLOOD PRESSURE: 77 MMHG | WEIGHT: 314 LBS | SYSTOLIC BLOOD PRESSURE: 121 MMHG | BODY MASS INDEX: 49.28 KG/M2 | TEMPERATURE: 98 F | HEIGHT: 67 IN

## 2022-08-25 DIAGNOSIS — D36.9 ADENOMATOUS POLYPS: ICD-10-CM

## 2022-08-25 DIAGNOSIS — I48.91 ATRIAL FIBRILLATION, UNSPECIFIED TYPE: ICD-10-CM

## 2022-08-25 DIAGNOSIS — E11.9 TYPE 2 DIABETES MELLITUS WITHOUT COMPLICATION, WITHOUT LONG-TERM CURRENT USE OF INSULIN: ICD-10-CM

## 2022-08-25 DIAGNOSIS — R53.83 FATIGUE, UNSPECIFIED TYPE: ICD-10-CM

## 2022-08-25 DIAGNOSIS — E66.01 MORBIDLY OBESE: ICD-10-CM

## 2022-08-25 DIAGNOSIS — Z12.5 PROSTATE CANCER SCREENING: ICD-10-CM

## 2022-08-25 DIAGNOSIS — I10 PRIMARY HYPERTENSION: ICD-10-CM

## 2022-08-25 DIAGNOSIS — Z23 ENCOUNTER FOR IMMUNIZATION: ICD-10-CM

## 2022-08-25 DIAGNOSIS — Z00.00 MEDICARE ANNUAL WELLNESS VISIT, SUBSEQUENT: Primary | ICD-10-CM

## 2022-08-25 LAB
BILIRUB BLD-MCNC: ABNORMAL MG/DL
CLARITY, POC: CLEAR
COLOR UR: ABNORMAL
GLUCOSE UR STRIP-MCNC: NEGATIVE MG/DL
KETONES UR QL: ABNORMAL
LEUKOCYTE EST, POC: NEGATIVE
NITRITE UR-MCNC: NEGATIVE MG/ML
PH UR: 5.5 [PH] (ref 5–8)
PROT UR STRIP-MCNC: ABNORMAL MG/DL
RBC # UR STRIP: ABNORMAL /UL
SP GR UR: 1.02 (ref 1–1.03)
UROBILINOGEN UR QL: ABNORMAL

## 2022-08-25 PROCEDURE — G0009 ADMIN PNEUMOCOCCAL VACCINE: HCPCS | Performed by: NURSE PRACTITIONER

## 2022-08-25 PROCEDURE — 1159F MED LIST DOCD IN RCRD: CPT | Performed by: NURSE PRACTITIONER

## 2022-08-25 PROCEDURE — 96160 PT-FOCUSED HLTH RISK ASSMT: CPT | Performed by: NURSE PRACTITIONER

## 2022-08-25 PROCEDURE — 90677 PCV20 VACCINE IM: CPT | Performed by: NURSE PRACTITIONER

## 2022-08-25 PROCEDURE — G0439 PPPS, SUBSEQ VISIT: HCPCS | Performed by: NURSE PRACTITIONER

## 2022-08-25 PROCEDURE — 81003 URINALYSIS AUTO W/O SCOPE: CPT | Performed by: NURSE PRACTITIONER

## 2022-08-25 PROCEDURE — 1170F FXNL STATUS ASSESSED: CPT | Performed by: NURSE PRACTITIONER

## 2022-08-25 PROCEDURE — 1126F AMNT PAIN NOTED NONE PRSNT: CPT | Performed by: NURSE PRACTITIONER

## 2022-08-25 PROCEDURE — 3062F POS MACROALBUMINURIA REV: CPT | Performed by: NURSE PRACTITIONER

## 2022-08-25 RX ORDER — PNEUMOCOCCAL VACCINE POLYVALENT 25; 25; 25; 25; 25; 25; 25; 25; 25; 25; 25; 25; 25; 25; 25; 25; 25; 25; 25; 25; 25; 25; 25 UG/.5ML; UG/.5ML; UG/.5ML; UG/.5ML; UG/.5ML; UG/.5ML; UG/.5ML; UG/.5ML; UG/.5ML; UG/.5ML; UG/.5ML; UG/.5ML; UG/.5ML; UG/.5ML; UG/.5ML; UG/.5ML; UG/.5ML; UG/.5ML; UG/.5ML; UG/.5ML; UG/.5ML; UG/.5ML; UG/.5ML
0.5 INJECTION, SOLUTION INTRAMUSCULAR; SUBCUTANEOUS ONCE
Qty: 0.5 ML | Refills: 0 | Status: SHIPPED | OUTPATIENT
Start: 2022-08-25 | End: 2022-08-25

## 2022-08-25 RX ORDER — NIFEDIPINE 90 MG/1
90 TABLET, EXTENDED RELEASE ORAL DAILY
Qty: 30 TABLET | Refills: 0 | Status: SHIPPED | OUTPATIENT
Start: 2022-08-25 | End: 2023-02-06

## 2022-08-25 NOTE — PATIENT INSTRUCTIONS
Medicare Wellness  Personal Prevention Plan of Service     Date of Office Visit:    Encounter Provider:  ANSHUL Resendiz  Place of Service:  [facility]  Patient Name: Ephraim Quinones  :  1950    As part of the Medicare Wellness portion of your visit today, we are providing you with this personalized preventive plan of services (PPPS). This plan is based upon recommendations of the [place] Preventive Services Task Force (USPSTF) and the Advisory Committee on Immunization Practices (ACIP).    This lists the preventive care services that should be considered, and provides dates of when you are due. Items listed as completed are up-to-date and do not require any further intervention.    Health Maintenance   Topic Date Due    Pneumococcal Vaccine 65+ (3 - PPSV23 or PCV20) 2019    ZOSTER VACCINE (3 of 3) 2020    COLORECTAL CANCER SCREENING  2022    URINE MICROALBUMIN  2022    HEMOGLOBIN A1C  2022    INFLUENZA VACCINE  10/01/2022    LIPID PANEL  2023    DIABETIC EYE EXAM  2023    ANNUAL WELLNESS VISIT  2023    DIABETIC FOOT EXAM  2023    HEPATITIS C SCREENING  Completed    COVID-19 Vaccine  Completed    AAA SCREEN (ONE-TIME)  Completed    TDAP/TD VACCINES  Discontinued       Orders Placed This Encounter   Procedures    Microalbumin / Creatinine Urine Ratio - Urine, Clean Catch     Order Specific Question:   Release to patient     Answer:   Routine Release    TSH     Order Specific Question:   Release to patient     Answer:   Routine Release    T4, free     Order Specific Question:   Release to patient     Answer:   Routine Release    Comprehensive Metabolic Panel     Order Specific Question:   Release to patient     Answer:   Routine Release    Hemoglobin A1c     Order Specific Question:   Release to patient     Answer:   Routine Release    Lipid Panel    PSA Screen     Order Specific Question:   Release to  patient     Answer:   Routine Release    Ambulatory Referral to General Surgery     Referral Priority:   Routine     Referral Type:   Consultation     Referral Reason:   Specialty Services Required     Referred to Provider:   Shahram Pace MD     Requested Specialty:   General Surgery     Number of Visits Requested:   1    POC Urinalysis Dipstick, Multipro     Order Specific Question:   Release to patient     Answer:   Routine Release    CBC & Differential     Order Specific Question:   Manual Differential     Answer:   No       No follow-ups on file.      Advance Care Planning and Advance Directives     You make decisions on a daily basis - decisions about where you want to live, your career, your home, your life. Perhaps one of the most important decisions you face is your choice for future medical care. Take time to talk with your family and your healthcare team and start planning today.  Advance Care Planning is a process that can help you:  Understand possible future healthcare decisions in light of your own experiences  Reflect on those decision in light of your goals and values  Discuss your decisions with those closest to you and the healthcare professionals that care for you  Make a plan by creating a document that reflects your wishes    Surrogate Decision Maker  In the event of a medical emergency, which has left you unable to communicate or to make your own decisions, you would need someone to make decisions for you.  It is important to discuss your preferences for medical treatment with this person while you are in good health.     Qualities of a surrogate decision maker:  Willing to take on this role and responsibility  Knows what you want for future medical care  Willing to follow your wishes even if they don't agree with them  Able to make difficult medical decisions under stressful circumstances    Advance Directives  These are legal documents you can create that will guide your healthcare team  and decision maker(s) when needed. These documents can be stored in the electronic medical record.    Living Will - a legal document to guide your care if you have a terminal condition or a serious illness and are unable to communicate. States vary by statute in document names/types, but most forms may include one or more of the following:        -  Directions regarding life-prolonging treatments        -  Directions regarding artificially provided nutrition/hydration        -  Choosing a healthcare decision maker        -  Direction regarding organ/tissue donation    Durable Power of  for Healthcare - this document names an -in-fact to make medical decisions for you, but it may also allow this person to make personal and financial decisions for you. Please seek the advice of an  if you need this type of document.    **Advance Directives are not required and no one may discriminate against you if you do not sign one.    Medical Orders  Many states allow specific forms/orders signed by your physician to record your wishes for medical treatment in your current state of health. This form, signed in personal communication with your physician, addresses resuscitation and other medical interventions that you may or may not want.      For more information or to schedule a time with a [facility] Advance Care Planning Facilitator contact: [email or web address] or call [phone] and someone will contact you directly.

## 2022-08-25 NOTE — PROGRESS NOTES
The ABCs of the Annual Wellness Visit  Subsequent Medicare Wellness Visit    Chief Complaint   Patient presents with   • Medicare Wellness-subsequent     Fasting     • Med Refill     Gregory.  Contract and UDS up to date      Subjective    History of Present Illness:  Ephraim Quinones is a 72 y.o. male who presents for a Subsequent Medicare Wellness Visit.    The following portions of the patient's history were reviewed and   updated as appropriate: allergies, current medications, past family history, past medical history, past social history, past surgical history and problem list.    Compared to one year ago, the patient feels his physical   health is the same.    Compared to one year ago, the patient feels his mental   health is the same.    Recent Hospitalizations:  He was not admitted to the hospital during the last year.       Current Medical Providers:  Patient Care Team:  Jim Hopkins MD as PCP - Noah Linder MD as Consulting Physician (Orthopedic Surgery)    Outpatient Medications Prior to Visit   Medication Sig Dispense Refill   • albuterol sulfate  (90 Base) MCG/ACT inhaler 2 sprays 2 minutes apart every 6 hours as needed 18 g 5   • aspirin 81 MG EC tablet Take 81 mg by mouth Daily.     • carbidopa-levodopa (SINEMET)  MG per tablet TAKE 1 TABLET TWO TIMES A  tablet 1   • Cholecalciferol (VITAMIN D3) 2000 units capsule Take 2,000 Units by mouth Daily.     • clobetasol (TEMOVATE) 0.05 % cream APPLY TOPICALLY TO THE APPROPRIATE AREA AS DIRECTED FOR ITCHING AS NEEDED 30 g 0   • ELIQUIS 5 MG tablet tablet Take 5 mg by mouth Every 12 (Twelve) Hours.     • glipizide (GLUCOTROL XL) 5 MG ER tablet TAKE 2 TABLETS BY MOUTH DAILY. 180 tablet 0   • glucose blood test strip 1 each by Other route Daily. Use as instructed 100 each 3   • HYDROcodone-acetaminophen (NORCO)  MG per tablet Take 1 tablet by mouth Every 6 (Six) Hours As Needed for Moderate Pain . 90  tablet 0   • indapamide (LOZOL) 2.5 MG tablet TAKE 1 TABLET BY MOUTH EVERY MORNING. 90 tablet 0   • Lancet Devices (EASY TOUCH LANCING DEVICE) misc USE TO TEST DAILY  0   • losartan (COZAAR) 100 MG tablet TAKE 1 TABLET BY MOUTH DAILY 90 tablet 0   • metFORMIN (GLUCOPHAGE) 1000 MG tablet TAKE 1 TABLET BY MOUTH 2 TIMES A DAY WITH MEALS. 180 tablet 2   • naproxen sodium (ALEVE) 220 MG tablet Take 220 mg by mouth 2 (Two) Times a Day As Needed.     • ONETOUCH DELICA LANCETS 33G misc 1 each Daily. 100 each 3   • Potassium 99 MG tablet Take 1 tablet by mouth 2 (Two) Times a Day.     • amLODIPine (NORVASC) 5 MG tablet TAKE 1 TABLET BY MOUTH TWICE DAILY 180 tablet 0   • rosuvastatin (Crestor) 10 MG tablet Take 1 tablet by mouth Every Night. 90 tablet 0   • colchicine (Colcrys) 0.6 MG tablet TAKE 1 TABLET BY MOUTH AS NEEDED FOR GOUT 30 tablet 0   • atorvastatin (Lipitor) 40 MG tablet Take 1 tablet by mouth Every Night. 30 tablet 0   • methocarbamol (ROBAXIN) 750 MG tablet        No facility-administered medications prior to visit.       Opioid medication/s are on active medication list.  and I have evaluated his active treatment plan and pain score trends (see table).  Vitals:    08/25/22 0858   PainSc: 0-No pain     I have reviewed the chart for potential of high risk medication and harmful drug interactions in the elderly.            Aspirin is on active medication list. Aspirin use is indicated based on review of current medical condition/s. Pros and cons of this therapy have been discussed today. Benefits of this medication outweigh potential harm.  Patient has been encouraged to continue taking this medication.  .      Patient Active Problem List   Diagnosis   • Type 2 diabetes mellitus without complication (HCC)   • HTN (hypertension)   • A-fib (HCC)   • Hx of adenomatous colonic polyps   • Morbidly obese (HCC)   • Phimosis   • Sacroiliitis, not elsewhere classified (HCC)     Advance Care Planning  Advance Directive is  "not on file.  ACP discussion was held with the patient during this visit. Patient does not have an advance directive, information provided.    Review of Systems   Constitutional: Negative for fatigue and fever.   Respiratory: Negative for shortness of breath.    Cardiovascular: Negative for chest pain.   Gastrointestinal: Negative for constipation and diarrhea.   Musculoskeletal:        Muscle cramps in legs   Neurological: Negative for dizziness.        Objective    Vitals:    08/25/22 0858   BP: 121/77   BP Location: Left arm   Patient Position: Sitting   Cuff Size: Adult   Pulse: 68   Temp: 98 °F (36.7 °C)   TempSrc: Temporal   SpO2: 98%   Weight: (!) 142 kg (314 lb)   Height: 170.2 cm (67\")   PainSc: 0-No pain     Estimated body mass index is 49.18 kg/m² as calculated from the following:    Height as of this encounter: 170.2 cm (67\").    Weight as of this encounter: 142 kg (314 lb).    Class 3 Severe Obesity (BMI >=40). Obesity-related health conditions include the following: hypertension, diabetes mellitus and osteoarthritis. Obesity is unchanged. BMI is is above average; BMI management plan is completed. We discussed low calorie, low carb based diet program, portion control and increasing exercise.      Does the patient have evidence of cognitive impairment? No    Physical Exam  Vitals reviewed.   Constitutional:       Appearance: He is well-developed.   HENT:      Right Ear: Tympanic membrane, ear canal and external ear normal.      Left Ear: Tympanic membrane, ear canal and external ear normal.      Mouth/Throat:      Mouth: Mucous membranes are moist.      Pharynx: Oropharynx is clear.   Neck:      Vascular: No carotid bruit.   Cardiovascular:      Rate and Rhythm: Normal rate and regular rhythm.      Heart sounds: Normal heart sounds. No murmur heard.  Pulmonary:      Effort: Pulmonary effort is normal.      Breath sounds: Normal breath sounds.   Abdominal:      General: Bowel sounds are normal.      " Palpations: Abdomen is soft.      Hernia: There is no hernia in the left inguinal area or right inguinal area.   Genitourinary:     Penis: Phimosis present.       Testes:         Right: Mass not present.         Left: Mass not present.      Prostate: Normal.      Rectum: Normal.   Neurological:      Mental Status: He is alert and oriented to person, place, and time.   Psychiatric:         Behavior: Behavior normal.       Lab Results   Component Value Date    CHLPL 187 2022    TRIG 122 2022    HDL 53 2022     (H) 2022    VLDL 22 2022            HEALTH RISK ASSESSMENT    Smoking Status:  Social History     Tobacco Use   Smoking Status Former Smoker   • Packs/day: 1.00   • Years: 20.00   • Pack years: 20.00   • Quit date: 1996   • Years since quittin.2   Smokeless Tobacco Never Used     Alcohol Consumption:  Social History     Substance and Sexual Activity   Alcohol Use Yes    Comment: occ     Fall Risk Screen:    STEADI Fall Risk Assessment was completed, and patient is at MODERATE risk for falls. Assessment completed on:2022    Depression Screening:  PHQ-2/PHQ-9 Depression Screening 2022   Retired PHQ-9 Total Score -   Retired Total Score -   Little Interest or Pleasure in Doing Things 0-->not at all   Feeling Down, Depressed or Hopeless 0-->not at all   PHQ-9: Brief Depression Severity Measure Score 0       Health Habits and Functional and Cognitive Screening:  Functional & Cognitive Status 2022   Do you have difficulty preparing food and eating? No   Do you have difficulty bathing yourself, getting dressed or grooming yourself? No   Do you have difficulty using the toilet? No   Do you have difficulty moving around from place to place? No   Do you have trouble with steps or getting out of a bed or a chair? Yes   Current Diet Unhealthy Diet   Dental Exam Not up to date   Eye Exam Up to date   Exercise (times per week) 0 times per week   Current Exercises  Include No Regular Exercise   Current Exercise Activities Include -   Do you need help using the phone?  No   Are you deaf or do you have serious difficulty hearing?  No   Do you need help with transportation? No   Do you need help shopping? No   Do you need help preparing meals?  No   Do you need help with housework?  No   Do you need help with laundry? No   Do you need help taking your medications? No   Do you need help managing money? No   Do you ever drive or ride in a car without wearing a seat belt? No   Have you felt unusual stress, anger or loneliness in the last month? No   Who do you live with? Spouse   If you need help, do you have trouble finding someone available to you? No   Have you been bothered in the last four weeks by sexual problems? No   Do you have difficulty concentrating, remembering or making decisions? Yes       Age-appropriate Screening Schedule:  Refer to the list below for future screening recommendations based on patient's age, sex and/or medical conditions. Orders for these recommended tests are listed in the plan section. The patient has been provided with a written plan.    Health Maintenance   Topic Date Due   • ZOSTER VACCINE (3 of 3) 01/02/2020   • URINE MICROALBUMIN  06/03/2022   • HEMOGLOBIN A1C  08/27/2022   • INFLUENZA VACCINE  10/01/2022   • LIPID PANEL  06/01/2023   • DIABETIC EYE EXAM  07/18/2023   • DIABETIC FOOT EXAM  08/25/2023   • TDAP/TD VACCINES  Discontinued              Assessment & Plan   CMS Preventative Services Quick Reference  Risk Factors Identified During Encounter  Cardiovascular Disease  Immunizations Discussed/Encouraged (specific Immunizations; Influenza and Pneumococcal 23  Inactivity/Sedentary  Obesity/Overweight   The above risks/problems have been discussed with the patient.  Follow up actions/plans if indicated are seen below in the Assessment/Plan Section.  Pertinent information has been shared with the patient in the After Visit  Summary.    Diagnoses and all orders for this visit:    1. Medicare annual wellness visit, subsequent (Primary)  -     CBC & Differential  -     TSH  -     T4, free  -     Comprehensive Metabolic Panel  -     Hemoglobin A1c  -     Lipid Panel  -     POC Urinalysis Dipstick, Multipro  -     PSA Screen  -     pneumococcal polysaccharide 23-valent (Pneumovax 23) 25 MCG/0.5ML vaccine; Inject 0.5 mL into the appropriate muscle as directed by prescriber 1 (One) Time for 1 dose.  Dispense: 0.5 mL; Refill: 0    2. Type 2 diabetes mellitus without complication, without long-term current use of insulin (HCC)  -     Microalbumin / Creatinine Urine Ratio - Urine, Clean Catch  -     Hemoglobin A1c  -     Lipid Panel  -     POC Urinalysis Dipstick, Multipro    3. Morbidly obese (HCC)  -     Comprehensive Metabolic Panel  -     Lipid Panel    4. Primary hypertension  -     Comprehensive Metabolic Panel  -     NIFEdipine XL (Procardia XL) 90 MG 24 hr tablet; Take 1 tablet by mouth Daily.  Dispense: 30 tablet; Refill: 0    5. Atrial fibrillation, unspecified type ([facility])    6. Encounter for immunization  -     pneumococcal polysaccharide 23-valent (Pneumovax 23) 25 MCG/0.5ML vaccine; Inject 0.5 mL into the appropriate muscle as directed by prescriber 1 (One) Time for 1 dose.  Dispense: 0.5 mL; Refill: 0    7. Prostate cancer screening  -     PSA Screen    8. Fatigue, unspecified type  -     CBC & Differential  -     TSH  -     T4, free    9. Adenomatous polyps  -     Ambulatory Referral to General Surgery    Change from amlodipine to procardia to try to help leg swelling.      Follow Up:   Return in about 2 weeks (around 9/8/2022), or if symptoms worsen or fail to improve, for Recheck - leg swelling / HTN.     An After Visit Summary and PPPS were made available to the patient.    Dolores Ba, APRN 8/25/22

## 2022-08-26 ENCOUNTER — PATIENT ROUNDING (BHMG ONLY) (OUTPATIENT)
Dept: FAMILY MEDICINE CLINIC | Facility: CLINIC | Age: 72
End: 2022-08-26

## 2022-08-26 LAB
ALBUMIN SERPL-MCNC: 4.3 G/DL (ref 3.5–5.2)
ALBUMIN/CREAT UR: 19 MG/G CREAT (ref 0–29)
ALBUMIN/GLOB SERPL: 2.4 G/DL
ALP SERPL-CCNC: 58 U/L (ref 39–117)
ALT SERPL-CCNC: 23 U/L (ref 1–41)
AST SERPL-CCNC: 25 U/L (ref 1–40)
BASOPHILS # BLD AUTO: 0.06 10*3/MM3 (ref 0–0.2)
BASOPHILS NFR BLD AUTO: 0.9 % (ref 0–1.5)
BILIRUB SERPL-MCNC: 0.5 MG/DL (ref 0–1.2)
BUN SERPL-MCNC: 24 MG/DL (ref 8–23)
BUN/CREAT SERPL: 27.6 (ref 7–25)
CALCIUM SERPL-MCNC: 9.9 MG/DL (ref 8.6–10.5)
CHLORIDE SERPL-SCNC: 100 MMOL/L (ref 98–107)
CHOLEST SERPL-MCNC: 171 MG/DL (ref 0–200)
CO2 SERPL-SCNC: 26 MMOL/L (ref 22–29)
CREAT SERPL-MCNC: 0.87 MG/DL (ref 0.76–1.27)
CREAT UR-MCNC: 225.7 MG/DL
EGFRCR-CYS SERPLBLD CKD-EPI 2021: 91.7 ML/MIN/1.73
EOSINOPHIL # BLD AUTO: 0.26 10*3/MM3 (ref 0–0.4)
EOSINOPHIL NFR BLD AUTO: 3.8 % (ref 0.3–6.2)
ERYTHROCYTE [DISTWIDTH] IN BLOOD BY AUTOMATED COUNT: 13 % (ref 12.3–15.4)
GLOBULIN SER CALC-MCNC: 1.8 GM/DL
GLUCOSE SERPL-MCNC: 116 MG/DL (ref 65–99)
HBA1C MFR BLD: 6.5 % (ref 4.8–5.6)
HCT VFR BLD AUTO: 45.7 % (ref 37.5–51)
HDLC SERPL-MCNC: 51 MG/DL (ref 40–60)
HGB BLD-MCNC: 15.4 G/DL (ref 13–17.7)
IMM GRANULOCYTES # BLD AUTO: 0.05 10*3/MM3 (ref 0–0.05)
IMM GRANULOCYTES NFR BLD AUTO: 0.7 % (ref 0–0.5)
LDLC SERPL CALC-MCNC: 95 MG/DL (ref 0–100)
LYMPHOCYTES # BLD AUTO: 1.7 10*3/MM3 (ref 0.7–3.1)
LYMPHOCYTES NFR BLD AUTO: 25.1 % (ref 19.6–45.3)
MCH RBC QN AUTO: 33 PG (ref 26.6–33)
MCHC RBC AUTO-ENTMCNC: 33.7 G/DL (ref 31.5–35.7)
MCV RBC AUTO: 98.1 FL (ref 79–97)
MICROALBUMIN UR-MCNC: 43.4 UG/ML
MONOCYTES # BLD AUTO: 0.61 10*3/MM3 (ref 0.1–0.9)
MONOCYTES NFR BLD AUTO: 9 % (ref 5–12)
NEUTROPHILS # BLD AUTO: 4.1 10*3/MM3 (ref 1.7–7)
NEUTROPHILS NFR BLD AUTO: 60.5 % (ref 42.7–76)
NRBC BLD AUTO-RTO: 0 /100 WBC (ref 0–0.2)
PLATELET # BLD AUTO: 225 10*3/MM3 (ref 140–450)
POTASSIUM SERPL-SCNC: 4.6 MMOL/L (ref 3.5–5.2)
PROT SERPL-MCNC: 6.1 G/DL (ref 6–8.5)
PSA SERPL-MCNC: 0.79 NG/ML (ref 0–4)
RBC # BLD AUTO: 4.66 10*6/MM3 (ref 4.14–5.8)
SODIUM SERPL-SCNC: 140 MMOL/L (ref 136–145)
T4 FREE SERPL-MCNC: 1.24 NG/DL (ref 0.93–1.7)
TRIGL SERPL-MCNC: 141 MG/DL (ref 0–150)
TSH SERPL DL<=0.005 MIU/L-ACNC: 0.89 UIU/ML (ref 0.27–4.2)
VLDLC SERPL CALC-MCNC: 25 MG/DL (ref 5–40)
WBC # BLD AUTO: 6.78 10*3/MM3 (ref 3.4–10.8)

## 2022-08-26 NOTE — PROGRESS NOTES
August 26, 2022    Hello, may I speak with Ephraim Quinones?     My name is Sheela    I am  with [facility]  [address], [address]  [place] [address] [phone].    Before we get started may I verify your date of birth? 1950    I am calling to officially welcome you to our practice and ask about your recent visit. Is this a good time to talk? yes    Tell me about your visit with us. What things went well?  Everything went Well! Friendly staff!       We're always looking for ways to make our patients' experiences even better. Do you have recommendations on ways we may improve?  No-completely satisfied!    Overall were you satisfied with your first visit to our practice? yes       I appreciate you taking the time to speak with me today. Is there anything else I can do for you? no      Thank you, and have a great day.

## 2022-08-31 DIAGNOSIS — R52 PAIN: ICD-10-CM

## 2022-08-31 RX ORDER — HYDROCODONE BITARTRATE AND ACETAMINOPHEN 10; 325 MG/1; MG/1
1 TABLET ORAL EVERY 6 HOURS PRN
Qty: 90 TABLET | Refills: 0 | Status: SHIPPED | OUTPATIENT
Start: 2022-08-31 | End: 2022-09-29 | Stop reason: SDUPTHER

## 2022-08-31 NOTE — TELEPHONE ENCOUNTER
Rx Refill Note  Requested Prescriptions     Pending Prescriptions Disp Refills   • HYDROcodone-acetaminophen (NORCO)  MG per tablet 90 tablet 0     Sig: Take 1 tablet by mouth Every 6 (Six) Hours As Needed for Moderate Pain.   • metFORMIN (GLUCOPHAGE) 1000 MG tablet 180 tablet 3     Sig: Take 1 tablet by mouth 2 (Two) Times a Day With Meals.      Last office visit with prescribing clinician: 5/27/2022      Next office visit with prescribing clinician: Visit date not found            Deandre Nassar Rep  08/31/22, 14:35 CDT         Drug thereapy appt 8/25/2022  contract done 12/1/21  UDS done 12/1/21

## 2022-09-09 ENCOUNTER — OFFICE VISIT (OUTPATIENT)
Dept: FAMILY MEDICINE CLINIC | Facility: CLINIC | Age: 72
End: 2022-09-09

## 2022-09-09 VITALS
SYSTOLIC BLOOD PRESSURE: 130 MMHG | WEIGHT: 315 LBS | TEMPERATURE: 97.7 F | RESPIRATION RATE: 18 BRPM | BODY MASS INDEX: 49.44 KG/M2 | HEIGHT: 67 IN | DIASTOLIC BLOOD PRESSURE: 78 MMHG | OXYGEN SATURATION: 98 % | HEART RATE: 89 BPM

## 2022-09-09 DIAGNOSIS — I10 HYPERTENSION, UNSPECIFIED TYPE: ICD-10-CM

## 2022-09-09 DIAGNOSIS — E11.9 TYPE 2 DIABETES MELLITUS WITHOUT COMPLICATION, WITHOUT LONG-TERM CURRENT USE OF INSULIN: Primary | ICD-10-CM

## 2022-09-09 PROCEDURE — 99213 OFFICE O/P EST LOW 20 MIN: CPT | Performed by: FAMILY MEDICINE

## 2022-09-09 RX ORDER — FUROSEMIDE 40 MG/1
40 TABLET ORAL DAILY
Qty: 30 TABLET | Refills: 2 | Status: SHIPPED | OUTPATIENT
Start: 2022-09-09 | End: 2022-11-23

## 2022-09-09 NOTE — PROGRESS NOTES
Subjective   Ephraim Quinones is a 72 y.o. male.     72-year-old male with a history of hypertension-calcium channel blockers or causing major peripheral edema both amlodipine and Procardia      The following portions of the patient's history were reviewed and updated as appropriate: allergies, current medications, past family history, past medical history, past social history, past surgical history and problem list.    Review of Systems   Respiratory: Negative for shortness of breath.    Cardiovascular: Positive for leg swelling. Negative for chest pain.   Skin: Positive for rash.       Objective   Physical Exam  Vitals and nursing note reviewed.   Constitutional:       Appearance: He is obese.   Cardiovascular:      Rate and Rhythm: Normal rate and regular rhythm.   Pulmonary:      Effort: Pulmonary effort is normal.      Breath sounds: Normal breath sounds.   Musculoskeletal:      Right lower leg: Edema present.      Left lower leg: Edema present.   Skin:     Findings: Rash present.      Comments: Bilateral symmetrical rash consistent with stasis dermatitis   Neurological:      General: No focal deficit present.      Mental Status: He is alert and oriented to person, place, and time.   Psychiatric:         Mood and Affect: Mood normal.         Behavior: Behavior normal.         Thought Content: Thought content normal.         Judgment: Judgment normal.         Assessment & Plan   Diagnoses and all orders for this visit:    1. Type 2 diabetes mellitus without complication, without long-term current use of insulin (HCC) (Primary)  -     Basic Metabolic Panel; Future    2. Hypertension, unspecified type    Other orders  -     furosemide (Lasix) 40 MG tablet; Take 1 tablet by mouth Daily.  Dispense: 30 tablet; Refill: 2    Diagnosis #3 stasis dermatitis  Plan steroid cream for stasis dermatitis, add Lasix-stop Procardia hold Procardia and amlodipine see 1 week BMP on return

## 2022-09-19 ENCOUNTER — OFFICE VISIT (OUTPATIENT)
Dept: FAMILY MEDICINE CLINIC | Facility: CLINIC | Age: 72
End: 2022-09-19

## 2022-09-19 VITALS
HEART RATE: 68 BPM | WEIGHT: 310.6 LBS | OXYGEN SATURATION: 98 % | TEMPERATURE: 98 F | SYSTOLIC BLOOD PRESSURE: 138 MMHG | DIASTOLIC BLOOD PRESSURE: 77 MMHG | HEIGHT: 67 IN | BODY MASS INDEX: 48.75 KG/M2

## 2022-09-19 DIAGNOSIS — I10 ESSENTIAL HYPERTENSION: Primary | ICD-10-CM

## 2022-09-19 PROCEDURE — 99213 OFFICE O/P EST LOW 20 MIN: CPT | Performed by: NURSE PRACTITIONER

## 2022-09-19 NOTE — PROGRESS NOTES
CC: follow up HTN    History:  Ephraim Quinones is a 72 y.o. male who presents today for evaluation of the above problems.      Patient reports that his swelling has improved greatly since starting Lasix. Weight is down 11 pounds. Denies any problems breathing.     HPI  ROS:  Review of Systems   Respiratory: Negative for shortness of breath.    Cardiovascular: Positive for leg swelling. Negative for chest pain.       Allergies   Allergen Reactions   • Simvastatin Other (See Comments)     Leg Cramps   • Percocet [Oxycodone-Acetaminophen] Itching     Past Medical History:   Diagnosis Date   • A-fib (HCC)    • Acid reflux    • Anemia 5/3/2016   • Diabetes mellitus (HCC)    • Gout    • Hyperlipidemia    • Hypertension    • Sleep apnea with use of continuous positive airway pressure (CPAP)      Past Surgical History:   Procedure Laterality Date   • BACK SURGERY      x2   • CIRCUMCISION N/A 6/6/2019    Procedure: CIRCUMCISION;  Surgeon: Yon Sloan MD;  Location: [facility];  Service: Urology   • FINGER SURGERY     • REPLACEMENT TOTAL KNEE Bilateral      Family History   Problem Relation Age of Onset   • Colon cancer Mother    • Prostate cancer Father    • No Known Problems Maternal Grandmother    • No Known Problems Maternal Grandfather    • No Known Problems Paternal Grandmother    • No Known Problems Paternal Grandfather       reports that he quit smoking about 26 years ago. He has a 20.00 pack-year smoking history. He has never used smokeless tobacco. He reports current alcohol use. He reports that he does not use drugs.      Current Outpatient Medications:   •  albuterol sulfate  (90 Base) MCG/ACT inhaler, 2 sprays 2 minutes apart every 6 hours as needed, Disp: 18 g, Rfl: 5  •  aspirin 81 MG EC tablet, Take 81 mg by mouth Daily., Disp: , Rfl:   •  carbidopa-levodopa (SINEMET)  MG per tablet, Take 1 tablet by mouth 2 (Two) Times a Day., Disp: 180 tablet, Rfl: 3  •  Cholecalciferol (VITAMIN D3)  "2000 units capsule, Take 2,000 Units by mouth Daily., Disp: , Rfl:   •  clobetasol (TEMOVATE) 0.05 % cream, APPLY TOPICALLY TO THE APPROPRIATE AREA AS DIRECTED FOR ITCHING AS NEEDED, Disp: 30 g, Rfl: 0  •  colchicine (Colcrys) 0.6 MG tablet, TAKE 1 TABLET BY MOUTH AS NEEDED FOR GOUT, Disp: 30 tablet, Rfl: 0  •  ELIQUIS 5 MG tablet tablet, Take 5 mg by mouth Every 12 (Twelve) Hours., Disp: , Rfl:   •  furosemide (Lasix) 40 MG tablet, Take 1 tablet by mouth Daily., Disp: 30 tablet, Rfl: 2  •  glipizide (GLUCOTROL XL) 5 MG ER tablet, TAKE 2 TABLETS BY MOUTH DAILY., Disp: 180 tablet, Rfl: 0  •  glucose blood test strip, 1 each by Other route Daily. Use as instructed, Disp: 100 each, Rfl: 3  •  HYDROcodone-acetaminophen (NORCO)  MG per tablet, Take 1 tablet by mouth Every 6 (Six) Hours As Needed for Moderate Pain., Disp: 90 tablet, Rfl: 0  •  indapamide (LOZOL) 2.5 MG tablet, TAKE 1 TABLET BY MOUTH EVERY MORNING., Disp: 90 tablet, Rfl: 0  •  Lancet Devices (EASY TOUCH LANCING DEVICE) misc, USE TO TEST DAILY, Disp: , Rfl: 0  •  losartan (COZAAR) 100 MG tablet, TAKE 1 TABLET BY MOUTH DAILY, Disp: 90 tablet, Rfl: 0  •  metFORMIN (GLUCOPHAGE) 1000 MG tablet, Take 1 tablet by mouth 2 (Two) Times a Day With Meals., Disp: 180 tablet, Rfl: 3  •  naproxen sodium (ALEVE) 220 MG tablet, Take 220 mg by mouth 2 (Two) Times a Day As Needed., Disp: , Rfl:   •  NIFEdipine XL (Procardia XL) 90 MG 24 hr tablet, Take 1 tablet by mouth Daily., Disp: 30 tablet, Rfl: 0  •  ONETOUCH DELICA LANCETS 33G misc, 1 each Daily., Disp: 100 each, Rfl: 3  •  Potassium 99 MG tablet, Take 1 tablet by mouth 2 (Two) Times a Day., Disp: , Rfl:     OBJECTIVE:  /77 (BP Location: Left arm, Patient Position: Sitting, Cuff Size: Large Adult)   Pulse 68   Temp 98 °F (36.7 °C) (Temporal)   Ht 170.2 cm (67\")   Wt (!) 141 kg (310 lb 9.6 oz)   SpO2 98%   BMI 48.65 kg/m²    Physical Exam  Vitals reviewed.   Constitutional:       Appearance: He is " well-developed.   Cardiovascular:      Rate and Rhythm: Normal rate and regular rhythm.      Heart sounds: Normal heart sounds.      Comments: Trace edema of lower extremities bilaterally.  Pulmonary:      Effort: Pulmonary effort is normal.      Breath sounds: Normal breath sounds.   Neurological:      Mental Status: He is alert and oriented to person, place, and time.   Psychiatric:         Behavior: Behavior normal.         Assessment/Plan    Diagnoses and all orders for this visit:    1. Essential hypertension (Primary)  -     Basic metabolic panel    Other orders  -     carbidopa-levodopa (SINEMET)  MG per tablet; Take 1 tablet by mouth 2 (Two) Times a Day.  Dispense: 180 tablet; Refill: 3        An After Visit Summary was printed and given to the patient at discharge.  Return if symptoms worsen or fail to improve, for Next scheduled follow up.       ANSHUL Noel 9/19/22    Electronically signed.

## 2022-09-21 LAB
BUN SERPL-MCNC: 28 MG/DL (ref 8–23)
BUN/CREAT SERPL: 26.9 (ref 7–25)
CALCIUM SERPL-MCNC: 9.8 MG/DL (ref 8.6–10.5)
CHLORIDE SERPL-SCNC: 97 MMOL/L (ref 98–107)
CO2 SERPL-SCNC: 31 MMOL/L (ref 22–29)
CREAT SERPL-MCNC: 1.04 MG/DL (ref 0.76–1.27)
EGFRCR SERPLBLD CKD-EPI 2021: 76.3 ML/MIN/1.73
GLUCOSE SERPL-MCNC: 145 MG/DL (ref 65–99)
POTASSIUM SERPL-SCNC: 3.9 MMOL/L (ref 3.5–5.2)
SODIUM SERPL-SCNC: 141 MMOL/L (ref 136–145)

## 2022-09-21 NOTE — PROGRESS NOTES
Potassium has dropped, but is still in normal range. Repeat BMP in 2 weeks to make sure it doesn't go any lower.

## 2022-09-26 ENCOUNTER — OFFICE VISIT (OUTPATIENT)
Dept: FAMILY MEDICINE CLINIC | Facility: CLINIC | Age: 72
End: 2022-09-26

## 2022-09-26 VITALS
SYSTOLIC BLOOD PRESSURE: 128 MMHG | DIASTOLIC BLOOD PRESSURE: 76 MMHG | TEMPERATURE: 98.6 F | BODY MASS INDEX: 48.65 KG/M2 | HEIGHT: 67 IN | OXYGEN SATURATION: 96 % | RESPIRATION RATE: 18 BRPM | HEART RATE: 66 BPM | WEIGHT: 310 LBS

## 2022-09-26 DIAGNOSIS — U07.1 COVID: Primary | ICD-10-CM

## 2022-09-26 PROCEDURE — 99213 OFFICE O/P EST LOW 20 MIN: CPT | Performed by: NURSE PRACTITIONER

## 2022-09-26 RX ORDER — GUAIFENESIN 600 MG/1
1200 TABLET, EXTENDED RELEASE ORAL 2 TIMES DAILY
Qty: 112 TABLET | Refills: 0 | Status: SHIPPED | OUTPATIENT
Start: 2022-09-26 | End: 2022-10-24

## 2022-09-26 NOTE — PROGRESS NOTES
CC: COVID    History:  Ephraim Quinones is a 72 y.o. male who presents today for evaluation of the above problems.      Patient was supposed to have colonoscopy tomorrow, however, he went for preprocedure COVID testing and tested positive yesterday.  States that his symptoms just started yesterday.  He complains of a cough and nasal congestion.      HPI  ROS:  Review of Systems   Constitutional: Negative for fever.   HENT: Positive for congestion.    Respiratory: Positive for cough. Negative for shortness of breath.        Allergies   Allergen Reactions   • Simvastatin Other (See Comments)     Leg Cramps   • Percocet [Oxycodone-Acetaminophen] Itching     Past Medical History:   Diagnosis Date   • A-fib (HCC)    • Acid reflux    • Anemia 5/3/2016   • Diabetes mellitus (HCC)    • Gout    • Hyperlipidemia    • Hypertension    • Sleep apnea with use of continuous positive airway pressure (CPAP)      Past Surgical History:   Procedure Laterality Date   • BACK SURGERY      x2   • CIRCUMCISION N/A 6/6/2019    Procedure: CIRCUMCISION;  Surgeon: Yon Sloan MD;  Location: [facility];  Service: Urology   • FINGER SURGERY     • REPLACEMENT TOTAL KNEE Bilateral      Family History   Problem Relation Age of Onset   • Colon cancer Mother    • Prostate cancer Father    • No Known Problems Maternal Grandmother    • No Known Problems Maternal Grandfather    • No Known Problems Paternal Grandmother    • No Known Problems Paternal Grandfather       reports that he quit smoking about 26 years ago. He has a 20.00 pack-year smoking history. He has never used smokeless tobacco. He reports current alcohol use. He reports that he does not use drugs.      Current Outpatient Medications:   •  albuterol sulfate  (90 Base) MCG/ACT inhaler, 2 sprays 2 minutes apart every 6 hours as needed, Disp: 18 g, Rfl: 5  •  aspirin 81 MG EC tablet, Take 81 mg by mouth Daily., Disp: , Rfl:   •  carbidopa-levodopa (SINEMET)  MG per  tablet, Take 1 tablet by mouth 2 (Two) Times a Day., Disp: 180 tablet, Rfl: 3  •  Cholecalciferol (VITAMIN D3) 2000 units capsule, Take 2,000 Units by mouth Daily., Disp: , Rfl:   •  clobetasol (TEMOVATE) 0.05 % cream, APPLY TOPICALLY TO THE APPROPRIATE AREA AS DIRECTED FOR ITCHING AS NEEDED, Disp: 30 g, Rfl: 0  •  colchicine (Colcrys) 0.6 MG tablet, TAKE 1 TABLET BY MOUTH AS NEEDED FOR GOUT, Disp: 30 tablet, Rfl: 0  •  ELIQUIS 5 MG tablet tablet, Take 5 mg by mouth Every 12 (Twelve) Hours., Disp: , Rfl:   •  furosemide (Lasix) 40 MG tablet, Take 1 tablet by mouth Daily., Disp: 30 tablet, Rfl: 2  •  glipizide (GLUCOTROL XL) 5 MG ER tablet, TAKE 2 TABLETS BY MOUTH DAILY., Disp: 180 tablet, Rfl: 0  •  glucose blood test strip, 1 each by Other route Daily. Use as instructed, Disp: 100 each, Rfl: 3  •  HYDROcodone-acetaminophen (NORCO)  MG per tablet, Take 1 tablet by mouth Every 6 (Six) Hours As Needed for Moderate Pain., Disp: 90 tablet, Rfl: 0  •  indapamide (LOZOL) 2.5 MG tablet, TAKE 1 TABLET BY MOUTH EVERY MORNING., Disp: 90 tablet, Rfl: 0  •  Lancet Devices (EASY TOUCH LANCING DEVICE) misc, USE TO TEST DAILY, Disp: , Rfl: 0  •  losartan (COZAAR) 100 MG tablet, TAKE 1 TABLET BY MOUTH DAILY, Disp: 90 tablet, Rfl: 0  •  metFORMIN (GLUCOPHAGE) 1000 MG tablet, Take 1 tablet by mouth 2 (Two) Times a Day With Meals., Disp: 180 tablet, Rfl: 3  •  naproxen sodium (ALEVE) 220 MG tablet, Take 220 mg by mouth 2 (Two) Times a Day As Needed., Disp: , Rfl:   •  NIFEdipine XL (Procardia XL) 90 MG 24 hr tablet, Take 1 tablet by mouth Daily., Disp: 30 tablet, Rfl: 0  •  ONETOUCH DELICA LANCETS 33G misc, 1 each Daily., Disp: 100 each, Rfl: 3  •  Potassium 99 MG tablet, Take 1 tablet by mouth 2 (Two) Times a Day., Disp: , Rfl:   •  guaiFENesin (Mucinex) 600 MG 12 hr tablet, Take 2 tablets by mouth 2 (Two) Times a Day for 28 days., Disp: 112 tablet, Rfl: 0    OBJECTIVE:  /76 (BP Location: Left arm, Patient Position:  "Sitting, Cuff Size: Large Adult)   Pulse 66   Temp 98.6 °F (37 °C)   Resp 18   Ht 170.2 cm (67\") Comment: pt stated  Wt (!) 141 kg (310 lb) Comment: pt stated  SpO2 96%   BMI 48.55 kg/m²    Physical Exam  Vitals reviewed.   Constitutional:       Appearance: He is well-developed. He is ill-appearing.   HENT:      Head:      Comments: Face is flushed  Eyes:      Comments: Eyes have glassy appearance.   Cardiovascular:      Rate and Rhythm: Normal rate and regular rhythm.      Heart sounds: Normal heart sounds.   Pulmonary:      Effort: Pulmonary effort is normal.      Breath sounds: Normal breath sounds.   Neurological:      Mental Status: He is alert and oriented to person, place, and time.   Psychiatric:         Behavior: Behavior normal.         Assessment/Plan    Diagnoses and all orders for this visit:    1. COVID (Primary)  -     guaiFENesin (Mucinex) 600 MG 12 hr tablet; Take 2 tablets by mouth 2 (Two) Times a Day for 28 days.  Dispense: 112 tablet; Refill: 0    Order for monoclonal antibody infusion sent to [facility].  Patient is not a candidate for Paxlovid due to Eliquis therapy.  He is cautioned on the importance of maintaining distance from his wife.  Also advised on isolation protocol.    An After Visit Summary was printed and given to the patient at discharge.  Return if symptoms worsen or fail to improve, for Next scheduled follow up.       ANSHUL Noel 9/26/22  Electronically signed.  "

## 2022-09-29 DIAGNOSIS — R52 PAIN: ICD-10-CM

## 2022-09-29 RX ORDER — HYDROCODONE BITARTRATE AND ACETAMINOPHEN 10; 325 MG/1; MG/1
1 TABLET ORAL EVERY 6 HOURS PRN
Qty: 90 TABLET | Refills: 0 | Status: SHIPPED | OUTPATIENT
Start: 2022-09-29 | End: 2022-10-31 | Stop reason: SDUPTHER

## 2022-09-29 NOTE — TELEPHONE ENCOUNTER
Rx Refill Note  Requested Prescriptions     Pending Prescriptions Disp Refills   • HYDROcodone-acetaminophen (NORCO)  MG per tablet 90 tablet 0     Sig: Take 1 tablet by mouth Every 6 (Six) Hours As Needed for Moderate Pain.      Last office visit with prescribing clinician: 9/9/2022      Next office visit with prescribing clinician: Visit date not found            Deandre Nassar Rep  09/29/22, 09:42 CDT         Drug thereapy appt 8/25/2022  contract done 12/1/21  UDS done 12/1/21

## 2022-10-07 RX ORDER — INDAPAMIDE 2.5 MG/1
2.5 TABLET, FILM COATED ORAL EVERY MORNING
Qty: 90 TABLET | Refills: 2 | Status: SHIPPED | OUTPATIENT
Start: 2022-10-07

## 2022-10-07 NOTE — TELEPHONE ENCOUNTER
Rx Refill Note  Requested Prescriptions     Pending Prescriptions Disp Refills   • indapamide (LOZOL) 2.5 MG tablet [Pharmacy Med Name: INDAPAMIDE 2.5MG] 90 tablet 0     Sig: TAKE 1 TABLET BY MOUTH EVERY MORNING      Last office visit with prescribing clinician: 9/9/2022      Next office visit with prescribing clinician: Visit date not found            Helena Meehan MA  10/07/22, 12:38 CDT

## 2022-10-13 RX ORDER — LOSARTAN POTASSIUM 50 MG/1
TABLET ORAL
Qty: 90 TABLET | Refills: 3 | Status: SHIPPED | OUTPATIENT
Start: 2022-10-13 | End: 2022-10-31

## 2022-10-13 NOTE — TELEPHONE ENCOUNTER
Rx Refill Note  Requested Prescriptions     Pending Prescriptions Disp Refills   • losartan (COZAAR) 50 MG tablet [Pharmacy Med Name: LOSARTAN POT 50MG] 90 tablet 0     Sig: TAKE 1 TABLET BY MOUTH DAILY      Last office visit with prescribing clinician: 9/9/2022      Next office visit with prescribing clinician: Visit date not found   CPE done 08/25/2022       {TIP  Is Refill Pharmacy correct?: YES    Helena Gilmore MA  10/13/22, 09:40 CDT

## 2022-10-18 ENCOUNTER — LAB (OUTPATIENT)
Dept: FAMILY MEDICINE CLINIC | Facility: CLINIC | Age: 72
End: 2022-10-18

## 2022-10-18 DIAGNOSIS — E87.6 HYPOKALEMIA: Primary | ICD-10-CM

## 2022-10-19 ENCOUNTER — OFFICE VISIT (OUTPATIENT)
Dept: FAMILY MEDICINE CLINIC | Facility: CLINIC | Age: 72
End: 2022-10-19

## 2022-10-19 VITALS
SYSTOLIC BLOOD PRESSURE: 131 MMHG | BODY MASS INDEX: 48.34 KG/M2 | DIASTOLIC BLOOD PRESSURE: 72 MMHG | TEMPERATURE: 97.5 F | WEIGHT: 308 LBS | OXYGEN SATURATION: 98 % | HEIGHT: 67 IN | HEART RATE: 74 BPM

## 2022-10-19 DIAGNOSIS — Z23 ENCOUNTER FOR IMMUNIZATION: ICD-10-CM

## 2022-10-19 DIAGNOSIS — E87.6 HYPOKALEMIA: ICD-10-CM

## 2022-10-19 DIAGNOSIS — R61 NIGHT SWEATS: ICD-10-CM

## 2022-10-19 DIAGNOSIS — R47.81 SLURRED SPEECH: Primary | ICD-10-CM

## 2022-10-19 LAB
BUN SERPL-MCNC: 28 MG/DL (ref 8–23)
BUN/CREAT SERPL: 25.9 (ref 7–25)
CALCIUM SERPL-MCNC: 9.6 MG/DL (ref 8.6–10.5)
CHLORIDE SERPL-SCNC: 95 MMOL/L (ref 98–107)
CO2 SERPL-SCNC: 27.4 MMOL/L (ref 22–29)
CREAT SERPL-MCNC: 1.08 MG/DL (ref 0.76–1.27)
EGFRCR SERPLBLD CKD-EPI 2021: 72.9 ML/MIN/1.73
GLUCOSE SERPL-MCNC: 150 MG/DL (ref 65–99)
POTASSIUM SERPL-SCNC: 3.5 MMOL/L (ref 3.5–5.2)
SODIUM SERPL-SCNC: 137 MMOL/L (ref 136–145)

## 2022-10-19 PROCEDURE — G0008 ADMIN INFLUENZA VIRUS VAC: HCPCS | Performed by: NURSE PRACTITIONER

## 2022-10-19 PROCEDURE — 99214 OFFICE O/P EST MOD 30 MIN: CPT | Performed by: NURSE PRACTITIONER

## 2022-10-19 PROCEDURE — 90662 IIV NO PRSV INCREASED AG IM: CPT | Performed by: NURSE PRACTITIONER

## 2022-10-19 RX ORDER — GLIPIZIDE 5 MG/1
5 TABLET, FILM COATED, EXTENDED RELEASE ORAL DAILY
Qty: 90 TABLET | Refills: 0
Start: 2022-10-19 | End: 2022-11-21

## 2022-10-19 NOTE — PROGRESS NOTES
CC: night sweats and possible stroke    History:  Ephraim Quinones is a 72 y.o. male who presents today for evaluation of the above problems.      Patient notes that about a week and a half ago he had an episode at dinner where his family noted that his speech was slurred and states that he dropped some of his food. They were concerned he was having stroke activity. He notes that he had previously only been taking his Eliquis once a day because of easy bleeding and bruising, but he has gone back to taking it as ordered. He also notes that the night of the episode he had had a few shots of whiskey while cooking, however, he did not feel intoxicated.     States that he has also been having night sweats for several months, but he hasn't thought much about it and didn't know what was causing them. He has been taking 5 mg of glipizide in AM and PM.     HPI  ROS:  Review of Systems   Constitutional: Negative for fatigue.   Respiratory: Negative for shortness of breath.    Cardiovascular: Negative for chest pain.   Neurological: Positive for speech difficulty. Negative for dizziness and light-headedness.       Allergies   Allergen Reactions   • Simvastatin Other (See Comments)     Leg Cramps   • Percocet [Oxycodone-Acetaminophen] Itching     Past Medical History:   Diagnosis Date   • A-fib (HCC)    • Acid reflux    • Anemia 5/3/2016   • Diabetes mellitus (HCC)    • Gout    • Hyperlipidemia    • Hypertension    • Sleep apnea with use of continuous positive airway pressure (CPAP)      Past Surgical History:   Procedure Laterality Date   • BACK SURGERY      x2   • CIRCUMCISION N/A 6/6/2019    Procedure: CIRCUMCISION;  Surgeon: Yon Sloan MD;  Location: [facility];  Service: Urology   • FINGER SURGERY     • REPLACEMENT TOTAL KNEE Bilateral      Family History   Problem Relation Age of Onset   • Colon cancer Mother    • Prostate cancer Father    • No Known Problems Maternal Grandmother    • No Known Problems  Maternal Grandfather    • No Known Problems Paternal Grandmother    • No Known Problems Paternal Grandfather       reports that he quit smoking about 26 years ago. His smoking use included cigarettes. He has a 20.00 pack-year smoking history. He has never used smokeless tobacco. He reports current alcohol use. He reports that he does not use drugs.      Current Outpatient Medications:   •  albuterol sulfate  (90 Base) MCG/ACT inhaler, 2 sprays 2 minutes apart every 6 hours as needed, Disp: 18 g, Rfl: 5  •  aspirin 81 MG EC tablet, Take 81 mg by mouth Daily., Disp: , Rfl:   •  carbidopa-levodopa (SINEMET)  MG per tablet, Take 1 tablet by mouth 2 (Two) Times a Day., Disp: 180 tablet, Rfl: 3  •  Cholecalciferol (VITAMIN D3) 2000 units capsule, Take 2,000 Units by mouth Daily., Disp: , Rfl:   •  clobetasol (TEMOVATE) 0.05 % cream, APPLY TOPICALLY TO THE APPROPRIATE AREA AS DIRECTED FOR ITCHING AS NEEDED, Disp: 30 g, Rfl: 0  •  colchicine (Colcrys) 0.6 MG tablet, TAKE 1 TABLET BY MOUTH AS NEEDED FOR GOUT, Disp: 30 tablet, Rfl: 0  •  ELIQUIS 5 MG tablet tablet, Take 5 mg by mouth Every 12 (Twelve) Hours., Disp: , Rfl:   •  furosemide (Lasix) 40 MG tablet, Take 1 tablet by mouth Daily., Disp: 30 tablet, Rfl: 2  •  glipizide (GLUCOTROL XL) 5 MG ER tablet, Take 1 tablet by mouth Daily., Disp: 90 tablet, Rfl: 0  •  glucose blood test strip, 1 each by Other route Daily. Use as instructed, Disp: 100 each, Rfl: 3  •  guaiFENesin (Mucinex) 600 MG 12 hr tablet, Take 2 tablets by mouth 2 (Two) Times a Day for 28 days., Disp: 112 tablet, Rfl: 0  •  HYDROcodone-acetaminophen (NORCO)  MG per tablet, Take 1 tablet by mouth Every 6 (Six) Hours As Needed for Moderate Pain., Disp: 90 tablet, Rfl: 0  •  indapamide (LOZOL) 2.5 MG tablet, TAKE 1 TABLET BY MOUTH EVERY MORNING, Disp: 90 tablet, Rfl: 2  •  Lancet Devices (EASY TOUCH LANCING DEVICE) misc, USE TO TEST DAILY, Disp: , Rfl: 0  •  losartan (COZAAR) 50 MG tablet,  "TAKE 1 TABLET BY MOUTH DAILY, Disp: 90 tablet, Rfl: 3  •  metFORMIN (GLUCOPHAGE) 1000 MG tablet, Take 1 tablet by mouth 2 (Two) Times a Day With Meals., Disp: 180 tablet, Rfl: 3  •  naproxen sodium (ALEVE) 220 MG tablet, Take 220 mg by mouth 2 (Two) Times a Day As Needed., Disp: , Rfl:   •  NIFEdipine XL (Procardia XL) 90 MG 24 hr tablet, Take 1 tablet by mouth Daily., Disp: 30 tablet, Rfl: 0  •  ONETOUCH DELICA LANCETS 33G misc, 1 each Daily., Disp: 100 each, Rfl: 3  •  Potassium 99 MG tablet, Take 1 tablet by mouth 2 (Two) Times a Day., Disp: , Rfl:     OBJECTIVE:  /72 (BP Location: Left arm, Patient Position: Sitting, Cuff Size: Large Adult)   Pulse 74   Temp 97.5 °F (36.4 °C) (Temporal)   Ht 170.2 cm (67\")   Wt (!) 140 kg (308 lb)   SpO2 98%   BMI 48.24 kg/m²    Physical Exam  Vitals reviewed.   Constitutional:       Appearance: He is well-developed.   Eyes:      Extraocular Movements: Extraocular movements intact.      Pupils: Pupils are equal, round, and reactive to light.   Cardiovascular:      Rate and Rhythm: Normal rate.   Pulmonary:      Effort: Pulmonary effort is normal.   Neurological:      Mental Status: He is alert and oriented to person, place, and time.      Sensory: Sensation is intact.      Comments: Strength symmetrical.     Cheek puff, smile, and frown all symmetrical   Psychiatric:         Behavior: Behavior normal.         Assessment/Plan    Diagnoses and all orders for this visit:    1. Slurred speech (Primary)  -     CT Head Without Contrast; Future    2. Night sweats    3. Encounter for immunization  -     Fluzone High-Dose 65+yrs ([id number])    4. Hypokalemia  -     Comprehensive Metabolic Panel; Future    Other orders  -     glipizide (GLUCOTROL XL) 5 MG ER tablet; Take 1 tablet by mouth Daily.  Dispense: 90 tablet; Refill: 0    Decrease glipizide to once a day. CT head.    On lab from yesterday potassium is still dropping. Need to recheck in 2 weeks to see if it has " stabilized.     An After Visit Summary was printed and given to the patient at discharge.  Return if symptoms worsen or fail to improve, for Next scheduled follow up.       ANSHUL Noel 10/19/22  Electronically signed.

## 2022-10-25 NOTE — PROGRESS NOTES
CT scan did not show any stroke. Episode still could have been a mini-stroke and there wouldn't have been changes on CT scan. Needs to be sure he is taking his baby aspiring every day and he also needs to be on a low dose of a statin also to prevent strokes.  I see that he has been on one in the past and it caused leg cramps, but I would like to try him on lovastatin 10 mg daily and see if he can tolerate that.

## 2022-10-27 RX ORDER — LOVASTATIN 10 MG/1
10 TABLET ORAL NIGHTLY
Qty: 30 TABLET | Refills: 1 | Status: SHIPPED | OUTPATIENT
Start: 2022-10-27 | End: 2022-12-20

## 2022-10-31 ENCOUNTER — OFFICE VISIT (OUTPATIENT)
Dept: FAMILY MEDICINE CLINIC | Facility: CLINIC | Age: 72
End: 2022-10-31

## 2022-10-31 VITALS
HEIGHT: 67 IN | HEART RATE: 73 BPM | BODY MASS INDEX: 48.34 KG/M2 | OXYGEN SATURATION: 96 % | TEMPERATURE: 98.5 F | SYSTOLIC BLOOD PRESSURE: 148 MMHG | RESPIRATION RATE: 20 BRPM | DIASTOLIC BLOOD PRESSURE: 80 MMHG | WEIGHT: 308 LBS

## 2022-10-31 DIAGNOSIS — R52 PAIN: ICD-10-CM

## 2022-10-31 DIAGNOSIS — R31.0 GROSS HEMATURIA: Primary | ICD-10-CM

## 2022-10-31 LAB
BILIRUB BLD-MCNC: ABNORMAL MG/DL
CLARITY, POC: CLEAR
COLOR UR: ABNORMAL
GLUCOSE UR STRIP-MCNC: ABNORMAL MG/DL
KETONES UR QL: NEGATIVE
LEUKOCYTE EST, POC: ABNORMAL
NITRITE UR-MCNC: NEGATIVE MG/ML
PH UR: 6 [PH] (ref 5–8)
PROT UR STRIP-MCNC: ABNORMAL MG/DL
RBC # UR STRIP: ABNORMAL /UL
SP GR UR: 1.02 (ref 1–1.03)
UROBILINOGEN UR QL: NORMAL

## 2022-10-31 PROCEDURE — 81003 URINALYSIS AUTO W/O SCOPE: CPT | Performed by: FAMILY MEDICINE

## 2022-10-31 PROCEDURE — 99213 OFFICE O/P EST LOW 20 MIN: CPT | Performed by: FAMILY MEDICINE

## 2022-10-31 RX ORDER — LOSARTAN POTASSIUM 100 MG/1
TABLET ORAL
Qty: 90 TABLET | Refills: 3 | Status: SHIPPED | OUTPATIENT
Start: 2022-10-31

## 2022-10-31 RX ORDER — HYDROCODONE BITARTRATE AND ACETAMINOPHEN 10; 325 MG/1; MG/1
1 TABLET ORAL EVERY 6 HOURS PRN
Qty: 90 TABLET | Refills: 0 | Status: SHIPPED | OUTPATIENT
Start: 2022-10-31 | End: 2022-12-02 | Stop reason: SDUPTHER

## 2022-10-31 NOTE — TELEPHONE ENCOUNTER
Rx Refill Note  Requested Prescriptions     Pending Prescriptions Disp Refills   • HYDROcodone-acetaminophen (NORCO)  MG per tablet 90 tablet 0     Sig: Take 1 tablet by mouth Every 6 (Six) Hours As Needed for Moderate Pain.      Last office visit with prescribing clinician: 9/9/2022      Next office visit with prescribing clinician: Visit date not found            Deandre Nassar Rep  10/31/22, 08:42 CDT       Drug thereapy appt 8/25/2022  contract done 12/1/21  UDS done 12/1/21

## 2022-10-31 NOTE — PROGRESS NOTES
Subjective   Ephraim Quinones is a 72 y.o. male.     History of Present Illness  72-year-old male with gross hematuria x3 days-on Eliquis for cardiac arrhythmia  Blood in Urine  This is a new problem. The current episode started in the past 7 days. The problem is unchanged. He describes the hematuria as gross hematuria. The hematuria occurs throughout his entire urinary stream. He reports no clotting in his urine stream. His pain is at a severity of 0/10. He is experiencing no pain. He describes his urine color as dark red. Pertinent negatives include no chills, fever or flank pain.       The following portions of the patient's history were reviewed and updated as appropriate: allergies, current medications, past family history, past medical history, past social history, past surgical history and problem list.    Review of Systems   Constitutional: Negative for chills and fever.   Genitourinary: Positive for hematuria. Negative for flank pain.       Objective   Physical Exam  Vitals and nursing note reviewed.   Constitutional:       Appearance: He is obese.   Cardiovascular:      Rate and Rhythm: Normal rate and regular rhythm.   Pulmonary:      Effort: Pulmonary effort is normal.      Breath sounds: Normal breath sounds.   Abdominal:      Tenderness: There is no right CVA tenderness or left CVA tenderness.   Musculoskeletal:      Right lower leg: No edema.      Left lower leg: No edema.   Skin:     General: Skin is warm and dry.   Neurological:      General: No focal deficit present.      Mental Status: He is alert and oriented to person, place, and time.   Psychiatric:         Mood and Affect: Mood normal.         Behavior: Behavior normal.         Thought Content: Thought content normal.         Judgment: Judgment normal.         Assessment & Plan   Diagnoses and all orders for this visit:    1. Gross hematuria (Primary)  -     POC Urinalysis Dipstick, Multipro  -     CT Abdomen Pelvis Without Contrast;  Future    Other orders  -     losartan (COZAAR) 100 MG tablet; 1 at bedtime for blood pressure  Dispense: 90 tablet; Refill: 3       Plan-CMP pending-CT of abdomen pelvis no contrast-stop Eliquis for possibly 48 hours-we are increasing losartan 100 mg at bedtime

## 2022-11-01 ENCOUNTER — TELEPHONE (OUTPATIENT)
Dept: FAMILY MEDICINE CLINIC | Facility: CLINIC | Age: 72
End: 2022-11-01

## 2022-11-01 DIAGNOSIS — D49.519 KIDNEY TUMOR: Primary | ICD-10-CM

## 2022-11-04 ENCOUNTER — TELEPHONE (OUTPATIENT)
Dept: UROLOGY | Facility: CLINIC | Age: 72
End: 2022-11-04

## 2022-11-04 NOTE — PROGRESS NOTES
Subjective    Mr. Quinones is 72 y.o. male    Chief Complaint: Renal Mass    History of Present Illness  Patient presented with gross hematuria acute onset approximately two weeks ago.  CT non con showed left lower pole renal mass. Denies flank pain.  On Eliquis.      The following portions of the patient's history were reviewed and updated as appropriate: allergies, current medications, past family history, past medical history, past social history, past surgical history and problem list.    Review of Systems   Constitutional: Negative for chills and fever.   Gastrointestinal: Negative for abdominal pain, anal bleeding and blood in stool.   Genitourinary: Positive for urgency. Negative for difficulty urinating, dysuria, frequency and hematuria.         Current Outpatient Medications:   •  albuterol sulfate  (90 Base) MCG/ACT inhaler, 2 sprays 2 minutes apart every 6 hours as needed, Disp: 18 g, Rfl: 5  •  aspirin 81 MG EC tablet, Take 81 mg by mouth Daily., Disp: , Rfl:   •  carbidopa-levodopa (SINEMET)  MG per tablet, Take 1 tablet by mouth 2 (Two) Times a Day., Disp: 180 tablet, Rfl: 3  •  Cholecalciferol (VITAMIN D3) 2000 units capsule, Take 2,000 Units by mouth Daily., Disp: , Rfl:   •  clobetasol (TEMOVATE) 0.05 % cream, APPLY TOPICALLY TO THE APPROPRIATE AREA AS DIRECTED FOR ITCHING AS NEEDED, Disp: 30 g, Rfl: 0  •  colchicine (Colcrys) 0.6 MG tablet, TAKE 1 TABLET BY MOUTH AS NEEDED FOR GOUT, Disp: 30 tablet, Rfl: 0  •  furosemide (Lasix) 40 MG tablet, Take 1 tablet by mouth Daily., Disp: 30 tablet, Rfl: 2  •  glipizide (GLUCOTROL XL) 5 MG ER tablet, Take 1 tablet by mouth Daily., Disp: 90 tablet, Rfl: 0  •  glucose blood test strip, 1 each by Other route Daily. Use as instructed, Disp: 100 each, Rfl: 3  •  HYDROcodone-acetaminophen (NORCO)  MG per tablet, Take 1 tablet by mouth Every 6 (Six) Hours As Needed for Moderate Pain., Disp: 90 tablet, Rfl: 0  •  indapamide (LOZOL) 2.5 MG tablet,  TAKE 1 TABLET BY MOUTH EVERY MORNING, Disp: 90 tablet, Rfl: 2  •  Lancet Devices (EASY TOUCH LANCING DEVICE) misc, USE TO TEST DAILY, Disp: , Rfl: 0  •  losartan (COZAAR) 100 MG tablet, 1 at bedtime for blood pressure, Disp: 90 tablet, Rfl: 3  •  lovastatin (MEVACOR) 10 MG tablet, Take 1 tablet by mouth Every Night., Disp: 30 tablet, Rfl: 1  •  metFORMIN (GLUCOPHAGE) 1000 MG tablet, Take 1 tablet by mouth 2 (Two) Times a Day With Meals., Disp: 180 tablet, Rfl: 3  •  naproxen sodium (ALEVE) 220 MG tablet, Take 220 mg by mouth 2 (Two) Times a Day As Needed., Disp: , Rfl:   •  NIFEdipine XL (Procardia XL) 90 MG 24 hr tablet, Take 1 tablet by mouth Daily., Disp: 30 tablet, Rfl: 0  •  ONETOUCH DELICA LANCETS 33G misc, 1 each Daily., Disp: 100 each, Rfl: 3  •  Potassium 99 MG tablet, Take 1 tablet by mouth 2 (Two) Times a Day., Disp: , Rfl:   •  ELIQUIS 5 MG tablet tablet, Take 5 mg by mouth Every 12 (Twelve) Hours., Disp: , Rfl:     Past Medical History:   Diagnosis Date   • A-fib (HCC)    • Acid reflux    • Anemia 5/3/2016   • Diabetes mellitus (HCC)    • Gout    • Hyperlipidemia    • Hypertension    • Sleep apnea with use of continuous positive airway pressure (CPAP)        Past Surgical History:   Procedure Laterality Date   • BACK SURGERY      x2   • CIRCUMCISION N/A 2019    Procedure: CIRCUMCISION;  Surgeon: Yon Sloan MD;  Location: [facility] OR;  Service: Urology   • FINGER SURGERY     • REPLACEMENT TOTAL KNEE Bilateral        Social History     Socioeconomic History   • Marital status:    Tobacco Use   • Smoking status: Former     Packs/day: 1.00     Years: 20.00     Pack years: 20.00     Types: Cigarettes     Quit date: 1996     Years since quittin.4   • Smokeless tobacco: Never   Vaping Use   • Vaping Use: Never used   Substance and Sexual Activity   • Alcohol use: Yes     Comment: occ   • Drug use: No   • Sexual activity: Defer       Family History   Problem Relation Age of Onset  "  • Colon cancer Mother    • Prostate cancer Father    • No Known Problems Maternal Grandmother    • No Known Problems Maternal Grandfather    • No Known Problems Paternal Grandmother    • No Known Problems Paternal Grandfather        Objective    Temp 97.5 °F (36.4 °C)   Ht 170.2 cm (67\")   Wt (!) 143 kg (314 lb 6.4 oz)   BMI 49.24 kg/m²     Physical Exam        Results for orders placed or performed in visit on 11/09/22   POC Urinalysis Dipstick, Multipro    Specimen: Urine   Result Value Ref Range    Color Yellow Yellow, Straw, Dark Yellow, Alexandra    Clarity, UA Clear Clear    Glucose, UA Negative Negative mg/dL    Bilirubin Negative Negative    Ketones, UA Negative Negative    Specific Gravity  1.020 1.005 - 1.030    Blood, UA Trace (A) Negative    pH, Urine 5.5 5.0 - 8.0    Protein, POC 30 mg/dL (A) Negative mg/dL    Urobilinogen, UA 0.2 E.U./dL Normal, 0.2 E.U./dL    Nitrite, UA Negative Negative    Leukocytes Negative Negative     CT independent review  The CT scan of the abdomen/pelvis done without contrast is available for me to review.  Treatment recommendations require an independent review.  First I scanned the liver, spleen, and bowel pattern.  The retroperitoneum including the major vessels and lymphatic packages are briefly reviewed.  This film has been reviewed by the radiologist to determine any non-urologic abnormalities that are present.  The kidneys are closely inspected for size, symmetry, contour, parenchymal thickness, perinephric reaction, presence of calcifications, and intrarenal dilation of the collecting system.  The ureters are inspected for their course, caliber, and any calcifications.  The bladder is inspected for its thickness, size, and presence of any calcifications.  This scan shows:    The right kidney appears normal on this non-contrasted CT scan.  The renal parenchymal is normal in thickness.  There are no solid masses or cysts.  There is no hydronephrosis.  There are no " stones.      The left kidney appears 4cm lower pole mass    The bladder appears normal on this non-contrasted CT scan.  The bladder appears normal in thickness.  There no masses or stones seen on this exam.      Assessment and Plan    Diagnoses and all orders for this visit:    1. Renal mass (Primary)  -     POC Urinalysis Dipstick, Multipro    2. Hematuria, gross    Patient presented with gross painless hematuria.  Previous history of smoking.  CT Noncon shows a very exophytic renal lesion left lower pole.  I am going to get a CT urogram as well as cystoscopy for full work-up.    Cystoscopy and CT urogram to fully evaluate.

## 2022-11-04 NOTE — TELEPHONE ENCOUNTER
Called to remind pt to bring Disk with him to office 1 hour prior to appt. Left voicemail with pt. Told pt if he had any questions to give our office a call

## 2022-11-09 ENCOUNTER — OFFICE VISIT (OUTPATIENT)
Dept: UROLOGY | Facility: CLINIC | Age: 72
End: 2022-11-09

## 2022-11-09 VITALS — WEIGHT: 314.4 LBS | BODY MASS INDEX: 49.35 KG/M2 | HEIGHT: 67 IN | TEMPERATURE: 97.5 F

## 2022-11-09 DIAGNOSIS — N28.89 RENAL MASS: Primary | ICD-10-CM

## 2022-11-09 DIAGNOSIS — R31.0 HEMATURIA, GROSS: ICD-10-CM

## 2022-11-09 LAB
BILIRUB BLD-MCNC: NEGATIVE MG/DL
CLARITY, POC: CLEAR
COLOR UR: YELLOW
GLUCOSE UR STRIP-MCNC: NEGATIVE MG/DL
KETONES UR QL: NEGATIVE
LEUKOCYTE EST, POC: NEGATIVE
NITRITE UR-MCNC: NEGATIVE MG/ML
PH UR: 5.5 [PH] (ref 5–8)
PROT UR STRIP-MCNC: ABNORMAL MG/DL
RBC # UR STRIP: ABNORMAL /UL
SP GR UR: 1.02 (ref 1–1.03)
UROBILINOGEN UR QL: ABNORMAL

## 2022-11-09 PROCEDURE — 81001 URINALYSIS AUTO W/SCOPE: CPT | Performed by: UROLOGY

## 2022-11-09 PROCEDURE — 99204 OFFICE O/P NEW MOD 45 MIN: CPT | Performed by: UROLOGY

## 2022-11-11 ENCOUNTER — OFFICE VISIT (OUTPATIENT)
Dept: FAMILY MEDICINE CLINIC | Facility: CLINIC | Age: 72
End: 2022-11-11

## 2022-11-11 VITALS
SYSTOLIC BLOOD PRESSURE: 116 MMHG | BODY MASS INDEX: 48.47 KG/M2 | OXYGEN SATURATION: 97 % | RESPIRATION RATE: 18 BRPM | HEIGHT: 67 IN | HEART RATE: 59 BPM | TEMPERATURE: 97.8 F | WEIGHT: 308.8 LBS | DIASTOLIC BLOOD PRESSURE: 78 MMHG

## 2022-11-11 DIAGNOSIS — L03.116 CELLULITIS OF LEFT LEG: Primary | ICD-10-CM

## 2022-11-11 PROCEDURE — 99213 OFFICE O/P EST LOW 20 MIN: CPT | Performed by: FAMILY MEDICINE

## 2022-11-11 RX ORDER — COLCHICINE 0.6 MG/1
TABLET ORAL
Qty: 30 TABLET | Refills: 2 | Status: SHIPPED | OUTPATIENT
Start: 2022-11-11 | End: 2022-11-14 | Stop reason: CLARIF

## 2022-11-11 RX ORDER — CLINDAMYCIN HYDROCHLORIDE 300 MG/1
300 CAPSULE ORAL 3 TIMES DAILY
Qty: 21 CAPSULE | Refills: 0 | Status: ON HOLD | OUTPATIENT
Start: 2022-11-11 | End: 2022-12-01

## 2022-11-11 NOTE — PROGRESS NOTES
Subjective   Ephraim Quinones is a 72 y.o. male.     History of Present Illness  72-year-old male with cellulitis blister left lower extremity      The following portions of the patient's history were reviewed and updated as appropriate: allergies, current medications, past family history, past medical history, past social history, past surgical history and problem list.    Review of Systems   Respiratory: Negative for shortness of breath.    Cardiovascular: Negative for chest pain.   Genitourinary:        Kidney lesion-being evaluated by urologist   Musculoskeletal: Positive for arthralgias and back pain.   Skin: Positive for wound.        Cellulitis lower extremity with blister       Objective   Physical Exam  Vitals and nursing note reviewed.   Constitutional:       Appearance: He is obese.   Pulmonary:      Effort: Pulmonary effort is normal.   Musculoskeletal:      Right lower leg: No edema.      Left lower leg: Edema present.   Skin:     General: Skin is warm and dry.      Findings: Rash present.      Comments: Cellulitis left lower extremity with a 1 cm blister anteriorly   Neurological:      General: No focal deficit present.      Mental Status: He is alert and oriented to person, place, and time.   Psychiatric:         Mood and Affect: Mood normal.         Behavior: Behavior normal.         Thought Content: Thought content normal.         Judgment: Judgment normal.         Assessment & Plan   Diagnoses and all orders for this visit:    1. Cellulitis of left leg (Primary)    Other orders  -     colchicine (Colcrys) 0.6 MG tablet; TAKE 1 TABLET BY MOUTH AS NEEDED FOR GOUT  Dispense: 30 tablet; Refill: 2  -     clindamycin (CLEOCIN) 300 MG capsule; Take 1 capsule by mouth 3 (Three) Times a Day.  Dispense: 21 capsule; Refill: 0         CONTROLLED SUBSTANCE TRACKING 6/3/2021 9/2/2021 12/1/2021 3/1/2022 5/27/2022 8/25/2022 11/11/2022   Rohit Jenkins 6/3/2021 9/2/2021 12/1/2021 3/1/2022 5/27/2022 8/25/2022  11/11/2022   Report Number - reviewed through EPIC reviewed through [facility] reviewed through epic - reviewed through epic -   Last UDS 8/24/2020 8/24/2020 12/1/2021 12/1/2021 12/1/2021 12/1/2021 12/1/2021   Last Controlled Substance Agreement 11/25/2020 11/25/2020 12/1/2021 12/1/2021 12/1/2021 12/1/2021 12/1/2021     Plan above-wound care instructions given

## 2022-11-14 ENCOUNTER — PRIOR AUTHORIZATION (OUTPATIENT)
Dept: FAMILY MEDICINE CLINIC | Facility: CLINIC | Age: 72
End: 2022-11-14

## 2022-11-14 RX ORDER — COLCHICINE 0.6 MG/1
0.6 CAPSULE ORAL DAILY
Qty: 30 CAPSULE | Refills: 0 | Status: SHIPPED | OUTPATIENT
Start: 2022-11-14

## 2022-11-14 NOTE — TELEPHONE ENCOUNTER
Prior auth submitted through cover my meds for Colchicine.      Colchicine not on formulary.  Formulary - Mitigare.  Please advise if okay to change prescription.

## 2022-11-17 ENCOUNTER — HOSPITAL ENCOUNTER (OUTPATIENT)
Dept: CT IMAGING | Facility: HOSPITAL | Age: 72
Discharge: HOME | End: 2022-11-17
Admitting: UROLOGY

## 2022-11-17 DIAGNOSIS — N28.89 RENAL MASS: ICD-10-CM

## 2022-11-17 LAB — CREAT BLDA-MCNC: 0.9 MG/DL (ref 0.6–1.3)

## 2022-11-17 PROCEDURE — 0 IOPAMIDOL PER 1 ML: Performed by: UROLOGY

## 2022-11-17 PROCEDURE — 74170 CT ABD WO CNTRST FLWD CNTRST: CPT

## 2022-11-17 PROCEDURE — 82565 ASSAY OF CREATININE: CPT

## 2022-11-17 RX ADMIN — IOPAMIDOL 100 ML: 755 INJECTION, SOLUTION INTRAVENOUS at 12:58

## 2022-11-18 ENCOUNTER — PROCEDURE VISIT (OUTPATIENT)
Dept: UROLOGY | Facility: CLINIC | Age: 72
End: 2022-11-18

## 2022-11-18 DIAGNOSIS — N28.89 RENAL MASS: Primary | ICD-10-CM

## 2022-11-18 DIAGNOSIS — N35.011 POST-TRAUMATIC BULBOUS URETHRAL STRICTURE: ICD-10-CM

## 2022-11-18 DIAGNOSIS — R31.0 HEMATURIA, GROSS: ICD-10-CM

## 2022-11-18 LAB
BILIRUB BLD-MCNC: ABNORMAL MG/DL
CLARITY, POC: CLEAR
COLOR UR: YELLOW
GLUCOSE UR STRIP-MCNC: NEGATIVE MG/DL
KETONES UR QL: ABNORMAL
LEUKOCYTE EST, POC: NEGATIVE
NITRITE UR-MCNC: NEGATIVE MG/ML
PH UR: 5 [PH] (ref 5–8)
PROT UR STRIP-MCNC: ABNORMAL MG/DL
RBC # UR STRIP: ABNORMAL /UL
SP GR UR: 1.02 (ref 1–1.03)
UROBILINOGEN UR QL: NORMAL

## 2022-11-18 PROCEDURE — 52000 CYSTOURETHROSCOPY: CPT | Performed by: UROLOGY

## 2022-11-18 PROCEDURE — 99214 OFFICE O/P EST MOD 30 MIN: CPT | Performed by: UROLOGY

## 2022-11-18 PROCEDURE — 81001 URINALYSIS AUTO W/SCOPE: CPT | Performed by: UROLOGY

## 2022-11-18 RX ORDER — SODIUM CHLORIDE 9 MG/ML
100 INJECTION, SOLUTION INTRAVENOUS CONTINUOUS
Status: CANCELLED | OUTPATIENT
Start: 2022-11-18

## 2022-11-18 RX ORDER — SODIUM CHLORIDE 0.9 % (FLUSH) 0.9 %
1-10 SYRINGE (ML) INJECTION AS NEEDED
Status: CANCELLED | OUTPATIENT
Start: 2022-11-18

## 2022-11-18 RX ORDER — SODIUM CHLORIDE 9 MG/ML
40 INJECTION, SOLUTION INTRAVENOUS AS NEEDED
Status: CANCELLED | OUTPATIENT
Start: 2022-11-18

## 2022-11-18 RX ORDER — SODIUM CHLORIDE 0.9 % (FLUSH) 0.9 %
10 SYRINGE (ML) INJECTION EVERY 12 HOURS SCHEDULED
Status: CANCELLED | OUTPATIENT
Start: 2022-11-18

## 2022-11-18 NOTE — PROGRESS NOTES
Subjective    Mr. Quinones is 72 y.o. male    Chief Complaint: Gross Hematuria    History of Present Illness  Patient with gross hematuria.  He had a Noncon CT which showed concern for renal mass.  He does report traumatic catheterization during his knee surgery.    The following portions of the patient's history were reviewed and updated as appropriate: allergies, current medications, past family history, past medical history, past social history, past surgical history and problem list.    Review of Systems   Constitutional: Negative for chills and fever.   Gastrointestinal: Negative for abdominal pain, anal bleeding and blood in stool.   Genitourinary: Positive for urgency. Negative for dysuria and hematuria.         Current Outpatient Medications:   •  albuterol sulfate  (90 Base) MCG/ACT inhaler, 2 sprays 2 minutes apart every 6 hours as needed, Disp: 18 g, Rfl: 5  •  aspirin 81 MG EC tablet, Take 81 mg by mouth Daily., Disp: , Rfl:   •  carbidopa-levodopa (SINEMET)  MG per tablet, Take 1 tablet by mouth 2 (Two) Times a Day., Disp: 180 tablet, Rfl: 3  •  Cholecalciferol (VITAMIN D3) 2000 units capsule, Take 2,000 Units by mouth Daily., Disp: , Rfl:   •  clindamycin (CLEOCIN) 300 MG capsule, Take 1 capsule by mouth 3 (Three) Times a Day., Disp: 21 capsule, Rfl: 0  •  clobetasol (TEMOVATE) 0.05 % cream, APPLY TOPICALLY TO THE APPROPRIATE AREA AS DIRECTED FOR ITCHING AS NEEDED, Disp: 30 g, Rfl: 0  •  colchicine (Mitigare) 0.6 MG capsule capsule, Take 1 capsule by mouth Daily., Disp: 30 capsule, Rfl: 0  •  ELIQUIS 5 MG tablet tablet, Take 5 mg by mouth Every 12 (Twelve) Hours., Disp: , Rfl:   •  furosemide (Lasix) 40 MG tablet, Take 1 tablet by mouth Daily., Disp: 30 tablet, Rfl: 2  •  glipizide (GLUCOTROL XL) 5 MG ER tablet, Take 1 tablet by mouth Daily., Disp: 90 tablet, Rfl: 0  •  glucose blood test strip, 1 each by Other route Daily. Use as instructed, Disp: 100 each, Rfl: 3  •   HYDROcodone-acetaminophen (NORCO)  MG per tablet, Take 1 tablet by mouth Every 6 (Six) Hours As Needed for Moderate Pain., Disp: 90 tablet, Rfl: 0  •  indapamide (LOZOL) 2.5 MG tablet, TAKE 1 TABLET BY MOUTH EVERY MORNING, Disp: 90 tablet, Rfl: 2  •  Lancet Devices (EASY TOUCH LANCING DEVICE) misc, USE TO TEST DAILY, Disp: , Rfl: 0  •  losartan (COZAAR) 100 MG tablet, 1 at bedtime for blood pressure, Disp: 90 tablet, Rfl: 3  •  lovastatin (MEVACOR) 10 MG tablet, Take 1 tablet by mouth Every Night., Disp: 30 tablet, Rfl: 1  •  metFORMIN (GLUCOPHAGE) 1000 MG tablet, Take 1 tablet by mouth 2 (Two) Times a Day With Meals., Disp: 180 tablet, Rfl: 3  •  naproxen sodium (ALEVE) 220 MG tablet, Take 220 mg by mouth 2 (Two) Times a Day As Needed., Disp: , Rfl:   •  NIFEdipine XL (Procardia XL) 90 MG 24 hr tablet, Take 1 tablet by mouth Daily., Disp: 30 tablet, Rfl: 0  •  ONETOUCH DELICA LANCETS 33G misc, 1 each Daily., Disp: 100 each, Rfl: 3  •  Potassium 99 MG tablet, Take 1 tablet by mouth 2 (Two) Times a Day., Disp: , Rfl:   No current facility-administered medications for this visit.    Past Medical History:   Diagnosis Date   • A-fib (HCC)    • Acid reflux    • Anemia 5/3/2016   • Diabetes mellitus (HCC)    • Gout    • Hyperlipidemia    • Hypertension    • Sleep apnea with use of continuous positive airway pressure (CPAP)        Past Surgical History:   Procedure Laterality Date   • BACK SURGERY      x2   • CIRCUMCISION N/A 2019    Procedure: CIRCUMCISION;  Surgeon: Yon Sloan MD;  Location: [facility] OR;  Service: Urology   • FINGER SURGERY     • REPLACEMENT TOTAL KNEE Bilateral        Social History     Socioeconomic History   • Marital status:    Tobacco Use   • Smoking status: Former     Packs/day: 1.00     Years: 20.00     Pack years: 20.00     Types: Cigarettes     Quit date: 1996     Years since quittin.4   • Smokeless tobacco: Never   Vaping Use   • Vaping Use: Never used    Substance and Sexual Activity   • Alcohol use: Yes     Comment: occ   • Drug use: No   • Sexual activity: Defer       Family History   Problem Relation Age of Onset   • Colon cancer Mother    • Prostate cancer Father    • No Known Problems Maternal Grandmother    • No Known Problems Maternal Grandfather    • No Known Problems Paternal Grandmother    • No Known Problems Paternal Grandfather        Objective    There were no vitals taken for this visit.    Physical Exam  Vitals reviewed.   Constitutional:       General: He is not in acute distress.     Appearance: He is well-developed. He is not diaphoretic.   Pulmonary:      Effort: Pulmonary effort is normal.   Abdominal:      General: There is no distension.      Palpations: Abdomen is soft. There is no mass.      Tenderness: There is no abdominal tenderness. There is no guarding or rebound.      Hernia: No hernia is present.   Skin:     General: Skin is warm and dry.   Neurological:      Mental Status: He is alert and oriented to person, place, and time.             Results for orders placed or performed in visit on 11/18/22   POC Urinalysis Dipstick, Multipro    Specimen: Urine   Result Value Ref Range    Color Yellow Yellow, Straw, Dark Yellow, Alexandra    Clarity, UA Clear Clear    Glucose, UA Negative Negative mg/dL    Bilirubin Small (1+) (A) Negative    Ketones, UA Trace (A) Negative    Specific Gravity  1.020 1.005 - 1.030    Blood, UA Large (A) Negative    pH, Urine 5.0 5.0 - 8.0    Protein,  mg/dL (A) Negative mg/dL    Urobilinogen, UA Normal Normal, 0.2 E.U./dL    Nitrite, UA Negative Negative    Leukocytes Negative Negative   CT independent reivew    The CT scan of the abdomen/pelvis done with and without contrast is available for me to review.  Treatment recommendations require an independent review.  First I scanned the liver, spleen, and bowel pattern.  The retroperitoneum including the major vessels and lymphatic packages are briefly reviewed.   This film as been reviewed by the radiologist to determine any non urologic abnormalities that are present.  The kidneys are closely inspected for size, symmetry, contour, parenchymal thickness, perinephric reaction, presence of calcifications, and intrarenal dilation of the collecting system.  The ureters are inspected for their course, caliber, and any calcifications.  The bladder is inspected for its thickness, size, and presence of any calcifications.  This scan shows:    The right kidney appears normal on this contrasted CT scan.  The renal parenchymal is normal in thickness.  There are no solid masses or cysts.  There is no hydronephrosis.  There are no stones.      The left kidney appears 4cm simple renal cyst    The bladder appears normal on this non-contrasted CT scan.  The bladder appears normal in thickness.  There no masses or stones seen on this exam.     Assessment and Plan    Diagnoses and all orders for this visit:    1. Renal mass (Primary)  -     CT Abdomen With & Without Contrast; Future  -     POC Urinalysis Dipstick, Multipro    2. Hematuria, gross    3. Post-traumatic bulbous urethral stricture              Pre- operative diagnosis:  Hematuria    Post operative diagnosis:  Urethral Stricture    Procedure:  The patient was prepped and draped in a normal sterile fashion.  The urethra was anesthetized with 2% lidocaine jelly.  A flexible cystoscope was introduced per urethra.      Urethra:  bulbar urethral stricture    Bladder:  Not evaluated    Ureteral orifices:  Not evaluated    Prostate:  Not evaluated    Patient tolerated the procedure well    Complications: none    Blood loss: minimal    Follow up:    Cystoscopy with balloon dilation of urethral stricture and subsequent cystoscopy.    Discussion of this resulted in significant evaluation management in addition to the cystoscopy performed today.    4 cm renal mass is simple cyst does not require further follow-up or intervention.

## 2022-11-21 RX ORDER — GLIPIZIDE 5 MG/1
TABLET, FILM COATED, EXTENDED RELEASE ORAL
Qty: 180 TABLET | Refills: 0 | Status: SHIPPED | OUTPATIENT
Start: 2022-11-21

## 2022-11-21 NOTE — TELEPHONE ENCOUNTER
Rx Refill Note  Requested Prescriptions     Pending Prescriptions Disp Refills   • glipizide (GLUCOTROL XL) 5 MG ER tablet [Pharmacy Med Name: glipizide ER 5 mg tablet, extended release 24 hr] 180 tablet 0     Sig: TAKE 2 TABLETS BY MOUTH DAILY.      Last office visit with prescribing clinician: 10/19/2022      Next office visit with prescribing clinician: Visit date not found   CPE done 08/25/2022    {TIP  Please add Last Relevant Lab Date if appropriate: 08/25/2022  {TIP  Is Refill Pharmacy correct?: yes    Helena Gilmore MA  11/21/22, 09:55 CST

## 2022-11-23 ENCOUNTER — PRE-ADMISSION TESTING (OUTPATIENT)
Dept: PREADMISSION TESTING | Facility: HOSPITAL | Age: 72
End: 2022-11-23

## 2022-11-23 VITALS
HEIGHT: 67 IN | BODY MASS INDEX: 48.89 KG/M2 | OXYGEN SATURATION: 100 % | SYSTOLIC BLOOD PRESSURE: 149 MMHG | HEART RATE: 68 BPM | RESPIRATION RATE: 20 BRPM | DIASTOLIC BLOOD PRESSURE: 74 MMHG | WEIGHT: 311.51 LBS

## 2022-11-23 LAB
ANION GAP SERPL CALCULATED.3IONS-SCNC: 11 MMOL/L (ref 5–15)
BUN SERPL-MCNC: 29 MG/DL (ref 8–23)
BUN/CREAT SERPL: 28.4 (ref 7–25)
CALCIUM SPEC-SCNC: 9.5 MG/DL (ref 8.6–10.5)
CHLORIDE SERPL-SCNC: 97 MMOL/L (ref 98–107)
CO2 SERPL-SCNC: 28 MMOL/L (ref 22–29)
CREAT SERPL-MCNC: 1.02 MG/DL (ref 0.76–1.27)
DEPRECATED RDW RBC AUTO: 43 FL (ref 37–54)
EGFRCR SERPLBLD CKD-EPI 2021: 78.1 ML/MIN/1.73
ERYTHROCYTE [DISTWIDTH] IN BLOOD BY AUTOMATED COUNT: 12.2 % (ref 12.3–15.4)
GLUCOSE SERPL-MCNC: 118 MG/DL (ref 65–99)
HCT VFR BLD AUTO: 41.3 % (ref 37.5–51)
HGB BLD-MCNC: 13.9 G/DL (ref 13–17.7)
MCH RBC QN AUTO: 32.2 PG (ref 26.6–33)
MCHC RBC AUTO-ENTMCNC: 33.7 G/DL (ref 31.5–35.7)
MCV RBC AUTO: 95.6 FL (ref 79–97)
PLATELET # BLD AUTO: 218 10*3/MM3 (ref 140–450)
PMV BLD AUTO: 10.7 FL (ref 6–12)
POTASSIUM SERPL-SCNC: 3.4 MMOL/L (ref 3.5–5.2)
RBC # BLD AUTO: 4.32 10*6/MM3 (ref 4.14–5.8)
SODIUM SERPL-SCNC: 136 MMOL/L (ref 136–145)
WBC NRBC COR # BLD: 8.95 10*3/MM3 (ref 3.4–10.8)

## 2022-11-23 PROCEDURE — 85027 COMPLETE CBC AUTOMATED: CPT

## 2022-11-23 PROCEDURE — 80048 BASIC METABOLIC PNL TOTAL CA: CPT

## 2022-11-23 PROCEDURE — 93010 ELECTROCARDIOGRAM REPORT: CPT | Performed by: INTERNAL MEDICINE

## 2022-11-23 PROCEDURE — 93005 ELECTROCARDIOGRAM TRACING: CPT

## 2022-11-23 PROCEDURE — 36415 COLL VENOUS BLD VENIPUNCTURE: CPT

## 2022-11-23 RX ORDER — FUROSEMIDE 40 MG/1
40 TABLET ORAL DAILY
Qty: 90 TABLET | Refills: 1 | Status: SHIPPED | OUTPATIENT
Start: 2022-11-23

## 2022-11-23 NOTE — TELEPHONE ENCOUNTER
Rx Refill Note  Requested Prescriptions     Pending Prescriptions Disp Refills   • furosemide (LASIX) 40 MG tablet [Pharmacy Med Name: furosemide 40 mg tablet] 30 tablet 2     Sig: TAKE 1 TABLET BY MOUTH DAILY.      Last office visit with prescribing clinician: 11/11/2022      Next office visit with prescribing clinician: Visit date not found            Helena Meehan MA  11/23/22, 08:20 CST

## 2022-11-23 NOTE — DISCHARGE INSTRUCTIONS
Before you come to the hospital        Arrival time: AS DIRECTED BY OFFICE     YOU MAY TAKE THE FOLLOWING MEDICATION(S) THE MORNING OF SURGERY WITH A SIP OF WATER: NORCO, PROCARDIA       ***PLEASE HOLD YOUR LOSARTAN 24 HOURS PRIOR TO SURGERY***            ALL OTHER HOME MEDICATION CHECK WITH YOUR PHYSICIAN (especially if you are taking diabetes medicines or blood thinners)    Do not take any Erectile Dysfunction medications (EX: CIALIS, VIAGRA) 24 hours prior to surgery.      If you were given and instructed to use a germ- killing soap, use as directed the night before surgery and again the morning of surgery or as directed by your surgeon.    (See attached information for How to Use Chlorhexidine for Bathing if applicable.)            Eating and drinking restrictions prior to scheduled arrival time    2 Hours before arrival time STOP   Drinking Clear liquids (water, apple juice-no pulp)     6 Hours before arrival time STOP   Milk or drinks that contain milk, full liquids    6 Hours before arrival time STOP   Light meals or foods, such as toast or cereal    8 Hours before arrival time STOP   Heavy foods, such as meat, fried foods, or fatty foods    (It is extremely important that you follow these guidelines to prevent delay or cancelation of your procedure)     Clear Liquids  Water and flavored water                                                                      Clear Fruit juices, such as cranberry juice and apple juice.  Black coffee (NO cream of any kind, including powdered).  Plain tea  Clear bouillon or broth.  Flavored gelatin.  Soda.  Gatorade or Powerade.  Full liquid examples  Juices that have pulp.  Frozen ice pops that contain fruit pieces.  Coffee with creamer  Milk.  Yogurt.                MANAGING PAIN AFTER SURGERY    We know you are probably wondering what your pain will be like after surgery.  Following surgery it is unrealistic to expect you will not have pain.   Pain is how our bodies let us  know that something is wrong or cautions us to be careful.  That said, our goal is to make your pain tolerable.    Methods we may use to treat your pain include (oral or IV medications, PCAs, epidurals, nerve blocks, etc.)   While some procedures require IV pain medications for a short time after surgery, transitioning to pain medications by mouth allows for better management of pain.   Your nurse will encourage you to take oral pain medications whenever possible.  IV medications work almost immediately, but only last a short while.  Taking medications by mouth allows for a more constant level of medication in your blood stream for a longer period of time.      Once your pain is out of control it is harder to get back under control.  It is important you are aware when your next dose of pain medication is due.  If you are admitted, your nurse may write the time of your next dose on the white board in your room to help you remember.      We are interested in your pain and encourage you to inform us about aggravating factors during your visit.   Many times a simple repositioning every few hours can make a big difference.    If your physician says it is okay, do not let your pain prevent you from getting out of bed. Be sure to call your nurse for assistance prior to getting up so you do not fall.      Before surgery, please decide your tolerable pain goal.  These faces help describe the pain ratings we use on a 0-10 scale.   Be prepared to tell us your goal and whether or not you take pain or anxiety medications at home.          Preparing for Surgery  Preparing for surgery is an important part of your care. It can make things go more smoothly and help you avoid complications. The steps leading up to surgery may vary among hospitals. Follow all instructions given to you by your health care providers. Ask questions if you do not understand something. Talk about any concerns that you have.  Here are some questions to  consider asking before your surgery:  If my surgery is not an emergency (is elective), when would be the best time to have the surgery?  What arrangements do I need to make for work, home, or school?  What will my recovery be like? How long will it be before I can return to normal activities?  Will I need to prepare my home? Will I need to arrange care for me or my children?  Should I expect to have pain after surgery? What are my pain management options? Are there nonmedical options that I can try for pain?  Tell a health care provider about:  Any allergies you have.  All medicines you are taking, including vitamins, herbs, eye drops, creams, and over-the-counter medicines.  Any problems you or family members have had with anesthetic medicines.  Any blood disorders you have.  Any surgeries you have had.  Any medical conditions you have.  Whether you are pregnant or may be pregnant.  What are the risks?  The risks and complications of surgery depend on the specific procedure that you have. Discuss all the risks with your health care providers before your surgery. Ask about common surgical complications, which may include:  Infection.  Bleeding or a need for blood replacement (transfusion).  Allergic reactions to medicines.  Damage to surrounding nerves, tissues, or structures.  A blood clot.  Scarring.  Failure of the surgery to correct the problem.  Follow these instructions before the procedure:  Several days or weeks before your procedure  You may have a physical exam by your primary health care provider to make sure it is safe for you to have surgery.  You may have testing. This may include a chest X-ray, blood and urine tests, electrocardiogram (ECG), or other testing.  Ask your health care provider about:  Changing or stopping your regular medicines. This is especially important if you are taking diabetes medicines or blood thinners.  Taking medicines such as aspirin and ibuprofen. These medicines can thin  your blood. Do not take these medicines unless your health care provider tells you to take them.  Taking over-the-counter medicines, vitamins, herbs, and supplements.  Do not use any products that contain nicotine or tobacco, such as cigarettes and e-cigarettes. If you need help quitting, ask your health care provider.  Avoid alcohol.  Ask your health care provider if there are exercises you can do to prepare for surgery.  Eat a healthy diet.   Plan to have someone take you home from the hospital or clinic.  Plan to have a responsible adult care for you for at least 24 hours after you leave the hospital or clinic. This is important.  The day before your procedure  You may be given antibiotic medicine to take by mouth to help prevent infection. Take it as told by your health care provider.  You may be asked to shower with a germ-killing soap.  Follow instructions from your health care provider about eating and drinking restrictions. This includes gum, mints and hard candy.  Pack comfortable clothes according to your procedure.   The day of your procedure  You may need to take another shower with a germ-killing soap before you leave home in the morning.  With a small sip of water, take only the medicines that you are told to take.  Remove all jewelry including rings.   Leave anything you consider valuable at home except hearing aids if needed.  Do not wear any makeup, nail polish, powder, deodorant, lotion, hair accessories, or anything on your skin or body except your clothes.  If you will be staying in the hospital, bring a case to hold your glasses, contacts, or dentures. You may also want to bring your robe and non-skid footwear.  If you wear oxygen at home, bring it with you the day of surgery.  If instructed by your health care provider, bring your sleep apnea device with you on the day of your surgery (if this applies to you).  You may want to leave your suitcase and sleep apnea device in the car until after  surgery.   Arrive at the hospital as scheduled.  Bring a friend or family member with you who can help to answer questions and be present while you meet with your health care provider.  At the hospital  When you arrive at the hospital:  Go to registration located at the main entrance of the hospital. You will be registered and given a beeper and a sticker sheet. Take the stickers to the Outpatient nurses desk and place in the black tray. This is to notify staff that you have arrived. Then return to the lobby to wait.   When your beeper lights up and vibrates proceed through the double doors, under the stairs, and a member of the Outpatient Surgery staff will escort you to your preoperative room.  You may have to wear compression sleeves. These help to prevent blood clots and reduce swelling in your legs.  An IV may be inserted into one of your veins.              In the operating room, you may be given one or more of the following:        A medicine to help you relax (sedative).        A medicine to numb the area (local anesthetic).        A medicine to make you fall asleep (general anesthetic).        A medicine that is injected into an area of your body to numb everything below the                      injection site (regional anesthetic).  You may be given an antibiotic through your IV to help prevent infection.  Your surgical site will be marked or identified.    Contact a health care provider if you:  Develop a fever of more than 100.4°F (38°C) or other feelings of illness during the 48 hours before your surgery.  Have symptoms that get worse.  Have questions or concerns about your surgery.  Summary  Preparing for surgery can make the procedure go more smoothly and lower your risk of complications.  Before surgery, make a list of questions and concerns to discuss with your surgeon. Ask about the risks and possible complications.  In the days or weeks before your surgery, follow all instructions from your health  care provider. You may need to stop smoking, avoid alcohol, follow eating restrictions, and change or stop your regular medicines.  Contact your surgeon if you develop a fever or other signs of illness during the few days before your surgery.  This information is not intended to replace advice given to you by your health care provider. Make sure you discuss any questions you have with your health care provider.  Document Revised: 12/21/2018 Document Reviewed: 10/23/2018  [employer] Patient Education © 2021 [employer]

## 2022-11-24 LAB
QT INTERVAL: 412 MS
QTC INTERVAL: 444 MS

## 2022-11-29 ENCOUNTER — TELEPHONE (OUTPATIENT)
Dept: UROLOGY | Facility: CLINIC | Age: 72
End: 2022-11-29

## 2022-11-29 NOTE — TELEPHONE ENCOUNTER
Called patient to remind them to arrive at patient registration on 12/1/22 at 0700 for the procedure with Dr. Sloan. Spoke with patient who voiced his understanding and stated he would be here then. Told patient if they had any questions to please contact our office at [phone].

## 2022-11-29 NOTE — TELEPHONE ENCOUNTER
----- Message from Yon Sloan MD sent at 11/29/2022  1:16 PM CST -----  Should be ok  ----- Message -----  From: Annie Hernandez MA  Sent: 11/29/2022   1:09 PM CST  To: Yon Sloan MD    Valerie from [facility] called to let us know they were sending cardiac clearance for surgery 12/1/22 but that he would only be off Eliquis for 2 days. Wanted clarification on if we could proceed with surgery that day

## 2022-11-29 NOTE — TELEPHONE ENCOUNTER
Valerie from [facility] called to let us know they were sending cardiac clearance for surgery 12/1/22 but that he would only be off Eliquis for 2 days. Was advised that the fax was not seen until today. Sent message to dr Sloan regarding on if we could move forward with surgery that day

## 2022-11-29 NOTE — TELEPHONE ENCOUNTER
Called pt to let him know it was fine for him to only be off his eliquis for 2 days instead of 3 for his surgery. Left voicemail with patient. If patient calls back, ok for [facility] to relay this information

## 2022-12-01 ENCOUNTER — ANESTHESIA (OUTPATIENT)
Dept: PERIOP | Facility: HOSPITAL | Age: 72
End: 2022-12-01

## 2022-12-01 ENCOUNTER — ANESTHESIA EVENT (OUTPATIENT)
Dept: PERIOP | Facility: HOSPITAL | Age: 72
End: 2022-12-01

## 2022-12-01 ENCOUNTER — HOSPITAL ENCOUNTER (OUTPATIENT)
Facility: HOSPITAL | Age: 72
Setting detail: HOSPITAL OUTPATIENT SURGERY
Discharge: HOME | End: 2022-12-01
Attending: UROLOGY | Admitting: UROLOGY

## 2022-12-01 VITALS
HEART RATE: 77 BPM | DIASTOLIC BLOOD PRESSURE: 75 MMHG | SYSTOLIC BLOOD PRESSURE: 166 MMHG | RESPIRATION RATE: 18 BRPM | OXYGEN SATURATION: 96 % | TEMPERATURE: 97.4 F

## 2022-12-01 DIAGNOSIS — N35.011 POST-TRAUMATIC BULBOUS URETHRAL STRICTURE: ICD-10-CM

## 2022-12-01 DIAGNOSIS — N32.89 BLADDER MASS: ICD-10-CM

## 2022-12-01 LAB
GLUCOSE BLDC GLUCOMTR-MCNC: 178 MG/DL (ref 70–130)
GLUCOSE BLDC GLUCOMTR-MCNC: 187 MG/DL (ref 70–130)

## 2022-12-01 PROCEDURE — 25010000002 ONDANSETRON PER 1 MG: Performed by: NURSE ANESTHETIST, CERTIFIED REGISTERED

## 2022-12-01 PROCEDURE — C1726 CATH, BAL DIL, NON-VASCULAR: HCPCS | Performed by: UROLOGY

## 2022-12-01 PROCEDURE — 25010000002 FENTANYL CITRATE (PF) 100 MCG/2ML SOLUTION: Performed by: NURSE ANESTHETIST, CERTIFIED REGISTERED

## 2022-12-01 PROCEDURE — 82962 GLUCOSE BLOOD TEST: CPT

## 2022-12-01 PROCEDURE — 25010000002 PROPOFOL 200 MG/20ML EMULSION: Performed by: NURSE ANESTHETIST, CERTIFIED REGISTERED

## 2022-12-01 PROCEDURE — 25010000002 DROPERIDOL PER 5 MG: Performed by: ANESTHESIOLOGY

## 2022-12-01 PROCEDURE — 52240 CYSTOSCOPY AND TREATMENT: CPT | Performed by: UROLOGY

## 2022-12-01 PROCEDURE — C1769 GUIDE WIRE: HCPCS | Performed by: UROLOGY

## 2022-12-01 PROCEDURE — 25010000002 CEFAZOLIN PER 500 MG: Performed by: UROLOGY

## 2022-12-01 PROCEDURE — 25010000002 FENTANYL CITRATE (PF) 50 MCG/ML SOLUTION: Performed by: ANESTHESIOLOGY

## 2022-12-01 RX ORDER — PROPOFOL 10 MG/ML
INJECTION, EMULSION INTRAVENOUS AS NEEDED
Status: DISCONTINUED | OUTPATIENT
Start: 2022-12-01 | End: 2022-12-01 | Stop reason: SURG

## 2022-12-01 RX ORDER — SODIUM CHLORIDE 0.9 % (FLUSH) 0.9 %
1-10 SYRINGE (ML) INJECTION AS NEEDED
Status: DISCONTINUED | OUTPATIENT
Start: 2022-12-01 | End: 2022-12-01 | Stop reason: HOSPADM

## 2022-12-01 RX ORDER — FLUMAZENIL 0.1 MG/ML
0.2 INJECTION INTRAVENOUS AS NEEDED
Status: DISCONTINUED | OUTPATIENT
Start: 2022-12-01 | End: 2022-12-01 | Stop reason: HOSPADM

## 2022-12-01 RX ORDER — HYDROMORPHONE HYDROCHLORIDE 1 MG/ML
0.5 INJECTION, SOLUTION INTRAMUSCULAR; INTRAVENOUS; SUBCUTANEOUS
Status: DISCONTINUED | OUTPATIENT
Start: 2022-12-01 | End: 2022-12-01 | Stop reason: HOSPADM

## 2022-12-01 RX ORDER — FENTANYL CITRATE 50 UG/ML
INJECTION, SOLUTION INTRAMUSCULAR; INTRAVENOUS AS NEEDED
Status: DISCONTINUED | OUTPATIENT
Start: 2022-12-01 | End: 2022-12-01 | Stop reason: SURG

## 2022-12-01 RX ORDER — SODIUM CHLORIDE, SODIUM LACTATE, POTASSIUM CHLORIDE, CALCIUM CHLORIDE 600; 310; 30; 20 MG/100ML; MG/100ML; MG/100ML; MG/100ML
1000 INJECTION, SOLUTION INTRAVENOUS CONTINUOUS
Status: DISCONTINUED | OUTPATIENT
Start: 2022-12-01 | End: 2022-12-01 | Stop reason: HOSPADM

## 2022-12-01 RX ORDER — HYDROCODONE BITARTRATE AND ACETAMINOPHEN 5; 325 MG/1; MG/1
1 TABLET ORAL ONCE AS NEEDED
Status: DISCONTINUED | OUTPATIENT
Start: 2022-12-01 | End: 2022-12-01 | Stop reason: HOSPADM

## 2022-12-01 RX ORDER — NALOXONE HCL 0.4 MG/ML
0.4 VIAL (ML) INJECTION AS NEEDED
Status: DISCONTINUED | OUTPATIENT
Start: 2022-12-01 | End: 2022-12-01 | Stop reason: HOSPADM

## 2022-12-01 RX ORDER — DROPERIDOL 2.5 MG/ML
0.62 INJECTION, SOLUTION INTRAMUSCULAR; INTRAVENOUS ONCE AS NEEDED
Status: COMPLETED | OUTPATIENT
Start: 2022-12-01 | End: 2022-12-01

## 2022-12-01 RX ORDER — FENTANYL CITRATE 50 UG/ML
25 INJECTION, SOLUTION INTRAMUSCULAR; INTRAVENOUS
Status: DISCONTINUED | OUTPATIENT
Start: 2022-12-01 | End: 2022-12-01 | Stop reason: HOSPADM

## 2022-12-01 RX ORDER — ROCURONIUM BROMIDE 10 MG/ML
INJECTION, SOLUTION INTRAVENOUS AS NEEDED
Status: DISCONTINUED | OUTPATIENT
Start: 2022-12-01 | End: 2022-12-01 | Stop reason: SURG

## 2022-12-01 RX ORDER — SODIUM CHLORIDE 0.9 % (FLUSH) 0.9 %
10 SYRINGE (ML) INJECTION EVERY 12 HOURS SCHEDULED
Status: DISCONTINUED | OUTPATIENT
Start: 2022-12-01 | End: 2022-12-01 | Stop reason: HOSPADM

## 2022-12-01 RX ORDER — LIDOCAINE HYDROCHLORIDE 10 MG/ML
0.5 INJECTION, SOLUTION EPIDURAL; INFILTRATION; INTRACAUDAL; PERINEURAL ONCE AS NEEDED
Status: DISCONTINUED | OUTPATIENT
Start: 2022-12-01 | End: 2022-12-01 | Stop reason: HOSPADM

## 2022-12-01 RX ORDER — NEOSTIGMINE METHYLSULFATE 5 MG/5 ML
SYRINGE (ML) INTRAVENOUS AS NEEDED
Status: DISCONTINUED | OUTPATIENT
Start: 2022-12-01 | End: 2022-12-01 | Stop reason: SURG

## 2022-12-01 RX ORDER — ONDANSETRON 2 MG/ML
INJECTION INTRAMUSCULAR; INTRAVENOUS AS NEEDED
Status: DISCONTINUED | OUTPATIENT
Start: 2022-12-01 | End: 2022-12-01 | Stop reason: SURG

## 2022-12-01 RX ORDER — IBUPROFEN 600 MG/1
600 TABLET ORAL ONCE AS NEEDED
Status: DISCONTINUED | OUTPATIENT
Start: 2022-12-01 | End: 2022-12-01 | Stop reason: HOSPADM

## 2022-12-01 RX ORDER — SODIUM CHLORIDE 0.9 % (FLUSH) 0.9 %
3-10 SYRINGE (ML) INJECTION AS NEEDED
Status: DISCONTINUED | OUTPATIENT
Start: 2022-12-01 | End: 2022-12-01 | Stop reason: HOSPADM

## 2022-12-01 RX ORDER — ACETAMINOPHEN 500 MG
1000 TABLET ORAL ONCE
Status: DISCONTINUED | OUTPATIENT
Start: 2022-12-01 | End: 2022-12-01 | Stop reason: HOSPADM

## 2022-12-01 RX ORDER — HYDROCODONE BITARTRATE AND ACETAMINOPHEN 7.5; 325 MG/1; MG/1
2 TABLET ORAL EVERY 4 HOURS PRN
Status: DISCONTINUED | OUTPATIENT
Start: 2022-12-01 | End: 2022-12-01 | Stop reason: HOSPADM

## 2022-12-01 RX ORDER — MAGNESIUM HYDROXIDE 1200 MG/15ML
LIQUID ORAL AS NEEDED
Status: DISCONTINUED | OUTPATIENT
Start: 2022-12-01 | End: 2022-12-01 | Stop reason: HOSPADM

## 2022-12-01 RX ORDER — OXYBUTYNIN CHLORIDE 5 MG/1
5 TABLET, EXTENDED RELEASE ORAL DAILY
Qty: 7 TABLET | Refills: 0 | Status: SHIPPED | OUTPATIENT
Start: 2022-12-01

## 2022-12-01 RX ORDER — SODIUM CHLORIDE 0.9 % (FLUSH) 0.9 %
3 SYRINGE (ML) INJECTION EVERY 12 HOURS SCHEDULED
Status: DISCONTINUED | OUTPATIENT
Start: 2022-12-01 | End: 2022-12-01 | Stop reason: HOSPADM

## 2022-12-01 RX ORDER — SORBITOL 30 G/1000ML
IRRIGANT IRRIGATION AS NEEDED
Status: DISCONTINUED | OUTPATIENT
Start: 2022-12-01 | End: 2022-12-01 | Stop reason: HOSPADM

## 2022-12-01 RX ORDER — SODIUM CHLORIDE, SODIUM LACTATE, POTASSIUM CHLORIDE, CALCIUM CHLORIDE 600; 310; 30; 20 MG/100ML; MG/100ML; MG/100ML; MG/100ML
100 INJECTION, SOLUTION INTRAVENOUS CONTINUOUS
Status: DISCONTINUED | OUTPATIENT
Start: 2022-12-01 | End: 2022-12-01 | Stop reason: HOSPADM

## 2022-12-01 RX ORDER — SODIUM CHLORIDE 9 MG/ML
40 INJECTION, SOLUTION INTRAVENOUS AS NEEDED
Status: DISCONTINUED | OUTPATIENT
Start: 2022-12-01 | End: 2022-12-01 | Stop reason: HOSPADM

## 2022-12-01 RX ORDER — ONDANSETRON 2 MG/ML
4 INJECTION INTRAMUSCULAR; INTRAVENOUS ONCE AS NEEDED
Status: DISCONTINUED | OUTPATIENT
Start: 2022-12-01 | End: 2022-12-01 | Stop reason: HOSPADM

## 2022-12-01 RX ORDER — SUCCINYLCHOLINE/SOD CL,ISO/PF 200MG/10ML
SYRINGE (ML) INTRAVENOUS AS NEEDED
Status: DISCONTINUED | OUTPATIENT
Start: 2022-12-01 | End: 2022-12-01 | Stop reason: SURG

## 2022-12-01 RX ORDER — CEPHALEXIN 500 MG/1
500 CAPSULE ORAL 3 TIMES DAILY
Qty: 9 CAPSULE | Refills: 0 | Status: SHIPPED | OUTPATIENT
Start: 2022-12-01 | End: 2022-12-04

## 2022-12-01 RX ORDER — KETAMINE HCL IN NACL, ISO-OSM 100MG/10ML
SYRINGE (ML) INJECTION AS NEEDED
Status: DISCONTINUED | OUTPATIENT
Start: 2022-12-01 | End: 2022-12-01 | Stop reason: SURG

## 2022-12-01 RX ORDER — SODIUM CHLORIDE 9 MG/ML
100 INJECTION, SOLUTION INTRAVENOUS CONTINUOUS
Status: DISCONTINUED | OUTPATIENT
Start: 2022-12-01 | End: 2022-12-01 | Stop reason: HOSPADM

## 2022-12-01 RX ORDER — MIDAZOLAM HYDROCHLORIDE 1 MG/ML
0.5 INJECTION INTRAMUSCULAR; INTRAVENOUS
Status: DISCONTINUED | OUTPATIENT
Start: 2022-12-01 | End: 2022-12-01 | Stop reason: HOSPADM

## 2022-12-01 RX ORDER — LABETALOL HYDROCHLORIDE 5 MG/ML
5 INJECTION, SOLUTION INTRAVENOUS
Status: DISCONTINUED | OUTPATIENT
Start: 2022-12-01 | End: 2022-12-01 | Stop reason: HOSPADM

## 2022-12-01 RX ADMIN — PROPOFOL 350 MG: 10 INJECTION, EMULSION INTRAVENOUS at 09:32

## 2022-12-01 RX ADMIN — FENTANYL CITRATE 25 MCG: 50 INJECTION INTRAMUSCULAR; INTRAVENOUS at 10:45

## 2022-12-01 RX ADMIN — Medication 50 MG: at 09:32

## 2022-12-01 RX ADMIN — SODIUM CHLORIDE 2 G: 9 INJECTION, SOLUTION INTRAVENOUS at 09:38

## 2022-12-01 RX ADMIN — SODIUM CHLORIDE, POTASSIUM CHLORIDE, SODIUM LACTATE AND CALCIUM CHLORIDE 1000 ML: 600; 310; 30; 20 INJECTION, SOLUTION INTRAVENOUS at 07:31

## 2022-12-01 RX ADMIN — GLYCOPYRROLATE 0.4 MG: 0.2 INJECTION INTRAMUSCULAR; INTRAVENOUS at 10:24

## 2022-12-01 RX ADMIN — Medication 150 MG: at 09:49

## 2022-12-01 RX ADMIN — FENTANYL CITRATE 25 MCG: 50 INJECTION INTRAMUSCULAR; INTRAVENOUS at 10:50

## 2022-12-01 RX ADMIN — FENTANYL CITRATE 100 MCG: 50 INJECTION INTRAMUSCULAR; INTRAVENOUS at 09:32

## 2022-12-01 RX ADMIN — HYDROCODONE BITARTRATE AND ACETAMINOPHEN 2 TABLET: 7.5; 325 TABLET ORAL at 10:44

## 2022-12-01 RX ADMIN — FENTANYL CITRATE 25 MCG: 50 INJECTION INTRAMUSCULAR; INTRAVENOUS at 11:00

## 2022-12-01 RX ADMIN — ROCURONIUM BROMIDE 25 MG: 10 INJECTION, SOLUTION INTRAVENOUS at 09:57

## 2022-12-01 RX ADMIN — DROPERIDOL 0.62 MG: 2.5 INJECTION, SOLUTION INTRAMUSCULAR; INTRAVENOUS at 10:46

## 2022-12-01 RX ADMIN — Medication 3 MG: at 10:24

## 2022-12-01 RX ADMIN — ONDANSETRON 4 MG: 2 INJECTION INTRAMUSCULAR; INTRAVENOUS at 10:24

## 2022-12-01 RX ADMIN — FENTANYL CITRATE 25 MCG: 50 INJECTION INTRAMUSCULAR; INTRAVENOUS at 10:55

## 2022-12-01 NOTE — ANESTHESIA PREPROCEDURE EVALUATION
Anesthesia Evaluation     Patient summary reviewed   no history of anesthetic complications:  NPO Solid Status: > 8 hours  NPO Liquid Status: > 8 hours           Airway   Mallampati: II  TM distance: >3 FB  Neck ROM: full  No difficulty expected  Dental - normal exam     Pulmonary    (+) a smoker Former, sleep apnea on CPAP,   (-) asthma    ROS comment: Uses albuterol following working in allergens, raked hay yesterday and used last pm  Cardiovascular   Exercise tolerance: good (4-7 METS)    PT is on anticoagulation therapy    (+) hypertension, dysrhythmias Atrial Fib, hyperlipidemia,   (-) past MI, CAD, cardiac stents, CABG    ROS comment: Last eliquis 5/23    Neuro/Psych  (+) TIA,    (-) seizures, CVA  GI/Hepatic/Renal/Endo    (+) morbid obesity, GERD,  diabetes mellitus,   (-) liver disease, no renal disease    Musculoskeletal (-) negative ROS    Abdominal   (+) obese,    Substance History      OB/GYN          Other                          Anesthesia Plan    ASA 3     general     (Do not apply nasal supplemental O2--mask requested by pt adamantly; Eliquis held )  intravenous induction     Anesthetic plan, risks, benefits, and alternatives have been provided, discussed and informed consent has been obtained with: patient.

## 2022-12-01 NOTE — ANESTHESIA PROCEDURE NOTES
Airway  Urgency: elective    Airway not difficult    General Information and Staff    Patient location during procedure: OR  CRNA/CAA: Shahram Mendoza CRNA    Indications and Patient Condition  Indications for airway management: airway protection    Preoxygenated: yes  MILS maintained throughout  Mask difficulty assessment: 1 - vent by mask    Final Airway Details  Final airway type: endotracheal airway      Successful airway: ETT  Cuffed: yes   Successful intubation technique: direct laryngoscopy  Facilitating devices/methods: intubating stylet  Endotracheal tube insertion site: oral  Blade: Rajan  Blade size: 4  ETT size (mm): 7.5  Cormack-Lehane Classification: grade I - full view of glottis  Placement verified by: chest auscultation and capnometry   Cuff volume (mL): 8  Measured from: lips  ETT/EBT  to lips (cm): 21  Number of attempts at approach: 1  Assessment: lips, teeth, and gum same as pre-op and atraumatic intubation

## 2022-12-01 NOTE — ANESTHESIA POSTPROCEDURE EVALUATION
Patient: Ephraim Quinones    Procedure Summary     Date: 12/01/22 Room / Location:  [facility] OR 01 /  [facility] OR    Anesthesia Start: 0929 Anesthesia Stop: 1034    Procedure: URETHRAL DILATATION with Balloon, TRANSURETHRAL RESECTION OF BLADDER TUMOR (Urethra) Diagnosis:       Post-traumatic bulbous urethral stricture      (Post-traumatic bulbous urethral stricture [N35.011])    Surgeons: Yon Sloan MD Provider: Shahram Mendoza CRNA    Anesthesia Type: general ASA Status: 3          Anesthesia Type: general    Vitals  Vitals Value Taken Time   /71 12/01/22 1126   Temp 97.4 °F (36.3 °C) 12/01/22 1115   Pulse 78 12/01/22 1126   Resp 15 12/01/22 1115   SpO2 94 % 12/01/22 1126   Vitals shown include unvalidated device data.        Post Anesthesia Care and Evaluation    Patient location during evaluation: PACU  Patient participation: complete - patient participated  Level of consciousness: awake and alert  Pain management: adequate    Airway patency: patent  Anesthetic complications: No anesthetic complications    Cardiovascular status: acceptable  Respiratory status: acceptable  Hydration status: acceptable    Comments: Blood pressure 129/97, pulse 78, temperature 97.4 °F (36.3 °C), temperature source Temporal, resp. rate 16, SpO2 98 %.    Pt discharged from PACU based on michael score >8

## 2022-12-01 NOTE — OP NOTE
URETHRAL DILATATION  Procedure Note    Ephraim Quinones  12/1/2022    Pre-op Diagnosis:   Post-traumatic bulbous urethral stricture [N35.011]    Post-op Diagnosis:     Post-Op Diagnosis Codes:     * Post-traumatic bulbous urethral stricture [N35.011]    Procedure/CPT® Codes:      Procedure(s):  URETHRAL DILATATION with Balloon, TRANSURETHRAL RESECTION OF BLADDER TUMOR    Surgeon(s):  Yon Sloan MD    Anesthesia: General    Staff:   Scrub Person: Armando Sun; Zane Wesley; Viky Lyles    Estimated Blood Loss: minimal    Specimens:                Specimens     ID Source Type Tests Collected By Collected At Frozen?    A Urinary Bladder Tissue · TISSUE PATHOLOGY EXAM   Yon Sloan MD 12/1/22 1021     Description: BLADDER TUMOR    This specimen was not marked as sent.            Drains:   Urethral Catheter Other (Comment) 24 Fr. (Active)       Findings: Bulbar Urethral Stricture dilated to 24 Fr  Large broad based bladder tumor posterior wall and dome  Total area resected and fulgurated 6 and  No evidence of perforation  No residual tumor    Complications: None    Indications: 72-year-old male with gross hematuria underwent cystoscopic evaluation which found to have urethral stricture was not passable with the scope.  Is brought to the operating room for dilation of urethral stricture and the cystoscopic examination    Description of Procedure:   After informed consent was obtained, patient was brought to the operating room where patient underwent general endotracheal anesthesia in the supine position.  Patient was converted to the dorsolithotomy position.  Prepped and draped in the usual sterile fashion.  Timeout was done to ensure the correct patient, procedure, and site.  Patient did receive preoperative antibiotics in the holding area.    23 Telugu Gonzalez endoscope certain urethra retrograde fashion.  The bulbar urethral stricture was encountered.  A 0.38 sensor tip wire was  placed through this.  A 4 Nauruan [employer] balloon dilator was used to dilate the urethra to 15 mmHg and held for 1 minute.  The balloon was taken down.  I then was able to easily Holt the scope.  He had a normal size prostate with somewhat of a high bladder neck.  The bladder was entered and drained.  The bladder was then carefully methodically inspected.  He had a posterior wall bladder tumor that was broad-based that was approximately 5 cm that appeared sessile.  There is no other tumors noted or satellite lesions.  I then removed the endoscope.    I sounded his urethra up to 30 Nauruan using Laureano sounds.  I then placed a resectoscope with aid of the visual obturator.  Visualized the tumor again it was broad-based and appeared grossly to be likely muscle invasive.  I then carefully methodically resected the area making careful care not to perforate the bladder.  There is no evidence of perforation.  The resection was carried down to the muscular fibers of the bladder.  The Medipacs evacuator was used to remove the tumor chips.  Rollerball was then used to fulgurate the base of the tumor.  Irrigation was turned off and there was no bleeding.  Area was inspected again there is no perforation there was no residual tumor.  I then removed the resectoscope.  Specimen sent off for pathologic analysis.  An 18 Nauruan three-way Costello catheter was placed with 10 cc sterile water used to inflate the balloon.  Patient was wake from anesthesia and transferred recovery in satisfactory condition.    Yon Sloan MD     Date: 12/1/2022  Time: 10:37 CST

## 2022-12-02 DIAGNOSIS — R52 PAIN: ICD-10-CM

## 2022-12-02 LAB
CYTO UR: NORMAL
LAB AP CASE REPORT: NORMAL
LAB AP SYNOPTIC CHECKLIST: NORMAL
Lab: NORMAL
PATH REPORT.FINAL DX SPEC: NORMAL
PATH REPORT.GROSS SPEC: NORMAL

## 2022-12-02 RX ORDER — HYDROCODONE BITARTRATE AND ACETAMINOPHEN 10; 325 MG/1; MG/1
1 TABLET ORAL EVERY 6 HOURS PRN
Qty: 90 TABLET | Refills: 0 | Status: SHIPPED | OUTPATIENT
Start: 2022-12-02 | End: 2022-12-30 | Stop reason: SDUPTHER

## 2022-12-02 NOTE — TELEPHONE ENCOUNTER
Rx Refill Note  Requested Prescriptions     Pending Prescriptions Disp Refills   • HYDROcodone-acetaminophen (NORCO)  MG per tablet 90 tablet 0     Sig: Take 1 tablet by mouth Every 6 (Six) Hours As Needed for Moderate Pain.      Last office visit with prescribing clinician: 11/11/2022   Last telemedicine visit with prescribing clinician: Visit date not found   Next office visit with prescribing clinician: Visit date not found                         Would you like a call back once the refill request has been completed: [] Yes [] No    If the office needs to give you a call back, can they leave a voicemail: [] Yes [] No    Deandre Nassar Rep  12/02/22, 13:15 CST       Drug thereapy appt 11/11/2022   contract done 12/1/21   UDS done 12/1/21

## 2022-12-05 ENCOUNTER — TELEPHONE (OUTPATIENT)
Dept: UROLOGY | Facility: CLINIC | Age: 72
End: 2022-12-05

## 2022-12-05 DIAGNOSIS — C67.1 MALIGNANT NEOPLASM OF DOME OF URINARY BLADDER: Primary | ICD-10-CM

## 2022-12-05 NOTE — TELEPHONE ENCOUNTER
Discussed pathology with patient.  I discussed that he will need referral to [facility] for consideration of cystectomy.

## 2022-12-06 ENCOUNTER — TELEPHONE (OUTPATIENT)
Dept: UROLOGY | Facility: CLINIC | Age: 72
End: 2022-12-06

## 2022-12-06 NOTE — TELEPHONE ENCOUNTER
Pt wife called an stated that dr rose told them yesterday that pt could come Thursday to get catheter removed.  I dont see a note about that so I sent a message to dr rose and when I hear back from him I will call pt back and get them on the schedule.

## 2022-12-08 ENCOUNTER — PROCEDURE VISIT (OUTPATIENT)
Dept: UROLOGY | Facility: CLINIC | Age: 72
End: 2022-12-08

## 2022-12-08 DIAGNOSIS — N35.912 BULBOUS URETHRAL STRICTURE: Primary | ICD-10-CM

## 2022-12-08 PROCEDURE — 99024 POSTOP FOLLOW-UP VISIT: CPT

## 2022-12-08 NOTE — PROGRESS NOTES
Ephraim Quinones is a 72 y.o. male who is here today for catheter removal. Patient of Dr. Sloan. 10cc syringe used to deflate the balloon and the catheter was removed without difficulty.  The patient tolerated this well and will return in 8 days to see Dr. Sloan in the office for follow up. ANSHUL Kirkpatrick was in the office at the time of procedure.     Patient was advised to drink clear fluids for the next couple hours and urinate. The patient was also advised he may experience some blood in the urine and burning with urination for the next couple days. If the patient is unable to urinate or develops fever, chills, N&V or suprapubic pain he will call to return for an appt at clinic or seek medical treatment at [facility] ER, PCP or Urgent Care after hours. Patient verbalized understanding and all questions were answered.     INES Brush    I have reviewed and agree with medical assistance documentation above

## 2022-12-14 NOTE — PROGRESS NOTES
Subjective    Mr. Quinones is 72 y.o. male    Chief Complaint: Bulbous Urethral Stricture/TCC of Bladder    History of Present Illness  Status post urethral dilation and TURBT.  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  He is experienced no postoperative complications.  He is voiding without difficulty.    The following portions of the patient's history were reviewed and updated as appropriate: allergies, current medications, past family history, past medical history, past social history, past surgical history and problem list.    Review of Systems   Constitutional: Negative for chills and fever.   Gastrointestinal: Negative for abdominal pain, anal bleeding and blood in stool.   Genitourinary: Positive for frequency. Negative for difficulty urinating, dysuria, hematuria and urgency.         Current Outpatient Medications:   •  albuterol sulfate  (90 Base) MCG/ACT inhaler, 2 sprays 2 minutes apart every 6 hours as needed, Disp: 18 g, Rfl: 5  •  aspirin 81 MG EC tablet, Take 81 mg by mouth Daily., Disp: , Rfl:   •  carbidopa-levodopa (SINEMET)  MG per tablet, Take 1 tablet by mouth 2 (Two) Times a Day., Disp: 180 tablet, Rfl: 3  •  Cholecalciferol (VITAMIN D3) 2000 units capsule, Take 2,000 Units by mouth Daily., Disp: , Rfl:   •  clobetasol (TEMOVATE) 0.05 % cream, APPLY TOPICALLY TO THE APPROPRIATE AREA AS DIRECTED FOR ITCHING AS NEEDED, Disp: 30 g, Rfl: 0  •  colchicine (Mitigare) 0.6 MG capsule capsule, Take 1 capsule by mouth Daily., Disp: 30 capsule, Rfl: 0  •  ELIQUIS 5 MG tablet tablet, Take 5 mg by mouth Every 12 (Twelve) Hours., Disp: , Rfl:   •  furosemide (LASIX) 40 MG tablet, TAKE 1 TABLET BY MOUTH DAILY., Disp: 90 tablet, Rfl: 1  •  glipizide (GLUCOTROL XL) 5 MG ER tablet, TAKE 2 TABLETS BY MOUTH DAILY., Disp: 180 tablet, Rfl: 0  •  glucose blood test strip, 1 each by Other route Daily. Use as instructed, Disp: 100 each, Rfl: 3  •  HYDROcodone-acetaminophen (NORCO)   MG per tablet, Take 1 tablet by mouth Every 6 (Six) Hours As Needed for Moderate Pain., Disp: 90 tablet, Rfl: 0  •  indapamide (LOZOL) 2.5 MG tablet, TAKE 1 TABLET BY MOUTH EVERY MORNING, Disp: 90 tablet, Rfl: 2  •  Lancet Devices (EASY TOUCH LANCING DEVICE) misc, USE TO TEST DAILY, Disp: , Rfl: 0  •  losartan (COZAAR) 100 MG tablet, 1 at bedtime for blood pressure, Disp: 90 tablet, Rfl: 3  •  lovastatin (MEVACOR) 10 MG tablet, Take 1 tablet by mouth Every Night., Disp: 30 tablet, Rfl: 1  •  metFORMIN (GLUCOPHAGE) 1000 MG tablet, Take 1 tablet by mouth 2 (Two) Times a Day With Meals., Disp: 180 tablet, Rfl: 3  •  naproxen sodium (ALEVE) 220 MG tablet, Take 220 mg by mouth 2 (Two) Times a Day As Needed., Disp: , Rfl:   •  NIFEdipine XL (Procardia XL) 90 MG 24 hr tablet, Take 1 tablet by mouth Daily., Disp: 30 tablet, Rfl: 0  •  ONETOUCH DELICA LANCETS 33G misc, 1 each Daily., Disp: 100 each, Rfl: 3  •  oxybutynin XL (DITROPAN-XL) 5 MG 24 hr tablet, Take 1 tablet by mouth Daily., Disp: 7 tablet, Rfl: 0  •  Potassium 99 MG tablet, Take 1 tablet by mouth 2 (Two) Times a Day., Disp: , Rfl:     Past Medical History:   Diagnosis Date   • A-fib (HCC)    • Acid reflux    • Anemia 05/03/2016   • Claustrophobia    • Diabetes mellitus (HCC)    • Gallstones    • Gout    • Hyperlipidemia    • Hypertension    • Pneumonia    • Sleep apnea with use of continuous positive airway pressure (CPAP)        Past Surgical History:   Procedure Laterality Date   • BACK SURGERY      x2   • CATARACT EXTRACTION     • CIRCUMCISION N/A 06/06/2019    Procedure: CIRCUMCISION;  Surgeon: Yon Sloan MD;  Location: [facility] OR;  Service: Urology   • COLONOSCOPY     • FINGER SURGERY Left    • REPLACEMENT TOTAL KNEE Bilateral    • URETHRAL DILATATION N/A 12/1/2022    Procedure: URETHRAL DILATATION with Balloon, TRANSURETHRAL RESECTION OF BLADDER TUMOR;  Surgeon: Yon Sloan MD;  Location: [facility] OR;  Service: Urology;   "Laterality: N/A;       Social History     Socioeconomic History   • Marital status:    Tobacco Use   • Smoking status: Former     Packs/day: 1.00     Years: 20.00     Pack years: 20.00     Types: Cigarettes     Quit date: 1996     Years since quittin.5   • Smokeless tobacco: Never   Vaping Use   • Vaping Use: Never used   Substance and Sexual Activity   • Alcohol use: Yes     Comment: occ   • Drug use: No   • Sexual activity: Defer       Family History   Problem Relation Age of Onset   • Colon cancer Mother    • Prostate cancer Father    • No Known Problems Maternal Grandmother    • No Known Problems Maternal Grandfather    • No Known Problems Paternal Grandmother    • No Known Problems Paternal Grandfather        Objective    Temp 97.1 °F (36.2 °C)   Ht 170.2 cm (67\")   Wt (!) 139 kg (305 lb 9.6 oz)   BMI 47.86 kg/m²     Physical Exam        Results for orders placed or performed in visit on 22   POC Urinalysis Dipstick, Multipro    Specimen: Urine   Result Value Ref Range    Color Alexandra Yellow, Straw, Dark Yellow, Alexandra    Clarity, UA Slightly Cloudy (A) Clear    Glucose,  mg/dL (A) Negative mg/dL    Bilirubin Negative Negative    Ketones, UA Trace (A) Negative    Specific Gravity  1.020 1.005 - 1.030    Blood, UA Trace (A) Negative    pH, Urine 7.0 5.0 - 8.0    Protein, POC 30 mg/dL (A) Negative mg/dL    Urobilinogen, UA Normal Normal, 0.2 E.U./dL    Nitrite, UA Negative Negative    Leukocytes Trace (A) Negative     IPSS Questionnaire (AUA-7):  Incomplete emptying  Over the past month, how often have you had a sensation of not emptying your bladder completely after you finish?: Not at all (22 1046)  Frequency  Over the past month, how often have you had to urinate again less than two hours after you finishing urinating ?: More than half the time (22 104)  Intermittency  Over the past month, how often have you found you stopped and started again several time when you " urinated ?: Not at all (12/16/22 1046)  Urgency  Over the last month, how difficult  have you found it to postpone urination ?: Not at all (12/16/22 1046)  Weak Stream  Over the past month, how often have you had a weak urinary stream ?: Not at all (12/16/22 1046)  Straining  Over the past month, how often have you had to push or strain to begin urination ?: Not at all (12/16/22 1046)  Nocturia  Over the past month, how many times did you most typically get up to urinate from the time you went to bed until the time you got up in the morning ?: Not at all (12/16/22 1046)  Quality of life due to urinary symptoms  If you were to spend the rest of your life with your urinary condition the way it is now, how would feel about that?: Pleased (12/16/22 1046)    Scores  Total IPSS Score: (!) 4 (12/16/22 1046)  Total Score = Symtomatic Level: Mildly symptomatic: 0-7 (12/16/22 1046)        Assessment and Plan    Diagnoses and all orders for this visit:    1. Bulbous urethral stricture (Primary)    2. Transitional cell carcinoma (HCC)  -     POC Urinalysis Dipstick, Multipro          Patient to be seen by Dr. Stevens at [place] for muscle invasive bladder cancer

## 2022-12-16 ENCOUNTER — OFFICE VISIT (OUTPATIENT)
Dept: UROLOGY | Facility: CLINIC | Age: 72
End: 2022-12-16

## 2022-12-16 VITALS — HEIGHT: 67 IN | WEIGHT: 305.6 LBS | TEMPERATURE: 97.1 F | BODY MASS INDEX: 47.97 KG/M2

## 2022-12-16 DIAGNOSIS — C68.9 TRANSITIONAL CELL CARCINOMA: ICD-10-CM

## 2022-12-16 DIAGNOSIS — N35.912 BULBOUS URETHRAL STRICTURE: Primary | ICD-10-CM

## 2022-12-16 LAB
BILIRUB BLD-MCNC: NEGATIVE MG/DL
CLARITY, POC: ABNORMAL
COLOR UR: ABNORMAL
GLUCOSE UR STRIP-MCNC: ABNORMAL MG/DL
KETONES UR QL: ABNORMAL
LEUKOCYTE EST, POC: ABNORMAL
NITRITE UR-MCNC: NEGATIVE MG/ML
PH UR: 7 [PH] (ref 5–8)
PROT UR STRIP-MCNC: ABNORMAL MG/DL
RBC # UR STRIP: ABNORMAL /UL
SP GR UR: 1.02 (ref 1–1.03)
UROBILINOGEN UR QL: NORMAL

## 2022-12-16 PROCEDURE — 99213 OFFICE O/P EST LOW 20 MIN: CPT | Performed by: UROLOGY

## 2022-12-16 PROCEDURE — 81003 URINALYSIS AUTO W/O SCOPE: CPT | Performed by: UROLOGY

## 2022-12-20 ENCOUNTER — IMMUNIZATION (OUTPATIENT)
Dept: FAMILY MEDICINE CLINIC | Facility: CLINIC | Age: 72
End: 2022-12-20

## 2022-12-20 DIAGNOSIS — Z23 NEED FOR VACCINATION: Primary | ICD-10-CM

## 2022-12-20 PROCEDURE — 0124A COVID-19 (PFIZER) BIVALENT BOOSTER 12+YRS: CPT | Performed by: FAMILY MEDICINE

## 2022-12-20 PROCEDURE — 91312 COVID-19 (PFIZER) BIVALENT BOOSTER 12+YRS: CPT | Performed by: FAMILY MEDICINE

## 2022-12-20 RX ORDER — LOVASTATIN 10 MG/1
TABLET ORAL
Qty: 90 TABLET | Refills: 2 | Status: SHIPPED | OUTPATIENT
Start: 2022-12-20 | End: 2023-02-06 | Stop reason: SDUPTHER

## 2022-12-20 NOTE — TELEPHONE ENCOUNTER
Rx Refill Note  Requested Prescriptions     Pending Prescriptions Disp Refills   • lovastatin (MEVACOR) 10 MG tablet [Pharmacy Med Name: lovastatin 10 mg tablet] 30 tablet 1     Sig: TAKE ONE TABLET BY MOUTH EVERY NIGHT      Last office visit with prescribing clinician: 10/19/2022   Last telemedicine visit with prescribing clinician: Visit date not found   Next office visit with prescribing clinician: Visit date not found       {TIP  Please add Last Relevant Lab 8/25/22                  Would you like a call back once the refill request has been completed: [] Yes [] No    If the office needs to give you a call back, can they leave a voicemail: [] Yes [] No    Helena Meehan MA  12/20/22, 08:23 CST

## 2022-12-30 DIAGNOSIS — R52 PAIN: ICD-10-CM

## 2022-12-30 NOTE — TELEPHONE ENCOUNTER
Rx Refill Note  Requested Prescriptions     Pending Prescriptions Disp Refills   • HYDROcodone-acetaminophen (NORCO)  MG per tablet 90 tablet 0     Sig: Take 1 tablet by mouth Every 6 (Six) Hours As Needed for Moderate Pain.      Last office visit with prescribing clinician: 11/11/2022   Last telemedicine visit with prescribing clinician: Visit date not found   Next office visit with prescribing clinician: Visit date not found                         Would you like a call back once the refill request has been completed: [] Yes [] No    If the office needs to give you a call back, can they leave a voicemail: [] Yes [] No    Anamaria Adan, PCT  12/30/22, 13:09 CST

## 2022-12-31 RX ORDER — HYDROCODONE BITARTRATE AND ACETAMINOPHEN 10; 325 MG/1; MG/1
1 TABLET ORAL EVERY 6 HOURS PRN
Qty: 90 TABLET | Refills: 0 | Status: SHIPPED | OUTPATIENT
Start: 2022-12-31 | End: 2023-02-02 | Stop reason: SDUPTHER

## 2023-02-02 DIAGNOSIS — R52 PAIN: ICD-10-CM

## 2023-02-02 RX ORDER — HYDROCODONE BITARTRATE AND ACETAMINOPHEN 10; 325 MG/1; MG/1
1 TABLET ORAL EVERY 6 HOURS PRN
Qty: 90 TABLET | Refills: 0 | Status: SHIPPED | OUTPATIENT
Start: 2023-02-02 | End: 2023-03-01 | Stop reason: SDUPTHER

## 2023-02-02 NOTE — TELEPHONE ENCOUNTER
Drug thereapy appt 11/11/2022  contract done 12/1/21  UDS done 12/1/21    Patient advised due controlled med visit.  He is in [place] today with wife and he has appointment in [place] tomorrow.  Appt scheduled for Monday.  Patient is out of medication.

## 2023-02-03 DIAGNOSIS — R91.1 RIGHT UPPER LOBE PULMONARY NODULE: Primary | ICD-10-CM

## 2023-02-06 ENCOUNTER — OFFICE VISIT (OUTPATIENT)
Dept: FAMILY MEDICINE CLINIC | Facility: CLINIC | Age: 73
End: 2023-02-06
Payer: MEDICARE

## 2023-02-06 VITALS
SYSTOLIC BLOOD PRESSURE: 120 MMHG | BODY MASS INDEX: 47.3 KG/M2 | TEMPERATURE: 97.3 F | HEART RATE: 68 BPM | OXYGEN SATURATION: 99 % | WEIGHT: 301.4 LBS | DIASTOLIC BLOOD PRESSURE: 80 MMHG | HEIGHT: 67 IN

## 2023-02-06 DIAGNOSIS — E78.5 HYPERLIPIDEMIA, UNSPECIFIED HYPERLIPIDEMIA TYPE: ICD-10-CM

## 2023-02-06 DIAGNOSIS — Z71.89 OTHER REASONS FOR SEEKING CONSULTATION: Primary | ICD-10-CM

## 2023-02-06 DIAGNOSIS — Z51.81 THERAPEUTIC DRUG MONITORING: ICD-10-CM

## 2023-02-06 PROCEDURE — 99213 OFFICE O/P EST LOW 20 MIN: CPT | Performed by: NURSE PRACTITIONER

## 2023-02-06 RX ORDER — LOVASTATIN 10 MG/1
10 TABLET ORAL
Qty: 90 TABLET | Refills: 2 | Status: SHIPPED | OUTPATIENT
Start: 2023-02-06

## 2023-02-06 RX ORDER — CLOBETASOL PROPIONATE 0.5 MG/G
CREAM TOPICAL TAKE AS DIRECTED
Qty: 30 G | Refills: 1 | Status: SHIPPED | OUTPATIENT
Start: 2023-02-06

## 2023-02-06 NOTE — PROGRESS NOTES
CC: follow up on recent scans    History:  Ephraim Quinones is a 72 y.o. male who presents today for evaluation of the above problems.      Patient is here with his granddaughter today to discuss the recent scans that he had done at [facility].  It was initially believed that Ephraim had a renal carcinoma, however, it was ultimately found that this was a cyst and not a malignancy.  Unfortunately an additional lesion was found in his bladder and this will be biopsied on Thursday.  It is my understanding that it is still currently undetermined if this is a malignant lesion.  The procedure on Thursday should allow for confirmation on this.  His scans that he had also showed a multilobulated right upper lobe lung nodule that is concerning for malignancy.  It was concerning as a primary malignancy and not any form of metastasis.  This past Friday, February 3, I did order a PET/CT as that was recommended per the radiology report.  At that time we were unsure if [facility] would be addressing this new finding.  Over the weekend, the [facility] urology provider did reach out to the patient and his family and he has contacted a pulmonologist regarding the nodule.    Patient is also here for controlled medication monitoring.  He continues to take or Norco as needed for back pain and this does continue to work well.  Has no concerns with his therapy.    CONTROLLED SUBSTANCE TRACKING 9/2/2021 12/1/2021 3/1/2022 5/27/2022 8/25/2022 11/11/2022 2/6/2023   Last Gregory 9/2/2021 12/1/2021 3/1/2022 5/27/2022 8/25/2022 11/11/2022 2/6/2023   Report Number reviewed through EPIC reviewed through [facility] reviewed through epic - reviewed through epic - reviewed through [facility]   Last UDS 8/24/2020 12/1/2021 12/1/2021 12/1/2021 12/1/2021 12/1/2021 2/6/2023   Last Controlled Substance Agreement 11/25/2020 12/1/2021 12/1/2021 12/1/2021 12/1/2021 12/1/2021 2/6/2023       HPI  ROS:  Review of Systems   Musculoskeletal: Positive for back pain  (controlled by Norco as needed).       Allergies   Allergen Reactions   • Simvastatin Other (See Comments)     Leg Cramps   • Cleocin [Clindamycin] GI Intolerance   • Percocet [Oxycodone-Acetaminophen] Itching     Past Medical History:   Diagnosis Date   • A-fib (HCC)    • Acid reflux    • Anemia 05/03/2016   • Claustrophobia    • Diabetes mellitus (HCC)    • Gallstones    • Gout    • Hyperlipidemia    • Hypertension    • Pneumonia    • Sleep apnea with use of continuous positive airway pressure (CPAP)      Past Surgical History:   Procedure Laterality Date   • BACK SURGERY      x2   • CATARACT EXTRACTION     • CIRCUMCISION N/A 06/06/2019    Procedure: CIRCUMCISION;  Surgeon: Yon Sloan MD;  Location:  [facility] OR;  Service: Urology   • COLONOSCOPY     • FINGER SURGERY Left    • REPLACEMENT TOTAL KNEE Bilateral    • URETHRAL DILATATION N/A 12/1/2022    Procedure: URETHRAL DILATATION with Balloon, TRANSURETHRAL RESECTION OF BLADDER TUMOR;  Surgeon: Yon Sloan MD;  Location:  [facility] OR;  Service: Urology;  Laterality: N/A;     Family History   Problem Relation Age of Onset   • Colon cancer Mother    • Prostate cancer Father    • No Known Problems Maternal Grandmother    • No Known Problems Maternal Grandfather    • No Known Problems Paternal Grandmother    • No Known Problems Paternal Grandfather       reports that he quit smoking about 26 years ago. His smoking use included cigarettes. He has a 20.00 pack-year smoking history. He has never used smokeless tobacco. He reports current alcohol use. He reports that he does not use drugs.      Current Outpatient Medications:   •  albuterol sulfate  (90 Base) MCG/ACT inhaler, 2 sprays 2 minutes apart every 6 hours as needed, Disp: 18 g, Rfl: 5  •  carbidopa-levodopa (SINEMET)  MG per tablet, Take 1 tablet by mouth 2 (Two) Times a Day., Disp: 180 tablet, Rfl: 3  •  Cholecalciferol (VITAMIN D3) 2000 units capsule, Take 2,000 Units by mouth  Daily., Disp: , Rfl:   •  clobetasol (TEMOVATE) 0.05 % cream, Apply  topically to the appropriate area as directed Take As Directed., Disp: 30 g, Rfl: 1  •  furosemide (LASIX) 40 MG tablet, TAKE 1 TABLET BY MOUTH DAILY., Disp: 90 tablet, Rfl: 1  •  glipizide (GLUCOTROL XL) 5 MG ER tablet, TAKE 2 TABLETS BY MOUTH DAILY. (Patient taking differently: Take 5 mg by mouth Daily.), Disp: 180 tablet, Rfl: 0  •  glucose blood test strip, 1 each by Other route Daily. Use as instructed, Disp: 100 each, Rfl: 3  •  HYDROcodone-acetaminophen (NORCO)  MG per tablet, Take 1 tablet by mouth Every 6 (Six) Hours As Needed for Moderate Pain., Disp: 90 tablet, Rfl: 0  •  indapamide (LOZOL) 2.5 MG tablet, TAKE 1 TABLET BY MOUTH EVERY MORNING, Disp: 90 tablet, Rfl: 2  •  Lancet Devices (EASY TOUCH LANCING DEVICE) misc, USE TO TEST DAILY, Disp: , Rfl: 0  •  losartan (COZAAR) 100 MG tablet, 1 at bedtime for blood pressure, Disp: 90 tablet, Rfl: 3  •  lovastatin (MEVACOR) 10 MG tablet, Take 1 tablet by mouth every night at bedtime., Disp: 90 tablet, Rfl: 2  •  metFORMIN (GLUCOPHAGE) 1000 MG tablet, Take 1 tablet by mouth 2 (Two) Times a Day With Meals., Disp: 180 tablet, Rfl: 3  •  naproxen sodium (ALEVE) 220 MG tablet, Take 220 mg by mouth 2 (Two) Times a Day As Needed., Disp: , Rfl:   •  ONETOUCH DELICA LANCETS 33G misc, 1 each Daily., Disp: 100 each, Rfl: 3  •  Potassium 99 MG tablet, Take 1 tablet by mouth 2 (Two) Times a Day., Disp: , Rfl:   •  aspirin 81 MG EC tablet, Take 81 mg by mouth Daily., Disp: , Rfl:   •  colchicine (Mitigare) 0.6 MG capsule capsule, Take 1 capsule by mouth Daily., Disp: 30 capsule, Rfl: 0  •  ELIQUIS 5 MG tablet tablet, Take 5 mg by mouth Every 12 (Twelve) Hours., Disp: , Rfl:   •  oxybutynin XL (DITROPAN-XL) 5 MG 24 hr tablet, Take 1 tablet by mouth Daily., Disp: 7 tablet, Rfl: 0    OBJECTIVE:  /80 (BP Location: Left arm, Patient Position: Sitting, Cuff Size: Large Adult)   Pulse 68   Temp 97.3  "°F (36.3 °C) (Temporal)   Ht 170.2 cm (67\")   Wt (!) 137 kg (301 lb 6.4 oz)   SpO2 99%   BMI 47.21 kg/m²    Physical Exam  Vitals reviewed.   Constitutional:       Appearance: He is well-developed.   Cardiovascular:      Rate and Rhythm: Normal rate.   Pulmonary:      Effort: Pulmonary effort is normal.   Neurological:      Mental Status: He is alert and oriented to person, place, and time.   Psychiatric:         Behavior: Behavior normal.         Assessment/Plan    Diagnoses and all orders for this visit:    1. Other reasons for seeking consultation (Primary)    2. Therapeutic drug monitoring  -     ToxASSURE Select 13 (MW) - Urine, Clean Catch    3. Hyperlipidemia, unspecified hyperlipidemia type  -     lovastatin (MEVACOR) 10 MG tablet; Take 1 tablet by mouth every night at bedtime.  Dispense: 90 tablet; Refill: 2    Other orders  -     clobetasol (TEMOVATE) 0.05 % cream; Apply  topically to the appropriate area as directed Take As Directed.  Dispense: 30 g; Refill: 1    We would like for [facility] to continue to take the lead on evaluation of the concerning pulmonary finding.  This will certainly centralize his care coordination and to be in his best interest to continue this.  I am not going to cancel his PET/CT yet in case we do ultimately need to get that done here.    [facility] is reviewed and appropriate. Contract is renewed today.  UDS done today.    An After Visit Summary was printed and given to the patient at discharge.  Return if symptoms worsen or fail to improve, for Next scheduled follow up.       Dolores LANDERS 2/6/2023    Electronically signed.  "

## 2023-02-12 LAB — DRUGS UR: NORMAL

## 2023-03-01 DIAGNOSIS — R52 PAIN: ICD-10-CM

## 2023-03-01 RX ORDER — HYDROCODONE BITARTRATE AND ACETAMINOPHEN 10; 325 MG/1; MG/1
1 TABLET ORAL EVERY 6 HOURS PRN
Qty: 90 TABLET | Refills: 0 | Status: SHIPPED | OUTPATIENT
Start: 2023-03-01 | End: 2023-03-31 | Stop reason: SDUPTHER

## 2023-03-01 RX ORDER — ALBUTEROL SULFATE 90 UG/1
AEROSOL, METERED RESPIRATORY (INHALATION)
Qty: 18 G | Refills: 5 | Status: SHIPPED | OUTPATIENT
Start: 2023-03-01

## 2023-03-01 NOTE — TELEPHONE ENCOUNTER
Rx Refill Note  Requested Prescriptions     Pending Prescriptions Disp Refills   • HYDROcodone-acetaminophen (NORCO)  MG per tablet 90 tablet 0     Sig: Take 1 tablet by mouth Every 6 (Six) Hours As Needed for Moderate Pain.   • albuterol sulfate  (90 Base) MCG/ACT inhaler 18 g 5     Si sprays 2 minutes apart every 6 hours as needed      Last office visit with prescribing clinician: 2022   Last telemedicine visit with prescribing clinician: Visit date not found   Next office visit with prescribing clinician: Visit date not found                         Would you like a call back once the refill request has been completed: [] Yes [] No    If the office needs to give you a call back, can they leave a voicemail: [] Yes [] No    Anamaria Adan, PCT  23, 07:51 CST

## 2023-03-17 PROBLEM — C34.11 MALIGNANT NEOPLASM OF UPPER LOBE OF RIGHT LUNG: Status: ACTIVE | Noted: 2023-03-17

## 2023-03-31 DIAGNOSIS — R52 PAIN: ICD-10-CM

## 2023-03-31 RX ORDER — HYDROCODONE BITARTRATE AND ACETAMINOPHEN 10; 325 MG/1; MG/1
1 TABLET ORAL EVERY 6 HOURS PRN
Qty: 90 TABLET | Refills: 0 | Status: SHIPPED | OUTPATIENT
Start: 2023-03-31

## 2023-03-31 NOTE — TELEPHONE ENCOUNTER
Rx Refill Note  Requested Prescriptions     Pending Prescriptions Disp Refills   • HYDROcodone-acetaminophen (NORCO)  MG per tablet 90 tablet 0     Sig: Take 1 tablet by mouth Every 6 (Six) Hours As Needed for Moderate Pain.      Last office visit with prescribing clinician: 11/11/2022   Last telemedicine visit with prescribing clinician: Visit date not found   Next office visit with prescribing clinician: Visit date not found                         Would you like a call back once the refill request has been completed: [] Yes [] No    If the office needs to give you a call back, can they leave a voicemail: [] Yes [] No    Deandre Nassar Rep  03/31/23, 11:52 CDT     Drug thereapy appt 2/6/22   contract done 2/6/22   UDS done 2/6/22

## 2023-04-12 ENCOUNTER — OFFICE VISIT (OUTPATIENT)
Dept: FAMILY MEDICINE CLINIC | Facility: CLINIC | Age: 73
End: 2023-04-12
Payer: MEDICARE

## 2023-04-12 VITALS
BODY MASS INDEX: 47.7 KG/M2 | WEIGHT: 303.9 LBS | DIASTOLIC BLOOD PRESSURE: 76 MMHG | HEART RATE: 60 BPM | TEMPERATURE: 98 F | HEIGHT: 67 IN | OXYGEN SATURATION: 98 % | SYSTOLIC BLOOD PRESSURE: 130 MMHG

## 2023-04-12 DIAGNOSIS — J40 BRONCHITIS: Primary | ICD-10-CM

## 2023-04-12 LAB
EXPIRATION DATE: NORMAL
FLUAV AG UPPER RESP QL IA.RAPID: NOT DETECTED
FLUBV AG UPPER RESP QL IA.RAPID: NOT DETECTED
INTERNAL CONTROL: NORMAL
Lab: NORMAL
SARS-COV-2 AG UPPER RESP QL IA.RAPID: NOT DETECTED

## 2023-04-12 RX ORDER — AZITHROMYCIN 250 MG/1
TABLET, FILM COATED ORAL
Qty: 6 TABLET | Refills: 0 | Status: SHIPPED | OUTPATIENT
Start: 2023-04-12

## 2023-04-12 NOTE — PROGRESS NOTES
Subjective   Ephraim Quinones is a 73 y.o. male.     History of Present Illness  73-year-old fully vaccinated against COVID patient with cough congestion-wife recently had COVID      The following portions of the patient's history were reviewed and updated as appropriate: allergies, current medications, past family history, past medical history, past social history, past surgical history and problem list.    Review of Systems   Constitutional: Negative for chills and fever.   HENT: Positive for postnasal drip and sore throat.    Respiratory: Positive for cough. Negative for shortness of breath.    Cardiovascular: Negative for chest pain and leg swelling.       Objective   Physical Exam  Vitals and nursing note reviewed.   Constitutional:       Appearance: He is obese.   HENT:      Mouth/Throat:      Mouth: Mucous membranes are moist.      Pharynx: Oropharynx is clear.   Neck:      Vascular: No carotid bruit.   Cardiovascular:      Rate and Rhythm: Normal rate and regular rhythm.   Pulmonary:      Effort: Pulmonary effort is normal.      Breath sounds: Normal breath sounds.   Musculoskeletal:      Right lower leg: No edema.      Left lower leg: No edema.   Lymphadenopathy:      Cervical: No cervical adenopathy.   Skin:     General: Skin is warm and dry.   Neurological:      General: No focal deficit present.      Mental Status: He is alert and oriented to person, place, and time.   Psychiatric:         Mood and Affect: Mood normal.         Behavior: Behavior normal.         Thought Content: Thought content normal.         Judgment: Judgment normal.         Assessment & Plan   Diagnoses and all orders for this visit:    1. Bronchitis (Primary)  -     POCT SARS-CoV-2 Antigen SARAVANAN + Flu    Other orders  -     azithromycin (Zithromax Z-Nicko) 250 MG tablet; Take 2 tablets the first day, then 1 tablet daily for 4 days.  Dispense: 6 tablet; Refill: 0       COVID flu negative-albuterol Mucinex plus above         Answers for  HPI/ROS submitted by the patient on 4/11/2023  What is the primary reason for your visit?: Other  Please describe your symptoms.: Head cold  Have you had these symptoms before?: Yes  How long have you been having these symptoms?: 1-4 days

## 2023-04-21 ENCOUNTER — CLINICAL SUPPORT (OUTPATIENT)
Dept: FAMILY MEDICINE CLINIC | Facility: CLINIC | Age: 73
End: 2023-04-21
Payer: MEDICARE

## 2023-04-21 RX ORDER — AMLODIPINE BESYLATE 5 MG/1
TABLET ORAL
Qty: 180 TABLET | Refills: 1 | Status: SHIPPED | OUTPATIENT
Start: 2023-04-21

## 2023-04-21 NOTE — TELEPHONE ENCOUNTER
Rx Refill Note  Requested Prescriptions     Pending Prescriptions Disp Refills   • amLODIPine (NORVASC) 5 MG tablet [Pharmacy Med Name: amlodipine 5 mg tablet] 180 tablet 0     Sig: TAKE 1 TABLET BY MOUTH TWICE DAILY      Last office visit with prescribing clinician: 4/12/2023   Last telemedicine visit with prescribing clinician: Visit date not found   Next office visit with prescribing clinician: Visit date not found       {TIP  Please add Last Relevant Lab 2/3/23  Helena Meehan MA  04/21/23, 10:50 CDT        Patient was in BP check this morning.  Per Dr. Hopkisn patient taking as needed.

## 2023-04-21 NOTE — PROGRESS NOTES
Patient came by office today to have his blood pressure checked and to compare his machine to my manual reading.  Last Thursday it was 188/70s and patient took an old amlodipine tablet and it went down to 160s.  Today his blood pressure was 144/72 manually and 148/82 with his machine.  Dr. Hopkins was advised about readings and said for patient to keep an eye on it and keep records but not changes to medication at this time.

## 2023-05-01 ENCOUNTER — OFFICE VISIT (OUTPATIENT)
Dept: FAMILY MEDICINE CLINIC | Facility: CLINIC | Age: 73
End: 2023-05-01
Payer: MEDICARE

## 2023-05-01 VITALS
BODY MASS INDEX: 46.74 KG/M2 | OXYGEN SATURATION: 97 % | TEMPERATURE: 98 F | WEIGHT: 297.8 LBS | HEART RATE: 90 BPM | DIASTOLIC BLOOD PRESSURE: 75 MMHG | HEIGHT: 67 IN | SYSTOLIC BLOOD PRESSURE: 127 MMHG

## 2023-05-01 DIAGNOSIS — R52 PAIN: ICD-10-CM

## 2023-05-01 DIAGNOSIS — J40 BRONCHITIS: Primary | ICD-10-CM

## 2023-05-01 DIAGNOSIS — S22.31XD CLOSED FRACTURE OF ONE RIB OF RIGHT SIDE WITH ROUTINE HEALING, SUBSEQUENT ENCOUNTER: ICD-10-CM

## 2023-05-01 RX ORDER — HYDROCODONE BITARTRATE AND ACETAMINOPHEN 10; 325 MG/1; MG/1
1 TABLET ORAL EVERY 6 HOURS PRN
Qty: 90 TABLET | Refills: 0 | Status: SHIPPED | OUTPATIENT
Start: 2023-05-01

## 2023-05-01 RX ORDER — PREDNISONE 10 MG/1
TABLET ORAL
Qty: 21 EACH | Refills: 0 | Status: SHIPPED | OUTPATIENT
Start: 2023-05-02

## 2023-05-01 RX ORDER — TRIAMCINOLONE ACETONIDE 40 MG/ML
40 INJECTION, SUSPENSION INTRA-ARTICULAR; INTRAMUSCULAR ONCE
Status: COMPLETED | OUTPATIENT
Start: 2023-05-01 | End: 2023-05-01

## 2023-05-01 RX ORDER — AZITHROMYCIN 250 MG/1
TABLET, FILM COATED ORAL
Qty: 6 TABLET | Refills: 0 | Status: SHIPPED | OUTPATIENT
Start: 2023-05-01

## 2023-05-01 RX ADMIN — TRIAMCINOLONE ACETONIDE 40 MG: 40 INJECTION, SUSPENSION INTRA-ARTICULAR; INTRAMUSCULAR at 09:07

## 2023-05-01 NOTE — PROGRESS NOTES
Fracture not evident on xray anymore, so it is likely healing. Send in steroid dose pack please to start tomorrow.

## 2023-05-01 NOTE — TELEPHONE ENCOUNTER
Rx Refill Note  Requested Prescriptions     Pending Prescriptions Disp Refills   • HYDROcodone-acetaminophen (NORCO)  MG per tablet 90 tablet 0     Sig: Take 1 tablet by mouth Every 6 (Six) Hours As Needed for Moderate Pain.      Last office visit with prescribing clinician: 4/12/2023   Last telemedicine visit with prescribing clinician: Visit date not found   Next office visit with prescribing clinician: Visit date not found                         Would you like a call back once the refill request has been completed: [] Yes [] No    If the office needs to give you a call back, can they leave a voicemail: [] Yes [] No    Anamaria Adan, PCT  05/01/23, 13:53 CDT

## 2023-05-01 NOTE — PROGRESS NOTES
CC: cough and rib pain    History:  Ephraim Quinones is a 73 y.o. male who presents today for evaluation of the above problems.      Patient complains of pain in his back and ribs on the right side. Has known fracture found in late March.  States that pain is getting worse instead of better. He is wearing a rib belt. Also states that he has developed a cough that started on last Thursday. Will be having lung surgery on May 18 for primary lung cancer. No fever, chills. No other symptoms.     HPI  ROS:  Review of Systems   Constitutional: Negative for fever.   HENT: Negative for congestion.    Respiratory: Positive for cough. Negative for shortness of breath.    Musculoskeletal: Positive for back pain.        Rib pain       Allergies   Allergen Reactions   • Simvastatin Other (See Comments)     Leg Cramps   • Cleocin [Clindamycin] GI Intolerance   • Percocet [Oxycodone-Acetaminophen] Itching     Past Medical History:   Diagnosis Date   • A-fib    • Acid reflux    • Anemia 05/03/2016   • Claustrophobia    • Diabetes mellitus    • Gallstones    • Gout    • Hyperlipidemia    • Hypertension    • Malignant neoplasm of upper lobe of right lung 3/17/2023   • Pneumonia    • Sleep apnea with use of continuous positive airway pressure (CPAP)      Past Surgical History:   Procedure Laterality Date   • BACK SURGERY      x2   • BLADDER SURGERY  02/2023   • CATARACT EXTRACTION     • CIRCUMCISION N/A 06/06/2019    Procedure: CIRCUMCISION;  Surgeon: Yon Sloan MD;  Location: [facility] OR;  Service: Urology   • COLONOSCOPY     • FINGER SURGERY Left    • LUNG BIOPSY Right 03/13/2023   • REPLACEMENT TOTAL KNEE Bilateral    • URETHRAL DILATATION N/A 12/01/2022    Procedure: URETHRAL DILATATION with Balloon, TRANSURETHRAL RESECTION OF BLADDER TUMOR;  Surgeon: Yon Sloan MD;  Location: [facility] OR;  Service: Urology;  Laterality: N/A;     Family History   Problem Relation Age of Onset   • Colon cancer Mother    •  Prostate cancer Father    • No Known Problems Maternal Grandmother    • No Known Problems Maternal Grandfather    • No Known Problems Paternal Grandmother    • No Known Problems Paternal Grandfather       reports that he quit smoking about 26 years ago. His smoking use included cigarettes. He has a 20.00 pack-year smoking history. He has never used smokeless tobacco. He reports current alcohol use. He reports that he does not use drugs.      Current Outpatient Medications:   •  albuterol sulfate  (90 Base) MCG/ACT inhaler, 2 sprays 2 minutes apart every 6 hours as needed, Disp: 18 g, Rfl: 5  •  amLODIPine (NORVASC) 5 MG tablet, TAKE 1 TABLET BY MOUTH TWICE DAILY, Disp: 180 tablet, Rfl: 1  •  aspirin 81 MG EC tablet, Take 1 tablet by mouth Daily., Disp: , Rfl:   •  carbidopa-levodopa (SINEMET)  MG per tablet, Take 1 tablet by mouth 2 (Two) Times a Day., Disp: 180 tablet, Rfl: 3  •  Cholecalciferol (VITAMIN D3) 2000 units capsule, Take 1 capsule by mouth Daily., Disp: , Rfl:   •  clobetasol (TEMOVATE) 0.05 % cream, Apply  topically to the appropriate area as directed Take As Directed., Disp: 30 g, Rfl: 1  •  colchicine (Mitigare) 0.6 MG capsule capsule, Take 1 capsule by mouth Daily., Disp: 30 capsule, Rfl: 0  •  ELIQUIS 5 MG tablet tablet, Take 1 tablet by mouth Every 12 (Twelve) Hours., Disp: , Rfl:   •  furosemide (LASIX) 40 MG tablet, TAKE 1 TABLET BY MOUTH DAILY., Disp: 90 tablet, Rfl: 1  •  glipizide (GLUCOTROL XL) 5 MG ER tablet, TAKE 2 TABLETS BY MOUTH DAILY. (Patient taking differently: Take 1 tablet by mouth Daily.), Disp: 180 tablet, Rfl: 0  •  glucose blood test strip, 1 each by Other route Daily. Use as instructed, Disp: 100 each, Rfl: 3  •  HYDROcodone-acetaminophen (NORCO)  MG per tablet, Take 1 tablet by mouth Every 6 (Six) Hours As Needed for Moderate Pain., Disp: 90 tablet, Rfl: 0  •  indapamide (LOZOL) 2.5 MG tablet, TAKE 1 TABLET BY MOUTH EVERY MORNING, Disp: 90 tablet, Rfl: 2  •  " Lancet Devices (EASY TOUCH LANCING DEVICE) misc, USE TO TEST DAILY, Disp: , Rfl: 0  •  losartan (COZAAR) 100 MG tablet, 1 at bedtime for blood pressure, Disp: 90 tablet, Rfl: 3  •  lovastatin (MEVACOR) 10 MG tablet, Take 1 tablet by mouth every night at bedtime., Disp: 90 tablet, Rfl: 2  •  metFORMIN (GLUCOPHAGE) 1000 MG tablet, Take 1 tablet by mouth 2 (Two) Times a Day With Meals., Disp: 180 tablet, Rfl: 3  •  naproxen sodium (ALEVE) 220 MG tablet, Take 1 tablet by mouth 2 (Two) Times a Day As Needed., Disp: , Rfl:   •  ONETOUCH DELICA LANCETS 33G misc, 1 each Daily., Disp: 100 each, Rfl: 3  •  oxybutynin XL (DITROPAN-XL) 5 MG 24 hr tablet, Take 1 tablet by mouth Daily., Disp: 7 tablet, Rfl: 0  •  Potassium 99 MG tablet, Take 1 tablet by mouth 2 (Two) Times a Day., Disp: , Rfl:   •  azithromycin (Zithromax) 250 MG tablet, Take 2 tablets the first day, then 1 tablet daily for 4 days., Disp: 6 tablet, Rfl: 0    Current Facility-Administered Medications:   •  triamcinolone acetonide (KENALOG-40) injection 40 mg, 40 mg, Intramuscular, Once, Dolores Ba, APRN    OBJECTIVE:  /75 (BP Location: Left arm, Patient Position: Sitting, Cuff Size: Large Adult)   Pulse 90   Temp 98 °F (36.7 °C) (Temporal)   Ht 170.2 cm (67\")   Wt 135 kg (297 lb 12.8 oz)   SpO2 97%   BMI 46.64 kg/m²    Physical Exam  Vitals reviewed.   Constitutional:       Appearance: He is well-developed.   Cardiovascular:      Rate and Rhythm: Normal rate and regular rhythm.      Heart sounds: Normal heart sounds.   Pulmonary:      Effort: Pulmonary effort is normal.      Breath sounds: Normal breath sounds.   Neurological:      Mental Status: He is alert and oriented to person, place, and time.   Psychiatric:         Behavior: Behavior normal.         Assessment/Plan    Diagnoses and all orders for this visit:    1. Bronchitis (Primary)  -     azithromycin (Zithromax) 250 MG tablet; Take 2 tablets the first day, then 1 tablet daily for 4 " days.  Dispense: 6 tablet; Refill: 0  -     triamcinolone acetonide (KENALOG-40) injection 40 mg    2. Closed fracture of one rib of right side with routine healing, subsequent encounter  -     Cancel: XR Ribs 2 View Right; Future  -     Cancel: XR Spine Lumbar 4+ View; Future  -     XR Ribs 2 View Right; Future  -     XR Spine Lumbar 4+ View; Future        An After Visit Summary was printed and given to the patient at discharge.  Return if symptoms worsen or fail to improve, for Next scheduled follow up.       Dolores Ba, ANSHUL 5/1/23    Electronically signed.

## 2023-05-12 RX ORDER — PREDNISONE 10 MG/1
TABLET ORAL
Qty: 21 EACH | Refills: 0 | Status: SHIPPED | OUTPATIENT
Start: 2023-05-12

## 2023-05-12 NOTE — TELEPHONE ENCOUNTER
He said that his pain pills just aren't hardly enough. He has an appt in [place] next Tuesday. Anything you recommend until then?

## 2023-05-12 NOTE — TELEPHONE ENCOUNTER
Mr. Bajwa said he is at [facility] today and they said even if he has surgery that he could continue the Prednisone, so he would like to see about getting a refill for his back.   Rx Refill Note  Requested Prescriptions     Pending Prescriptions Disp Refills   • predniSONE (DELTASONE) 10 MG (21) dose pack 21 each 0     Sig: Use as directed on package      Last office visit with prescribing clinician: 4/12/2023   Last telemedicine visit with prescribing clinician: 5/1/2023   Next office visit with prescribing clinician: Visit date not found                         Would you like a call back once the refill request has been completed: [] Yes [] No    If the office needs to give you a call back, can they leave a voicemail: [] Yes [] No    Anamaria Adan, PCT  05/12/23, 11:10 CDT

## 2023-05-12 NOTE — TELEPHONE ENCOUNTER
He just finished a steroid dose pack and is saying his pain pills just aren't quite enough. You have any thoughts?

## 2023-05-12 NOTE — TELEPHONE ENCOUNTER
He seemed like you might know that he needed it, He said to help with his back? Is that not correct?

## 2023-05-23 RX ORDER — FUROSEMIDE 40 MG/1
40 TABLET ORAL DAILY
Qty: 90 TABLET | Refills: 1 | Status: SHIPPED | OUTPATIENT
Start: 2023-05-23

## 2023-05-23 NOTE — TELEPHONE ENCOUNTER
Rx Refill Note  Requested Prescriptions     Pending Prescriptions Disp Refills   • furosemide (LASIX) 40 MG tablet 90 tablet 1     Sig: Take 1 tablet by mouth Daily.      Last office visit with prescribing clinician: 4/12/2023   Last telemedicine visit with prescribing clinician: Visit date not found   Next office visit with prescribing clinician: Visit date not found                         Would you like a call back once the refill request has been completed: [] Yes [] No    If the office needs to give you a call back, can they leave a voicemail: [] Yes [] No    Deandre Nassar Rep  05/23/23, 14:51 CDT

## 2023-05-30 ENCOUNTER — TELEPHONE (OUTPATIENT)
Dept: FAMILY MEDICINE CLINIC | Facility: CLINIC | Age: 73
End: 2023-05-30

## 2023-05-30 DIAGNOSIS — R52 PAIN: ICD-10-CM

## 2023-05-30 DIAGNOSIS — E11.9 TYPE 2 DIABETES MELLITUS WITHOUT COMPLICATION, WITHOUT LONG-TERM CURRENT USE OF INSULIN: Primary | ICD-10-CM

## 2023-05-30 RX ORDER — GLIPIZIDE 5 MG/1
5 TABLET, FILM COATED, EXTENDED RELEASE ORAL DAILY
Qty: 90 TABLET | Refills: 3 | Status: SHIPPED | OUTPATIENT
Start: 2023-05-30

## 2023-05-30 RX ORDER — HYDROCODONE BITARTRATE AND ACETAMINOPHEN 10; 325 MG/1; MG/1
1 TABLET ORAL EVERY 6 HOURS PRN
Qty: 90 TABLET | Refills: 0 | Status: SHIPPED | OUTPATIENT
Start: 2023-05-30

## 2023-05-30 NOTE — TELEPHONE ENCOUNTER
Pt's granddaughter calling and unable to find Norco locally available.  Pt does have an appt scheduled for tomorrow for yvan/contract/uds but would like rx to be sent today so granddaughter can  after work.  Only pharmacy available was in [place] but was not sure how long they would have them.      Please send rx to [facility]   [address] [address].    Ok to send in?

## 2023-05-30 NOTE — TELEPHONE ENCOUNTER
Rx Refill Note  Requested Prescriptions     Pending Prescriptions Disp Refills   • glipizide (GLUCOTROL XL) 5 MG ER tablet [Pharmacy Med Name: glipizide ER 5 mg tablet, extended release 24 hr] 180 tablet 0     Sig: TAKE 2 TABLETS BY MOUTH DAILY.      Last office visit with prescribing clinician: 5/1/2023   Last telemedicine visit with prescribing clinician: Visit date not found   Next office visit with prescribing clinician: Visit date not found       {TIP  Please add Last Relevant Lab 8/25/22  Helena Meehan MA  05/30/23, 09:23 CDT

## 2023-05-31 ENCOUNTER — OFFICE VISIT (OUTPATIENT)
Dept: FAMILY MEDICINE CLINIC | Facility: CLINIC | Age: 73
End: 2023-05-31

## 2023-05-31 VITALS
HEIGHT: 67 IN | HEART RATE: 97 BPM | DIASTOLIC BLOOD PRESSURE: 68 MMHG | BODY MASS INDEX: 44.8 KG/M2 | SYSTOLIC BLOOD PRESSURE: 118 MMHG | WEIGHT: 285.4 LBS | OXYGEN SATURATION: 97 %

## 2023-05-31 DIAGNOSIS — G89.29 CHRONIC BILATERAL LOW BACK PAIN WITHOUT SCIATICA: ICD-10-CM

## 2023-05-31 DIAGNOSIS — E11.9 TYPE 2 DIABETES MELLITUS WITHOUT COMPLICATION, WITHOUT LONG-TERM CURRENT USE OF INSULIN: Primary | ICD-10-CM

## 2023-05-31 DIAGNOSIS — M54.50 CHRONIC BILATERAL LOW BACK PAIN WITHOUT SCIATICA: ICD-10-CM

## 2023-05-31 LAB
EXPIRATION DATE: ABNORMAL
HBA1C MFR BLD: 6.6 %
Lab: ABNORMAL

## 2023-05-31 RX ORDER — METHOCARBAMOL 500 MG/1
500 TABLET, FILM COATED ORAL 4 TIMES DAILY
Qty: 44 TABLET | Refills: 0 | COMMUNITY
Start: 2023-05-21 | End: 2023-06-01

## 2023-05-31 RX ORDER — SENNA PLUS 8.6 MG/1
2 TABLET ORAL
COMMUNITY
Start: 2023-05-21 | End: 2023-06-11

## 2023-05-31 RX ORDER — GABAPENTIN 300 MG/1
300 CAPSULE ORAL 3 TIMES DAILY
Qty: 45 CAPSULE | Refills: 0 | COMMUNITY
Start: 2023-05-21 | End: 2023-06-02 | Stop reason: SDUPTHER

## 2023-05-31 NOTE — PROGRESS NOTES
Subjective   Ephraim Quinones is a 73 y.o. male.     History of Present Illness  73-year-old male with chronic back pain- recent right upper lobe resection at [place] for lung cancer-May 18        12/1/2021     8:00 AM 3/1/2022     8:00 AM 5/27/2022     8:00 AM 8/25/2022     8:00 AM 11/11/2022     9:00 AM 2/6/2023     8:00 AM 5/31/2023     9:00 AM   CONTROLLED SUBSTANCE TRACKING   Last Gregory 12/1/2021 3/1/2022 5/27/2022 8/25/2022 11/11/2022 2/6/2023 5/31/2023   Report Number reviewed through epic reviewed through epic  reviewed through epic  reviewed through epic    Last UDS 12/1/2021 12/1/2021 12/1/2021 12/1/2021 12/1/2021 2/6/2023 2/6/2023   Last Controlled Substance Agreement 12/1/2021 12/1/2021 12/1/2021 12/1/2021 12/1/2021 2/6/2023 2/6/2023       The following portions of the patient's history were reviewed and updated as appropriate: allergies, current medications, past family history, past medical history, past social history, past surgical history and problem list.    Review of Systems   Respiratory: Negative for cough and shortness of breath.    Cardiovascular: Negative for chest pain and leg swelling.   Endocrine: Negative for polydipsia, polyphagia and polyuria.   Musculoskeletal: Positive for arthralgias and back pain.       Objective   Physical Exam  Vitals reviewed.   Constitutional:       Appearance: He is obese.   Cardiovascular:      Rate and Rhythm: Normal rate and regular rhythm.   Pulmonary:      Effort: Pulmonary effort is normal.      Breath sounds: Normal breath sounds.   Musculoskeletal:      Right lower leg: No edema.      Left lower leg: No edema.   Skin:     General: Skin is warm and dry.      Comments: Rash right chest allergic to tape where he had his lobectomy improving no signs of infection   Neurological:      General: No focal deficit present.      Mental Status: He is alert and oriented to person, place, and time.   Psychiatric:         Mood and Affect: Mood normal.          Behavior: Behavior normal.         Thought Content: Thought content normal.         Judgment: Judgment normal.         Assessment & Plan   Diagnoses and all orders for this visit:    1. Type 2 diabetes mellitus without complication, without long-term current use of insulin (Primary)  -     ORDER: POC Glycosylated Hemoglobin (Hb A1C)    2. Chronic bilateral low back pain without sciatica         Plan above-hemoglobin A1c 6.6-follow-up with specialist

## 2023-06-02 DIAGNOSIS — G62.9 NEUROPATHY: Primary | ICD-10-CM

## 2023-06-02 RX ORDER — GABAPENTIN 300 MG/1
300 CAPSULE ORAL 3 TIMES DAILY
Qty: 45 CAPSULE | Refills: 0 | Status: SHIPPED | OUTPATIENT
Start: 2023-06-02 | End: 2023-06-17

## 2023-06-08 NOTE — TELEPHONE ENCOUNTER
Adrienne called and asked if Ephraim can get some albuterol nebulizer solution? She says she has the supplies just needs albuterol. Says he is still coughing and thinks he needs it. Please advise

## 2023-06-09 RX ORDER — ALBUTEROL SULFATE 1.25 MG/3ML
1 SOLUTION RESPIRATORY (INHALATION) 3 TIMES DAILY PRN
Qty: 90 EACH | Refills: 0 | Status: SHIPPED | OUTPATIENT
Start: 2023-06-09 | End: 2023-07-09

## 2023-06-16 DIAGNOSIS — G62.9 NEUROPATHY: ICD-10-CM

## 2023-06-16 RX ORDER — GABAPENTIN 300 MG/1
CAPSULE ORAL
Qty: 45 CAPSULE | Refills: 0 | Status: SHIPPED | OUTPATIENT
Start: 2023-06-16

## 2023-06-16 NOTE — TELEPHONE ENCOUNTER
Drug thereapy appt 05/31/2023  contract done 2/6/23  UDS done 2/6/23    Rx Refill Note  Requested Prescriptions     Pending Prescriptions Disp Refills    gabapentin (NEURONTIN) 300 MG capsule [Pharmacy Med Name: gabapentin 300 mg capsule] 45 capsule 0     Sig: Take 1 capsule by mouth 3 Times a Day for 15 days.      Last office visit with prescribing clinician: 5/31/2023   Last telemedicine visit with prescribing clinician: Visit date not found   Next office visit with prescribing clinician: Visit date not found   {    Helena Meehan MA  06/16/23, 08:47 CDT

## 2023-08-01 DIAGNOSIS — R52 PAIN: ICD-10-CM

## 2023-08-01 RX ORDER — HYDROCODONE BITARTRATE AND ACETAMINOPHEN 10; 325 MG/1; MG/1
1 TABLET ORAL EVERY 6 HOURS PRN
Qty: 90 TABLET | Refills: 0 | Status: SHIPPED | OUTPATIENT
Start: 2023-08-01

## 2023-08-26 DIAGNOSIS — E11.9 TYPE 2 DIABETES MELLITUS WITHOUT COMPLICATION, WITHOUT LONG-TERM CURRENT USE OF INSULIN: Primary | ICD-10-CM

## 2023-08-28 DIAGNOSIS — R52 PAIN: ICD-10-CM

## 2023-08-28 NOTE — TELEPHONE ENCOUNTER
Rx Refill Note  Requested Prescriptions     Pending Prescriptions Disp Refills    metFORMIN (GLUCOPHAGE) 1000 MG tablet [Pharmacy Med Name: metformin 1,000 mg tablet] 180 tablet 3     Sig: TAKE 1 TABLET BY MOUTH 2 TIMES A DAY WITH MEALS.      Last office visit with prescribing clinician: 5/31/2023   Last telemedicine visit with prescribing clinician: Visit date not found   Next office visit with prescribing clinician: Visit date not found       {TIP  Please add Last Relevant Lab 8/25/22  Helena Meehan MA  08/28/23, 07:34 CDT

## 2023-08-29 ENCOUNTER — OFFICE VISIT (OUTPATIENT)
Dept: FAMILY MEDICINE CLINIC | Facility: CLINIC | Age: 73
End: 2023-08-29
Payer: MEDICARE

## 2023-08-29 VITALS
SYSTOLIC BLOOD PRESSURE: 102 MMHG | HEIGHT: 67 IN | OXYGEN SATURATION: 98 % | WEIGHT: 271.6 LBS | DIASTOLIC BLOOD PRESSURE: 72 MMHG | BODY MASS INDEX: 42.63 KG/M2 | TEMPERATURE: 98 F | HEART RATE: 77 BPM

## 2023-08-29 DIAGNOSIS — L57.0 ACTINIC KERATOSES: ICD-10-CM

## 2023-08-29 DIAGNOSIS — G62.9 NEUROPATHY: Primary | ICD-10-CM

## 2023-08-29 RX ORDER — HYDROCODONE BITARTRATE AND ACETAMINOPHEN 10; 325 MG/1; MG/1
1 TABLET ORAL EVERY 6 HOURS PRN
Qty: 90 TABLET | Refills: 0 | Status: SHIPPED | OUTPATIENT
Start: 2023-08-29

## 2023-08-29 NOTE — PROGRESS NOTES
Procedure   Cryotherapy, Skin Lesion    Date/Time: 8/29/2023 9:35 AM  Performed by: Dolores Ba APRN  Authorized by: Dolores Ba APRN   Preparation: Patient was prepped and draped in the usual sterile fashion.  Local anesthesia used: no    Anesthesia:  Local anesthesia used: no    Sedation:  Patient sedated: no    Patient tolerance: patient tolerated the procedure well with no immediate complications  Comments: Bilateral AK on each temple. Cryotherapy performed with 20 second freeze.

## 2023-08-29 NOTE — TELEPHONE ENCOUNTER
Drug thereapy appt 8/29/23  contract done 2/6/23  UDS done 2/6/23    Rx Refill Note  Requested Prescriptions     Pending Prescriptions Disp Refills    HYDROcodone-acetaminophen (NORCO)  MG per tablet 90 tablet 0     Sig: Take 1 tablet by mouth Every 6 (Six) Hours As Needed for Moderate Pain.      Last office visit with prescribing clinician: 8/29/23   Last telemedicine visit with prescribing clinician: Visit date not found   Next office visit with prescribing clinician:   Helena Meehan MA  08/29/23, 09:06 CDT

## 2023-08-29 NOTE — PROGRESS NOTES
CC: controlled med monitoring and skin lesions    History:  Ephraim Quinones is a 73 y.o. male who presents today for evaluation of the above problems.      Patient continues on gabapentin for neuropathy of his low back. Currently this is not working well, however, he is undergoing workup by ortho/neurosurgery.     Also notes that he has rough spots on each side of his face, in the temple area, that become sore and won't heal.           3/1/2022     8:00 AM 5/27/2022     8:00 AM 8/25/2022     8:00 AM 11/11/2022     9:00 AM 2/6/2023     8:00 AM 5/31/2023     9:00 AM 8/29/2023     8:00 AM   CONTROLLED SUBSTANCE TRACKING   Last Gregory 3/1/2022 5/27/2022 8/25/2022 11/11/2022 2/6/2023 5/31/2023 8/29/2023   Report Number reviewed through epic  reviewed through epic  reviewed through epic  reviewed through epic   Last UDS 12/1/2021 12/1/2021 12/1/2021 12/1/2021 2/6/2023 2/6/2023 2/6/2023   Last Controlled Substance Agreement 12/1/2021 12/1/2021 12/1/2021 12/1/2021 2/6/2023 2/6/2023 2/6/2023         HPI  ROS:  Review of Systems   Musculoskeletal:  Positive for back pain.   Skin:         Rough, sore areas on temples bilaterally     Allergies   Allergen Reactions    Adhesive Tape Other (See Comments)     Blisters and rips skin off    Simvastatin Other (See Comments)     Leg Cramps    Cleocin [Clindamycin] GI Intolerance    Percocet [Oxycodone-Acetaminophen] Itching     Past Medical History:   Diagnosis Date    A-fib     Acid reflux     Anemia 05/03/2016    Claustrophobia     Diabetes mellitus     Gallstones     Gout     Hyperlipidemia     Hypertension     Malignant neoplasm of upper lobe of right lung 3/17/2023    Pneumonia     Sleep apnea with use of continuous positive airway pressure (CPAP)      Past Surgical History:   Procedure Laterality Date    BACK SURGERY      x2    BLADDER SURGERY  02/2023    CATARACT EXTRACTION      CIRCUMCISION N/A 06/06/2019    Procedure: CIRCUMCISION;  Surgeon: Yon Sloan MD;   Location:  [facility] OR;  Service: Urology    COLONOSCOPY      FINGER SURGERY Left     LUNG BIOPSY Right 03/13/2023    REPLACEMENT TOTAL KNEE Bilateral     URETHRAL DILATATION N/A 12/01/2022    Procedure: URETHRAL DILATATION with Balloon, TRANSURETHRAL RESECTION OF BLADDER TUMOR;  Surgeon: Yon Sloan MD;  Location:  [facility] OR;  Service: Urology;  Laterality: N/A;     Family History   Problem Relation Age of Onset    Colon cancer Mother     Prostate cancer Father     No Known Problems Maternal Grandmother     No Known Problems Maternal Grandfather     No Known Problems Paternal Grandmother     No Known Problems Paternal Grandfather       reports that he quit smoking about 27 years ago. His smoking use included cigarettes. He has a 20.00 pack-year smoking history. He has never used smokeless tobacco. He reports current alcohol use. He reports that he does not use drugs.      Current Outpatient Medications:     albuterol sulfate  (90 Base) MCG/ACT inhaler, 2 sprays 2 minutes apart every 6 hours as needed, Disp: 18 g, Rfl: 5    carbidopa-levodopa (SINEMET)  MG per tablet, Take 1 tablet by mouth 2 (Two) Times a Day., Disp: 180 tablet, Rfl: 3    Cholecalciferol (VITAMIN D3) 2000 units capsule, Take 1 capsule by mouth Daily., Disp: , Rfl:     clobetasol (TEMOVATE) 0.05 % cream, Apply  topically to the appropriate area as directed Take As Directed., Disp: 30 g, Rfl: 1    furosemide (LASIX) 40 MG tablet, Take 1 tablet by mouth Daily., Disp: 90 tablet, Rfl: 1    gabapentin (NEURONTIN) 300 MG capsule, Take 1 capsule by mouth 4 (Four) Times a Day., Disp: 120 capsule, Rfl: 1    glipizide (GLUCOTROL XL) 5 MG ER tablet, Take 1 tablet by mouth Daily., Disp: 90 tablet, Rfl: 3    glucose blood test strip, 1 each by Other route Daily. Use as instructed, Disp: 100 each, Rfl: 3    HYDROcodone-acetaminophen (NORCO)  MG per tablet, Take 1 tablet by mouth Every 6 (Six) Hours As Needed for Moderate Pain., Disp: 90  "tablet, Rfl: 0    indapamide (LOZOL) 2.5 MG tablet, TAKE 1 TABLET BY MOUTH EVERY MORNING, Disp: 90 tablet, Rfl: 2    Lancet Devices (EASY TOUCH LANCING DEVICE) misc, USE TO TEST DAILY, Disp: , Rfl: 0    losartan (COZAAR) 100 MG tablet, 1 at bedtime for blood pressure, Disp: 90 tablet, Rfl: 3    metFORMIN (GLUCOPHAGE) 1000 MG tablet, TAKE 1 TABLET BY MOUTH 2 TIMES A DAY WITH MEALS., Disp: 180 tablet, Rfl: 0    naproxen sodium (ALEVE) 220 MG tablet, Take 1 tablet by mouth 2 (Two) Times a Day As Needed., Disp: , Rfl:     ONETOUCH DELICA LANCETS 33G misc, 1 each Daily., Disp: 100 each, Rfl: 3    oxybutynin XL (DITROPAN-XL) 5 MG 24 hr tablet, Take 1 tablet by mouth Daily., Disp: 7 tablet, Rfl: 0    Potassium 99 MG tablet, Take 1 tablet by mouth 2 (Two) Times a Day., Disp: , Rfl:     pramipexole (Mirapex) 0.25 MG tablet, 1-2 nightly for leg cramps--the dose has been increased, Disp: 90 tablet, Rfl: 1    amLODIPine (NORVASC) 5 MG tablet, TAKE 1 TABLET BY MOUTH TWICE DAILY (Patient not taking: Reported on 8/29/2023), Disp: 180 tablet, Rfl: 1    aspirin 81 MG EC tablet, Take 1 tablet by mouth Daily. (Patient not taking: Reported on 8/29/2023), Disp: , Rfl:     colchicine (Mitigare) 0.6 MG capsule capsule, Take 1 capsule by mouth Daily. (Patient not taking: Reported on 8/29/2023), Disp: 30 capsule, Rfl: 0    ELIQUIS 5 MG tablet tablet, Take 1 tablet by mouth Every 12 (Twelve) Hours. (Patient not taking: Reported on 8/29/2023), Disp: , Rfl:     lovastatin (MEVACOR) 10 MG tablet, Take 1 tablet by mouth every night at bedtime. (Patient not taking: Reported on 8/29/2023), Disp: 90 tablet, Rfl: 2    OBJECTIVE:  /72 (BP Location: Left arm, Patient Position: Sitting, Cuff Size: Large Adult)   Pulse 77   Temp 98 øF (36.7 øC) (Temporal)   Ht 170.2 cm (67\")   Wt 123 kg (271 lb 9.6 oz)   SpO2 98%   BMI 42.54 kg/mý    Physical Exam  Vitals reviewed.   Constitutional:       Appearance: He is well-developed.   HENT:      Head: "      Comments: Apparent AK on each temple   Cardiovascular:      Rate and Rhythm: Normal rate.   Pulmonary:      Effort: Pulmonary effort is normal.   Neurological:      Mental Status: He is alert and oriented to person, place, and time.   Psychiatric:         Behavior: Behavior normal.     Assessment/Plan    Diagnoses and all orders for this visit:    1. Neuropathy (Primary)    2. Actinic keratoses  -     Cryotherapy, Skin Lesion    UDS and contract up to date. Gregory reviewed and appropriate.     An After Visit Summary was printed and given to the patient at discharge.  Return in about 1 week (around 9/5/2023) for Annual physical.       ANSHUL Noel 8/29/23    Electronically signed.

## 2023-09-06 DIAGNOSIS — I10 PRIMARY HYPERTENSION: Primary | ICD-10-CM

## 2023-09-07 RX ORDER — LOSARTAN POTASSIUM 100 MG/1
TABLET ORAL
Qty: 90 TABLET | Refills: 3 | Status: SHIPPED | OUTPATIENT
Start: 2023-09-07

## 2023-09-07 NOTE — TELEPHONE ENCOUNTER
Rx Refill Note  Requested Prescriptions     Pending Prescriptions Disp Refills    losartan (COZAAR) 100 MG tablet 90 tablet 3     Si at bedtime for blood pressure      Last office visit with prescribing clinician: 23   Last telemedicine visit with prescribing clinician: Visit date not found   Next office visit with prescribing clinician: 23    {TIP  Please add Last Relevant Lab 23    Helena Meehan MA  23, 07:28 CDT

## 2023-09-08 ENCOUNTER — OFFICE VISIT (OUTPATIENT)
Dept: FAMILY MEDICINE CLINIC | Facility: CLINIC | Age: 73
End: 2023-09-08
Payer: MEDICARE

## 2023-09-08 VITALS
HEIGHT: 67 IN | BODY MASS INDEX: 44.2 KG/M2 | DIASTOLIC BLOOD PRESSURE: 78 MMHG | HEART RATE: 73 BPM | TEMPERATURE: 98 F | OXYGEN SATURATION: 98 % | WEIGHT: 281.6 LBS | SYSTOLIC BLOOD PRESSURE: 118 MMHG

## 2023-09-08 DIAGNOSIS — R53.83 FATIGUE, UNSPECIFIED TYPE: ICD-10-CM

## 2023-09-08 DIAGNOSIS — I10 PRIMARY HYPERTENSION: ICD-10-CM

## 2023-09-08 DIAGNOSIS — Z12.5 PROSTATE CANCER SCREENING: ICD-10-CM

## 2023-09-08 DIAGNOSIS — E11.9 TYPE 2 DIABETES MELLITUS WITHOUT COMPLICATION, WITHOUT LONG-TERM CURRENT USE OF INSULIN: ICD-10-CM

## 2023-09-08 DIAGNOSIS — Z00.00 MEDICARE ANNUAL WELLNESS VISIT, SUBSEQUENT: Primary | ICD-10-CM

## 2023-09-08 DIAGNOSIS — E66.01 MORBIDLY OBESE: ICD-10-CM

## 2023-09-08 DIAGNOSIS — Z12.11 COLON CANCER SCREENING: ICD-10-CM

## 2023-09-08 NOTE — PROGRESS NOTES
The ABCs of the Annual Wellness Visit  Subsequent Medicare Wellness Visit    Subjective    Ephraim Quinones is a 73 y.o. male who presents for a Subsequent Medicare Wellness Visit.    The following portions of the patient's history were reviewed and   updated as appropriate: allergies, current medications, past family history, past medical history, past social history, past surgical history, and problem list.    Compared to one year ago, the patient feels his physical   health is worse.    Compared to one year ago, the patient feels his mental   health is the same.    Recent Hospitalizations:  He was admitted within the past 365 days at [facility] for Bronchoscopy. Mediastinoscopy. Right thoracoscopy with extended right upper lobectomy (right upper lobectomy with en bloc wedge resection of the right lower lobe (5/18/23)       Current Medical Providers:  Patient Care Team:  Jim Hopkins MD as PCP - Noah Linder MD as Consulting Physician (Orthopedic Surgery)    Outpatient Medications Prior to Visit   Medication Sig Dispense Refill    albuterol sulfate  (90 Base) MCG/ACT inhaler 2 sprays 2 minutes apart every 6 hours as needed 18 g 5    carbidopa-levodopa (SINEMET)  MG per tablet Take 1 tablet by mouth 2 (Two) Times a Day. 180 tablet 3    Cholecalciferol (VITAMIN D3) 2000 units capsule Take 1 capsule by mouth Daily.      clobetasol (TEMOVATE) 0.05 % cream Apply  topically to the appropriate area as directed Take As Directed. 30 g 1    furosemide (LASIX) 40 MG tablet Take 1 tablet by mouth Daily. 90 tablet 1    gabapentin (NEURONTIN) 300 MG capsule Take 1 capsule by mouth 4 (Four) Times a Day. 120 capsule 1    glipizide (GLUCOTROL XL) 5 MG ER tablet Take 1 tablet by mouth Daily. 90 tablet 3    glucose blood test strip 1 each by Other route Daily. Use as instructed 100 each 3    HYDROcodone-acetaminophen (NORCO)  MG per tablet Take 1 tablet by mouth Every 6  (Six) Hours As Needed for Moderate Pain. 90 tablet 0    indapamide (LOZOL) 2.5 MG tablet TAKE 1 TABLET BY MOUTH EVERY MORNING 90 tablet 2    Lancet Devices (EASY TOUCH LANCING DEVICE) misc USE TO TEST DAILY  0    losartan (COZAAR) 100 MG tablet 1 at bedtime for blood pressure 90 tablet 3    metFORMIN (GLUCOPHAGE) 1000 MG tablet TAKE 1 TABLET BY MOUTH 2 TIMES A DAY WITH MEALS. 180 tablet 0    naproxen sodium (ALEVE) 220 MG tablet Take 1 tablet by mouth 2 (Two) Times a Day As Needed.      ONETOUCH DELICA LANCETS 33G misc 1 each Daily. 100 each 3    oxybutynin XL (DITROPAN-XL) 5 MG 24 hr tablet Take 1 tablet by mouth Daily. 7 tablet 0    Potassium 99 MG tablet Take 1 tablet by mouth 2 (Two) Times a Day.      pramipexole (Mirapex) 0.25 MG tablet 1-2 nightly for leg cramps--the dose has been increased 90 tablet 1    aspirin 81 MG EC tablet Take 1 tablet by mouth Daily. (Patient not taking: Reported on 8/29/2023)      ELIQUIS 5 MG tablet tablet Take 1 tablet by mouth Every 12 (Twelve) Hours. (Patient not taking: Reported on 8/29/2023)      lovastatin (MEVACOR) 10 MG tablet Take 1 tablet by mouth every night at bedtime. (Patient not taking: Reported on 8/29/2023) 90 tablet 2    amLODIPine (NORVASC) 5 MG tablet TAKE 1 TABLET BY MOUTH TWICE DAILY (Patient not taking: Reported on 8/29/2023) 180 tablet 1    colchicine (Mitigare) 0.6 MG capsule capsule Take 1 capsule by mouth Daily. (Patient not taking: Reported on 8/29/2023) 30 capsule 0     No facility-administered medications prior to visit.       Opioid medication/s are on active medication list.  and I have evaluated his active treatment plan and pain score trends (see table).  Vitals:    09/08/23 0831   PainSc:   6   PainLoc: Back     I have reviewed the chart for potential of high risk medication and harmful drug interactions in the elderly.          Aspirin is on active medication list. Aspirin use is indicated based on review of current medical condition/s. Pros and  "cons of this therapy have been discussed today. Benefits of this medication outweigh potential harm.  Patient has been encouraged to continue taking this medication.  .      Patient Active Problem List   Diagnosis    Type 2 diabetes mellitus without complication    HTN (hypertension)    A-fib    Hx of adenomatous colonic polyps    Morbidly obese    Phimosis    Sacroiliitis, not elsewhere classified    Post-traumatic bulbous urethral stricture    Malignant neoplasm of upper lobe of right lung     Advance Care Planning   Advance Care Planning     Advance Directive is not on file.  ACP discussion was held with the patient during this visit. Patient does not have an advance directive, information provided.     Objective    Vitals:    23 0831   BP: 118/78   BP Location: Left arm   Patient Position: Sitting   Cuff Size: Large Adult   Pulse: 73   Temp: 98 °F (36.7 °C)   TempSrc: Temporal   SpO2: 98%   Weight: 128 kg (281 lb 9.6 oz)   Height: 170.2 cm (67\")   PainSc:   6   PainLoc: Back     Estimated body mass index is 44.1 kg/m² as calculated from the following:    Height as of this encounter: 170.2 cm (67\").    Weight as of this encounter: 128 kg (281 lb 9.6 oz).    Class 3 Severe Obesity (BMI >=40). Obesity-related health conditions include the following: hypertension, diabetes mellitus, and dyslipidemias. Obesity is unchanged. BMI is is above average; BMI management plan is completed. We discussed portion control and increasing exercise.      Does the patient have evidence of cognitive impairment? No          HEALTH RISK ASSESSMENT    Smoking Status:  Social History     Tobacco Use   Smoking Status Former    Packs/day: 1.00    Years: 20.00    Pack years: 20.00    Types: Cigarettes    Quit date: 1996    Years since quittin.2   Smokeless Tobacco Never     Alcohol Consumption:  Social History     Substance and Sexual Activity   Alcohol Use Yes    Comment: occ     Fall Risk Screen:    STEADI Fall Risk " Assessment was completed, and patient is at HIGH risk for falls. Assessment completed on:2023    Depression Screenin/8/2023     8:37 AM   PHQ-2/PHQ-9 Depression Screening   Little Interest or Pleasure in Doing Things 0-->not at all   Feeling Down, Depressed or Hopeless 0-->not at all   PHQ-9: Brief Depression Severity Measure Score 0       Health Habits and Functional and Cognitive Screenin/8/2023     8:34 AM   Functional & Cognitive Status   Do you have difficulty preparing food and eating? No   Do you have difficulty bathing yourself, getting dressed or grooming yourself? Yes   Do you have difficulty using the toilet? No   Do you have difficulty moving around from place to place? Yes   Do you have trouble with steps or getting out of a bed or a chair? Yes   Current Diet Unhealthy Diet   Dental Exam Not up to date   Eye Exam Up to date   Exercise (times per week) 7 times per week   Current Exercises Include Walking   Do you need help using the phone?  No   Are you deaf or do you have serious difficulty hearing?  No   Do you need help to go to places out of walking distance? Yes   Do you need help shopping? No   Do you need help preparing meals?  No   Do you need help with housework?  No   Do you need help with laundry? No   Do you need help taking your medications? No   Do you need help managing money? No   Do you ever drive or ride in a car without wearing a seat belt? No   Have you felt unusual stress, anger or loneliness in the last month? No   Who do you live with? Spouse   If you need help, do you have trouble finding someone available to you? No   Have you been bothered in the last four weeks by sexual problems? No   Do you have difficulty concentrating, remembering or making decisions? No       Age-appropriate Screening Schedule:  Refer to the list below for future screening recommendations based on patient's age, sex and/or medical conditions. Orders for these recommended tests are  listed in the plan section. The patient has been provided with a written plan.    Health Maintenance   Topic Date Due    ZOSTER VACCINE (3 of 3) 01/02/2020    COLORECTAL CANCER SCREENING  05/28/2022    LIPID PANEL  08/25/2023    URINE MICROALBUMIN  08/25/2023    BMI FOLLOWUP  08/25/2023    INFLUENZA VACCINE  10/01/2023    HEMOGLOBIN A1C  10/18/2023    DIABETIC EYE EXAM  11/14/2023    ANNUAL WELLNESS VISIT  09/08/2024    DIABETIC FOOT EXAM  09/08/2024    HEPATITIS C SCREENING  Completed    COVID-19 Vaccine  Completed    Pneumococcal Vaccine 65+  Completed    AAA SCREEN (ONE-TIME)  Completed    TDAP/TD VACCINES  Discontinued                  CMS Preventative Services Quick Reference  Risk Factors Identified During Encounter  Immunizations Discussed/Encouraged: Influenza  The above risks/problems have been discussed with the patient.  Pertinent information has been shared with the patient in the After Visit Summary.  An After Visit Summary and PPPS were made available to the patient.    Follow Up:   Next Medicare Wellness visit to be scheduled in 1 year.       Additional E&M Note during same encounter follows:  Patient has multiple medical problems which are significant and separately identifiable that require additional work above and beyond the Medicare Wellness Visit.      Chief Complaint  Medicare Wellness-subsequent (fasting)    Subjective        HPI  Ephraim Quinones is also being seen today for T2DM, HTN. Patient reports that he has been doing fairly well other than back pain. BP is well controlled. Weight shows a 10 pound increase from his last visit a week ago. He denies any shortness of breath. Denies any excessive swelling. Suspect possible typo on previous weight as weight in may was 285 and today was 281.     Review of Systems   Respiratory:  Negative for shortness of breath.    Cardiovascular:  Negative for chest pain.   Musculoskeletal:  Positive for arthralgias and back pain.   Neurological:  Negative  "for dizziness and light-headedness.     Objective   Vital Signs:  /78 (BP Location: Left arm, Patient Position: Sitting, Cuff Size: Large Adult)   Pulse 73   Temp 98 °F (36.7 °C) (Temporal)   Ht 170.2 cm (67\")   Wt 128 kg (281 lb 9.6 oz)   SpO2 98%   BMI 44.10 kg/m²     Physical Exam  Vitals reviewed.   Constitutional:       Appearance: He is well-developed.   HENT:      Right Ear: Tympanic membrane, ear canal and external ear normal.      Left Ear: Tympanic membrane, ear canal and external ear normal.      Mouth/Throat:      Mouth: Mucous membranes are moist.      Pharynx: Oropharynx is clear.   Neck:      Vascular: No carotid bruit.   Cardiovascular:      Rate and Rhythm: Normal rate.   Pulmonary:      Effort: Pulmonary effort is normal.   Abdominal:      General: Bowel sounds are normal. There is no distension.      Palpations: Abdomen is soft.      Tenderness: There is no abdominal tenderness.   Genitourinary:     Testes: Normal.      Prostate: Normal.      Comments: micropenis  Neurological:      Mental Status: He is alert and oriented to person, place, and time.   Psychiatric:         Behavior: Behavior normal.                       Assessment and Plan   Diagnoses and all orders for this visit:    1. Medicare annual wellness visit, subsequent (Primary)  -     Microalbumin / Creatinine Urine Ratio - Urine, Clean Catch  -     Ambulatory Referral to General Surgery  -     CBC & Differential  -     TSH  -     T4, free  -     Hemoglobin A1c  -     Comprehensive Metabolic Panel  -     Lipid Panel  -     Cancel: POC Urinalysis Dipstick, Multipro  -     PSA Screen    2. Type 2 diabetes mellitus without complication, without long-term current use of insulin  -     Microalbumin / Creatinine Urine Ratio - Urine, Clean Catch  -     Hemoglobin A1c  -     Lipid Panel  -     Cancel: POC Urinalysis Dipstick, Multipro    3. Morbidly obese    4. Primary hypertension  -     Comprehensive Metabolic Panel  -     " Cancel: POC Urinalysis Dipstick, Multipro    5. Colon cancer screening  -     Ambulatory Referral to General Surgery    6. Fatigue, unspecified type  -     CBC & Differential  -     TSH  -     T4, free    7. Prostate cancer screening  -     PSA Screen             Follow Up   Return in about 6 months (around 3/8/2024) for Next scheduled follow up.  Patient was given instructions and counseling regarding his condition or for health maintenance advice. Please see specific information pulled into the AVS if appropriate.     Dolores Ba, APRN 9/8/23

## 2023-09-08 NOTE — PATIENT INSTRUCTIONS
Medicare Wellness  Personal Prevention Plan of Service     Date of Office Visit:    Encounter Provider:  ANSHUL Resendiz  Place of Service:  [facility]  Patient Name: Ephraim Quinones  :  1950    As part of the Medicare Wellness portion of your visit today, we are providing you with this personalized preventive plan of services (PPPS). This plan is based upon recommendations of the [place] Preventive Services Task Force (USPSTF) and the Advisory Committee on Immunization Practices (ACIP).    This lists the preventive care services that should be considered, and provides dates of when you are due. Items listed as completed are up-to-date and do not require any further intervention.    Health Maintenance   Topic Date Due    ZOSTER VACCINE (3 of 3) 2020    COLORECTAL CANCER SCREENING  2022    LIPID PANEL  2023    URINE MICROALBUMIN  2023    BMI FOLLOWUP  2023    INFLUENZA VACCINE  10/01/2023    HEMOGLOBIN A1C  10/18/2023    DIABETIC EYE EXAM  2023    ANNUAL WELLNESS VISIT  2024    DIABETIC FOOT EXAM  2024    HEPATITIS C SCREENING  Completed    COVID-19 Vaccine  Completed    Pneumococcal Vaccine 65+  Completed    AAA SCREEN (ONE-TIME)  Completed    TDAP/TD VACCINES  Discontinued       Orders Placed This Encounter   Procedures    Microalbumin / Creatinine Urine Ratio - Urine, Clean Catch     Order Specific Question:   Release to patient     Answer:   Routine Release [[id number]]    TSH     Order Specific Question:   Release to patient     Answer:   Routine Release [[id number]]    T4, free     Order Specific Question:   Release to patient     Answer:   Routine Release [[id number]]    Hemoglobin A1c     Order Specific Question:   Release to patient     Answer:   Routine Release [[id number]]    Comprehensive Metabolic Panel     Order Specific Question:   Release to patient     Answer:   Routine Release [[id number]]     Lipid Panel     Order Specific Question:   Release to patient     Answer:   Routine Release [[id number]]    PSA Screen     Order Specific Question:   Release to patient     Answer:   Routine Release [[id number]]    Ambulatory Referral to General Surgery     Referral Priority:   Routine     Referral Type:   Consultation     Referral Reason:   Specialty Services Required     Requested Specialty:   General Surgery     Number of Visits Requested:   1    POC Urinalysis Dipstick, Multipro     Order Specific Question:   Release to patient     Answer:   Routine Release [[id number]]    CBC & Differential     Order Specific Question:   Manual Differential     Answer:   No     Order Specific Question:   Release to patient     Answer:   Routine Release [[id number]]       No follow-ups on file.        Advance Care Planning and Advance Directives     You make decisions on a daily basis - decisions about where you want to live, your career, your home, your life. Perhaps one of the most important decisions you face is your choice for future medical care. Take time to talk with your family and your healthcare team and start planning today.  Advance Care Planning is a process that can help you:  Understand possible future healthcare decisions in light of your own experiences  Reflect on those decision in light of your goals and values  Discuss your decisions with those closest to you and the healthcare professionals that care for you  Make a plan by creating a document that reflects your wishes    Surrogate Decision Maker  In the event of a medical emergency, which has left you unable to communicate or to make your own decisions, you would need someone to make decisions for you.  It is important to discuss your preferences for medical treatment with this person while you are in good health.     Qualities of a surrogate decision maker:  Willing to take on this role and responsibility  Knows what you want for future medical  care  Willing to follow your wishes even if they don't agree with them  Able to make difficult medical decisions under stressful circumstances    Advance Directives  These are legal documents you can create that will guide your healthcare team and decision maker(s) when needed. These documents can be stored in the electronic medical record.    Living Will - a legal document to guide your care if you have a terminal condition or a serious illness and are unable to communicate. States vary by statute in document names/types, but most forms may include one or more of the following:        -  Directions regarding life-prolonging treatments        -  Directions regarding artificially provided nutrition/hydration        -  Choosing a healthcare decision maker        -  Direction regarding organ/tissue donation    Durable Power of  for Healthcare - this document names an -in-fact to make medical decisions for you, but it may also allow this person to make personal and financial decisions for you. Please seek the advice of an  if you need this type of document.    **Advance Directives are not required and no one may discriminate against you if you do not sign one.    Medical Orders  Many states allow specific forms/orders signed by your physician to record your wishes for medical treatment in your current state of health. This form, signed in personal communication with your physician, addresses resuscitation and other medical interventions that you may or may not want.      For more information or to schedule a time with a [facility] Advance Care Planning Facilitator contact: [email or web address] or call [phone] and someone will contact you directly.

## 2023-09-12 LAB
BILIRUB BLD-MCNC: NEGATIVE MG/DL
CLARITY, POC: ABNORMAL
COLOR UR: YELLOW
GLUCOSE UR STRIP-MCNC: NEGATIVE MG/DL
KETONES UR QL: NEGATIVE
LEUKOCYTE EST, POC: NEGATIVE
NITRITE UR-MCNC: POSITIVE MG/ML
PH UR: 6.5 [PH] (ref 5–8)
PROT UR STRIP-MCNC: NEGATIVE MG/DL
RBC # UR STRIP: NEGATIVE /UL
SP GR UR: 1.02 (ref 1–1.03)
UROBILINOGEN UR QL: NORMAL

## 2023-09-12 RX ORDER — ALLOPURINOL 100 MG/1
100 TABLET ORAL DAILY
Qty: 90 TABLET | Refills: 1 | Status: SHIPPED | OUTPATIENT
Start: 2023-09-12

## 2023-09-13 LAB
ALBUMIN SERPL-MCNC: 4.1 G/DL (ref 3.5–5.2)
ALBUMIN/CREAT UR: 4 MG/G CREAT (ref 0–29)
ALBUMIN/GLOB SERPL: 2.2 G/DL
ALP SERPL-CCNC: 73 U/L (ref 39–117)
ALT SERPL-CCNC: 11 U/L (ref 1–41)
AST SERPL-CCNC: 15 U/L (ref 1–40)
BASOPHILS # BLD AUTO: 0.06 10*3/MM3 (ref 0–0.2)
BASOPHILS NFR BLD AUTO: 0.8 % (ref 0–1.5)
BILIRUB SERPL-MCNC: 0.3 MG/DL (ref 0–1.2)
BUN SERPL-MCNC: 16 MG/DL (ref 8–23)
BUN/CREAT SERPL: 21.6 (ref 7–25)
CALCIUM SERPL-MCNC: 9.5 MG/DL (ref 8.6–10.5)
CHLORIDE SERPL-SCNC: 99 MMOL/L (ref 98–107)
CHOLEST SERPL-MCNC: 203 MG/DL (ref 0–200)
CO2 SERPL-SCNC: 27.4 MMOL/L (ref 22–29)
CREAT SERPL-MCNC: 0.74 MG/DL (ref 0.76–1.27)
CREAT UR-MCNC: 98.9 MG/DL
EGFRCR SERPLBLD CKD-EPI 2021: 95.7 ML/MIN/1.73
EOSINOPHIL # BLD AUTO: 0.43 10*3/MM3 (ref 0–0.4)
EOSINOPHIL NFR BLD AUTO: 5.8 % (ref 0.3–6.2)
ERYTHROCYTE [DISTWIDTH] IN BLOOD BY AUTOMATED COUNT: 12.1 % (ref 12.3–15.4)
GLOBULIN SER CALC-MCNC: 1.9 GM/DL
GLUCOSE SERPL-MCNC: 141 MG/DL (ref 65–99)
HBA1C MFR BLD: 6.2 % (ref 4.8–5.6)
HCT VFR BLD AUTO: 41.4 % (ref 37.5–51)
HDLC SERPL-MCNC: 53 MG/DL (ref 40–60)
HGB BLD-MCNC: 14.1 G/DL (ref 13–17.7)
IMM GRANULOCYTES # BLD AUTO: 0.05 10*3/MM3 (ref 0–0.05)
IMM GRANULOCYTES NFR BLD AUTO: 0.7 % (ref 0–0.5)
LDLC SERPL CALC-MCNC: 125 MG/DL (ref 0–100)
LYMPHOCYTES # BLD AUTO: 1.23 10*3/MM3 (ref 0.7–3.1)
LYMPHOCYTES NFR BLD AUTO: 16.7 % (ref 19.6–45.3)
MCH RBC QN AUTO: 32.4 PG (ref 26.6–33)
MCHC RBC AUTO-ENTMCNC: 34.1 G/DL (ref 31.5–35.7)
MCV RBC AUTO: 95.2 FL (ref 79–97)
MICROALBUMIN UR-MCNC: 4.3 UG/ML
MONOCYTES # BLD AUTO: 0.44 10*3/MM3 (ref 0.1–0.9)
MONOCYTES NFR BLD AUTO: 6 % (ref 5–12)
NEUTROPHILS # BLD AUTO: 5.17 10*3/MM3 (ref 1.7–7)
NEUTROPHILS NFR BLD AUTO: 70 % (ref 42.7–76)
NRBC BLD AUTO-RTO: 0 /100 WBC (ref 0–0.2)
PLATELET # BLD AUTO: 254 10*3/MM3 (ref 140–450)
POTASSIUM SERPL-SCNC: 3.9 MMOL/L (ref 3.5–5.2)
PROT SERPL-MCNC: 6 G/DL (ref 6–8.5)
PSA SERPL-MCNC: 0.69 NG/ML (ref 0–4)
RBC # BLD AUTO: 4.35 10*6/MM3 (ref 4.14–5.8)
SODIUM SERPL-SCNC: 139 MMOL/L (ref 136–145)
T4 FREE SERPL-MCNC: 1.22 NG/DL (ref 0.93–1.7)
TRIGL SERPL-MCNC: 140 MG/DL (ref 0–150)
TSH SERPL DL<=0.005 MIU/L-ACNC: 2.02 UIU/ML (ref 0.27–4.2)
VLDLC SERPL CALC-MCNC: 25 MG/DL (ref 5–40)
WBC # BLD AUTO: 7.38 10*3/MM3 (ref 3.4–10.8)

## 2023-09-14 LAB
BACTERIA UR CULT: NORMAL
BACTERIA UR CULT: NORMAL

## 2023-09-19 DIAGNOSIS — R52 PAIN: ICD-10-CM

## 2023-09-19 NOTE — TELEPHONE ENCOUNTER
Drug thereapy appt 8/29/23  contract done 2/6/23  UDS done 2/6/23      Rx Refill Note  Requested Prescriptions     Pending Prescriptions Disp Refills    HYDROcodone-acetaminophen (NORCO)  MG per tablet 90 tablet 0     Sig: Take 1 tablet by mouth Every 6 (Six) Hours As Needed for Moderate Pain.      Last office visit with prescribing clinician: 9/8/23   Last telemedicine visit with prescribing clinician: Visit date not found   Next office visit with prescribing clinician: 3/12/2024   {    Helena Meehan MA  09/19/23, 15:37 CDT

## 2023-09-20 RX ORDER — HYDROCODONE BITARTRATE AND ACETAMINOPHEN 10; 325 MG/1; MG/1
1 TABLET ORAL EVERY 6 HOURS PRN
Qty: 90 TABLET | Refills: 0 | Status: SHIPPED | OUTPATIENT
Start: 2023-09-20

## 2023-09-25 NOTE — TELEPHONE ENCOUNTER
Rx Refill Note  Requested Prescriptions     Pending Prescriptions Disp Refills    carbidopa-levodopa (SINEMET)  MG per tablet [Pharmacy Med Name: carbidopa 25 mg-levodopa 100 mg tablet] 180 tablet 3     Sig: TAKE 1 TABLET BY MOUTH TWICE DAILY      Last office visit with prescribing clinician: 9/8/2023   Last telemedicine visit with prescribing clinician: Visit date not found   Next office visit with prescribing clinician: 3/12/24      Helena Meehan MA  09/25/23, 09:23 CDT

## 2023-09-27 DIAGNOSIS — G62.9 NEUROPATHY: ICD-10-CM

## 2023-09-27 RX ORDER — GABAPENTIN 300 MG/1
300 CAPSULE ORAL 4 TIMES DAILY
Qty: 120 CAPSULE | Refills: 4 | Status: SHIPPED | OUTPATIENT
Start: 2023-09-27

## 2023-09-27 NOTE — TELEPHONE ENCOUNTER
Rx Refill Note  Requested Prescriptions     Pending Prescriptions Disp Refills    gabapentin (NEURONTIN) 300 MG capsule 120 capsule 1     Sig: Take 1 capsule by mouth 4 (Four) Times a Day.      Last office visit with prescribing clinician: 5/31/2023   Last telemedicine visit with prescribing clinician: Visit date not found   Next office visit with prescribing clinician: 3/12/2024   Drug thereapy appt 8/29/23  contract done 2/6/23  UDS done 2/6/23    Helena Meehan MA  09/27/23, 12:09 CDT

## 2023-10-10 NOTE — TELEPHONE ENCOUNTER
Calling and inquiring if can have meds sent in.  Symptoms began yesterday and include hoarse, sinus problems, sore throat.  Has not done home covid test and has not been around anyone sick.  Is using his inhaler.  Please advise.    [facility]

## 2023-10-11 RX ORDER — AMOXICILLIN 500 MG/1
500 CAPSULE ORAL 3 TIMES DAILY
Qty: 21 CAPSULE | Refills: 0 | Status: SHIPPED | OUTPATIENT
Start: 2023-10-11 | End: 2023-10-18

## 2023-10-11 NOTE — TELEPHONE ENCOUNTER
Rx Refill Note  Requested Prescriptions     Pending Prescriptions Disp Refills    amoxicillin (AMOXIL) 500 MG capsule 21 capsule 0     Sig: Take 1 capsule by mouth 3 (Three) Times a Day for 7 days.      Last office visit with prescribing clinician: 5/31/2023   Last telemedicine visit with prescribing clinician: Visit date not found   Next office visit with prescribing clinician: 3/12/2024                         Would you like a call back once the refill request has been completed: [] Yes [] No    If the office needs to give you a call back, can they leave a voicemail: [] Yes [] No    Anamaria Adan, PCT  10/11/23, 12:59 CDT

## 2023-10-12 DIAGNOSIS — R52 PAIN: ICD-10-CM

## 2023-10-13 RX ORDER — ALBUTEROL SULFATE 90 UG/1
AEROSOL, METERED RESPIRATORY (INHALATION)
Qty: 18 G | Refills: 5 | Status: SHIPPED | OUTPATIENT
Start: 2023-10-13

## 2023-10-13 RX ORDER — HYDROCODONE BITARTRATE AND ACETAMINOPHEN 10; 325 MG/1; MG/1
1 TABLET ORAL EVERY 6 HOURS PRN
Qty: 12 TABLET | Refills: 0 | Status: SHIPPED | OUTPATIENT
Start: 2023-10-13 | End: 2023-10-16 | Stop reason: SDUPTHER

## 2023-10-13 NOTE — TELEPHONE ENCOUNTER
Rx Refill Note  Requested Prescriptions     Pending Prescriptions Disp Refills    HYDROcodone-acetaminophen (NORCO)  MG per tablet 90 tablet 0     Sig: Take 1 tablet by mouth Every 6 (Six) Hours As Needed for Moderate Pain.      Last office visit with prescribing clinician: 5/31/2023   Last telemedicine visit with prescribing clinician: Visit date not found   Next office visit with prescribing clinician: 10/13/2023                         Would you like a call back once the refill request has been completed: [] Yes [] No    If the office needs to give you a call back, can they leave a voicemail: [] Yes [] No    Helena Gilmore MA  10/13/23, 14:28 CDT

## 2023-10-13 NOTE — TELEPHONE ENCOUNTER
Rx Refill Note  Requested Prescriptions     Pending Prescriptions Disp Refills    albuterol sulfate  (90 Base) MCG/ACT inhaler 18 g 5     Si sprays 2 minutes apart every 6 hours as needed      Last office visit with prescribing clinician: 2023   Last telemedicine visit with prescribing clinician: Visit date not found   Next office visit with prescribing clinician: 3/12/2024                         Would you like a call back once the refill request has been completed: [] Yes [] No    If the office needs to give you a call back, can they leave a voicemail: [] Yes [] No    Deandre Nassar Rep  10/13/23, 15:52 CDT

## 2023-10-13 NOTE — TELEPHONE ENCOUNTER
Granddaughter calling and requesting med be filled today--he is due tomorrow.      Drug thereapy appt 8/29/23  contract done 2/6/23  UDS done 2/6/23

## 2023-10-16 DIAGNOSIS — R52 PAIN: ICD-10-CM

## 2023-10-16 RX ORDER — HYDROCODONE BITARTRATE AND ACETAMINOPHEN 10; 325 MG/1; MG/1
1 TABLET ORAL EVERY 6 HOURS PRN
Qty: 12 TABLET | Refills: 0 | Status: SHIPPED | OUTPATIENT
Start: 2023-10-16 | End: 2023-10-16 | Stop reason: SDUPTHER

## 2023-10-16 RX ORDER — HYDROCODONE BITARTRATE AND ACETAMINOPHEN 10; 325 MG/1; MG/1
1 TABLET ORAL EVERY 6 HOURS PRN
Qty: 78 TABLET | Refills: 0 | Status: SHIPPED | OUTPATIENT
Start: 2023-10-16

## 2023-10-16 NOTE — TELEPHONE ENCOUNTER
Rx Refill Note  Requested Prescriptions     Pending Prescriptions Disp Refills    HYDROcodone-acetaminophen (NORCO)  MG per tablet 12 tablet 0     Sig: Take 1 tablet by mouth Every 6 (Six) Hours As Needed for Moderate Pain.      Last office visit with prescribing clinician: 9/8/2023         Kandis Mendoza MA  10/16/23, 09:42 CDT

## 2023-10-16 NOTE — TELEPHONE ENCOUNTER
Pt is needing remaining pills sent--total 78 tablets.     Rx Refill Note  Requested Prescriptions     Pending Prescriptions Disp Refills    HYDROcodone-acetaminophen (NORCO)  MG per tablet 78 tablet 0     Sig: Take 1 tablet by mouth Every 6 (Six) Hours As Needed for Moderate Pain.     Signed Prescriptions Disp Refills    HYDROcodone-acetaminophen (NORCO)  MG per tablet 12 tablet 0     Sig: Take 1 tablet by mouth Every 6 (Six) Hours As Needed for Moderate Pain.     Authorizing Provider: KVNG VILLALPANDO      Last office visit with prescribing clinician: 9/8/2023   Last telemedicine visit with prescribing clinician: Visit date not found   Next office visit with prescribing clinician: Visit date not found                         Would you like a call back once the refill request has been completed: [] Yes [] No    If the office needs to give you a call back, can they leave a voicemail: [] Yes [] No    Deandre Nassar Rep  10/16/23, 11:09 CDT

## 2023-11-06 DIAGNOSIS — R52 PAIN: ICD-10-CM

## 2023-11-06 RX ORDER — HYDROCODONE BITARTRATE AND ACETAMINOPHEN 10; 325 MG/1; MG/1
1 TABLET ORAL EVERY 6 HOURS PRN
Qty: 78 TABLET | Refills: 0 | Status: SHIPPED | OUTPATIENT
Start: 2023-11-06

## 2023-11-06 NOTE — TELEPHONE ENCOUNTER
Drug thereapy appt 8/29/23  contract done 2/6/23  UDS done 2/6/23    Rx Refill Note  Requested Prescriptions     Pending Prescriptions Disp Refills    HYDROcodone-acetaminophen (NORCO)  MG per tablet 78 tablet 0     Sig: Take 1 tablet by mouth Every 6 (Six) Hours As Needed for Moderate Pain.      Last office visit with prescribing clinician: 9/8/23   Last telemedicine visit with prescribing clinician: Visit date not found   Next office visit with prescribing clinician: 3/12/2024                         Would you like a call back once the refill request has been completed: [] Yes [] No    If the office needs to give you a call back, can they leave a voicemail: [] Yes [] No    Helena Meehan MA  11/06/23, 09:15 CST

## 2023-11-08 RX ORDER — PRAMIPEXOLE DIHYDROCHLORIDE 0.25 MG/1
TABLET ORAL
Qty: 180 TABLET | Refills: 1 | Status: SHIPPED | OUTPATIENT
Start: 2023-11-08

## 2023-11-08 NOTE — TELEPHONE ENCOUNTER
Rx Refill Note  Requested Prescriptions     Pending Prescriptions Disp Refills    pramipexole (MIRAPEX) 0.25 MG tablet [Pharmacy Med Name: pramipexole 0.25 mg tablet] 90 tablet 1     Sig: take 1-2 tablets nightly for leg cramps      Last office visit with prescribing clinician: 9/8/2023   Last telemedicine visit with prescribing clinician: Visit date not found   Next office visit with prescribing clinician: 3/12/2024   {  Helena Meehan MA  11/08/23, 09:29 CST

## 2023-11-09 NOTE — TELEPHONE ENCOUNTER
Rebecca has checked with pharmacies in [facility] and [place] and Gaffney is not available.  Says she has checked at [facility] and they do have Oxycodone 10mg that he could switch to if you were ok with that.  She did say that he has taken that one recently when had lung surgical procedure and did fine with it.  Is not sure why listed on allergy list and says he may have had allergic reaction yeats ago and would like it removed from his list.       Drug thereapy appt 8/29/23  contract done 2/6/23  UDS done 2/6/23    [facility]

## 2023-11-10 RX ORDER — OXYCODONE HYDROCHLORIDE 10 MG/1
10 TABLET ORAL EVERY 4 HOURS PRN
Qty: 90 TABLET | Refills: 0 | Status: SHIPPED | OUTPATIENT
Start: 2023-11-10

## 2023-11-10 NOTE — TELEPHONE ENCOUNTER
Rx Refill Note  Requested Prescriptions     Pending Prescriptions Disp Refills    oxyCODONE (ROXICODONE) 10 MG tablet 90 tablet 0     Sig: Take 1 tablet by mouth Every 4 (Four) Hours As Needed for Moderate Pain.      Last office visit with prescribing clinician: 5/31/2023   Last telemedicine visit with prescribing clinician: Visit date not found   Next office visit with prescribing clinician: 3/12/2024                         Would you like a call back once the refill request has been completed: [] Yes [] No    If the office needs to give you a call back, can they leave a voicemail: [] Yes [] No    Deandre Nassar Rep  11/10/23, 08:42 CST

## 2023-11-13 ENCOUNTER — IMMUNIZATION (OUTPATIENT)
Dept: FAMILY MEDICINE CLINIC | Facility: CLINIC | Age: 73
End: 2023-11-13
Payer: MEDICARE

## 2023-11-13 ENCOUNTER — FLU SHOT (OUTPATIENT)
Dept: FAMILY MEDICINE CLINIC | Facility: CLINIC | Age: 73
End: 2023-11-13
Payer: MEDICARE

## 2023-11-13 DIAGNOSIS — Z23 NEED FOR INFLUENZA VACCINATION: Primary | ICD-10-CM

## 2023-11-13 DIAGNOSIS — Z23 NEED FOR COVID-19 VACCINE: Primary | ICD-10-CM

## 2023-11-27 DIAGNOSIS — R52 PAIN: Primary | ICD-10-CM

## 2023-11-27 RX ORDER — OXYCODONE HYDROCHLORIDE 10 MG/1
10 TABLET ORAL EVERY 4 HOURS PRN
Qty: 90 TABLET | Refills: 0 | Status: SHIPPED | OUTPATIENT
Start: 2023-11-27

## 2023-11-27 NOTE — TELEPHONE ENCOUNTER
Drug thereapy appt 8/29/23  contract done 2/6/23  UDS done 2/6/23    Rx Refill Note  Requested Prescriptions     Pending Prescriptions Disp Refills    oxyCODONE (ROXICODONE) 10 MG tablet 90 tablet 0     Sig: Take 1 tablet by mouth Every 4 (Four) Hours As Needed for Moderate Pain.      Last office visit with prescribing clinician: 9/8/23   Last telemedicine visit with prescribing clinician: Visit date not found   Next office visit with prescribing clinician: 3/12/2024       Helena Meehan MA  11/27/23, 13:29 CST

## 2023-12-04 ENCOUNTER — OFFICE VISIT (OUTPATIENT)
Dept: FAMILY MEDICINE CLINIC | Facility: CLINIC | Age: 73
End: 2023-12-04
Payer: MEDICARE

## 2023-12-04 VITALS
SYSTOLIC BLOOD PRESSURE: 132 MMHG | HEART RATE: 80 BPM | WEIGHT: 301.2 LBS | TEMPERATURE: 98.4 F | DIASTOLIC BLOOD PRESSURE: 80 MMHG | BODY MASS INDEX: 47.27 KG/M2 | OXYGEN SATURATION: 97 % | HEIGHT: 67 IN

## 2023-12-04 DIAGNOSIS — M10.9 ACUTE GOUT OF ANKLE, UNSPECIFIED CAUSE, UNSPECIFIED LATERALITY: Primary | ICD-10-CM

## 2023-12-04 DIAGNOSIS — M46.1 SACROILIITIS, NOT ELSEWHERE CLASSIFIED: ICD-10-CM

## 2023-12-04 PROCEDURE — 99213 OFFICE O/P EST LOW 20 MIN: CPT | Performed by: NURSE PRACTITIONER

## 2023-12-04 PROCEDURE — 3044F HG A1C LEVEL LT 7.0%: CPT | Performed by: NURSE PRACTITIONER

## 2023-12-04 PROCEDURE — 3075F SYST BP GE 130 - 139MM HG: CPT | Performed by: NURSE PRACTITIONER

## 2023-12-04 PROCEDURE — 3079F DIAST BP 80-89 MM HG: CPT | Performed by: NURSE PRACTITIONER

## 2023-12-04 RX ORDER — IBUPROFEN 800 MG/1
800 TABLET ORAL EVERY MORNING
COMMUNITY

## 2023-12-04 RX ORDER — COLCHICINE 0.6 MG/1
TABLET ORAL
Qty: 10 TABLET | Refills: 0 | Status: SHIPPED | OUTPATIENT
Start: 2023-12-04

## 2023-12-04 NOTE — PROGRESS NOTES
CC: gout and controlled med monitoring    History:  Ephraim Quinones is a 73 y.o. male who presents today for evaluation of the above problems.      Patient complains of gout in his ankles and right great toe for 3 days.     Also presents for controlled med monitoring. Continues on gabapentin and oxycodone for sacroiliitis.  These continue to work well and patient has no complaints.         5/27/2022     8:00 AM 8/25/2022     8:00 AM 11/11/2022     9:00 AM 2/6/2023     8:00 AM 5/31/2023     9:00 AM 8/29/2023     8:00 AM 12/4/2023     8:00 AM   CONTROLLED SUBSTANCE TRACKING   Last Gregory 5/27/2022 8/25/2022 11/11/2022 2/6/2023 5/31/2023 8/29/2023 12/4/2023   Report Number  reviewed through epic  reviewed through epic  reviewed through epic reviewed through [facility]   Last UDS 12/1/2021 12/1/2021 12/1/2021 2/6/2023 2/6/2023 2/6/2023 2/6/2023   Last Controlled Substance Agreement 12/1/2021 12/1/2021 12/1/2021 2/6/2023 2/6/2023 2/6/2023 2/6/2023         Gout    ROS:  Review of Systems   Musculoskeletal:  Positive for back pain and gout.        Pain in ankles       Allergies   Allergen Reactions    Adhesive Tape Other (See Comments)     Blisters and rips skin off    Simvastatin Other (See Comments)     Leg Cramps    Cleocin [Clindamycin] GI Intolerance     Past Medical History:   Diagnosis Date    A-fib     Acid reflux     Anemia 05/03/2016    Claustrophobia     Diabetes mellitus     Gallstones     Gout     Hyperlipidemia     Hypertension     Malignant neoplasm of upper lobe of right lung 3/17/2023    Pneumonia     Sleep apnea with use of continuous positive airway pressure (CPAP)      Past Surgical History:   Procedure Laterality Date    BACK SURGERY      x2    BLADDER SURGERY  02/2023    CATARACT EXTRACTION      CIRCUMCISION N/A 06/06/2019    Procedure: CIRCUMCISION;  Surgeon: Yon Sloan MD;  Location: [facility];  Service: Urology    COLONOSCOPY      FINGER SURGERY Left     LUNG BIOPSY Right 03/13/2023     REPLACEMENT TOTAL KNEE Bilateral     URETHRAL DILATATION N/A 12/01/2022    Procedure: URETHRAL DILATATION with Balloon, TRANSURETHRAL RESECTION OF BLADDER TUMOR;  Surgeon: Yon Sloan MD;  Location: [facility];  Service: Urology;  Laterality: N/A;     Family History   Problem Relation Age of Onset    Colon cancer Mother     Prostate cancer Father     No Known Problems Maternal Grandmother     No Known Problems Maternal Grandfather     No Known Problems Paternal Grandmother     No Known Problems Paternal Grandfather       reports that he quit smoking about 27 years ago. His smoking use included cigarettes. He has a 20.00 pack-year smoking history. He has never used smokeless tobacco. He reports current alcohol use. He reports that he does not use drugs.      Current Outpatient Medications:     albuterol sulfate  (90 Base) MCG/ACT inhaler, 2 sprays 2 minutes apart every 6 hours as needed, Disp: 18 g, Rfl: 5    allopurinol (Zyloprim) 100 MG tablet, Take 1 tablet by mouth Daily., Disp: 90 tablet, Rfl: 1    aspirin 81 MG EC tablet, Take 1 tablet by mouth Daily., Disp: , Rfl:     carbidopa-levodopa (SINEMET)  MG per tablet, TAKE 1 TABLET BY MOUTH TWICE DAILY, Disp: 180 tablet, Rfl: 3    Cholecalciferol (VITAMIN D3) 2000 units capsule, Take 1 capsule by mouth Daily., Disp: , Rfl:     clobetasol (TEMOVATE) 0.05 % cream, Apply  topically to the appropriate area as directed Take As Directed., Disp: 30 g, Rfl: 1    ELIQUIS 5 MG tablet tablet, Take 1 tablet by mouth Every 12 (Twelve) Hours., Disp: , Rfl:     furosemide (LASIX) 40 MG tablet, Take 1 tablet by mouth Daily., Disp: 90 tablet, Rfl: 1    gabapentin (NEURONTIN) 300 MG capsule, Take 1 capsule by mouth 4 (Four) Times a Day., Disp: 120 capsule, Rfl: 4    glipizide (GLUCOTROL XL) 5 MG ER tablet, Take 1 tablet by mouth Daily., Disp: 90 tablet, Rfl: 3    glucose blood test strip, 1 each by Other route Daily. Use as instructed, Disp: 100 each, Rfl: 3     "ibuprofen (ADVIL,MOTRIN) 800 MG tablet, Take 1 tablet by mouth Every Morning., Disp: , Rfl:     indapamide (LOZOL) 2.5 MG tablet, TAKE 1 TABLET BY MOUTH EVERY MORNING, Disp: 90 tablet, Rfl: 2    Lancet Devices (EASY TOUCH LANCING DEVICE) misc, USE TO TEST DAILY, Disp: , Rfl: 0    losartan (COZAAR) 100 MG tablet, 1 at bedtime for blood pressure, Disp: 90 tablet, Rfl: 3    lovastatin (MEVACOR) 10 MG tablet, Take 1 tablet by mouth every night at bedtime. (Patient taking differently: Take 1 tablet by mouth Every Other Day.), Disp: 90 tablet, Rfl: 2    metFORMIN (GLUCOPHAGE) 1000 MG tablet, TAKE 1 TABLET BY MOUTH 2 TIMES A DAY WITH MEALS., Disp: 180 tablet, Rfl: 0    naproxen sodium (ALEVE) 220 MG tablet, Take 1 tablet by mouth 2 (Two) Times a Day As Needed., Disp: , Rfl:     ONETOUCH DELICA LANCETS 33G misc, 1 each Daily., Disp: 100 each, Rfl: 3    oxybutynin XL (DITROPAN-XL) 5 MG 24 hr tablet, Take 1 tablet by mouth Daily., Disp: 7 tablet, Rfl: 0    oxyCODONE (ROXICODONE) 10 MG tablet, Take 1 tablet by mouth Every 4 (Four) Hours As Needed for Moderate Pain., Disp: 90 tablet, Rfl: 0    Potassium 99 MG tablet, Take 1 tablet by mouth 2 (Two) Times a Day., Disp: , Rfl:     pramipexole (MIRAPEX) 0.25 MG tablet, take 1-2 tablets nightly for leg cramps, Disp: 180 tablet, Rfl: 1    colchicine 0.6 MG tablet, Take two tablets immediately and then take one tablet one hour later. Then take one tablet daily until gone., Disp: 10 tablet, Rfl: 0    HYDROcodone-acetaminophen (NORCO)  MG per tablet, Take 1 tablet by mouth Every 6 (Six) Hours As Needed for Moderate Pain. (Patient not taking: Reported on 12/4/2023), Disp: 78 tablet, Rfl: 0    OBJECTIVE:  /80 (BP Location: Left arm, Patient Position: Sitting, Cuff Size: Large Adult)   Pulse 80   Temp 98.4 °F (36.9 °C) (Temporal)   Ht 170.2 cm (67\")   Wt (!) 137 kg (301 lb 3.2 oz)   SpO2 97%   BMI 47.17 kg/m²    Physical Exam  Vitals reviewed.   Constitutional:       " Appearance: He is well-developed.   Cardiovascular:      Rate and Rhythm: Normal rate.   Pulmonary:      Effort: Pulmonary effort is normal.   Musculoskeletal:      Comments: No erythema or heat noted on ankles. 1+ pitting noted on lower legs.    Neurological:      Mental Status: He is alert and oriented to person, place, and time.   Psychiatric:         Behavior: Behavior normal.       Assessment/Plan    Diagnoses and all orders for this visit:    1. Acute gout of ankle, unspecified cause, unspecified laterality (Primary)  -     colchicine 0.6 MG tablet; Take two tablets immediately and then take one tablet one hour later. Then take one tablet daily until gone.  Dispense: 10 tablet; Refill: 0  -     Comprehensive Metabolic Panel  -     Uric acid    2. Sacroiliitis, not elsewhere classified    Gregory reviewed and appropriate. Contract and UDS are current. Refills not due at this time.     An After Visit Summary was printed and given to the patient at discharge.  Return if symptoms worsen or fail to improve, for Next scheduled follow up.       ANSHUL Noel 12/4/23    Electronically signed.

## 2023-12-05 LAB
ALBUMIN SERPL-MCNC: 4.4 G/DL (ref 3.5–5.2)
ALBUMIN/GLOB SERPL: 1.8 G/DL
ALP SERPL-CCNC: 80 U/L (ref 39–117)
ALT SERPL-CCNC: 16 U/L (ref 1–41)
AST SERPL-CCNC: 15 U/L (ref 1–40)
BILIRUB SERPL-MCNC: 0.6 MG/DL (ref 0–1.2)
BUN SERPL-MCNC: 27 MG/DL (ref 8–23)
BUN/CREAT SERPL: 29 (ref 7–25)
CALCIUM SERPL-MCNC: 9.7 MG/DL (ref 8.6–10.5)
CHLORIDE SERPL-SCNC: 99 MMOL/L (ref 98–107)
CO2 SERPL-SCNC: 27.2 MMOL/L (ref 22–29)
CREAT SERPL-MCNC: 0.93 MG/DL (ref 0.76–1.27)
EGFRCR SERPLBLD CKD-EPI 2021: 86.7 ML/MIN/1.73
GLOBULIN SER CALC-MCNC: 2.4 GM/DL
GLUCOSE SERPL-MCNC: 148 MG/DL (ref 65–99)
POTASSIUM SERPL-SCNC: 4 MMOL/L (ref 3.5–5.2)
PROT SERPL-MCNC: 6.8 G/DL (ref 6–8.5)
SODIUM SERPL-SCNC: 140 MMOL/L (ref 136–145)
URATE SERPL-MCNC: 8 MG/DL (ref 3.4–7)

## 2023-12-05 NOTE — PROGRESS NOTES
Please advise that once his ankles get better, he needs to increase his allopurinol to 200 mg a day. Increasing it while his gout is flared can actually keep it from getting better, so he needs to be past it before he increases the dose.  90

## 2023-12-07 RX ORDER — ALLOPURINOL 200 MG/1
200 TABLET ORAL DAILY
Qty: 90 TABLET | Refills: 1 | Status: SHIPPED | OUTPATIENT
Start: 2023-12-07

## 2023-12-07 NOTE — TELEPHONE ENCOUNTER
Rx Refill Note  Requested Prescriptions     Pending Prescriptions Disp Refills    allopurinol 200 MG tablet 90 tablet 1     Sig: Take 200 mg by mouth Daily.        Nathalia Zhang MA  12/07/23, 13:45 CST

## 2023-12-08 DIAGNOSIS — E11.9 TYPE 2 DIABETES MELLITUS WITHOUT COMPLICATION, WITHOUT LONG-TERM CURRENT USE OF INSULIN: ICD-10-CM

## 2023-12-08 NOTE — TELEPHONE ENCOUNTER
Rx Refill Note  Requested Prescriptions     Pending Prescriptions Disp Refills    metFORMIN (GLUCOPHAGE) 1000 MG tablet [Pharmacy Med Name: metformin 1,000 mg tablet] 180 tablet 0     Sig: TAKE 1 TABLET BY MOUTH 2 TIMES A DAY WITH MEALS.      Last office visit with prescribing clinician: 12/4/23   Last telemedicine visit with prescribing clinician: Visit date not found   Next office visit with prescribing clinician: 3/12/2024       {TIP  Please add Last Relevant Lab 9/12/23    Helena Meehan MA  12/08/23, 09:09 CST

## 2023-12-11 ENCOUNTER — TELEPHONE (OUTPATIENT)
Dept: FAMILY MEDICINE CLINIC | Facility: CLINIC | Age: 73
End: 2023-12-11
Payer: MEDICARE

## 2023-12-11 NOTE — TELEPHONE ENCOUNTER
Rebecca rnagel--states allopurinol 200mg is $900 cash or $115 copay.  He does have 1 refill remaining on 100mg--he can double up on those and copay around $7.00.      [facility]

## 2023-12-14 DIAGNOSIS — R52 PAIN: ICD-10-CM

## 2023-12-14 NOTE — TELEPHONE ENCOUNTER
Drug thereapy appt 12/4/23  contract done 2/6/23  UDS done 2/6/23    Rx Refill Note  Requested Prescriptions     Pending Prescriptions Disp Refills    oxyCODONE (ROXICODONE) 10 MG tablet 90 tablet 0     Sig: Take 1 tablet by mouth Every 4 (Four) Hours As Needed for Moderate Pain.      Last office visit with prescribing clinician: 12/4/23   Last telemedicine visit with prescribing clinician: Visit date not found   Next office visit with prescribing clinician: 3/12/2024     Helena Meehan MA  12/14/23, 14:47 CST

## 2023-12-15 RX ORDER — OXYCODONE HYDROCHLORIDE 10 MG/1
10 TABLET ORAL EVERY 4 HOURS PRN
Qty: 90 TABLET | Refills: 0 | Status: SHIPPED | OUTPATIENT
Start: 2023-12-15

## 2023-12-22 DIAGNOSIS — M10.9 ACUTE GOUT OF ANKLE, UNSPECIFIED CAUSE, UNSPECIFIED LATERALITY: ICD-10-CM

## 2023-12-22 RX ORDER — COLCHICINE 0.6 MG/1
TABLET ORAL
Qty: 10 TABLET | Refills: 0 | Status: SHIPPED | OUTPATIENT
Start: 2023-12-22

## 2024-01-02 DIAGNOSIS — R52 PAIN: ICD-10-CM

## 2024-01-02 RX ORDER — OXYCODONE HYDROCHLORIDE 10 MG/1
10 TABLET ORAL EVERY 4 HOURS PRN
Qty: 90 TABLET | Refills: 0 | Status: SHIPPED | OUTPATIENT
Start: 2024-01-02

## 2024-01-02 NOTE — TELEPHONE ENCOUNTER
Drug thereapy appt 12/4/23  contract done 2/6/23  UDS done 2/6/23    Rx Refill Note  Requested Prescriptions     Pending Prescriptions Disp Refills    oxyCODONE (ROXICODONE) 10 MG tablet 90 tablet 0     Sig: Take 1 tablet by mouth Every 4 (Four) Hours As Needed for Moderate Pain.      Last office visit with prescribing clinician: 12/4/23   Last telemedicine visit with prescribing clinician: Visit date not found   Next office visit with prescribing clinician: 3/12/2024     Helena Meehan MA  01/02/24, 14:53 CST

## 2024-01-03 ENCOUNTER — OFFICE VISIT (OUTPATIENT)
Dept: FAMILY MEDICINE CLINIC | Facility: CLINIC | Age: 74
End: 2024-01-03
Payer: MEDICARE

## 2024-01-03 VITALS
SYSTOLIC BLOOD PRESSURE: 124 MMHG | OXYGEN SATURATION: 94 % | HEIGHT: 67 IN | TEMPERATURE: 99.3 F | HEART RATE: 95 BPM | WEIGHT: 302.2 LBS | DIASTOLIC BLOOD PRESSURE: 65 MMHG | BODY MASS INDEX: 47.43 KG/M2

## 2024-01-03 DIAGNOSIS — J18.9 PNEUMONIA DUE TO INFECTIOUS ORGANISM, UNSPECIFIED LATERALITY, UNSPECIFIED PART OF LUNG: Primary | ICD-10-CM

## 2024-01-03 DIAGNOSIS — R50.9 FEVER, UNSPECIFIED FEVER CAUSE: ICD-10-CM

## 2024-01-03 LAB
BILIRUB BLD-MCNC: NEGATIVE MG/DL
CLARITY, POC: CLEAR
COLOR UR: YELLOW
EXPIRATION DATE: NORMAL
FLUAV AG UPPER RESP QL IA.RAPID: NOT DETECTED
FLUBV AG UPPER RESP QL IA.RAPID: NOT DETECTED
GLUCOSE UR STRIP-MCNC: ABNORMAL MG/DL
INTERNAL CONTROL: NORMAL
KETONES UR QL: NEGATIVE
LEUKOCYTE EST, POC: NEGATIVE
Lab: NORMAL
NITRITE UR-MCNC: NEGATIVE MG/ML
PH UR: 6 [PH] (ref 5–8)
PROT UR STRIP-MCNC: ABNORMAL MG/DL
RBC # UR STRIP: NEGATIVE /UL
SARS-COV-2 AG UPPER RESP QL IA.RAPID: NOT DETECTED
SP GR UR: 1.01 (ref 1–1.03)
UROBILINOGEN UR QL: ABNORMAL

## 2024-01-03 RX ORDER — GUAIFENESIN 600 MG/1
1200 TABLET, EXTENDED RELEASE ORAL 2 TIMES DAILY
Qty: 28 TABLET | Refills: 0 | Status: SHIPPED | OUTPATIENT
Start: 2024-01-03

## 2024-01-03 RX ORDER — PREDNISONE 10 MG/1
TABLET ORAL
Qty: 21 TABLET | Refills: 0 | Status: SHIPPED | OUTPATIENT
Start: 2024-01-03

## 2024-01-03 RX ORDER — DEXAMETHASONE SODIUM PHOSPHATE 10 MG/ML
10 INJECTION INTRAMUSCULAR; INTRAVENOUS ONCE
Status: COMPLETED | OUTPATIENT
Start: 2024-01-03 | End: 2024-01-03

## 2024-01-03 RX ORDER — ALBUTEROL SULFATE 1.25 MG/3ML
1 SOLUTION RESPIRATORY (INHALATION) EVERY 6 HOURS PRN
Qty: 180 EACH | Refills: 2 | Status: SHIPPED | OUTPATIENT
Start: 2024-01-03

## 2024-01-03 RX ORDER — ALLOPURINOL 100 MG/1
100 TABLET ORAL 2 TIMES DAILY
COMMUNITY
Start: 2023-12-11

## 2024-01-03 RX ORDER — ALBUTEROL SULFATE 90 UG/1
AEROSOL, METERED RESPIRATORY (INHALATION)
Qty: 18 G | Refills: 5 | Status: SHIPPED | OUTPATIENT
Start: 2024-01-03

## 2024-01-03 RX ADMIN — DEXAMETHASONE SODIUM PHOSPHATE 10 MG: 10 INJECTION INTRAMUSCULAR; INTRAVENOUS at 10:41

## 2024-01-03 NOTE — PROGRESS NOTES
CC: ER follow up    History:  Ephraim Quinones is a 73 y.o. male who presents today for evaluation of the above problems.      Had altered mental status last night. O2 was low. Been coughing and feeling poorly for about a week. Granddaughter took him to [facility].   Diagnosed with right sided pneumonia. Was given Rocephin and started on Ceftin.   No swabs were done for flu/Covid. Does have a nebulizer and some albuterol at home and he has been doing breathing treatments.      HPI  ROS:  Review of Systems   Respiratory:  Positive for cough and shortness of breath.    Neurological:         Altered mental status       Allergies   Allergen Reactions    Adhesive Tape Other (See Comments)     Blisters and rips skin off    Simvastatin Other (See Comments)     Leg Cramps    Cleocin [Clindamycin] GI Intolerance     Past Medical History:   Diagnosis Date    A-fib     Acid reflux     Anemia 05/03/2016    Claustrophobia     Diabetes mellitus     Gallstones     Gout     Hyperlipidemia     Hypertension     Malignant neoplasm of upper lobe of right lung 3/17/2023    Pneumonia     Sleep apnea with use of continuous positive airway pressure (CPAP)      Past Surgical History:   Procedure Laterality Date    BACK SURGERY      x2    BLADDER SURGERY  02/2023    CATARACT EXTRACTION      CIRCUMCISION N/A 06/06/2019    Procedure: CIRCUMCISION;  Surgeon: Yon Sloan MD;  Location:  [facility];  Service: Urology    COLONOSCOPY      FINGER SURGERY Left     LUNG BIOPSY Right 03/13/2023    REPLACEMENT TOTAL KNEE Bilateral     URETHRAL DILATATION N/A 12/01/2022    Procedure: URETHRAL DILATATION with Balloon, TRANSURETHRAL RESECTION OF BLADDER TUMOR;  Surgeon: Yon Sloan MD;  Location:  [facility];  Service: Urology;  Laterality: N/A;     Family History   Problem Relation Age of Onset    Colon cancer Mother     Prostate cancer Father     No Known Problems Maternal Grandmother     No Known Problems Maternal Grandfather     No Known  Problems Paternal Grandmother     No Known Problems Paternal Grandfather       reports that he quit smoking about 27 years ago. His smoking use included cigarettes. He has a 20.00 pack-year smoking history. He has never used smokeless tobacco. He reports current alcohol use. He reports that he does not use drugs.      Current Outpatient Medications:     albuterol sulfate  (90 Base) MCG/ACT inhaler, 2 sprays 2 minutes apart every 6 hours as needed, Disp: 18 g, Rfl: 5    allopurinol (ZYLOPRIM) 100 MG tablet, Take 1 tablet by mouth 2 (Two) Times a Day., Disp: , Rfl:     aspirin 81 MG EC tablet, Take 1 tablet by mouth Daily., Disp: , Rfl:     carbidopa-levodopa (SINEMET)  MG per tablet, TAKE 1 TABLET BY MOUTH TWICE DAILY, Disp: 180 tablet, Rfl: 3    Cholecalciferol (VITAMIN D3) 2000 units capsule, Take 1 capsule by mouth Daily., Disp: , Rfl:     clobetasol (TEMOVATE) 0.05 % cream, Apply  topically to the appropriate area as directed Take As Directed., Disp: 30 g, Rfl: 1    ELIQUIS 5 MG tablet tablet, Take 1 tablet by mouth Every 12 (Twelve) Hours., Disp: , Rfl:     furosemide (LASIX) 40 MG tablet, Take 1 tablet by mouth Daily., Disp: 90 tablet, Rfl: 1    gabapentin (NEURONTIN) 300 MG capsule, Take 1 capsule by mouth 4 (Four) Times a Day., Disp: 120 capsule, Rfl: 4    glipizide (GLUCOTROL XL) 5 MG ER tablet, Take 1 tablet by mouth Daily., Disp: 90 tablet, Rfl: 3    glucose blood test strip, 1 each by Other route Daily. Use as instructed, Disp: 100 each, Rfl: 3    indapamide (LOZOL) 2.5 MG tablet, TAKE 1 TABLET BY MOUTH EVERY MORNING, Disp: 90 tablet, Rfl: 2    Lancet Devices (EASY TOUCH LANCING DEVICE) misc, USE TO TEST DAILY, Disp: , Rfl: 0    losartan (COZAAR) 100 MG tablet, 1 at bedtime for blood pressure, Disp: 90 tablet, Rfl: 3    lovastatin (MEVACOR) 10 MG tablet, Take 1 tablet by mouth every night at bedtime. (Patient taking differently: Take 1 tablet by mouth Every Other Day.), Disp: 90 tablet, Rfl:  "2    metFORMIN (GLUCOPHAGE) 1000 MG tablet, TAKE 1 TABLET BY MOUTH 2 TIMES A DAY WITH MEALS., Disp: 180 tablet, Rfl: 2    naproxen sodium (ALEVE) 220 MG tablet, Take 1 tablet by mouth 2 (Two) Times a Day As Needed., Disp: , Rfl:     ONETOUCH DELICA LANCETS 33G misc, 1 each Daily., Disp: 100 each, Rfl: 3    oxyCODONE (ROXICODONE) 10 MG tablet, Take 1 tablet by mouth Every 4 (Four) Hours As Needed for Moderate Pain., Disp: 90 tablet, Rfl: 0    Potassium 99 MG tablet, Take 1 tablet by mouth 2 (Two) Times a Day., Disp: , Rfl:     pramipexole (MIRAPEX) 0.25 MG tablet, take 1-2 tablets nightly for leg cramps, Disp: 180 tablet, Rfl: 1    albuterol (ACCUNEB) 1.25 MG/3ML nebulizer solution, Take 3 mL by nebulization Every 6 (Six) Hours As Needed for Shortness of Air., Disp: 180 each, Rfl: 2    colchicine 0.6 MG tablet, Take two tablets immediately and then take one tablet one hour later. Then take one tablet daily until gone. (Patient not taking: Reported on 1/3/2024), Disp: 10 tablet, Rfl: 0    guaiFENesin (Mucinex) 600 MG 12 hr tablet, Take 2 tablets by mouth 2 (Two) Times a Day., Disp: 28 tablet, Rfl: 0    HYDROcodone-acetaminophen (NORCO)  MG per tablet, Take 1 tablet by mouth Every 6 (Six) Hours As Needed for Moderate Pain. (Patient not taking: Reported on 1/3/2024), Disp: 78 tablet, Rfl: 0    predniSONE (DELTASONE) 10 MG (21) dose pack, Use as directed on package, Disp: 21 tablet, Rfl: 0  No current facility-administered medications for this visit.    OBJECTIVE:  /65 (BP Location: Left arm, Patient Position: Sitting, Cuff Size: Large Adult)   Pulse 95   Temp 99.3 °F (37.4 °C)   Ht 170.2 cm (67\")   Wt (!) 137 kg (302 lb 3.2 oz)   SpO2 94%   BMI 47.33 kg/m²    Physical Exam  Vitals reviewed.   Constitutional:       Appearance: He is well-developed. He is diaphoretic.   Cardiovascular:      Rate and Rhythm: Normal rate.   Pulmonary:      Effort: Pulmonary effort is normal.      Breath sounds: Normal " breath sounds.   Neurological:      Mental Status: He is alert and oriented to person, place, and time.   Psychiatric:         Behavior: Behavior normal.         Assessment/Plan    Diagnoses and all orders for this visit:    1. Pneumonia due to infectious organism, unspecified laterality, unspecified part of lung (Primary)  -     albuterol (ACCUNEB) 1.25 MG/3ML nebulizer solution; Take 3 mL by nebulization Every 6 (Six) Hours As Needed for Shortness of Air.  Dispense: 180 each; Refill: 2  -     albuterol sulfate  (90 Base) MCG/ACT inhaler; 2 sprays 2 minutes apart every 6 hours as needed  Dispense: 18 g; Refill: 5  -     Cancel: POC Urinalysis Dipstick, Multipro  -     predniSONE (DELTASONE) 10 MG (21) dose pack; Use as directed on package  Dispense: 21 tablet; Refill: 0  -     guaiFENesin (Mucinex) 600 MG 12 hr tablet; Take 2 tablets by mouth 2 (Two) Times a Day.  Dispense: 28 tablet; Refill: 0  -     dexAMETHasone (DECADRON) injection 10 mg  -     POCT SARS-CoV-2 Antigen SARAVANAN + Flu  -     Cancel: POC Urinalysis Dipstick, Multipro          An After Visit Summary was printed and given to the patient at discharge.  Return in about 1 week (around 1/10/2024) for Recheck - pneumonia.       ANSHUL Noel 1/3/24    Electronically signed.

## 2024-01-12 ENCOUNTER — OFFICE VISIT (OUTPATIENT)
Dept: FAMILY MEDICINE CLINIC | Facility: CLINIC | Age: 74
End: 2024-01-12
Payer: MEDICARE

## 2024-01-12 VITALS
TEMPERATURE: 97 F | BODY MASS INDEX: 46.93 KG/M2 | HEART RATE: 96 BPM | DIASTOLIC BLOOD PRESSURE: 68 MMHG | WEIGHT: 299 LBS | RESPIRATION RATE: 18 BRPM | HEIGHT: 67 IN | OXYGEN SATURATION: 98 % | SYSTOLIC BLOOD PRESSURE: 128 MMHG

## 2024-01-12 DIAGNOSIS — J18.9 PNEUMONIA DUE TO INFECTIOUS ORGANISM, UNSPECIFIED LATERALITY, UNSPECIFIED PART OF LUNG: ICD-10-CM

## 2024-01-12 PROCEDURE — 3074F SYST BP LT 130 MM HG: CPT | Performed by: NURSE PRACTITIONER

## 2024-01-12 PROCEDURE — 99213 OFFICE O/P EST LOW 20 MIN: CPT | Performed by: NURSE PRACTITIONER

## 2024-01-12 PROCEDURE — 3078F DIAST BP <80 MM HG: CPT | Performed by: NURSE PRACTITIONER

## 2024-01-12 RX ORDER — ALLOPURINOL 100 MG/1
200 TABLET ORAL DAILY
Qty: 180 TABLET | Refills: 2 | Status: SHIPPED | OUTPATIENT
Start: 2024-01-12

## 2024-01-12 RX ORDER — ALBUTEROL SULFATE 90 UG/1
AEROSOL, METERED RESPIRATORY (INHALATION)
Qty: 18 G | Refills: 5 | Status: SHIPPED | OUTPATIENT
Start: 2024-01-12

## 2024-01-12 NOTE — PROGRESS NOTES
CC: follow up pneumonia    History:  Ephraim Quinones is a 73 y.o. male who presents today for evaluation of the above problems.      Patient returns to clinic for follow up on right lung pneumonia. Diagnosed at [facility] ER on 1/2 after being sick for around a week. Reports that he is feeling much better. O2 sat is improved back to his baseline.     HPI  ROS:  Review of Systems   Respiratory:  Negative for cough and shortness of breath.        Allergies   Allergen Reactions    Adhesive Tape Other (See Comments)     Blisters and rips skin off    Simvastatin Other (See Comments)     Leg Cramps    Cleocin [Clindamycin] GI Intolerance     Past Medical History:   Diagnosis Date    A-fib     Acid reflux     Anemia 05/03/2016    Claustrophobia     Diabetes mellitus     Gallstones     Gout     Hyperlipidemia     Hypertension     Malignant neoplasm of upper lobe of right lung 3/17/2023    Pneumonia     Sleep apnea with use of continuous positive airway pressure (CPAP)      Past Surgical History:   Procedure Laterality Date    BACK SURGERY      x2    BLADDER SURGERY  02/2023    CATARACT EXTRACTION      CIRCUMCISION N/A 06/06/2019    Procedure: CIRCUMCISION;  Surgeon: Yon Sloan MD;  Location:  [facility] OR;  Service: Urology    COLONOSCOPY      FINGER SURGERY Left     LUNG BIOPSY Right 03/13/2023    REPLACEMENT TOTAL KNEE Bilateral     URETHRAL DILATATION N/A 12/01/2022    Procedure: URETHRAL DILATATION with Balloon, TRANSURETHRAL RESECTION OF BLADDER TUMOR;  Surgeon: Yon Sloan MD;  Location:  [facility] OR;  Service: Urology;  Laterality: N/A;     Family History   Problem Relation Age of Onset    Colon cancer Mother     Prostate cancer Father     No Known Problems Maternal Grandmother     No Known Problems Maternal Grandfather     No Known Problems Paternal Grandmother     No Known Problems Paternal Grandfather       reports that he quit smoking about 27 years ago. His smoking use included cigarettes. He has a  20.00 pack-year smoking history. He has never used smokeless tobacco. He reports current alcohol use. He reports that he does not use drugs.      Current Outpatient Medications:     albuterol (ACCUNEB) 1.25 MG/3ML nebulizer solution, Take 3 mL by nebulization Every 6 (Six) Hours As Needed for Shortness of Air., Disp: 180 each, Rfl: 2    albuterol sulfate  (90 Base) MCG/ACT inhaler, 2 sprays 2 minutes apart every 6 hours as needed, Disp: 18 g, Rfl: 5    allopurinol (ZYLOPRIM) 100 MG tablet, Take 2 tablets by mouth Daily., Disp: 180 tablet, Rfl: 2    aspirin 81 MG EC tablet, Take 1 tablet by mouth Daily., Disp: , Rfl:     carbidopa-levodopa (SINEMET)  MG per tablet, TAKE 1 TABLET BY MOUTH TWICE DAILY, Disp: 180 tablet, Rfl: 3    Cholecalciferol (VITAMIN D3) 2000 units capsule, Take 1 capsule by mouth Daily., Disp: , Rfl:     clobetasol (TEMOVATE) 0.05 % cream, Apply  topically to the appropriate area as directed Take As Directed., Disp: 30 g, Rfl: 1    colchicine 0.6 MG tablet, Take two tablets immediately and then take one tablet one hour later. Then take one tablet daily until gone., Disp: 10 tablet, Rfl: 0    ELIQUIS 5 MG tablet tablet, Take 1 tablet by mouth Every 12 (Twelve) Hours., Disp: , Rfl:     furosemide (LASIX) 40 MG tablet, Take 1 tablet by mouth Daily., Disp: 90 tablet, Rfl: 1    gabapentin (NEURONTIN) 300 MG capsule, Take 1 capsule by mouth 4 (Four) Times a Day., Disp: 120 capsule, Rfl: 4    glipizide (GLUCOTROL XL) 5 MG ER tablet, Take 1 tablet by mouth Daily., Disp: 90 tablet, Rfl: 3    glucose blood test strip, 1 each by Other route Daily. Use as instructed, Disp: 100 each, Rfl: 3    guaiFENesin (Mucinex) 600 MG 12 hr tablet, Take 2 tablets by mouth 2 (Two) Times a Day., Disp: 28 tablet, Rfl: 0    HYDROcodone-acetaminophen (NORCO)  MG per tablet, Take 1 tablet by mouth Every 6 (Six) Hours As Needed for Moderate Pain., Disp: 78 tablet, Rfl: 0    indapamide (LOZOL) 2.5 MG tablet,  "TAKE 1 TABLET BY MOUTH EVERY MORNING, Disp: 90 tablet, Rfl: 2    Lancet Devices (EASY TOUCH LANCING DEVICE) misc, USE TO TEST DAILY, Disp: , Rfl: 0    losartan (COZAAR) 100 MG tablet, 1 at bedtime for blood pressure, Disp: 90 tablet, Rfl: 3    lovastatin (MEVACOR) 10 MG tablet, Take 1 tablet by mouth every night at bedtime. (Patient taking differently: Take 1 tablet by mouth Every Other Day.), Disp: 90 tablet, Rfl: 2    metFORMIN (GLUCOPHAGE) 1000 MG tablet, TAKE 1 TABLET BY MOUTH 2 TIMES A DAY WITH MEALS., Disp: 180 tablet, Rfl: 2    naproxen sodium (ALEVE) 220 MG tablet, Take 1 tablet by mouth 2 (Two) Times a Day As Needed., Disp: , Rfl:     ONETOUCH DELICA LANCETS 33G misc, 1 each Daily., Disp: 100 each, Rfl: 3    oxyCODONE (ROXICODONE) 10 MG tablet, Take 1 tablet by mouth Every 4 (Four) Hours As Needed for Moderate Pain., Disp: 90 tablet, Rfl: 0    Potassium 99 MG tablet, Take 1 tablet by mouth 2 (Two) Times a Day., Disp: , Rfl:     pramipexole (MIRAPEX) 0.25 MG tablet, take 1-2 tablets nightly for leg cramps, Disp: 180 tablet, Rfl: 1    predniSONE (DELTASONE) 10 MG (21) dose pack, Use as directed on package, Disp: 21 tablet, Rfl: 0    OBJECTIVE:  /68   Pulse 96   Temp 97 °F (36.1 °C) (Temporal)   Resp 18   Ht 170.2 cm (67\")   Wt 136 kg (299 lb)   SpO2 98%   BMI 46.83 kg/m²    Physical Exam  Vitals reviewed.   Constitutional:       Appearance: He is well-developed.   Cardiovascular:      Rate and Rhythm: Normal rate and regular rhythm.      Heart sounds: Normal heart sounds.   Pulmonary:      Effort: Pulmonary effort is normal.      Breath sounds: Normal breath sounds.   Neurological:      Mental Status: He is alert and oriented to person, place, and time.   Psychiatric:         Behavior: Behavior normal.         Assessment/Plan    Diagnoses and all orders for this visit:    1. Pneumonia due to infectious organism, unspecified laterality, unspecified part of lung  -     albuterol sulfate  (90 " Base) MCG/ACT inhaler; 2 sprays 2 minutes apart every 6 hours as needed  Dispense: 18 g; Refill: 5  -     XR Chest PA & Lateral; Future    Other orders  -     allopurinol (ZYLOPRIM) 100 MG tablet; Take 2 tablets by mouth Daily.  Dispense: 180 tablet; Refill: 2    Will repeat a chest xray to evaluate for resolution. Clinically, patient is much improved.          An After Visit Summary was printed and given to the patient at discharge.  Return if symptoms worsen or fail to improve, for Next scheduled follow up.       Dolores Ba, ANSHUL 1/12/24    Electronically signed.

## 2024-01-16 DIAGNOSIS — R59.1 LYMPHADENOPATHY: Primary | ICD-10-CM

## 2024-01-16 DIAGNOSIS — R52 PAIN: ICD-10-CM

## 2024-01-16 NOTE — TELEPHONE ENCOUNTER
Drug thereapy appt 12/4/23  contract done 2/6/23  UDS done 2/6/23    Rx Refill Note  Requested Prescriptions     Pending Prescriptions Disp Refills    oxyCODONE (ROXICODONE) 10 MG tablet 90 tablet 0     Sig: Take 1 tablet by mouth Every 4 (Four) Hours As Needed for Moderate Pain.      Last office visit with prescribing clinician: 1/12/24   Last telemedicine visit with prescribing clinician: Visit date not found   Next office visit with prescribing clinician: 3/12/2024     Helena Meehan MA  01/16/24, 16:11 CST

## 2024-01-17 RX ORDER — INDAPAMIDE 2.5 MG/1
2.5 TABLET ORAL EVERY MORNING
Qty: 90 TABLET | Refills: 2 | Status: SHIPPED | OUTPATIENT
Start: 2024-01-17

## 2024-01-17 RX ORDER — OXYCODONE HYDROCHLORIDE 10 MG/1
10 TABLET ORAL EVERY 4 HOURS PRN
Qty: 90 TABLET | Refills: 0 | Status: SHIPPED | OUTPATIENT
Start: 2024-01-17

## 2024-01-17 NOTE — TELEPHONE ENCOUNTER
Rx Refill Note  Requested Prescriptions     Pending Prescriptions Disp Refills    indapamide (LOZOL) 2.5 MG tablet 90 tablet 2     Sig: Take 1 tablet by mouth Every Morning.      Last office visit with prescribing clinician: 5/31/2023   Last telemedicine visit with prescribing clinician: Visit date not found   Next office visit with prescribing clinician: 3/12/2024                         Would you like a call back once the refill request has been completed: [] Yes [] No    If the office needs to give you a call back, can they leave a voicemail: [] Yes [] No    Anamaria Briscoe, PCT  01/17/24, 11:28 CST

## 2024-01-19 DIAGNOSIS — M10.9 ACUTE GOUT OF ANKLE, UNSPECIFIED CAUSE, UNSPECIFIED LATERALITY: ICD-10-CM

## 2024-01-19 RX ORDER — COLCHICINE 0.6 MG/1
TABLET ORAL
Qty: 10 TABLET | Refills: 0 | Status: SHIPPED | OUTPATIENT
Start: 2024-01-19

## 2024-01-19 NOTE — TELEPHONE ENCOUNTER
Rx Refill Note  Requested Prescriptions     Pending Prescriptions Disp Refills    colchicine 0.6 MG tablet [Pharmacy Med Name: colchicine 0.6 mg tablet] 10 tablet 0     Sig: Take two tablets immediately and then take one tablet one hour later. Then take one tablet daily until gone.      Last office visit with prescribing clinician: 1/12/24   Last telemedicine visit with prescribing clinician: Visit date not found   Next office visit with prescribing clinician: 3/12/2024       {TIP  Please add Last Relevant Lab 12/4/23    Helena Meehan MA  01/19/24, 09:34 CST

## 2024-01-26 ENCOUNTER — TELEPHONE (OUTPATIENT)
Dept: FAMILY MEDICINE CLINIC | Facility: CLINIC | Age: 74
End: 2024-01-26
Payer: MEDICARE

## 2024-01-26 ENCOUNTER — OFFICE VISIT (OUTPATIENT)
Dept: FAMILY MEDICINE CLINIC | Facility: CLINIC | Age: 74
End: 2024-01-26
Payer: MEDICARE

## 2024-01-26 VITALS
DIASTOLIC BLOOD PRESSURE: 81 MMHG | HEART RATE: 87 BPM | TEMPERATURE: 97.3 F | WEIGHT: 298.4 LBS | HEIGHT: 67 IN | SYSTOLIC BLOOD PRESSURE: 129 MMHG | BODY MASS INDEX: 46.83 KG/M2 | OXYGEN SATURATION: 98 %

## 2024-01-26 DIAGNOSIS — Z46.6 ENCOUNTER FOR FOLEY CATHETER REMOVAL: Primary | ICD-10-CM

## 2024-01-26 NOTE — TELEPHONE ENCOUNTER
"Hub staff attempted to follow warm transfer process and was unsuccessful     Caller: Ephraim Quinones \"Nba\"    Relationship to patient: Self    Best call back number: [phone]    Patient is needing: WANTING TO SPEAK TO JUANITA, DID NOT SPECIFY REASON  "

## 2024-01-26 NOTE — PROGRESS NOTES
Catheter removed without difficulty. Patient tolerated well. Advised to go to ER if unable to void in 8 hours.

## 2024-02-01 DIAGNOSIS — M10.9 ACUTE GOUT OF ANKLE, UNSPECIFIED CAUSE, UNSPECIFIED LATERALITY: ICD-10-CM

## 2024-02-01 DIAGNOSIS — R52 PAIN: ICD-10-CM

## 2024-02-01 RX ORDER — COLCHICINE 0.6 MG/1
TABLET ORAL
Qty: 10 TABLET | Refills: 0 | OUTPATIENT
Start: 2024-02-01

## 2024-02-01 NOTE — TELEPHONE ENCOUNTER
Rx Refill Note  Requested Prescriptions     Pending Prescriptions Disp Refills    oxyCODONE (ROXICODONE) 10 MG tablet 90 tablet 0     Sig: Take 1 tablet by mouth Every 4 (Four) Hours As Needed for Moderate Pain.      Last office visit with prescribing clinician: 5/31/2023   Last telemedicine visit with prescribing clinician: Visit date not found   Next office visit with prescribing clinician: 3/12/2024                         Would you like a call back once the refill request has been completed: [] Yes [] No    If the office needs to give you a call back, can they leave a voicemail: [] Yes [] No    Anamaria Briscoe, PCT  02/01/24, 12:55 CST

## 2024-02-01 NOTE — TELEPHONE ENCOUNTER
Rx Refill Note  Requested Prescriptions     Pending Prescriptions Disp Refills    colchicine 0.6 MG tablet [Pharmacy Med Name: colchicine 0.6 mg tablet] 10 tablet 0     Sig: TAKE TWO TABLETS BY MOUTH NOW AND THEN TAKE ONE TABLET ONE HOUR LATER, THEN TAKE ONE TABLET DAILY UNTIL GONE      Last office visit with prescribing clinician: 5/31/2023   Last telemedicine visit with prescribing clinician: Visit date not found   Next office visit with prescribing clinician: 2/1/2024                         Would you like a call back once the refill request has been completed: [] Yes [] No    If the office needs to give you a call back, can they leave a voicemail: [] Yes [] No    Anamaria Briscoe, PCT  02/01/24, 12:54 CST

## 2024-02-02 DIAGNOSIS — M10.9 ACUTE GOUT OF ANKLE, UNSPECIFIED CAUSE, UNSPECIFIED LATERALITY: ICD-10-CM

## 2024-02-02 RX ORDER — OXYCODONE HYDROCHLORIDE 10 MG/1
10 TABLET ORAL EVERY 4 HOURS PRN
Qty: 90 TABLET | Refills: 0 | Status: SHIPPED | OUTPATIENT
Start: 2024-02-02

## 2024-02-02 RX ORDER — COLCHICINE 0.6 MG/1
TABLET ORAL
Qty: 10 TABLET | Refills: 0 | OUTPATIENT
Start: 2024-02-02

## 2024-02-02 NOTE — TELEPHONE ENCOUNTER
Rx Refill Note  Requested Prescriptions     Pending Prescriptions Disp Refills    colchicine 0.6 MG tablet [Pharmacy Med Name: colchicine 0.6 mg tablet] 10 tablet 0     Sig: TAKE TWO TABLETS BY MOUTH NOW AND THEN TAKE ONE TABLET ONE HOUR LATER, THEN TAKE ONE TABLET DAILY UNTIL GONE      Last office visit with prescribing clinician: 5/31/2023   Last telemedicine visit with prescribing clinician: Visit date not found   Next office visit with prescribing clinician: 3/12/2024                         Would you like a call back once the refill request has been completed: [] Yes [] No    If the office needs to give you a call back, can they leave a voicemail: [] Yes [] No    Helena Gilmore MA  02/02/24, 14:53 CST

## 2024-02-05 DIAGNOSIS — M10.9 ACUTE GOUT OF ANKLE, UNSPECIFIED CAUSE, UNSPECIFIED LATERALITY: ICD-10-CM

## 2024-02-05 DIAGNOSIS — R59.1 LYMPHADENOPATHY: Primary | ICD-10-CM

## 2024-02-05 RX ORDER — COLCHICINE 0.6 MG/1
TABLET ORAL
Qty: 10 TABLET | Refills: 0 | Status: SHIPPED | OUTPATIENT
Start: 2024-02-05

## 2024-02-05 NOTE — TELEPHONE ENCOUNTER
Rx Refill Note  Requested Prescriptions     Pending Prescriptions Disp Refills    colchicine 0.6 MG tablet [Pharmacy Med Name: colchicine 0.6 mg tablet] 10 tablet 0     Sig: TAKE TWO TABLETS BY MOUTH NOW AND THEN TAKE ONE TABLET ONE HOUR LATER, THEN TAKE ONE TABLET DAILY UNTIL GONE      Last office visit with prescribing clinician: 5/31/2023   Last telemedicine visit with prescribing clinician: Visit date not found   Next office visit with prescribing clinician: 3/12/2024                         Would you like a call back once the refill request has been completed: [] Yes [] No    If the office needs to give you a call back, can they leave a voicemail: [] Yes [] No    Helena Gilmore MA  02/05/24, 11:23 CST

## 2024-02-12 RX ORDER — CLOBETASOL PROPIONATE 0.5 MG/G
CREAM TOPICAL
Qty: 30 G | Refills: 1 | Status: SHIPPED | OUTPATIENT
Start: 2024-02-12

## 2024-02-16 DIAGNOSIS — R52 PAIN: ICD-10-CM

## 2024-02-16 RX ORDER — OXYCODONE HYDROCHLORIDE 10 MG/1
10 TABLET ORAL EVERY 4 HOURS PRN
Qty: 90 TABLET | Refills: 0 | Status: SHIPPED | OUTPATIENT
Start: 2024-02-16

## 2024-02-18 DIAGNOSIS — G62.9 NEUROPATHY: ICD-10-CM

## 2024-02-19 RX ORDER — GABAPENTIN 300 MG/1
CAPSULE ORAL
Qty: 120 CAPSULE | Refills: 4 | Status: SHIPPED | OUTPATIENT
Start: 2024-02-19

## 2024-02-19 NOTE — TELEPHONE ENCOUNTER
Rx Refill Note  Requested Prescriptions     Pending Prescriptions Disp Refills    gabapentin (NEURONTIN) 300 MG capsule [Pharmacy Med Name: gabapentin 300 mg capsule] 120 capsule 4     Sig: Take 1 capsule by mouth 4 Times a Day.      Last office visit with prescribing clinician: 1/26/24   Last telemedicine visit with prescribing clinician: Visit date not found   Next office visit with prescribing clinician: 3/12/2024     Helena Meehan MA  02/19/24, 07:43 CST    Drug thereapy appt 12/4/23  contract done 2/6/23  UDS done 2/6/23

## 2024-02-21 ENCOUNTER — OFFICE VISIT (OUTPATIENT)
Dept: FAMILY MEDICINE CLINIC | Facility: CLINIC | Age: 74
End: 2024-02-21
Payer: MEDICARE

## 2024-02-21 VITALS
SYSTOLIC BLOOD PRESSURE: 137 MMHG | OXYGEN SATURATION: 98 % | HEART RATE: 87 BPM | WEIGHT: 311.8 LBS | HEIGHT: 67 IN | RESPIRATION RATE: 18 BRPM | BODY MASS INDEX: 48.94 KG/M2 | DIASTOLIC BLOOD PRESSURE: 88 MMHG | TEMPERATURE: 98 F

## 2024-02-21 DIAGNOSIS — E11.9 TYPE 2 DIABETES MELLITUS WITHOUT COMPLICATION, WITHOUT LONG-TERM CURRENT USE OF INSULIN: ICD-10-CM

## 2024-02-21 DIAGNOSIS — M10.9 ACUTE GOUT OF ANKLE, UNSPECIFIED CAUSE, UNSPECIFIED LATERALITY: Primary | ICD-10-CM

## 2024-02-21 RX ORDER — COLCHICINE 0.6 MG/1
TABLET ORAL
Qty: 10 TABLET | Refills: 5 | Status: SHIPPED | OUTPATIENT
Start: 2024-02-21

## 2024-02-21 RX ORDER — ALLOPURINOL 300 MG/1
300 TABLET ORAL DAILY
Qty: 90 TABLET | Refills: 3 | Status: SHIPPED | OUTPATIENT
Start: 2024-02-21

## 2024-02-21 NOTE — PROGRESS NOTES
Subjective   Ephraim Quinones is a 73 y.o. male.     History of Present Illness  73-year-old male with recurrent gout      The following portions of the patient's history were reviewed and updated as appropriate: allergies, current medications, past family history, past medical history, past social history, past surgical history, and problem list.    Review of Systems   Respiratory:  Negative for shortness of breath.         Remote smoker   Cardiovascular:  Negative for chest pain and leg swelling.   Genitourinary:         History of bladder tumor and lung cancer   Musculoskeletal:  Positive for arthralgias.        Gout mostly ankles arch hand       Objective   Physical Exam  Vitals and nursing note reviewed.   Constitutional:       Appearance: Normal appearance.   Cardiovascular:      Rate and Rhythm: Normal rate and regular rhythm.   Pulmonary:      Effort: Pulmonary effort is normal.      Breath sounds: Normal breath sounds.   Musculoskeletal:      Right lower leg: No edema.      Left lower leg: No edema.   Skin:     General: Skin is warm and dry.   Neurological:      General: No focal deficit present.      Mental Status: He is alert and oriented to person, place, and time.   Psychiatric:         Mood and Affect: Mood normal.         Behavior: Behavior normal.         Thought Content: Thought content normal.         Judgment: Judgment normal.         Assessment & Plan   Diagnoses and all orders for this visit:    1. Acute gout of ankle, unspecified cause, unspecified laterality (Primary)  -     Uric Acid  -     colchicine 0.6 MG tablet; TAKE TWO TABLETS BY MOUTH NOW AND THEN TAKE ONE TABLET ONE HOUR LATER, THEN TAKE ONE TABLET DAILY UNTIL GONE  Dispense: 10 tablet; Refill: 5    2. Type 2 diabetes mellitus without complication, without long-term current use of insulin  -     Hemoglobin A1c  -     Basic Metabolic Panel    Other orders  -     allopurinol (ZYLOPRIM) 300 MG tablet; Take 1 tablet by mouth Daily.   Dispense: 90 tablet; Refill: 3         Plan above         Answers submitted by the patient for this visit:  Primary Reason for Visit (Submitted on 2/20/2024)  What is the primary reason for your visit?: Other  Other (Submitted on 2/20/2024)  Please describe your symptoms.: Gout  Have you had these symptoms before?: Yes  How long have you been having these symptoms?: Greater than 2 weeks

## 2024-02-22 LAB
BUN SERPL-MCNC: 25 MG/DL (ref 8–23)
BUN/CREAT SERPL: 30.1 (ref 7–25)
CALCIUM SERPL-MCNC: 9.1 MG/DL (ref 8.6–10.5)
CHLORIDE SERPL-SCNC: 100 MMOL/L (ref 98–107)
CO2 SERPL-SCNC: 25.2 MMOL/L (ref 22–29)
CREAT SERPL-MCNC: 0.83 MG/DL (ref 0.76–1.27)
EGFRCR SERPLBLD CKD-EPI 2021: 92.4 ML/MIN/1.73
GLUCOSE SERPL-MCNC: 107 MG/DL (ref 65–99)
HBA1C MFR BLD: 6.8 % (ref 4.8–5.6)
POTASSIUM SERPL-SCNC: 4.4 MMOL/L (ref 3.5–5.2)
SODIUM SERPL-SCNC: 139 MMOL/L (ref 136–145)
URATE SERPL-MCNC: 5.9 MG/DL (ref 3.4–7)

## 2024-02-29 RX ORDER — ALLOPURINOL 100 MG/1
100 TABLET ORAL DAILY
Qty: 90 TABLET | Refills: 1 | OUTPATIENT
Start: 2024-02-29

## 2024-03-04 DIAGNOSIS — R52 PAIN: ICD-10-CM

## 2024-03-04 RX ORDER — OXYCODONE HYDROCHLORIDE 10 MG/1
10 TABLET ORAL EVERY 4 HOURS PRN
Qty: 90 TABLET | Refills: 0 | Status: SHIPPED | OUTPATIENT
Start: 2024-03-04

## 2024-03-04 NOTE — TELEPHONE ENCOUNTER
Drug thereapy appt 12/4/23  contract done 2/6/23  UDS done 2/6/23    Rx Refill Note  Requested Prescriptions     Pending Prescriptions Disp Refills    oxyCODONE (ROXICODONE) 10 MG tablet 90 tablet 0     Sig: Take 1 tablet by mouth Every 4 (Four) Hours As Needed for Moderate Pain.      Last office visit with prescribing clinician: 2/21/2024   Last telemedicine visit with prescribing clinician: Visit date not found   Next office visit with prescribing clinician: 3/12/2024     Helena Meehan MA  03/04/24, 14:23 CST

## 2024-03-06 DIAGNOSIS — M10.9 ACUTE GOUT OF ANKLE, UNSPECIFIED CAUSE, UNSPECIFIED LATERALITY: ICD-10-CM

## 2024-03-06 RX ORDER — ALLOPURINOL 300 MG/1
300 TABLET ORAL DAILY
Qty: 90 TABLET | Refills: 0 | Status: SHIPPED | OUTPATIENT
Start: 2024-03-06

## 2024-03-06 RX ORDER — COLCHICINE 0.6 MG/1
TABLET ORAL
Qty: 10 TABLET | Refills: 5 | Status: SHIPPED | OUTPATIENT
Start: 2024-03-06

## 2024-03-06 RX ORDER — ALLOPURINOL 100 MG/1
100 TABLET ORAL DAILY
Qty: 90 TABLET | Refills: 0 | Status: SHIPPED | OUTPATIENT
Start: 2024-03-06

## 2024-03-06 NOTE — TELEPHONE ENCOUNTER
Anesthesia Evaluation     Patient summary reviewed and Nursing notes reviewed   history of anesthetic complications:  PONV               Airway   Mallampati: II  TM distance: >3 FB  Neck ROM: full  No difficulty expected and Possible difficult intubation  Dental      Pulmonary    (+) a smoker Current,sleep apnea, decreased breath sounds  Cardiovascular     (+) hypertension poorly controlled, hyperlipidemia      Neuro/Psych  (+) headaches, psychiatric history  GI/Hepatic/Renal/Endo    (+) obesity, morbid obesity    Musculoskeletal     (+) arthralgias, back pain, chronic pain, neck pain  Abdominal    Substance History      OB/GYN          Other   blood dyscrasia anemia,                   Anesthesia Plan    ASA 3     general     (Risks and benefits discussed including risk of aspiration, recall and dental damage. All patient questions answered.    Will continue with plan of care.)  intravenous induction     Anesthetic plan, risks, benefits, and alternatives have been provided, discussed and informed consent has been obtained with: patient.  Pre-procedure education provided    CODE STATUS:    Level Of Support Discussed With: Patient  Code Status (Patient has no pulse and is not breathing): CPR (Attempt to Resuscitate)  Medical Interventions (Patient has pulse or is breathing): Full Support       Rx Refill Note  Requested Prescriptions     Pending Prescriptions Disp Refills    allopurinol (Zyloprim) 300 MG tablet 90 tablet 0     Sig: Take 1 tablet by mouth Daily.    allopurinol (Zyloprim) 100 MG tablet 90 tablet 0     Sig: Take 1 tablet by mouth Daily.      Last office visit with prescribing clinician: 2/21/2024   Last telemedicine visit with prescribing clinician: Visit date not found   Next office visit with prescribing clinician: 3/12/2024                         Would you like a call back once the refill request has been completed: [] Yes [] No    If the office needs to give you a call back, can they leave a voicemail: [] Yes [] No    Anamaria Briscoe, PCT  03/06/24, 13:57 CST

## 2024-03-06 NOTE — TELEPHONE ENCOUNTER
Nba called and said he is still having gout issues. He said you said if it didn't improve this time you may go up to 400 mg allopurinol? He wanted me to check and see what you recommend. Please advise

## 2024-03-12 ENCOUNTER — OFFICE VISIT (OUTPATIENT)
Dept: FAMILY MEDICINE CLINIC | Facility: CLINIC | Age: 74
End: 2024-03-12
Payer: MEDICARE

## 2024-03-12 VITALS
DIASTOLIC BLOOD PRESSURE: 78 MMHG | BODY MASS INDEX: 48.97 KG/M2 | HEART RATE: 59 BPM | TEMPERATURE: 97.5 F | HEIGHT: 67 IN | SYSTOLIC BLOOD PRESSURE: 128 MMHG | OXYGEN SATURATION: 95 % | WEIGHT: 312 LBS

## 2024-03-12 DIAGNOSIS — M10.9 ACUTE GOUT OF ANKLE, UNSPECIFIED CAUSE, UNSPECIFIED LATERALITY: Primary | ICD-10-CM

## 2024-03-12 DIAGNOSIS — Z51.81 THERAPEUTIC DRUG MONITORING: ICD-10-CM

## 2024-03-12 DIAGNOSIS — R53.83 FATIGUE, UNSPECIFIED TYPE: ICD-10-CM

## 2024-03-12 DIAGNOSIS — E11.9 TYPE 2 DIABETES MELLITUS WITHOUT COMPLICATION, WITHOUT LONG-TERM CURRENT USE OF INSULIN: ICD-10-CM

## 2024-03-12 DIAGNOSIS — E78.5 HYPERLIPIDEMIA, UNSPECIFIED HYPERLIPIDEMIA TYPE: ICD-10-CM

## 2024-03-12 NOTE — PROGRESS NOTES
8/25/2022     8:00 AM 11/11/2022     9:00 AM 2/6/2023     8:00 AM 5/31/2023     9:00 AM 8/29/2023     8:00 AM 12/4/2023     8:00 AM 3/12/2024     8:00 AM   CONTROLLED SUBSTANCE TRACKING   Last Gregory 8/25/2022 11/11/2022 2/6/2023 5/31/2023 8/29/2023 12/4/2023 3/12/2024   Report Number reviewed through epic  reviewed through epic  reviewed through epic reviewed through [facility]    Last UDS 12/1/2021 12/1/2021 2/6/2023 2/6/2023 2/6/2023 2/6/2023 3/12/2024   Last Controlled Substance Agreement 12/1/2021 12/1/2021 2/6/2023 2/6/2023 2/6/2023 2/6/2023 3/12/2024     Jacob Quinones is a 73 y.o. male.     History of Present Illness  73-year-old male with history of renal cancer hyperlipidemia hyperglycemia and morbid obesity      The following portions of the patient's history were reviewed and updated as appropriate: allergies, current medications, past family history, past medical history, past social history, past surgical history, and problem list.    Review of Systems   Respiratory:  Negative for shortness of breath.         History of adenocarcinoma of the lung   Cardiovascular:  Negative for chest pain and leg swelling.   Genitourinary:         History of bladder cancer   Musculoskeletal:  Positive for arthralgias.       Objective   Physical Exam  Vitals and nursing note reviewed.   Constitutional:       Appearance: He is obese.   Eyes:      Extraocular Movements: Extraocular movements intact.      Pupils: Pupils are equal, round, and reactive to light.   Cardiovascular:      Rate and Rhythm: Normal rate and regular rhythm.   Pulmonary:      Effort: Pulmonary effort is normal.      Breath sounds: Normal breath sounds.   Musculoskeletal:      Right lower leg: No edema.      Left lower leg: No edema.   Neurological:      General: No focal deficit present.      Mental Status: He is oriented to person, place, and time.   Psychiatric:         Mood and Affect: Mood normal.         Behavior: Behavior normal.          Thought Content: Thought content normal.         Judgment: Judgment normal.         Assessment & Plan   Diagnoses and all orders for this visit:    1. Acute gout of ankle, unspecified cause, unspecified laterality (Primary)  -     Uric Acid    2. Type 2 diabetes mellitus without complication, without long-term current use of insulin  -     Hemoglobin A1c    3. Fatigue, unspecified type  -     CBC & Differential  -     TSH    4. Hyperlipidemia, unspecified hyperlipidemia type  -     Comprehensive Metabolic Panel  -     Lipid Panel    5. Therapeutic drug monitoring  -     ToxASSURE Select 13 (MW) - Urine, Clean Catch  -     Gabapentin, Urine - Urine, Clean Catch         Plan above-continue seeing specialist-RSV vaccination recommended         Answers submitted by the patient for this visit:  Primary Reason for Visit (Submitted on 3/7/2024)  What is the primary reason for your visit?: Other  Other (Submitted on 3/7/2024)  Please describe your symptoms.: Check in  Have you had these symptoms before?: Yes  How long have you been having these symptoms?: Greater than 2 weeks

## 2024-03-13 LAB
ALBUMIN SERPL-MCNC: 3.9 G/DL (ref 3.5–5.2)
ALBUMIN/GLOB SERPL: 1.7 G/DL
ALP SERPL-CCNC: 87 U/L (ref 39–117)
ALT SERPL-CCNC: 19 U/L (ref 1–41)
AST SERPL-CCNC: 19 U/L (ref 1–40)
BASOPHILS # BLD AUTO: 0.07 10*3/MM3 (ref 0–0.2)
BASOPHILS NFR BLD AUTO: 0.9 % (ref 0–1.5)
BILIRUB SERPL-MCNC: 0.5 MG/DL (ref 0–1.2)
BUN SERPL-MCNC: 19 MG/DL (ref 8–23)
BUN/CREAT SERPL: 23.2 (ref 7–25)
CALCIUM SERPL-MCNC: 9.3 MG/DL (ref 8.6–10.5)
CHLORIDE SERPL-SCNC: 100 MMOL/L (ref 98–107)
CHOLEST SERPL-MCNC: 170 MG/DL (ref 0–200)
CO2 SERPL-SCNC: 23.8 MMOL/L (ref 22–29)
CREAT SERPL-MCNC: 0.82 MG/DL (ref 0.76–1.27)
EGFRCR SERPLBLD CKD-EPI 2021: 92.8 ML/MIN/1.73
EOSINOPHIL # BLD AUTO: 0.31 10*3/MM3 (ref 0–0.4)
EOSINOPHIL NFR BLD AUTO: 4.1 % (ref 0.3–6.2)
ERYTHROCYTE [DISTWIDTH] IN BLOOD BY AUTOMATED COUNT: 13.5 % (ref 12.3–15.4)
GLOBULIN SER CALC-MCNC: 2.3 GM/DL
GLUCOSE SERPL-MCNC: 125 MG/DL (ref 65–99)
HBA1C MFR BLD: 6.4 % (ref 4.8–5.6)
HCT VFR BLD AUTO: 43.2 % (ref 37.5–51)
HDLC SERPL-MCNC: 52 MG/DL (ref 40–60)
HGB BLD-MCNC: 14.7 G/DL (ref 13–17.7)
IMM GRANULOCYTES # BLD AUTO: 0.06 10*3/MM3 (ref 0–0.05)
IMM GRANULOCYTES NFR BLD AUTO: 0.8 % (ref 0–0.5)
LDLC SERPL CALC-MCNC: 100 MG/DL (ref 0–100)
LYMPHOCYTES # BLD AUTO: 1.25 10*3/MM3 (ref 0.7–3.1)
LYMPHOCYTES NFR BLD AUTO: 16.4 % (ref 19.6–45.3)
MCH RBC QN AUTO: 32.7 PG (ref 26.6–33)
MCHC RBC AUTO-ENTMCNC: 34 G/DL (ref 31.5–35.7)
MCV RBC AUTO: 96 FL (ref 79–97)
MONOCYTES # BLD AUTO: 0.54 10*3/MM3 (ref 0.1–0.9)
MONOCYTES NFR BLD AUTO: 7.1 % (ref 5–12)
NEUTROPHILS # BLD AUTO: 5.41 10*3/MM3 (ref 1.7–7)
NEUTROPHILS NFR BLD AUTO: 70.7 % (ref 42.7–76)
NRBC BLD AUTO-RTO: 0 /100 WBC (ref 0–0.2)
PLATELET # BLD AUTO: 218 10*3/MM3 (ref 140–450)
POTASSIUM SERPL-SCNC: 4.2 MMOL/L (ref 3.5–5.2)
PROT SERPL-MCNC: 6.2 G/DL (ref 6–8.5)
RBC # BLD AUTO: 4.5 10*6/MM3 (ref 4.14–5.8)
SODIUM SERPL-SCNC: 137 MMOL/L (ref 136–145)
TRIGL SERPL-MCNC: 100 MG/DL (ref 0–150)
TSH SERPL DL<=0.005 MIU/L-ACNC: 1.43 UIU/ML (ref 0.27–4.2)
URATE SERPL-MCNC: 4.3 MG/DL (ref 3.4–7)
VLDLC SERPL CALC-MCNC: 18 MG/DL (ref 5–40)
WBC # BLD AUTO: 7.64 10*3/MM3 (ref 3.4–10.8)

## 2024-03-19 DIAGNOSIS — R52 PAIN: ICD-10-CM

## 2024-03-19 RX ORDER — OXYCODONE HYDROCHLORIDE 10 MG/1
10 TABLET ORAL EVERY 4 HOURS PRN
Qty: 90 TABLET | Refills: 0 | Status: SHIPPED | OUTPATIENT
Start: 2024-03-19

## 2024-03-19 NOTE — TELEPHONE ENCOUNTER
Drug thereapy appt 03/12/2024  contract done 3/12/2024  UDS done 3/12/2024    Rx Refill Note  Requested Prescriptions     Pending Prescriptions Disp Refills    oxyCODONE (ROXICODONE) 10 MG tablet 90 tablet 0     Sig: Take 1 tablet by mouth Every 4 (Four) Hours As Needed for Moderate Pain.      Last office visit with prescribing clinician: 3/12/2024   Last telemedicine visit with prescribing clinician: Visit date not found   Next office visit with prescribing clinician: 9/11/2024     Helena Meehan MA  03/19/24, 08:21 CDT

## 2024-03-20 LAB
DRUGS UR: NORMAL
GABAPENTIN UR-MCNC: >800 UG/ML

## 2024-04-03 DIAGNOSIS — R52 PAIN: ICD-10-CM

## 2024-04-03 RX ORDER — OXYCODONE HYDROCHLORIDE 10 MG/1
10 TABLET ORAL EVERY 4 HOURS PRN
Qty: 90 TABLET | Refills: 0 | Status: SHIPPED | OUTPATIENT
Start: 2024-04-03

## 2024-04-03 NOTE — TELEPHONE ENCOUNTER
Drug thereapy appt 03/12/2024  contract done 3/12/2024  UDS done 3/12/2024      Rx Refill Note  Requested Prescriptions     Pending Prescriptions Disp Refills    oxyCODONE (ROXICODONE) 10 MG tablet 90 tablet 0     Sig: Take 1 tablet by mouth Every 4 (Four) Hours As Needed for Moderate Pain.      Last office visit with prescribing clinician: 3/12/2024   Last telemedicine visit with prescribing clinician: Visit date not found   Next office visit with prescribing clinician: 9/11/2024     Helena Meehan MA  04/03/24, 07:30 CDT

## 2024-04-18 DIAGNOSIS — R52 PAIN: ICD-10-CM

## 2024-04-18 DIAGNOSIS — I10 PRIMARY HYPERTENSION: Primary | ICD-10-CM

## 2024-04-18 RX ORDER — INDAPAMIDE 2.5 MG/1
2.5 TABLET ORAL EVERY MORNING
Qty: 90 TABLET | Refills: 2 | Status: SHIPPED | OUTPATIENT
Start: 2024-04-18

## 2024-04-18 RX ORDER — OXYCODONE HYDROCHLORIDE 10 MG/1
10 TABLET ORAL EVERY 4 HOURS PRN
Qty: 90 TABLET | Refills: 0 | Status: SHIPPED | OUTPATIENT
Start: 2024-04-18

## 2024-04-18 NOTE — TELEPHONE ENCOUNTER
Rx Refill Note  Requested Prescriptions     Pending Prescriptions Disp Refills    indapamide (LOZOL) 2.5 MG tablet 90 tablet 2     Sig: Take 1 tablet by mouth Every Morning.      Last office visit with prescribing clinician: 3/12/24   Last telemedicine visit with prescribing clinician: Visit date not found   Next office visit with prescribing clinician: 9/11/24    {TIP  Please add Last Relevant Lab 3/12/24    Helena Meehan MA  04/18/24, 07:34 CDT

## 2024-04-18 NOTE — TELEPHONE ENCOUNTER
Drug thereapy appt 03/12/2024  contract done 3/12/2024  UDS done 3/12/2024    Rx Refill Note  Requested Prescriptions     Pending Prescriptions Disp Refills    oxyCODONE (ROXICODONE) 10 MG tablet 90 tablet 0     Sig: Take 1 tablet by mouth Every 4 (Four) Hours As Needed for Moderate Pain.      Last office visit with prescribing clinician: 3/12/2024   Last telemedicine visit with prescribing clinician: Visit date not found   Next office visit with prescribing clinician: 9/11/2024     Helena Meehan MA  04/18/24, 07:33 CDT

## 2024-04-24 ENCOUNTER — OFFICE VISIT (OUTPATIENT)
Dept: FAMILY MEDICINE CLINIC | Facility: CLINIC | Age: 74
End: 2024-04-24
Payer: MEDICARE

## 2024-04-24 VITALS
SYSTOLIC BLOOD PRESSURE: 138 MMHG | DIASTOLIC BLOOD PRESSURE: 80 MMHG | BODY MASS INDEX: 48.5 KG/M2 | OXYGEN SATURATION: 96 % | HEART RATE: 80 BPM | TEMPERATURE: 98.2 F | WEIGHT: 309 LBS | HEIGHT: 67 IN

## 2024-04-24 DIAGNOSIS — Z12.11 COLON CANCER SCREENING: ICD-10-CM

## 2024-04-24 DIAGNOSIS — M10.9 ACUTE GOUT OF LEFT ANKLE, UNSPECIFIED CAUSE: Primary | ICD-10-CM

## 2024-04-24 PROCEDURE — 99213 OFFICE O/P EST LOW 20 MIN: CPT | Performed by: NURSE PRACTITIONER

## 2024-04-24 PROCEDURE — 3044F HG A1C LEVEL LT 7.0%: CPT | Performed by: NURSE PRACTITIONER

## 2024-04-24 PROCEDURE — 3079F DIAST BP 80-89 MM HG: CPT | Performed by: NURSE PRACTITIONER

## 2024-04-24 PROCEDURE — 3075F SYST BP GE 130 - 139MM HG: CPT | Performed by: NURSE PRACTITIONER

## 2024-04-24 PROCEDURE — 1160F RVW MEDS BY RX/DR IN RCRD: CPT | Performed by: NURSE PRACTITIONER

## 2024-04-24 PROCEDURE — 1159F MED LIST DOCD IN RCRD: CPT | Performed by: NURSE PRACTITIONER

## 2024-04-24 NOTE — PROGRESS NOTES
CC: gout    History:  Ephraim Quinones is a 74 y.o. male who presents today for evaluation of the above problems.      Patient states that he was taking 400 mg of allopurinol daily, but was advised to cut back to 300. When he did this, he started having frequent flare ups, so he went back to 400 mg daily. States that even taking this, he is unable to eat sausage or drink even one beer without causing a flare up. He states that colchicine will help the flare after a few days.     Patient is also aware that he is due for colonoscopy. Unfortunately, he has another surgery coming up on the 8th of May. Needs to hold off until after this.     HPI  ROS:  Review of Systems   Musculoskeletal:         Frequent gout flare ups       Allergies   Allergen Reactions    Adhesive Tape Other (See Comments)     Blisters and rips skin off    Simvastatin Other (See Comments)     Leg Cramps    Cleocin [Clindamycin] GI Intolerance     Past Medical History:   Diagnosis Date    A-fib     Acid reflux     Anemia 05/03/2016    Claustrophobia     Diabetes mellitus     Gallstones     Gout     Hyperlipidemia     Hypertension     Malignant neoplasm of upper lobe of right lung 3/17/2023    Pneumonia     Sleep apnea with use of continuous positive airway pressure (CPAP)      Past Surgical History:   Procedure Laterality Date    BACK SURGERY      x2    BLADDER SURGERY  02/2023    CATARACT EXTRACTION      CIRCUMCISION N/A 06/06/2019    Procedure: CIRCUMCISION;  Surgeon: Yon Sloan MD;  Location:  [facility];  Service: Urology    COLONOSCOPY      FINGER SURGERY Left     LUNG BIOPSY Right 03/13/2023    REPLACEMENT TOTAL KNEE Bilateral     URETHRAL DILATATION N/A 12/01/2022    Procedure: URETHRAL DILATATION with Balloon, TRANSURETHRAL RESECTION OF BLADDER TUMOR;  Surgeon: Yon Sloan MD;  Location:  [facility];  Service: Urology;  Laterality: N/A;     Family History   Problem Relation Age of Onset    Colon cancer Mother     Prostate  cancer Father     No Known Problems Maternal Grandmother     No Known Problems Maternal Grandfather     No Known Problems Paternal Grandmother     No Known Problems Paternal Grandfather       reports that he quit smoking about 27 years ago. His smoking use included cigarettes. He started smoking about 47 years ago. He has a 20 pack-year smoking history. He has been exposed to tobacco smoke. He has never used smokeless tobacco. He reports current alcohol use. He reports that he does not use drugs.      Current Outpatient Medications:     albuterol (ACCUNEB) 1.25 MG/3ML nebulizer solution, Take 3 mL by nebulization Every 6 (Six) Hours As Needed for Shortness of Air., Disp: 180 each, Rfl: 2    albuterol sulfate  (90 Base) MCG/ACT inhaler, 2 sprays 2 minutes apart every 6 hours as needed, Disp: 18 g, Rfl: 5    allopurinol (Zyloprim) 100 MG tablet, Take 1 tablet by mouth Daily. (Patient taking differently: Take 1 tablet by mouth As Needed (Take with 300mg for gout flare up).), Disp: 90 tablet, Rfl: 0    allopurinol (ZYLOPRIM) 300 MG tablet, Take 1 tablet by mouth Daily., Disp: 90 tablet, Rfl: 3    aspirin 81 MG EC tablet, Take 1 tablet by mouth Daily., Disp: , Rfl:     carbidopa-levodopa (SINEMET)  MG per tablet, TAKE 1 TABLET BY MOUTH TWICE DAILY, Disp: 180 tablet, Rfl: 3    Cholecalciferol (VITAMIN D3) 2000 units capsule, Take 1 capsule by mouth Daily., Disp: , Rfl:     clobetasol propionate (TEMOVATE) 0.05 % cream, Apply topically to the appropriate area as directed, Disp: 30 g, Rfl: 1    ELIQUIS 5 MG tablet tablet, Take 1 tablet by mouth Every 12 (Twelve) Hours., Disp: , Rfl:     furosemide (LASIX) 40 MG tablet, Take 1 tablet by mouth Daily., Disp: 90 tablet, Rfl: 1    gabapentin (NEURONTIN) 300 MG capsule, Take 1 capsule by mouth 4 Times a Day., Disp: 120 capsule, Rfl: 4    glipizide (GLUCOTROL XL) 5 MG ER tablet, Take 1 tablet by mouth Daily., Disp: 90 tablet, Rfl: 3    glucose blood test strip, 1 each  "by Other route Daily. Use as instructed, Disp: 100 each, Rfl: 3    guaiFENesin (Mucinex) 600 MG 12 hr tablet, Take 2 tablets by mouth 2 (Two) Times a Day., Disp: 28 tablet, Rfl: 0    indapamide (LOZOL) 2.5 MG tablet, Take 1 tablet by mouth Every Morning., Disp: 90 tablet, Rfl: 2    Lancet Devices (EASY TOUCH LANCING DEVICE) misc, USE TO TEST DAILY, Disp: , Rfl: 0    losartan (COZAAR) 100 MG tablet, 1 at bedtime for blood pressure, Disp: 90 tablet, Rfl: 3    lovastatin (MEVACOR) 10 MG tablet, Take 1 tablet by mouth every night at bedtime. (Patient taking differently: Take 1 tablet by mouth Every Other Day.), Disp: 90 tablet, Rfl: 2    metFORMIN (GLUCOPHAGE) 1000 MG tablet, TAKE 1 TABLET BY MOUTH 2 TIMES A DAY WITH MEALS., Disp: 180 tablet, Rfl: 2    naproxen sodium (ALEVE) 220 MG tablet, Take 1 tablet by mouth 2 (Two) Times a Day As Needed., Disp: , Rfl:     ONETOUCH DELICA LANCETS 33G misc, 1 each Daily., Disp: 100 each, Rfl: 3    oxyCODONE (ROXICODONE) 10 MG tablet, Take 1 tablet by mouth Every 4 (Four) Hours As Needed for Moderate Pain., Disp: 90 tablet, Rfl: 0    Potassium 99 MG tablet, Take 1 tablet by mouth 2 (Two) Times a Day., Disp: , Rfl:     pramipexole (MIRAPEX) 0.25 MG tablet, take 1-2 tablets nightly for leg cramps, Disp: 180 tablet, Rfl: 1    colchicine 0.6 MG tablet, TAKE TWO TABLETS BY MOUTH NOW AND THEN TAKE ONE TABLET ONE HOUR LATER, THEN TAKE ONE TABLET DAILY UNTIL GONE (Patient not taking: Reported on 4/24/2024), Disp: 10 tablet, Rfl: 5    HYDROcodone-acetaminophen (NORCO)  MG per tablet, Take 1 tablet by mouth Every 6 (Six) Hours As Needed for Moderate Pain. (Patient not taking: Reported on 4/24/2024), Disp: 78 tablet, Rfl: 0    OBJECTIVE:  /80 (BP Location: Left arm, Patient Position: Sitting, Cuff Size: Large Adult)   Pulse 80   Temp 98.2 °F (36.8 °C) (Temporal)   Ht 170.2 cm (67\")   Wt (!) 140 kg (309 lb)   SpO2 96%   BMI 48.40 kg/m²    Physical Exam  Vitals reviewed. "   Constitutional:       Appearance: He is well-developed.   Cardiovascular:      Rate and Rhythm: Normal rate.   Pulmonary:      Effort: Pulmonary effort is normal.   Neurological:      Mental Status: He is alert and oriented to person, place, and time.   Psychiatric:         Behavior: Behavior normal.         Assessment/Plan    Diagnoses and all orders for this visit:    1. Acute gout of left ankle, unspecified cause (Primary)  -     Uric Acid    2. Colon cancer screening    Check uric acid and possible increase allopurinol.  Will revisit colon cancer screening after patient recovers from May 8 surgery.     An After Visit Summary was printed and given to the patient at discharge.  Return if symptoms worsen or fail to improve, for Next scheduled follow up.       ANSHUL Noel 4/24/24    Electronically signed.

## 2024-04-25 LAB — URATE SERPL-MCNC: 5.2 MG/DL (ref 3.4–7)

## 2024-04-25 NOTE — PROGRESS NOTES
Uric acid level is very normal. Continue the allopurinol at 400 mg, but we don't need to increase it past that.

## 2024-05-03 DIAGNOSIS — R52 PAIN: ICD-10-CM

## 2024-05-03 RX ORDER — OXYCODONE HYDROCHLORIDE 10 MG/1
10 TABLET ORAL EVERY 4 HOURS PRN
Qty: 90 TABLET | Refills: 0 | Status: SHIPPED | OUTPATIENT
Start: 2024-05-03

## 2024-05-03 NOTE — TELEPHONE ENCOUNTER
Drug thereapy appt 03/12/2024  contract done 3/12/2024  UDS done 3/12/2024    Rx Refill Note  Requested Prescriptions     Pending Prescriptions Disp Refills    oxyCODONE (ROXICODONE) 10 MG tablet 90 tablet 0     Sig: Take 1 tablet by mouth Every 4 (Four) Hours As Needed for Moderate Pain.      Last office visit with prescribing clinician: 3/12/2024   Last telemedicine visit with prescribing clinician: Visit date not found   Next office visit with prescribing clinician: 9/11/2024       Helena Meehan MA  05/03/24, 07:37 CDT      Dr. Hopkins out of office until 5/7/24.

## 2024-05-13 DIAGNOSIS — M10.9 ACUTE GOUT OF ANKLE, UNSPECIFIED CAUSE, UNSPECIFIED LATERALITY: ICD-10-CM

## 2024-05-13 RX ORDER — COLCHICINE 0.6 MG/1
TABLET ORAL
Qty: 10 TABLET | Refills: 5 | Status: SHIPPED | OUTPATIENT
Start: 2024-05-13 | End: 2024-05-16 | Stop reason: SDUPTHER

## 2024-05-13 NOTE — TELEPHONE ENCOUNTER
Rx Refill Note  Requested Prescriptions     Pending Prescriptions Disp Refills    colchicine 0.6 MG tablet [Pharmacy Med Name: colchicine 0.6 mg tablet] 10 tablet 5     Sig: TAKE TWO TABLETS BY MOUTH NOW AND THEN TAKE ONE TABLET ONE HOUR LATER, THEN TAKE ONE TABLET DAILY UNTIL GONE      Last office visit with prescribing clinician: 3/12/2024   Last telemedicine visit with prescribing clinician: Visit date not found   Next office visit with prescribing clinician: 10/1/2024                         Would you like a call back once the refill request has been completed: [] Yes [] No    If the office needs to give you a call back, can they leave a voicemail: [] Yes [] No    Helena Gilmore MA  05/13/24, 12:46 CDT

## 2024-05-16 DIAGNOSIS — E11.9 TYPE 2 DIABETES MELLITUS WITHOUT COMPLICATION, WITHOUT LONG-TERM CURRENT USE OF INSULIN: ICD-10-CM

## 2024-05-16 DIAGNOSIS — M10.9 ACUTE GOUT OF ANKLE, UNSPECIFIED CAUSE, UNSPECIFIED LATERALITY: ICD-10-CM

## 2024-05-16 DIAGNOSIS — R52 PAIN: ICD-10-CM

## 2024-05-16 RX ORDER — COLCHICINE 0.6 MG/1
TABLET ORAL
Qty: 10 TABLET | Refills: 5 | Status: SHIPPED | OUTPATIENT
Start: 2024-05-16

## 2024-05-16 RX ORDER — GLIPIZIDE 5 MG/1
5 TABLET, FILM COATED, EXTENDED RELEASE ORAL DAILY
Qty: 90 TABLET | Refills: 3 | Status: SHIPPED | OUTPATIENT
Start: 2024-05-16

## 2024-05-16 RX ORDER — OXYCODONE HYDROCHLORIDE 10 MG/1
10 TABLET ORAL EVERY 4 HOURS PRN
Qty: 90 TABLET | Refills: 0 | Status: CANCELLED | OUTPATIENT
Start: 2024-05-16

## 2024-05-16 NOTE — TELEPHONE ENCOUNTER
Rx Refill Note  Requested Prescriptions     Pending Prescriptions Disp Refills    glipizide (GLUCOTROL XL) 5 MG ER tablet [Pharmacy Med Name: glipizide ER 5 mg tablet, extended release 24 hr] 90 tablet 3     Sig: Take 1 tablet by mouth Daily.      Last office visit with prescribing clinician: 3/12/2024   Last telemedicine visit with prescribing clinician: Visit date not found   Next office visit with prescribing clinician: 10/1/2024   CPE done 9/08/2023    {TIP  Please add Last Relevant Lab Date if appropriate: 3/12/2024             {TIP  Is Refill Pharmacy correct?: yes    Would you like a call back once the refill request has been completed: [] Yes [x] No    If the office needs to give you a call back, can they leave a voicemail: [] Yes [] No    Helena Gilmore MA  05/16/24, 08:12 CDT

## 2024-05-17 NOTE — TELEPHONE ENCOUNTER
Rx Refill Note  Requested Prescriptions     Pending Prescriptions Disp Refills    oxyCODONE (ROXICODONE) 10 MG tablet 90 tablet 0     Sig: Take 1 tablet by mouth Every 4 (Four) Hours As Needed for Moderate Pain.      Last office visit with prescribing clinician: Visit date not found   Last telemedicine visit with prescribing clinician: Visit date not found   Next office visit with prescribing clinician: Visit date not found                         Would you like a call back once the refill request has been completed: [] Yes [] No    If the office needs to give you a call back, can they leave a voicemail: [] Yes [] No    Anamaria Briscoe, PCT  05/17/24, 08:41 CDT     98

## 2024-05-17 NOTE — TELEPHONE ENCOUNTER
Pt's grand daughter called to inquire on status of refill for medication- advised that request has been received and is being reviewed for refill.

## 2024-05-18 DIAGNOSIS — R52 PAIN: ICD-10-CM

## 2024-05-20 ENCOUNTER — OFFICE VISIT (OUTPATIENT)
Dept: FAMILY MEDICINE CLINIC | Facility: CLINIC | Age: 74
End: 2024-05-20
Payer: MEDICARE

## 2024-05-20 VITALS
HEIGHT: 67 IN | BODY MASS INDEX: 48.81 KG/M2 | DIASTOLIC BLOOD PRESSURE: 79 MMHG | OXYGEN SATURATION: 97 % | SYSTOLIC BLOOD PRESSURE: 139 MMHG | TEMPERATURE: 97.5 F | HEART RATE: 88 BPM | WEIGHT: 311 LBS

## 2024-05-20 DIAGNOSIS — R10.30 INGUINAL PAIN, UNSPECIFIED LATERALITY: Primary | ICD-10-CM

## 2024-05-20 DIAGNOSIS — R52 PAIN: ICD-10-CM

## 2024-05-20 DIAGNOSIS — G89.29 CHRONIC MIDLINE LOW BACK PAIN WITH SCIATICA, SCIATICA LATERALITY UNSPECIFIED: Primary | ICD-10-CM

## 2024-05-20 DIAGNOSIS — M54.40 CHRONIC MIDLINE LOW BACK PAIN WITH SCIATICA, SCIATICA LATERALITY UNSPECIFIED: Primary | ICD-10-CM

## 2024-05-20 PROCEDURE — 99212 OFFICE O/P EST SF 10 MIN: CPT | Performed by: NURSE PRACTITIONER

## 2024-05-20 PROCEDURE — 3044F HG A1C LEVEL LT 7.0%: CPT | Performed by: NURSE PRACTITIONER

## 2024-05-20 PROCEDURE — 1125F AMNT PAIN NOTED PAIN PRSNT: CPT | Performed by: NURSE PRACTITIONER

## 2024-05-20 PROCEDURE — 1160F RVW MEDS BY RX/DR IN RCRD: CPT | Performed by: NURSE PRACTITIONER

## 2024-05-20 PROCEDURE — 3075F SYST BP GE 130 - 139MM HG: CPT | Performed by: NURSE PRACTITIONER

## 2024-05-20 PROCEDURE — 3078F DIAST BP <80 MM HG: CPT | Performed by: NURSE PRACTITIONER

## 2024-05-20 PROCEDURE — 1159F MED LIST DOCD IN RCRD: CPT | Performed by: NURSE PRACTITIONER

## 2024-05-20 RX ORDER — OXYCODONE HYDROCHLORIDE 10 MG/1
10 TABLET ORAL EVERY 4 HOURS PRN
Qty: 90 TABLET | Refills: 0 | OUTPATIENT
Start: 2024-05-20

## 2024-05-20 RX ORDER — OXYCODONE HYDROCHLORIDE 10 MG/1
10 TABLET ORAL EVERY 4 HOURS PRN
Qty: 90 TABLET | Refills: 0 | Status: SHIPPED | OUTPATIENT
Start: 2024-05-20

## 2024-05-20 RX ORDER — OXYCODONE HYDROCHLORIDE 10 MG/1
10 TABLET ORAL EVERY 4 HOURS PRN
Qty: 72 TABLET | Refills: 0 | OUTPATIENT
Start: 2024-05-20

## 2024-05-20 RX ORDER — OXYCODONE HYDROCHLORIDE 10 MG/1
10 TABLET ORAL EVERY 4 HOURS PRN
Qty: 18 EACH | Refills: 0 | Status: SHIPPED | OUTPATIENT
Start: 2024-05-20

## 2024-05-20 NOTE — TELEPHONE ENCOUNTER
Reviewed chart for this patient for refills since Dr. Pito Villegas is out, unsure as to why this refill was bieng forwarded to him as this is a patient of Dr. Hopkins.  I have reviewed the notes, assessments, and/ or procedures performed by the Dr. Hopkins and Dolores Ba.  Reviewed Gregory today.      Advisement: Patient is above the recommended ceiling morphine equivalents of 60 for primary care.  Patient is not routinely having pain monitored and/or peg scores noted in documentation.  Also on a contraindicated combination with gabapentin without documenting that the patient wishes to proceed even with the increased risk of respiratory depression and sedation.  Recommendation would be referral to pain management for management of the above issues due to complexity of patient's pain management.      No evidence of abuse or deterrence noted at this time.  Refill authorized at this time with above recommendations for continued management

## 2024-05-20 NOTE — TELEPHONE ENCOUNTER
Too early to refill.     Rx Refill Note  Requested Prescriptions     Pending Prescriptions Disp Refills    oxyCODONE (ROXICODONE) 10 MG tablet 90 tablet 0     Sig: Take 1 tablet by mouth Every 4 (Four) Hours As Needed for Moderate Pain.      Last office visit with prescribing clinician: Visit date not found   Last telemedicine visit with prescribing clinician: Visit date not found   Next office visit with prescribing clinician: Visit date not found                         Would you like a call back once the refill request has been completed: [] Yes [] No    If the office needs to give you a call back, can they leave a voicemail: [] Yes [] No    Jennifer Spivey LPN  05/20/24, 08:24 CDT

## 2024-05-20 NOTE — TELEPHONE ENCOUNTER
Granddaughter calling about pt's pain medication being denied over the weekend.  Pt has been out of his pain meds since Saturday.  They have been trying to get this med refilled since Thursday.      Can you please sign prescription today?  Thank You!              Drug thereapy appt 03/12/2024  contract done 3/12/2024  UDS done 3/12/2024

## 2024-05-20 NOTE — TELEPHONE ENCOUNTER
Drug thereapy appt 03/12/2024  contract done 3/12/2024  UDS done 3/12/2024    Rx Refill Note  Requested Prescriptions     Pending Prescriptions Disp Refills    oxyCODONE (ROXICODONE) 10 MG tablet [Pharmacy Med Name: oxycodone 10 mg tablet] 90 tablet 0     Sig: Take 1 tablet by mouth Every 4 Hours As Needed for Moderate Pain.      Last office visit with prescribing clinician: 5/20/24   Last telemedicine visit with prescribing clinician: Visit date not found   Next office visit with prescribing clinician: 10/1/2024     Helena Meehan MA  05/20/24, 13:24 CDT

## 2024-05-20 NOTE — PROGRESS NOTES
CC: groin swelling    History:  Ephraim Quinones is a 74 y.o. male who presents today for evaluation of the above problems.      Patient underwent urethroplasty at [facility] on May 8. He still has vazquez catheter in place. States that he is having a considerable amount of discomfort in his groin and testicles and that he is very swollen.     HPI  ROS:  Review of Systems   Genitourinary:  Positive for penile pain, penile swelling, scrotal swelling and testicular pain.       Allergies   Allergen Reactions    Adhesive Tape Other (See Comments)     Blisters and rips skin off    Simvastatin Other (See Comments)     Leg Cramps    Cleocin [Clindamycin] GI Intolerance     Past Medical History:   Diagnosis Date    A-fib     Acid reflux     Anemia 05/03/2016    Claustrophobia     Diabetes mellitus     Gallstones     Gout     Hyperlipidemia     Hypertension     Malignant neoplasm of upper lobe of right lung 3/17/2023    Pneumonia     Sleep apnea with use of continuous positive airway pressure (CPAP)      Past Surgical History:   Procedure Laterality Date    BACK SURGERY      x2    BLADDER SURGERY  02/2023    CATARACT EXTRACTION      CIRCUMCISION N/A 06/06/2019    Procedure: CIRCUMCISION;  Surgeon: Yon Sloan MD;  Location:  [facility];  Service: Urology    COLONOSCOPY      FINGER SURGERY Left     LUNG BIOPSY Right 03/13/2023    REPLACEMENT TOTAL KNEE Bilateral     URETHRAL DILATATION N/A 12/01/2022    Procedure: URETHRAL DILATATION with Balloon, TRANSURETHRAL RESECTION OF BLADDER TUMOR;  Surgeon: Yon Sloan MD;  Location:  [facility];  Service: Urology;  Laterality: N/A;    URETHROPLASTY  05/08/2024    [facility]     Family History   Problem Relation Age of Onset    Colon cancer Mother     Prostate cancer Father     No Known Problems Maternal Grandmother     No Known Problems Maternal Grandfather     No Known Problems Paternal Grandmother     No Known Problems Paternal Grandfather       reports that he  quit smoking about 27 years ago. His smoking use included cigarettes. He started smoking about 48 years ago. He has a 20 pack-year smoking history. He has been exposed to tobacco smoke. He has never used smokeless tobacco. He reports current alcohol use. He reports that he does not use drugs.      Current Outpatient Medications:     albuterol (ACCUNEB) 1.25 MG/3ML nebulizer solution, Take 3 mL by nebulization Every 6 (Six) Hours As Needed for Shortness of Air., Disp: 180 each, Rfl: 2    albuterol sulfate  (90 Base) MCG/ACT inhaler, 2 sprays 2 minutes apart every 6 hours as needed, Disp: 18 g, Rfl: 5    allopurinol (Zyloprim) 100 MG tablet, Take 1 tablet by mouth Daily. (Patient taking differently: Take 1 tablet by mouth As Needed (Take with 300mg for gout flare up).), Disp: 90 tablet, Rfl: 0    allopurinol (ZYLOPRIM) 300 MG tablet, Take 1 tablet by mouth Daily., Disp: 90 tablet, Rfl: 3    aspirin 81 MG EC tablet, Take 1 tablet by mouth Daily., Disp: , Rfl:     carbidopa-levodopa (SINEMET)  MG per tablet, TAKE 1 TABLET BY MOUTH TWICE DAILY, Disp: 180 tablet, Rfl: 3    Cholecalciferol (VITAMIN D3) 2000 units capsule, Take 1 capsule by mouth Daily., Disp: , Rfl:     clobetasol propionate (TEMOVATE) 0.05 % cream, Apply topically to the appropriate area as directed, Disp: 30 g, Rfl: 1    colchicine 0.6 MG tablet, TAKE TWO TABLETS BY MOUTH NOW AND THEN TAKE ONE TABLET ONE HOUR LATER, THEN TAKE ONE TABLET DAILY UNTIL GONE, Disp: 10 tablet, Rfl: 5    ELIQUIS 5 MG tablet tablet, Take 1 tablet by mouth Every 12 (Twelve) Hours., Disp: , Rfl:     furosemide (LASIX) 40 MG tablet, Take 1 tablet by mouth Daily., Disp: 90 tablet, Rfl: 1    gabapentin (NEURONTIN) 300 MG capsule, Take 1 capsule by mouth 4 Times a Day., Disp: 120 capsule, Rfl: 4    glipizide (GLUCOTROL XL) 5 MG ER tablet, Take 1 tablet by mouth Daily., Disp: 90 tablet, Rfl: 3    glucose blood test strip, 1 each by Other route Daily. Use as instructed,  "Disp: 100 each, Rfl: 3    guaiFENesin (Mucinex) 600 MG 12 hr tablet, Take 2 tablets by mouth 2 (Two) Times a Day., Disp: 28 tablet, Rfl: 0    HYDROcodone-acetaminophen (NORCO)  MG per tablet, Take 1 tablet by mouth Every 6 (Six) Hours As Needed for Moderate Pain., Disp: 78 tablet, Rfl: 0    indapamide (LOZOL) 2.5 MG tablet, Take 1 tablet by mouth Every Morning., Disp: 90 tablet, Rfl: 2    Lancet Devices (EASY TOUCH LANCING DEVICE) misc, USE TO TEST DAILY, Disp: , Rfl: 0    losartan (COZAAR) 100 MG tablet, 1 at bedtime for blood pressure, Disp: 90 tablet, Rfl: 3    lovastatin (MEVACOR) 10 MG tablet, Take 1 tablet by mouth every night at bedtime. (Patient taking differently: Take 1 tablet by mouth Every Other Day.), Disp: 90 tablet, Rfl: 2    metFORMIN (GLUCOPHAGE) 1000 MG tablet, TAKE 1 TABLET BY MOUTH 2 TIMES A DAY WITH MEALS., Disp: 180 tablet, Rfl: 2    naproxen sodium (ALEVE) 220 MG tablet, Take 1 tablet by mouth 2 (Two) Times a Day As Needed., Disp: , Rfl:     ONETOUCH DELICA LANCETS 33G misc, 1 each Daily., Disp: 100 each, Rfl: 3    oxyCODONE (ROXICODONE) 10 MG tablet, Take 1 tablet by mouth Every 4 (Four) Hours As Needed for Moderate Pain., Disp: 90 tablet, Rfl: 0    Potassium 99 MG tablet, Take 1 tablet by mouth 2 (Two) Times a Day., Disp: , Rfl:     pramipexole (MIRAPEX) 0.25 MG tablet, take 1-2 tablets nightly for leg cramps, Disp: 180 tablet, Rfl: 1    OBJECTIVE:  /79 (BP Location: Left arm, Patient Position: Sitting, Cuff Size: Large Adult)   Pulse 88   Temp 97.5 °F (36.4 °C) (Temporal)   Ht 170.2 cm (67\") Comment: pt reported  Wt (!) 141 kg (311 lb) Comment: pt reported  SpO2 97%   BMI 48.71 kg/m²    Physical Exam  Vitals reviewed.   Constitutional:       Appearance: He is well-developed.   Cardiovascular:      Rate and Rhythm: Normal rate.   Pulmonary:      Effort: Pulmonary effort is normal.   Genitourinary:      Neurological:      Mental Status: He is alert and oriented to person, " place, and time.   Psychiatric:         Behavior: Behavior normal.         Assessment/Plan    Diagnoses and all orders for this visit:    1. Inguinal pain, unspecified laterality (Primary)    Patient advised to contact surgeon and describe swelling, redness and firmness. Unsure if this level of edema is normal at this point post op. Advised that he needed to be seen within the next one to two days, even if that meant going through the ER to be seen.     An After Visit Summary was printed and given to the patient at discharge.  Return if symptoms worsen or fail to improve, for Next scheduled follow up.       ANSHUL Noel 5/20/24    Electronically signed.

## 2024-05-24 DIAGNOSIS — M10.9 ACUTE GOUT OF LEFT ANKLE, UNSPECIFIED CAUSE: Primary | ICD-10-CM

## 2024-05-24 RX ORDER — ALLOPURINOL 100 MG/1
100 TABLET ORAL AS NEEDED
Qty: 90 TABLET | Refills: 3 | Status: SHIPPED | OUTPATIENT
Start: 2024-05-24

## 2024-05-24 NOTE — TELEPHONE ENCOUNTER
Rx Refill Note  Requested Prescriptions     Pending Prescriptions Disp Refills    allopurinol (Zyloprim) 100 MG tablet 90 tablet 3     Sig: Take 1 tablet by mouth As Needed (Take with 300mg for gout flare up).      Last office visit with prescribing clinician: 3/12/2024   Last telemedicine visit with prescribing clinician: Visit date not found   Next office visit with prescribing clinician: 10/1/2024                         Would you like a call back once the refill request has been completed: [] Yes [] No    If the office needs to give you a call back, can they leave a voicemail: [] Yes [] No    Helena Gilmore MA  05/24/24, 11:38 CDT

## 2024-06-03 ENCOUNTER — OFFICE VISIT (OUTPATIENT)
Dept: FAMILY MEDICINE CLINIC | Facility: CLINIC | Age: 74
End: 2024-06-03
Payer: MEDICARE

## 2024-06-03 VITALS
BODY MASS INDEX: 48.97 KG/M2 | SYSTOLIC BLOOD PRESSURE: 140 MMHG | OXYGEN SATURATION: 96 % | DIASTOLIC BLOOD PRESSURE: 72 MMHG | WEIGHT: 312 LBS | HEIGHT: 67 IN | TEMPERATURE: 97.5 F | RESPIRATION RATE: 22 BRPM | HEART RATE: 50 BPM

## 2024-06-03 DIAGNOSIS — M10.9 ACUTE GOUT OF ANKLE, UNSPECIFIED CAUSE, UNSPECIFIED LATERALITY: ICD-10-CM

## 2024-06-03 DIAGNOSIS — M46.1 SACROILIITIS, NOT ELSEWHERE CLASSIFIED: ICD-10-CM

## 2024-06-03 DIAGNOSIS — N47.1 PHIMOSIS: Primary | ICD-10-CM

## 2024-06-03 PROCEDURE — 3078F DIAST BP <80 MM HG: CPT | Performed by: NURSE PRACTITIONER

## 2024-06-03 PROCEDURE — 99214 OFFICE O/P EST MOD 30 MIN: CPT | Performed by: NURSE PRACTITIONER

## 2024-06-03 PROCEDURE — 3077F SYST BP >= 140 MM HG: CPT | Performed by: NURSE PRACTITIONER

## 2024-06-03 PROCEDURE — 1159F MED LIST DOCD IN RCRD: CPT | Performed by: NURSE PRACTITIONER

## 2024-06-03 PROCEDURE — 1125F AMNT PAIN NOTED PAIN PRSNT: CPT | Performed by: NURSE PRACTITIONER

## 2024-06-03 PROCEDURE — 1160F RVW MEDS BY RX/DR IN RCRD: CPT | Performed by: NURSE PRACTITIONER

## 2024-06-03 PROCEDURE — 3044F HG A1C LEVEL LT 7.0%: CPT | Performed by: NURSE PRACTITIONER

## 2024-06-03 RX ORDER — ALLOPURINOL 300 MG/1
300 TABLET ORAL 2 TIMES DAILY
Qty: 180 TABLET | Refills: 2 | Status: SHIPPED | OUTPATIENT
Start: 2024-06-03

## 2024-06-03 RX ORDER — CEPHALEXIN 500 MG/1
500 CAPSULE ORAL 2 TIMES DAILY
COMMUNITY

## 2024-06-03 RX ORDER — COLCHICINE 0.6 MG/1
TABLET ORAL
Qty: 10 TABLET | Refills: 5 | Status: SHIPPED | OUTPATIENT
Start: 2024-06-03

## 2024-06-03 NOTE — PROGRESS NOTES
CC: urethroplasty follow up; controlled med monitoring    History:  Ephraim Quinones is a 74 y.o. male who presents today for evaluation of the above problems.      Patient was last seen here on 5/20 for concerns of swelling and redness after his urethroplasty that was done at [place] on 5/8/24.  He was advised to contact urology office, as I did have concerns regarding infection.  He was seen by them and was started on Keflex.  He has about two more days of medication. While the redness has improved, he states that the swelling is still significant. His catheter has been removed, and he is able to void, however, the swelling is such that his penis is not visible.    Needs refill on gout medication. States that if he eats any almaguer, sausage, or pork at all it causes a flare.    Also, presents for controlled medication monitoring. Continues on oxycodone and gabapentin for his chronic back pain. These continue to work well. He is not due for Gregory or UDS today.         8/25/2022     8:00 AM 11/11/2022     9:00 AM 2/6/2023     8:00 AM 5/31/2023     9:00 AM 8/29/2023     8:00 AM 12/4/2023     8:00 AM 3/12/2024     8:00 AM   CONTROLLED SUBSTANCE TRACKING   Last Gregory 8/25/2022 11/11/2022 2/6/2023 5/31/2023 8/29/2023 12/4/2023 3/12/2024   Report Number reviewed through epic  reviewed through epic  reviewed through epic reviewed through [facility]    Last UDS 12/1/2021 12/1/2021 2/6/2023 2/6/2023 2/6/2023 2/6/2023 3/12/2024   Last Controlled Substance Agreement 12/1/2021 12/1/2021 2/6/2023 2/6/2023 2/6/2023 2/6/2023 3/12/2024         HPI  ROS:  Review of Systems   Genitourinary:         Swelling around penis   Musculoskeletal:  Positive for arthralgias (gout) and back pain.       Allergies   Allergen Reactions    Adhesive Tape Other (See Comments)     Blisters and rips skin off    Simvastatin Other (See Comments)     Leg Cramps    Cleocin [Clindamycin] GI Intolerance     Past Medical History:   Diagnosis Date    A-fib      Acid reflux     Anemia 05/03/2016    Claustrophobia     Diabetes mellitus     Gallstones     Gout     Hyperlipidemia     Hypertension     Malignant neoplasm of upper lobe of right lung 3/17/2023    Pneumonia     Sleep apnea with use of continuous positive airway pressure (CPAP)      Past Surgical History:   Procedure Laterality Date    BACK SURGERY      x2    BLADDER SURGERY  02/2023    CATARACT EXTRACTION      CIRCUMCISION N/A 06/06/2019    Procedure: CIRCUMCISION;  Surgeon: Yon Sloan MD;  Location:  [facility];  Service: Urology    COLONOSCOPY      FINGER SURGERY Left     LUNG BIOPSY Right 03/13/2023    REPLACEMENT TOTAL KNEE Bilateral     URETHRAL DILATATION N/A 12/01/2022    Procedure: URETHRAL DILATATION with Balloon, TRANSURETHRAL RESECTION OF BLADDER TUMOR;  Surgeon: Yon Sloan MD;  Location:  [facility];  Service: Urology;  Laterality: N/A;    URETHROPLASTY  05/08/2024    [place]     Family History   Problem Relation Age of Onset    Colon cancer Mother     Prostate cancer Father     No Known Problems Maternal Grandmother     No Known Problems Maternal Grandfather     No Known Problems Paternal Grandmother     No Known Problems Paternal Grandfather       reports that he quit smoking about 28 years ago. His smoking use included cigarettes. He started smoking about 48 years ago. He has a 20 pack-year smoking history. He has been exposed to tobacco smoke. He has never used smokeless tobacco. He reports current alcohol use. He reports that he does not use drugs.      Current Outpatient Medications:     albuterol (ACCUNEB) 1.25 MG/3ML nebulizer solution, Take 3 mL by nebulization Every 6 (Six) Hours As Needed for Shortness of Air., Disp: 180 each, Rfl: 2    albuterol sulfate  (90 Base) MCG/ACT inhaler, 2 sprays 2 minutes apart every 6 hours as needed, Disp: 18 g, Rfl: 5    allopurinol (Zyloprim) 100 MG tablet, Take 1 tablet by mouth As Needed (Take with 300mg for gout flare up).,  Disp: 90 tablet, Rfl: 3    allopurinol (ZYLOPRIM) 300 MG tablet, Take 1 tablet by mouth Daily., Disp: 90 tablet, Rfl: 3    aspirin 81 MG EC tablet, Take 1 tablet by mouth Daily., Disp: , Rfl:     carbidopa-levodopa (SINEMET)  MG per tablet, TAKE 1 TABLET BY MOUTH TWICE DAILY, Disp: 180 tablet, Rfl: 3    cephalexin (KEFLEX) 500 MG capsule, Take 1 capsule by mouth 2 (Two) Times a Day., Disp: , Rfl:     Cholecalciferol (VITAMIN D3) 2000 units capsule, Take 1 capsule by mouth Daily., Disp: , Rfl:     clobetasol propionate (TEMOVATE) 0.05 % cream, Apply topically to the appropriate area as directed, Disp: 30 g, Rfl: 1    colchicine 0.6 MG tablet, TAKE TWO TABLETS BY MOUTH NOW AND THEN TAKE ONE TABLET ONE HOUR LATER, THEN TAKE ONE TABLET DAILY UNTIL GONE, Disp: 10 tablet, Rfl: 5    ELIQUIS 5 MG tablet tablet, Take 1 tablet by mouth Every 12 (Twelve) Hours., Disp: , Rfl:     furosemide (LASIX) 40 MG tablet, Take 1 tablet by mouth Daily., Disp: 90 tablet, Rfl: 1    gabapentin (NEURONTIN) 300 MG capsule, Take 1 capsule by mouth 4 Times a Day., Disp: 120 capsule, Rfl: 4    glipizide (GLUCOTROL XL) 5 MG ER tablet, Take 1 tablet by mouth Daily., Disp: 90 tablet, Rfl: 3    glucose blood test strip, 1 each by Other route Daily. Use as instructed, Disp: 100 each, Rfl: 3    guaiFENesin (Mucinex) 600 MG 12 hr tablet, Take 2 tablets by mouth 2 (Two) Times a Day., Disp: 28 tablet, Rfl: 0    HYDROcodone-acetaminophen (NORCO)  MG per tablet, Take 1 tablet by mouth Every 6 (Six) Hours As Needed for Moderate Pain., Disp: 78 tablet, Rfl: 0    indapamide (LOZOL) 2.5 MG tablet, Take 1 tablet by mouth Every Morning., Disp: 90 tablet, Rfl: 2    Lancet Devices (EASY TOUCH LANCING DEVICE) misc, USE TO TEST DAILY, Disp: , Rfl: 0    losartan (COZAAR) 100 MG tablet, 1 at bedtime for blood pressure, Disp: 90 tablet, Rfl: 3    lovastatin (MEVACOR) 10 MG tablet, Take 1 tablet by mouth every night at bedtime. (Patient taking differently:  "Take 1 tablet by mouth Every Other Day.), Disp: 90 tablet, Rfl: 2    metFORMIN (GLUCOPHAGE) 1000 MG tablet, TAKE 1 TABLET BY MOUTH 2 TIMES A DAY WITH MEALS., Disp: 180 tablet, Rfl: 2    naproxen sodium (ALEVE) 220 MG tablet, Take 1 tablet by mouth 2 (Two) Times a Day As Needed., Disp: , Rfl:     ONETOUCH DELICA LANCETS 33G misc, 1 each Daily., Disp: 100 each, Rfl: 3    oxyCODONE (ROXICODONE) 10 MG tablet, Take 1 tablet by mouth Every 4 (Four) Hours As Needed for Moderate Pain., Disp: 18 each, Rfl: 0    oxyCODONE (ROXICODONE) 10 MG tablet, Take 1 tablet by mouth Every 4 (Four) Hours As Needed for Moderate Pain., Disp: 90 tablet, Rfl: 0    Potassium 99 MG tablet, Take 1 tablet by mouth 2 (Two) Times a Day., Disp: , Rfl:     pramipexole (MIRAPEX) 0.25 MG tablet, take 1-2 tablets nightly for leg cramps, Disp: 180 tablet, Rfl: 1    allopurinol (ZYLOPRIM) 300 MG tablet, Take 1 tablet by mouth 2 (Two) Times a Day., Disp: 180 tablet, Rfl: 2    OBJECTIVE:  /72 (BP Location: Left arm, Patient Position: Sitting, Cuff Size: Large Adult)   Pulse 50   Temp 97.5 °F (36.4 °C) (Temporal)   Resp 22   Ht 170.2 cm (67\")   Wt (!) 142 kg (312 lb)   SpO2 96%   BMI 48.87 kg/m²    Physical Exam  Vitals reviewed.   Constitutional:       Appearance: He is well-developed.   Cardiovascular:      Rate and Rhythm: Normal rate.   Pulmonary:      Effort: Pulmonary effort is normal.   Genitourinary:     Comments: Penis is not visible. It is drawn completely into the fat pad. There is some minimal yellow crusting noted at the opening. Fat pad does have firmness, but no erythema, and he states that it is not tender.  Neurological:      Mental Status: He is alert and oriented to person, place, and time.   Psychiatric:         Behavior: Behavior normal.       Assessment/Plan    Diagnoses and all orders for this visit:    1. Phimosis (Primary)    2. Acute gout of ankle, unspecified cause, unspecified laterality  -     colchicine 0.6 MG " tablet; TAKE TWO TABLETS BY MOUTH NOW AND THEN TAKE ONE TABLET ONE HOUR LATER, THEN TAKE ONE TABLET DAILY UNTIL GONE  Dispense: 10 tablet; Refill: 5    3. Sacroiliitis, not elsewhere classified    Other orders  -     allopurinol (ZYLOPRIM) 300 MG tablet; Take 1 tablet by mouth 2 (Two) Times a Day.  Dispense: 180 tablet; Refill: 2    Call placed to Dr. Coughlin's office to discuss patient presentation. Awaiting return call.   Patient ok for refill on gabapentin and oxycodone when due.   Allpurinol increase to 600 mg daily.     An After Visit Summary was printed and given to the patient at discharge.  Return if symptoms worsen or fail to improve, for Next scheduled follow up.       ANSHUL Noel 6/3/24    Electronically signed.

## 2024-06-04 DIAGNOSIS — G89.29 CHRONIC MIDLINE LOW BACK PAIN WITH SCIATICA, SCIATICA LATERALITY UNSPECIFIED: ICD-10-CM

## 2024-06-04 DIAGNOSIS — M54.40 CHRONIC MIDLINE LOW BACK PAIN WITH SCIATICA, SCIATICA LATERALITY UNSPECIFIED: ICD-10-CM

## 2024-06-04 DIAGNOSIS — R52 PAIN: ICD-10-CM

## 2024-06-04 RX ORDER — OXYCODONE HYDROCHLORIDE 10 MG/1
10 TABLET ORAL EVERY 6 HOURS PRN
Qty: 90 TABLET | Refills: 0 | Status: SHIPPED | OUTPATIENT
Start: 2024-06-04

## 2024-06-04 NOTE — TELEPHONE ENCOUNTER
Noted--will advise Rebecca dose adjustment and for them to reach out to [facility] to discuss pain control.      Rx Refill Note  Requested Prescriptions     Pending Prescriptions Disp Refills    oxyCODONE (ROXICODONE) 10 MG tablet 90 tablet 0     Sig: Take 1 tablet by mouth Every 6 (Six) Hours As Needed for Moderate Pain or Severe Pain.      Last office visit with prescribing clinician: 3/12/2024   Last telemedicine visit with prescribing clinician: Visit date not found   Next office visit with prescribing clinician: 10/1/2024                         Would you like a call back once the refill request has been completed: [] Yes [] No    If the office needs to give you a call back, can they leave a voicemail: [] Yes [] No    Deandre Nassar Rep  06/04/24, 10:43 CDT

## 2024-06-04 NOTE — TELEPHONE ENCOUNTER
Granddaughter calling for med refill--they did not  the short fill of 18 tablets sent on 05/20 since 90 tablets were also filled. I called and talked to [facility]--they will put that 18 tablets rx on hold.  Pt has been getting 90 tablets for 15 days supply--he would be due today for a refill and is taking them as prescribed.      Do you want to keep same directions or increase quantity (180) for a 30 days supply?    Drug thereapy appt 6/03/2024  contract done 3/12/2024  UDS done 3/12/2024

## 2024-06-07 ENCOUNTER — HOSPITAL ENCOUNTER (EMERGENCY)
Facility: HOSPITAL | Age: 74
Discharge: HOME | End: 2024-06-07
Attending: EMERGENCY MEDICINE | Admitting: EMERGENCY MEDICINE
Payer: MEDICARE

## 2024-06-07 VITALS
SYSTOLIC BLOOD PRESSURE: 161 MMHG | OXYGEN SATURATION: 99 % | TEMPERATURE: 99 F | RESPIRATION RATE: 20 BRPM | HEART RATE: 87 BPM | BODY MASS INDEX: 48.44 KG/M2 | WEIGHT: 308.64 LBS | HEIGHT: 67 IN | DIASTOLIC BLOOD PRESSURE: 79 MMHG

## 2024-06-07 DIAGNOSIS — Z51.89 VISIT FOR WOUND CHECK: Primary | ICD-10-CM

## 2024-06-07 PROCEDURE — 99283 EMERGENCY DEPT VISIT LOW MDM: CPT

## 2024-06-07 RX ORDER — TRANEXAMIC ACID 100 MG/ML
500 INJECTION, SOLUTION INTRAVENOUS ONCE
Status: COMPLETED | OUTPATIENT
Start: 2024-06-07 | End: 2024-06-07

## 2024-06-07 RX ADMIN — TRANEXAMIC ACID 500 MG: 100 INJECTION, SOLUTION INTRAVENOUS at 21:20

## 2024-06-08 NOTE — ED PROVIDER NOTES
Subjective   History of Present Illness  Nba is a 74-year-old male who presents to the ED via private vehicle for chief complaint of bleeding perineum after fall.  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  He had this done by urology at [place].  Patient states this evening he was at home when he stood up and fell back onto a chair and then went to the bathroom and noticed bleeding.  Family at bedside states that it was squirting and were concerned they drove him immediately to the ED.  Patient is in no acute distress he is not diaphoretic he is not symptomatic.        Review of Systems   All other systems reviewed and are negative.      Past Medical History:   Diagnosis Date    A-fib     Acid reflux     Anemia 05/03/2016    Claustrophobia     Diabetes mellitus     Gallstones     Gout     Hyperlipidemia     Hypertension     Malignant neoplasm of upper lobe of right lung 3/17/2023    Pneumonia     Sleep apnea with use of continuous positive airway pressure (CPAP)        Allergies   Allergen Reactions    Adhesive Tape Other (See Comments)     Blisters and rips skin off    Simvastatin Other (See Comments)     Leg Cramps    Cleocin [Clindamycin] GI Intolerance       Past Surgical History:   Procedure Laterality Date    BACK SURGERY      x2    BLADDER SURGERY  02/2023    CATARACT EXTRACTION      CIRCUMCISION N/A 06/06/2019    Procedure: CIRCUMCISION;  Surgeon: Yon Sloan MD;  Location:  [facility];  Service: Urology    COLONOSCOPY      FINGER SURGERY Left     LUNG BIOPSY Right 03/13/2023    REPLACEMENT TOTAL KNEE Bilateral     URETHRAL DILATATION N/A 12/01/2022    Procedure: URETHRAL DILATATION with Balloon, TRANSURETHRAL RESECTION OF BLADDER TUMOR;  Surgeon: Yon Sloan MD;  Location:  [facility];  Service: Urology;  Laterality: N/A;     URETHROPLASTY  2024    [place]       Family History   Problem Relation Age of Onset    Colon cancer Mother     Prostate cancer Father     No Known Problems Maternal Grandmother     No Known Problems Maternal Grandfather     No Known Problems Paternal Grandmother     No Known Problems Paternal Grandfather        Social History     Socioeconomic History    Marital status:    Tobacco Use    Smoking status: Former     Current packs/day: 0.00     Average packs/day: 1 pack/day for 20.0 years (20.0 ttl pk-yrs)     Types: Cigarettes     Start date: 1976     Quit date: 1996     Years since quittin.0     Passive exposure: Past    Smokeless tobacco: Never   Vaping Use    Vaping status: Never Used   Substance and Sexual Activity    Alcohol use: Yes     Comment: occ    Drug use: No    Sexual activity: Defer           Objective   Physical Exam  Vitals reviewed.   Constitutional:       Appearance: Normal appearance. He is normal weight.   HENT:      Head: Normocephalic and atraumatic.      Right Ear: External ear normal.      Left Ear: External ear normal.      Nose: Nose normal.      Mouth/Throat:      Mouth: Mucous membranes are moist. Mucous membranes are dry.      Pharynx: Oropharynx is clear.   Eyes:      Extraocular Movements: Extraocular movements intact.      Conjunctiva/sclera: Conjunctivae normal.      Pupils: Pupils are equal, round, and reactive to light.   Cardiovascular:      Rate and Rhythm: Normal rate and regular rhythm.      Pulses: Normal pulses.      Heart sounds: Normal heart sounds.   Pulmonary:      Effort: Pulmonary effort is normal.      Breath sounds: Normal breath sounds.   Abdominal:      General: Bowel sounds are normal.      Palpations: Abdomen is soft.   Genitourinary:     Comments: Healing tissue of the perineum  2 punctate tracts, no active bleeding but slow venous/capillary oozing when standing.  Musculoskeletal:         General: Normal range of motion.      Cervical  back: Normal range of motion and neck supple. No rigidity.   Skin:     General: Skin is warm and dry.      Capillary Refill: Capillary refill takes less than 2 seconds.   Neurological:      General: No focal deficit present.      Mental Status: He is alert and oriented to person, place, and time.   Psychiatric:         Mood and Affect: Mood normal.         Procedures           ED Course                                             Medical Decision Making  Nba is a 74-year-old male who presents to the ED for evaluation of his perineum after a fall.  Patient's perineum has no active bleeding there is no obvious laceration, he did have some dehiscence previous that was prior to arrival for which he had been getting care for by his family.  He recently was seen by the nurse practitioner at the urology office I at [place] and was told it is improving.  Patient has no active laceration, he has bleeding but no active site, it is small oozing in nature.  Will attempt to tamponade with direct pressure.    Patient was unable to have tamponade with Surgicel  We then treated Surgicel with TXA and direct compression, this caused the bleeding stopped    21:34 CDT  Nursing came to me and stated that the patient would like to leave as the bleeding has stopped.  Patient was encouraged to follow-up with his urologist on Monday.    Answered all questions provided reassurance and encouraged him to return to ED for any other bleeding.  Ambulated out of the department under his own power without complication.    Risk  Prescription drug management.        Final diagnoses:   Visit for wound check       ED Disposition  ED Disposition       ED Disposition   Discharge    Condition   Stable    Comment   --               Jim Hopkins MD  [address] [facility] [address] [phone]               Medication List        Changed      lovastatin 10 MG tablet  Commonly known as: MEVACOR  Take 1 tablet by mouth every night at  bedtime.  What changed: when to take this                 Mohsen Tyler MD  06/07/24 0532

## 2024-06-26 DIAGNOSIS — G89.29 CHRONIC MIDLINE LOW BACK PAIN WITH SCIATICA, SCIATICA LATERALITY UNSPECIFIED: ICD-10-CM

## 2024-06-26 DIAGNOSIS — M54.40 CHRONIC MIDLINE LOW BACK PAIN WITH SCIATICA, SCIATICA LATERALITY UNSPECIFIED: ICD-10-CM

## 2024-06-26 DIAGNOSIS — R52 PAIN: ICD-10-CM

## 2024-06-26 RX ORDER — OXYCODONE HYDROCHLORIDE 10 MG/1
10 TABLET ORAL EVERY 6 HOURS PRN
Qty: 90 TABLET | Refills: 0 | Status: SHIPPED | OUTPATIENT
Start: 2024-06-26

## 2024-06-26 NOTE — TELEPHONE ENCOUNTER
Rx Refill Note  Requested Prescriptions     Pending Prescriptions Disp Refills    oxyCODONE (ROXICODONE) 10 MG tablet 90 tablet 0     Sig: Take 1 tablet by mouth Every 6 (Six) Hours As Needed for Moderate Pain or Severe Pain.      Last office visit with prescribing clinician: 3/12/2024   Last telemedicine visit with prescribing clinician: Visit date not found   Next office visit with prescribing clinician: 10/1/2024     Drug thereapy appt 6/03/2024  contract done 3/12/2024  UDS done 3/12/2024                      Would you like a call back once the refill request has been completed: [] Yes [] No    If the office needs to give you a call back, can they leave a voicemail: [] Yes [] No    Helena Gilmore MA  06/26/24, 12:00 CDT

## 2024-06-27 ENCOUNTER — TELEPHONE (OUTPATIENT)
Dept: FAMILY MEDICINE CLINIC | Facility: CLINIC | Age: 74
End: 2024-06-27
Payer: MEDICARE

## 2024-06-27 NOTE — TELEPHONE ENCOUNTER
I would check with medical supply company or urology office. I believe that he is talking about the tape that secures the tubing to his leg.

## 2024-06-27 NOTE — TELEPHONE ENCOUNTER
Patient called regarding tape for his catheter.  He has called the pharmacy and they do not have it.  Wife has checked at the hospital and was advised to contact PCP.

## 2024-07-01 ENCOUNTER — OFFICE VISIT (OUTPATIENT)
Dept: FAMILY MEDICINE CLINIC | Facility: CLINIC | Age: 74
End: 2024-07-01
Payer: MEDICARE

## 2024-07-01 VITALS
HEIGHT: 67 IN | WEIGHT: 310 LBS | TEMPERATURE: 98 F | BODY MASS INDEX: 48.65 KG/M2 | HEART RATE: 91 BPM | SYSTOLIC BLOOD PRESSURE: 139 MMHG | DIASTOLIC BLOOD PRESSURE: 83 MMHG | OXYGEN SATURATION: 96 %

## 2024-07-01 DIAGNOSIS — R30.0 DYSURIA: Primary | ICD-10-CM

## 2024-07-01 DIAGNOSIS — M10.9 ACUTE GOUT OF LEFT ANKLE, UNSPECIFIED CAUSE: ICD-10-CM

## 2024-07-01 DIAGNOSIS — R82.90 ABNORMAL URINALYSIS: ICD-10-CM

## 2024-07-01 LAB
BILIRUB BLD-MCNC: NEGATIVE MG/DL
CLARITY, POC: ABNORMAL
COLOR UR: ABNORMAL
GLUCOSE UR STRIP-MCNC: NEGATIVE MG/DL
KETONES UR QL: ABNORMAL
LEUKOCYTE EST, POC: ABNORMAL
NITRITE UR-MCNC: POSITIVE MG/ML
PH UR: 5.5 [PH] (ref 5–8)
PROT UR STRIP-MCNC: ABNORMAL MG/DL
RBC # UR STRIP: ABNORMAL /UL
SP GR UR: 1.02 (ref 1–1.03)
UROBILINOGEN UR QL: ABNORMAL

## 2024-07-01 PROCEDURE — 1159F MED LIST DOCD IN RCRD: CPT | Performed by: NURSE PRACTITIONER

## 2024-07-01 PROCEDURE — 3075F SYST BP GE 130 - 139MM HG: CPT | Performed by: NURSE PRACTITIONER

## 2024-07-01 PROCEDURE — 3079F DIAST BP 80-89 MM HG: CPT | Performed by: NURSE PRACTITIONER

## 2024-07-01 PROCEDURE — 99214 OFFICE O/P EST MOD 30 MIN: CPT | Performed by: NURSE PRACTITIONER

## 2024-07-01 PROCEDURE — 3044F HG A1C LEVEL LT 7.0%: CPT | Performed by: NURSE PRACTITIONER

## 2024-07-01 PROCEDURE — 1125F AMNT PAIN NOTED PAIN PRSNT: CPT | Performed by: NURSE PRACTITIONER

## 2024-07-01 PROCEDURE — 81003 URINALYSIS AUTO W/O SCOPE: CPT | Performed by: NURSE PRACTITIONER

## 2024-07-01 PROCEDURE — 1160F RVW MEDS BY RX/DR IN RCRD: CPT | Performed by: NURSE PRACTITIONER

## 2024-07-01 RX ORDER — ALLOPURINOL 100 MG/1
100 TABLET ORAL DAILY
Start: 2024-07-01

## 2024-07-01 RX ORDER — SULFAMETHOXAZOLE AND TRIMETHOPRIM 800; 160 MG/1; MG/1
1 TABLET ORAL 2 TIMES DAILY
Qty: 14 TABLET | Refills: 0 | Status: SHIPPED | OUTPATIENT
Start: 2024-07-01

## 2024-07-01 NOTE — PROGRESS NOTES
CC: burning, pressure, dark urine    History:  Ephraim Quinones is a 74 y.o. male who presents today for evaluation of the above problems.      Patient does continue to have a vazquez catheter in place after his urethroplasty on May 8.  He has been having burning, pressure, and dark urine since Friday. States that he found some antibiotics at home and took them over the weekend.   Also has been having some yellow drainage at the head of his penis around the catheter insertion site.    Patient also notes that he was still having pain after eating pork chops and sausage on the 600 mg of allopurinol. He added an additional 100 mg tablet that he had at home and he has been able to enjoy occasional pork at this dose.      HPI  ROS:  Review of Systems   Genitourinary:  Positive for dysuria.        Pressure, dark colored urine.        Allergies   Allergen Reactions    Adhesive Tape Other (See Comments)     Blisters and rips skin off    Simvastatin Other (See Comments)     Leg Cramps    Cleocin [Clindamycin] GI Intolerance     Past Medical History:   Diagnosis Date    A-fib     Acid reflux     Anemia 05/03/2016    Claustrophobia     Diabetes mellitus     Gallstones     Gout     Hyperlipidemia     Hypertension     Malignant neoplasm of upper lobe of right lung 3/17/2023    Pneumonia     Sleep apnea with use of continuous positive airway pressure (CPAP)      Past Surgical History:   Procedure Laterality Date    BACK SURGERY      x2    BLADDER SURGERY  02/2023    CATARACT EXTRACTION      CIRCUMCISION N/A 06/06/2019    Procedure: CIRCUMCISION;  Surgeon: Yon Sloan MD;  Location:  [facility];  Service: Urology    COLONOSCOPY      FINGER SURGERY Left     LUNG BIOPSY Right 03/13/2023    REPLACEMENT TOTAL KNEE Bilateral     URETHRAL DILATATION N/A 12/01/2022    Procedure: URETHRAL DILATATION with Balloon, TRANSURETHRAL RESECTION OF BLADDER TUMOR;  Surgeon: Yon Sloan MD;  Location:  [facility];  Service: Urology;   Laterality: N/A;    URETHROPLASTY  05/08/2024    [place]     Family History   Problem Relation Age of Onset    Colon cancer Mother     Prostate cancer Father     No Known Problems Maternal Grandmother     No Known Problems Maternal Grandfather     No Known Problems Paternal Grandmother     No Known Problems Paternal Grandfather       reports that he quit smoking about 28 years ago. His smoking use included cigarettes. He started smoking about 48 years ago. He has a 20 pack-year smoking history. He has been exposed to tobacco smoke. He has never used smokeless tobacco. He reports current alcohol use. He reports that he does not use drugs.      Current Outpatient Medications:     albuterol sulfate  (90 Base) MCG/ACT inhaler, 2 sprays 2 minutes apart every 6 hours as needed, Disp: 18 g, Rfl: 5    allopurinol (Zyloprim) 100 MG tablet, Take 1 tablet by mouth Daily. With two tablets of 300 mg allopurinol, for total of 700 mg daily., Disp: , Rfl:     allopurinol (ZYLOPRIM) 300 MG tablet, Take 1 tablet by mouth 2 (Two) Times a Day., Disp: 180 tablet, Rfl: 2    aspirin 81 MG EC tablet, Take 1 tablet by mouth Daily., Disp: , Rfl:     carbidopa-levodopa (SINEMET)  MG per tablet, TAKE 1 TABLET BY MOUTH TWICE DAILY, Disp: 180 tablet, Rfl: 3    Cholecalciferol (VITAMIN D3) 2000 units capsule, Take 1 capsule by mouth Daily., Disp: , Rfl:     clobetasol propionate (TEMOVATE) 0.05 % cream, Apply topically to the appropriate area as directed, Disp: 30 g, Rfl: 1    ELIQUIS 5 MG tablet tablet, Take 1 tablet by mouth Every 12 (Twelve) Hours., Disp: , Rfl:     furosemide (LASIX) 40 MG tablet, Take 1 tablet by mouth Daily., Disp: 90 tablet, Rfl: 1    gabapentin (NEURONTIN) 300 MG capsule, Take 1 capsule by mouth 4 Times a Day., Disp: 120 capsule, Rfl: 4    glipizide (GLUCOTROL XL) 5 MG ER tablet, Take 1 tablet by mouth Daily., Disp: 90 tablet, Rfl: 3    glucose blood test strip, 1 each by Other route Daily. Use as  instructed, Disp: 100 each, Rfl: 3    guaiFENesin (Mucinex) 600 MG 12 hr tablet, Take 2 tablets by mouth 2 (Two) Times a Day., Disp: 28 tablet, Rfl: 0    HYDROcodone-acetaminophen (NORCO)  MG per tablet, Take 1 tablet by mouth Every 6 (Six) Hours As Needed for Moderate Pain., Disp: 78 tablet, Rfl: 0    indapamide (LOZOL) 2.5 MG tablet, Take 1 tablet by mouth Every Morning., Disp: 90 tablet, Rfl: 2    Lancet Devices (EASY TOUCH LANCING DEVICE) misc, USE TO TEST DAILY, Disp: , Rfl: 0    losartan (COZAAR) 100 MG tablet, 1 at bedtime for blood pressure, Disp: 90 tablet, Rfl: 3    lovastatin (MEVACOR) 10 MG tablet, Take 1 tablet by mouth every night at bedtime. (Patient taking differently: Take 1 tablet by mouth Every Other Day.), Disp: 90 tablet, Rfl: 2    metFORMIN (GLUCOPHAGE) 1000 MG tablet, TAKE 1 TABLET BY MOUTH 2 TIMES A DAY WITH MEALS., Disp: 180 tablet, Rfl: 2    naproxen sodium (ALEVE) 220 MG tablet, Take 1 tablet by mouth 2 (Two) Times a Day As Needed., Disp: , Rfl:     ONETOUCH DELICA LANCETS 33G misc, 1 each Daily., Disp: 100 each, Rfl: 3    oxyCODONE (ROXICODONE) 10 MG tablet, Take 1 tablet by mouth Every 6 (Six) Hours As Needed for Moderate Pain or Severe Pain., Disp: 90 tablet, Rfl: 0    Potassium 99 MG tablet, Take 1 tablet by mouth 2 (Two) Times a Day., Disp: , Rfl:     pramipexole (MIRAPEX) 0.25 MG tablet, take 1-2 tablets nightly for leg cramps, Disp: 180 tablet, Rfl: 1    albuterol (ACCUNEB) 1.25 MG/3ML nebulizer solution, Take 3 mL by nebulization Every 6 (Six) Hours As Needed for Shortness of Air. (Patient not taking: Reported on 7/1/2024), Disp: 180 each, Rfl: 2    colchicine 0.6 MG tablet, TAKE TWO TABLETS BY MOUTH NOW AND THEN TAKE ONE TABLET ONE HOUR LATER, THEN TAKE ONE TABLET DAILY UNTIL GONE (Patient not taking: Reported on 7/1/2024), Disp: 10 tablet, Rfl: 5    oxyCODONE (ROXICODONE) 10 MG tablet, Take 1 tablet by mouth Every 4 (Four) Hours As Needed for Moderate Pain., Disp: 18 each,  "Rfl: 0    sulfamethoxazole-trimethoprim (Bactrim DS) 800-160 MG per tablet, Take 1 tablet by mouth 2 (Two) Times a Day., Disp: 14 tablet, Rfl: 0    OBJECTIVE:  /83 (BP Location: Left arm, Patient Position: Sitting, Cuff Size: Large Adult)   Pulse 91   Temp 98 °F (36.7 °C) (Temporal)   Ht 170.2 cm (67\")   Wt (!) 141 kg (310 lb)   SpO2 96%   BMI 48.55 kg/m²    Physical Exam  Vitals reviewed.   Constitutional:       Appearance: He is well-developed.   Cardiovascular:      Rate and Rhythm: Normal rate.   Pulmonary:      Effort: Pulmonary effort is normal.   Genitourinary:     Comments: Head of penis remains drawn back into swelling of the lower pubic area, though the swelling has improved from previous visit. There is a small amount of thick yellow discharge evident. No odor noted.   Neurological:      Mental Status: He is alert and oriented to person, place, and time.   Psychiatric:         Behavior: Behavior normal.       Assessment/Plan    Diagnoses and all orders for this visit:    1. Dysuria (Primary)  -     POC Urinalysis Dipstick, Multipro  -     sulfamethoxazole-trimethoprim (Bactrim DS) 800-160 MG per tablet; Take 1 tablet by mouth 2 (Two) Times a Day.  Dispense: 14 tablet; Refill: 0  -     Urine Culture - Urine, Urine, Catheter    2. Abnormal urinalysis  -     Urine Culture - Urine, Urine, Catheter    3. Acute gout of left ankle, unspecified cause  -     allopurinol (Zyloprim) 100 MG tablet; Take 1 tablet by mouth Daily. With two tablets of 300 mg allopurinol, for total of 700 mg daily.    Urine drawn from catheter line, not bag.   Will culture, but based on UA will go ahead and start Bactrim (instead of cipro) since there is concern for soft tissue infection as well as UTI. Patient does have adequate renal function to tolerate this medication.         An After Visit Summary was printed and given to the patient at discharge.  Return if symptoms worsen or fail to improve, for Next scheduled follow " up.    Dolores Ba ,APRN 7/1/24           Electronically signed.

## 2024-07-03 LAB
BACTERIA UR CULT: NORMAL
BACTERIA UR CULT: NORMAL

## 2024-07-03 NOTE — PROGRESS NOTES
No growth on culture, likely because of taking some antibiotics at home before his visit. Finish meds as ordered.

## 2024-07-11 DIAGNOSIS — M10.9 ACUTE GOUT OF LEFT ANKLE, UNSPECIFIED CAUSE: ICD-10-CM

## 2024-07-11 RX ORDER — ALLOPURINOL 300 MG/1
300 TABLET ORAL 2 TIMES DAILY
Qty: 180 TABLET | Refills: 3 | Status: SHIPPED | OUTPATIENT
Start: 2024-07-11

## 2024-07-11 RX ORDER — ALLOPURINOL 100 MG/1
100 TABLET ORAL DAILY
Qty: 90 TABLET | Refills: 3 | Status: SHIPPED | OUTPATIENT
Start: 2024-07-11

## 2024-07-11 NOTE — TELEPHONE ENCOUNTER
Pt calling and taking total allopurinol 700mg.   Will need rx's for 300mg and 100mg capsules.    [facility]    Rx Refill Note  Requested Prescriptions     Pending Prescriptions Disp Refills    allopurinol (Zyloprim) 100 MG tablet 90 tablet 3     Sig: Take 1 tablet by mouth Daily. With two tablets of 300 mg allopurinol, for total of 700 mg daily.    allopurinol (ZYLOPRIM) 300 MG tablet 180 tablet 3     Sig: Take 1 tablet by mouth 2 (Two) Times a Day.      Last office visit with prescribing clinician: 3/12/2024   Last telemedicine visit with prescribing clinician: Visit date not found   Next office visit with prescribing clinician: 10/1/2024                         Would you like a call back once the refill request has been completed: [] Yes [] No    If the office needs to give you a call back, can they leave a voicemail: [] Yes [] No    Deandre Nassar Rep  07/11/24, 10:04 CDT

## 2024-07-15 RX ORDER — PRAMIPEXOLE DIHYDROCHLORIDE 0.25 MG/1
TABLET ORAL
Qty: 180 TABLET | Refills: 1 | Status: SHIPPED | OUTPATIENT
Start: 2024-07-15

## 2024-07-15 NOTE — TELEPHONE ENCOUNTER
Rx Refill Note  Requested Prescriptions     Pending Prescriptions Disp Refills    pramipexole (MIRAPEX) 0.25 MG tablet [Pharmacy Med Name: pramipexole 0.25 mg tablet] 180 tablet 1     Sig: TAKE 1-2 TABLETS BY MOUTH NIGHTLY FOR LEG CRAMPS      Last office visit with prescribing clinician: 7/1/24   Last telemedicine visit with prescribing clinician: Visit date not found   Next office visit with prescribing clinician: 10/1/2024     Helena Meehan MA  07/15/24, 08:15 CDT

## 2024-07-17 DIAGNOSIS — M54.40 CHRONIC MIDLINE LOW BACK PAIN WITH SCIATICA, SCIATICA LATERALITY UNSPECIFIED: ICD-10-CM

## 2024-07-17 DIAGNOSIS — R52 PAIN: ICD-10-CM

## 2024-07-17 DIAGNOSIS — G89.29 CHRONIC MIDLINE LOW BACK PAIN WITH SCIATICA, SCIATICA LATERALITY UNSPECIFIED: ICD-10-CM

## 2024-07-18 RX ORDER — OXYCODONE HYDROCHLORIDE 10 MG/1
10 TABLET ORAL EVERY 6 HOURS PRN
Qty: 90 TABLET | Refills: 0 | Status: SHIPPED | OUTPATIENT
Start: 2024-07-18

## 2024-07-18 NOTE — TELEPHONE ENCOUNTER
Rx Refill Note  Requested Prescriptions     Pending Prescriptions Disp Refills    oxyCODONE (ROXICODONE) 10 MG tablet 90 tablet 0     Sig: Take 1 tablet by mouth Every 6 (Six) Hours As Needed for Moderate Pain or Severe Pain.      Last office visit with office: 6/3/24  Next office visit with office: 10/1/24    UDS: 3/12/24    DATE OF LAST REFILL: 6/26/24    Controlled Substance Agreement: up to date           {TIP  Is Refill Pharmacy correct?:  Helena Meehan MA  07/18/24, 07:12 CDT

## 2024-07-29 ENCOUNTER — TELEPHONE (OUTPATIENT)
Dept: FAMILY MEDICINE CLINIC | Facility: CLINIC | Age: 74
End: 2024-07-29
Payer: MEDICARE

## 2024-07-29 DIAGNOSIS — Z12.11 SCREENING FOR COLORECTAL CANCER: Primary | ICD-10-CM

## 2024-07-29 DIAGNOSIS — Z12.12 SCREENING FOR COLORECTAL CANCER: Primary | ICD-10-CM

## 2024-07-29 NOTE — TELEPHONE ENCOUNTER
Adrienne called and states patient is ok now per other provider for referral for colonoscopy.  Wants referral to Dr. Pace      Referral pended.

## 2024-07-30 DIAGNOSIS — G62.9 NEUROPATHY: ICD-10-CM

## 2024-07-30 RX ORDER — GABAPENTIN 300 MG/1
CAPSULE ORAL
Qty: 120 CAPSULE | Refills: 4 | Status: SHIPPED | OUTPATIENT
Start: 2024-07-30

## 2024-07-30 NOTE — TELEPHONE ENCOUNTER
Rx Refill Note  Requested Prescriptions     Pending Prescriptions Disp Refills    gabapentin (NEURONTIN) 300 MG capsule [Pharmacy Med Name: gabapentin 300 mg capsule] 120 capsule 4     Sig: Take 1 capsule by mouth 4 Times a Day.      Last office visit with office: 6/3/24  Next office visit with office: 10/1/24    UDS: 3/12/24    DATE OF LAST REFILL: 2/19/24    Controlled Substance Agreement: up to date           {TIP  Is Refill Pharmacy correct?:  Helena Meehan MA  07/30/24, 08:18 CDT

## 2024-08-08 DIAGNOSIS — G89.29 CHRONIC MIDLINE LOW BACK PAIN WITH SCIATICA, SCIATICA LATERALITY UNSPECIFIED: ICD-10-CM

## 2024-08-08 DIAGNOSIS — R52 PAIN: ICD-10-CM

## 2024-08-08 DIAGNOSIS — M54.40 CHRONIC MIDLINE LOW BACK PAIN WITH SCIATICA, SCIATICA LATERALITY UNSPECIFIED: ICD-10-CM

## 2024-08-08 RX ORDER — OXYCODONE HYDROCHLORIDE 10 MG/1
10 TABLET ORAL EVERY 6 HOURS PRN
Qty: 90 TABLET | Refills: 0 | Status: SHIPPED | OUTPATIENT
Start: 2024-08-08

## 2024-08-08 RX ORDER — FUROSEMIDE 40 MG/1
40 TABLET ORAL DAILY
Qty: 90 TABLET | Refills: 0 | Status: SHIPPED | OUTPATIENT
Start: 2024-08-08

## 2024-08-08 NOTE — TELEPHONE ENCOUNTER
Rx Refill Note  Requested Prescriptions     Pending Prescriptions Disp Refills    oxyCODONE (ROXICODONE) 10 MG tablet 90 tablet 0     Sig: Take 1 tablet by mouth Every 6 (Six) Hours As Needed for Moderate Pain or Severe Pain.      Last office visit with prescribing clinician: 3/12/2024   Last telemedicine visit with prescribing clinician: Visit date not found   Next office visit with prescribing clinician: 10/1/2024     CPE done     Drug thereapy appt 6/03/2024  contract done 3/12/2024  UDS done 3/12/2024                      Would you like a call back once the refill request has been completed: [] Yes [] No    If the office needs to give you a call back, can they leave a voicemail: [] Yes [] No    Helena Gilmore MA  08/08/24, 08:30 CDT

## 2024-08-08 NOTE — TELEPHONE ENCOUNTER
Rx Refill Note  Requested Prescriptions     Pending Prescriptions Disp Refills    furosemide (LASIX) 40 MG tablet [Pharmacy Med Name: furosemide 40 mg tablet] 90 tablet 1     Sig: Take 1 tablet by mouth Daily.      Last office visit with prescribing clinician: 3/12/2024   Last telemedicine visit with prescribing clinician: Visit date not found   Next office visit with prescribing clinician: 8/8/2024       {TIP  Please add Last Relevant Lab 6/8/24    Helena Meehan MA  08/08/24, 08:24 CDT

## 2024-08-20 DIAGNOSIS — I10 PRIMARY HYPERTENSION: ICD-10-CM

## 2024-08-20 RX ORDER — INDAPAMIDE 2.5 MG/1
2.5 TABLET ORAL EVERY MORNING
Qty: 90 TABLET | Refills: 0 | Status: SHIPPED | OUTPATIENT
Start: 2024-08-20

## 2024-08-20 NOTE — TELEPHONE ENCOUNTER
Rx Refill Note  Requested Prescriptions     Pending Prescriptions Disp Refills    indapamide (LOZOL) 2.5 MG tablet 90 tablet 2     Sig: Take 1 tablet by mouth Every Morning.      Last office visit with prescribing clinician: 3/12/2024   Last telemedicine visit with prescribing clinician: Visit date not found   Next office visit with prescribing clinician: 10/1/2024                         Would you like a call back once the refill request has been completed: [] Yes [] No    If the office needs to give you a call back, can they leave a voicemail: [] Yes [] No    Deandre Nassar Rep  08/20/24, 08:22 CDT

## 2024-08-26 ENCOUNTER — OFFICE VISIT (OUTPATIENT)
Dept: FAMILY MEDICINE CLINIC | Facility: CLINIC | Age: 74
End: 2024-08-26
Payer: MEDICARE

## 2024-08-26 VITALS
BODY MASS INDEX: 49.44 KG/M2 | SYSTOLIC BLOOD PRESSURE: 125 MMHG | OXYGEN SATURATION: 95 % | DIASTOLIC BLOOD PRESSURE: 80 MMHG | HEIGHT: 67 IN | WEIGHT: 315 LBS | HEART RATE: 85 BPM | TEMPERATURE: 98.6 F

## 2024-08-26 DIAGNOSIS — R60.9 SWELLING: ICD-10-CM

## 2024-08-26 DIAGNOSIS — M46.1 SACROILIITIS, NOT ELSEWHERE CLASSIFIED: ICD-10-CM

## 2024-08-26 DIAGNOSIS — L03.119 CELLULITIS OF LOWER EXTREMITY, UNSPECIFIED LATERALITY: Primary | ICD-10-CM

## 2024-08-26 PROCEDURE — 3074F SYST BP LT 130 MM HG: CPT | Performed by: NURSE PRACTITIONER

## 2024-08-26 PROCEDURE — 3044F HG A1C LEVEL LT 7.0%: CPT | Performed by: NURSE PRACTITIONER

## 2024-08-26 PROCEDURE — 99213 OFFICE O/P EST LOW 20 MIN: CPT | Performed by: NURSE PRACTITIONER

## 2024-08-26 PROCEDURE — 1159F MED LIST DOCD IN RCRD: CPT | Performed by: NURSE PRACTITIONER

## 2024-08-26 PROCEDURE — 3079F DIAST BP 80-89 MM HG: CPT | Performed by: NURSE PRACTITIONER

## 2024-08-26 PROCEDURE — 1160F RVW MEDS BY RX/DR IN RCRD: CPT | Performed by: NURSE PRACTITIONER

## 2024-08-26 PROCEDURE — 1125F AMNT PAIN NOTED PAIN PRSNT: CPT | Performed by: NURSE PRACTITIONER

## 2024-08-26 RX ORDER — CEFDINIR 300 MG/1
300 CAPSULE ORAL 2 TIMES DAILY
Qty: 14 CAPSULE | Refills: 0 | Status: SHIPPED | OUTPATIENT
Start: 2024-08-26

## 2024-08-26 NOTE — PROGRESS NOTES
CC: leg swelling and redness, controlled med monitoring    History:  Ephraim Quinones is a 74 y.o. male who presents today for evaluation of the above problems.      Has been having leg swelling with blistering and redness for about two weeks.   There are also two scrapes on his left shin.   Hasn't been eating differently for the most part, but did have country ham yesterday morning. Has had some increased shortness of breath.   Takes 40 mg of lasix daily at 2 pm.   Weight is up by 12 pounds since her last visit and had been stable prior to that.       Patient continues on oxycodone and gabapentin for pain control. These continue to work fairly well for his back pain.   Contract and UDS are current. Gregory is reviewed and appropriate.         11/11/2022     9:00 AM 2/6/2023     8:00 AM 5/31/2023     9:00 AM 8/29/2023     8:00 AM 12/4/2023     8:00 AM 3/12/2024     8:00 AM 8/26/2024     8:00 AM   CONTROLLED SUBSTANCE TRACKING   Last Gregory 11/11/2022 2/6/2023 5/31/2023 8/29/2023 12/4/2023 3/12/2024 8/26/2024   Report Number  reviewed through epic  reviewed through epic reviewed through epic  reviewed through epic   Last UDS 12/1/2021 2/6/2023 2/6/2023 2/6/2023 2/6/2023 3/12/2024 3/12/2024   Last Controlled Substance Agreement 12/1/2021 2/6/2023 2/6/2023 2/6/2023 2/6/2023 3/12/2024 3/12/2024           Leg Swelling    ROS:  Review of Systems   Respiratory:  Positive for shortness of breath.    Cardiovascular:  Positive for leg swelling.       Allergies   Allergen Reactions    Adhesive Tape Other (See Comments)     Blisters and rips skin off    Simvastatin Other (See Comments)     Leg Cramps    Cleocin [Clindamycin] GI Intolerance     Past Medical History:   Diagnosis Date    A-fib     Acid reflux     Anemia 05/03/2016    Claustrophobia     Diabetes mellitus     Gallstones     Gout     Hyperlipidemia     Hypertension     Malignant neoplasm of upper lobe of right lung 3/17/2023    Pneumonia     Sleep apnea with use of  continuous positive airway pressure (CPAP)      Past Surgical History:   Procedure Laterality Date    BACK SURGERY      x2    BLADDER SURGERY  02/2023    CATARACT EXTRACTION      CIRCUMCISION N/A 06/06/2019    Procedure: CIRCUMCISION;  Surgeon: oYn Sloan MD;  Location:  [facility];  Service: Urology    COLONOSCOPY      FINGER SURGERY Left     LUNG BIOPSY Right 03/13/2023    REPLACEMENT TOTAL KNEE Bilateral     URETHRAL DILATATION N/A 12/01/2022    Procedure: URETHRAL DILATATION with Balloon, TRANSURETHRAL RESECTION OF BLADDER TUMOR;  Surgeon: Yon Sloan MD;  Location:  [facility];  Service: Urology;  Laterality: N/A;    URETHROPLASTY  05/08/2024    [place]     Family History   Problem Relation Age of Onset    Colon cancer Mother     Prostate cancer Father     No Known Problems Maternal Grandmother     No Known Problems Maternal Grandfather     No Known Problems Paternal Grandmother     No Known Problems Paternal Grandfather       reports that he quit smoking about 28 years ago. His smoking use included cigarettes. He started smoking about 48 years ago. He has a 20 pack-year smoking history. He has been exposed to tobacco smoke. He has never used smokeless tobacco. He reports current alcohol use. He reports that he does not use drugs.      Current Outpatient Medications:     albuterol sulfate  (90 Base) MCG/ACT inhaler, 2 sprays 2 minutes apart every 6 hours as needed, Disp: 18 g, Rfl: 5    allopurinol (Zyloprim) 100 MG tablet, Take 1 tablet by mouth Daily. With two tablets of 300 mg allopurinol, for total of 700 mg daily., Disp: 90 tablet, Rfl: 3    allopurinol (ZYLOPRIM) 300 MG tablet, Take 1 tablet by mouth 2 (Two) Times a Day., Disp: 180 tablet, Rfl: 3    aspirin 81 MG EC tablet, Take 1 tablet by mouth Daily., Disp: , Rfl:     carbidopa-levodopa (SINEMET)  MG per tablet, TAKE 1 TABLET BY MOUTH TWICE DAILY, Disp: 180 tablet, Rfl: 3    Cholecalciferol (VITAMIN D3) 2000 units  capsule, Take 1 capsule by mouth Daily., Disp: , Rfl:     clobetasol propionate (TEMOVATE) 0.05 % cream, Apply topically to the appropriate area as directed, Disp: 30 g, Rfl: 1    colchicine 0.6 MG tablet, TAKE TWO TABLETS BY MOUTH NOW AND THEN TAKE ONE TABLET ONE HOUR LATER, THEN TAKE ONE TABLET DAILY UNTIL GONE, Disp: 10 tablet, Rfl: 5    ELIQUIS 5 MG tablet tablet, Take 1 tablet by mouth Every 12 (Twelve) Hours., Disp: , Rfl:     furosemide (LASIX) 40 MG tablet, Take 1 tablet by mouth Daily., Disp: 90 tablet, Rfl: 0    gabapentin (NEURONTIN) 300 MG capsule, Take 1 capsule by mouth 4 Times a Day., Disp: 120 capsule, Rfl: 4    glipizide (GLUCOTROL XL) 5 MG ER tablet, Take 1 tablet by mouth Daily., Disp: 90 tablet, Rfl: 3    glucose blood test strip, 1 each by Other route Daily. Use as instructed, Disp: 100 each, Rfl: 3    guaiFENesin (Mucinex) 600 MG 12 hr tablet, Take 2 tablets by mouth 2 (Two) Times a Day., Disp: 28 tablet, Rfl: 0    indapamide (LOZOL) 2.5 MG tablet, Take 1 tablet by mouth Every Morning., Disp: 90 tablet, Rfl: 0    Lancet Devices (EASY TOUCH LANCING DEVICE) misc, USE TO TEST DAILY, Disp: , Rfl: 0    losartan (COZAAR) 100 MG tablet, 1 at bedtime for blood pressure, Disp: 90 tablet, Rfl: 3    lovastatin (MEVACOR) 10 MG tablet, Take 1 tablet by mouth every night at bedtime. (Patient taking differently: Take 1 tablet by mouth Every Other Day.), Disp: 90 tablet, Rfl: 2    metFORMIN (GLUCOPHAGE) 1000 MG tablet, TAKE 1 TABLET BY MOUTH 2 TIMES A DAY WITH MEALS., Disp: 180 tablet, Rfl: 2    naproxen sodium (ALEVE) 220 MG tablet, Take 1 tablet by mouth 2 (Two) Times a Day As Needed., Disp: , Rfl:     ONETOUCH DELICA LANCETS 33G misc, 1 each Daily., Disp: 100 each, Rfl: 3    oxyCODONE (ROXICODONE) 10 MG tablet, Take 1 tablet by mouth Every 6 (Six) Hours As Needed for Moderate Pain or Severe Pain., Disp: 90 tablet, Rfl: 0    Potassium 99 MG tablet, Take 1 tablet by mouth 2 (Two) Times a Day., Disp: , Rfl:     " pramipexole (MIRAPEX) 0.25 MG tablet, TAKE 1-2 TABLETS BY MOUTH NIGHTLY FOR LEG CRAMPS, Disp: 180 tablet, Rfl: 1    albuterol (ACCUNEB) 1.25 MG/3ML nebulizer solution, Take 3 mL by nebulization Every 6 (Six) Hours As Needed for Shortness of Air. (Patient not taking: Reported on 8/26/2024), Disp: 180 each, Rfl: 2    cefdinir (OMNICEF) 300 MG capsule, Take 1 capsule by mouth 2 (Two) Times a Day., Disp: 14 capsule, Rfl: 0    HYDROcodone-acetaminophen (NORCO)  MG per tablet, Take 1 tablet by mouth Every 6 (Six) Hours As Needed for Moderate Pain. (Patient not taking: Reported on 8/26/2024), Disp: 78 tablet, Rfl: 0    OBJECTIVE:  /80 (BP Location: Left arm, Patient Position: Sitting, Cuff Size: Large Adult)   Pulse 85   Temp 98.6 °F (37 °C) (Temporal)   Ht 170.2 cm (67\")   Wt (!) 147 kg (323 lb 3.2 oz)   SpO2 95%   BMI 50.62 kg/m²    Physical Exam  Vitals reviewed.   Constitutional:       Appearance: He is well-developed.   Cardiovascular:      Rate and Rhythm: Normal rate and regular rhythm.      Heart sounds: Normal heart sounds.   Pulmonary:      Effort: Pulmonary effort is normal.      Breath sounds: Normal breath sounds.   Skin:     Comments: Bilateral lower extremity edema from calves down. Two scrapes on left shin. No current blisters. Tissue is very erythematous. No heat noted.    Neurological:      Mental Status: He is alert and oriented to person, place, and time.   Psychiatric:         Behavior: Behavior normal.       Assessment/Plan    Diagnoses and all orders for this visit:    1. Cellulitis of lower extremity, unspecified laterality (Primary)  -     Comprehensive Metabolic Panel  -     cefdinir (OMNICEF) 300 MG capsule; Take 1 capsule by mouth 2 (Two) Times a Day.  Dispense: 14 capsule; Refill: 0    2. Swelling  -     Comprehensive Metabolic Panel    3. Sacroiliitis, not elsewhere classified    Ok to order pain meds when due.     Increase lasix to BID and get compression socks on. "   Omnicef for cellulitis. Reduce salt in diet.     An After Visit Summary was printed and given to the patient at discharge.  Return in about 4 days (around 8/30/2024).       ANSHUL Noel 8/26/24    Electronically signed.

## 2024-08-27 LAB
ALBUMIN SERPL-MCNC: 4.1 G/DL (ref 3.5–5.2)
ALBUMIN/GLOB SERPL: 2.1 G/DL
ALP SERPL-CCNC: 87 U/L (ref 39–117)
ALT SERPL-CCNC: 11 U/L (ref 1–41)
AST SERPL-CCNC: 13 U/L (ref 1–40)
BILIRUB SERPL-MCNC: 0.3 MG/DL (ref 0–1.2)
BUN SERPL-MCNC: 21 MG/DL (ref 8–23)
BUN/CREAT SERPL: 19.1 (ref 7–25)
CALCIUM SERPL-MCNC: 9.5 MG/DL (ref 8.6–10.5)
CHLORIDE SERPL-SCNC: 96 MMOL/L (ref 98–107)
CO2 SERPL-SCNC: 30.8 MMOL/L (ref 22–29)
CREAT SERPL-MCNC: 1.1 MG/DL (ref 0.76–1.27)
EGFRCR SERPLBLD CKD-EPI 2021: 70.4 ML/MIN/1.73
GLOBULIN SER CALC-MCNC: 2 GM/DL
GLUCOSE SERPL-MCNC: 196 MG/DL (ref 65–99)
POTASSIUM SERPL-SCNC: 4.3 MMOL/L (ref 3.5–5.2)
PROT SERPL-MCNC: 6.1 G/DL (ref 6–8.5)
SODIUM SERPL-SCNC: 140 MMOL/L (ref 136–145)

## 2024-08-28 DIAGNOSIS — G89.29 CHRONIC MIDLINE LOW BACK PAIN WITH SCIATICA, SCIATICA LATERALITY UNSPECIFIED: ICD-10-CM

## 2024-08-28 DIAGNOSIS — M54.40 CHRONIC MIDLINE LOW BACK PAIN WITH SCIATICA, SCIATICA LATERALITY UNSPECIFIED: ICD-10-CM

## 2024-08-28 DIAGNOSIS — R52 PAIN: ICD-10-CM

## 2024-08-29 ENCOUNTER — OFFICE VISIT (OUTPATIENT)
Dept: FAMILY MEDICINE CLINIC | Facility: CLINIC | Age: 74
End: 2024-08-29
Payer: MEDICARE

## 2024-08-29 VITALS
TEMPERATURE: 98.4 F | HEART RATE: 88 BPM | WEIGHT: 315 LBS | BODY MASS INDEX: 49.44 KG/M2 | SYSTOLIC BLOOD PRESSURE: 138 MMHG | DIASTOLIC BLOOD PRESSURE: 80 MMHG | OXYGEN SATURATION: 96 % | HEIGHT: 67 IN

## 2024-08-29 DIAGNOSIS — M10.9 ACUTE GOUT OF ANKLE, UNSPECIFIED CAUSE, UNSPECIFIED LATERALITY: ICD-10-CM

## 2024-08-29 DIAGNOSIS — E11.9 TYPE 2 DIABETES MELLITUS WITHOUT COMPLICATION, WITHOUT LONG-TERM CURRENT USE OF INSULIN: ICD-10-CM

## 2024-08-29 DIAGNOSIS — M79.89 LEG SWELLING: Primary | ICD-10-CM

## 2024-08-29 PROCEDURE — 3075F SYST BP GE 130 - 139MM HG: CPT | Performed by: NURSE PRACTITIONER

## 2024-08-29 PROCEDURE — 99214 OFFICE O/P EST MOD 30 MIN: CPT | Performed by: NURSE PRACTITIONER

## 2024-08-29 PROCEDURE — 3044F HG A1C LEVEL LT 7.0%: CPT | Performed by: NURSE PRACTITIONER

## 2024-08-29 PROCEDURE — 3079F DIAST BP 80-89 MM HG: CPT | Performed by: NURSE PRACTITIONER

## 2024-08-29 PROCEDURE — 1160F RVW MEDS BY RX/DR IN RCRD: CPT | Performed by: NURSE PRACTITIONER

## 2024-08-29 PROCEDURE — 1159F MED LIST DOCD IN RCRD: CPT | Performed by: NURSE PRACTITIONER

## 2024-08-29 PROCEDURE — 1125F AMNT PAIN NOTED PAIN PRSNT: CPT | Performed by: NURSE PRACTITIONER

## 2024-08-29 RX ORDER — BUMETANIDE 2 MG/1
2 TABLET ORAL DAILY
Qty: 15 TABLET | Refills: 0 | Status: SHIPPED | OUTPATIENT
Start: 2024-08-29

## 2024-08-29 RX ORDER — COLCHICINE 0.6 MG/1
TABLET ORAL
Qty: 10 TABLET | Refills: 5 | Status: SHIPPED | OUTPATIENT
Start: 2024-08-29

## 2024-08-29 RX ORDER — GLIPIZIDE 5 MG/1
5 TABLET, FILM COATED, EXTENDED RELEASE ORAL DAILY
Qty: 90 TABLET | Refills: 3 | Status: SHIPPED | OUTPATIENT
Start: 2024-08-29

## 2024-08-29 RX ORDER — OXYCODONE HYDROCHLORIDE 10 MG/1
10 TABLET ORAL EVERY 6 HOURS PRN
Qty: 90 TABLET | Refills: 0 | Status: SHIPPED | OUTPATIENT
Start: 2024-08-29

## 2024-08-29 NOTE — TELEPHONE ENCOUNTER
Rx Refill Note  Requested Prescriptions     Pending Prescriptions Disp Refills    oxyCODONE (ROXICODONE) 10 MG tablet 90 tablet 0     Sig: Take 1 tablet by mouth Every 6 (Six) Hours As Needed for Moderate Pain or Severe Pain.      Last office visit with office: 8/26/24  Next office visit with office: 10/1/24    UDS: 3/12/24    DATE OF LAST REFILL: 8/8/24    Controlled Substance Agreement: up to date      Helena Meehan MA  08/29/24, 07:45 CDT

## 2024-08-29 NOTE — PROGRESS NOTES
CC: follow up cellulitis    History:  Ephraim Quinones is a 74 y.o. male who presents today for evaluation of the above problems.      Continues on antibiotic and 80 mg of lasix daily. Legs are still very firm, erythematous and edematous. He does have on compression socks today.       HPI  ROS:  Review of Systems   Cardiovascular:  Positive for leg swelling.       Allergies   Allergen Reactions    Adhesive Tape Other (See Comments)     Blisters and rips skin off    Simvastatin Other (See Comments)     Leg Cramps    Cleocin [Clindamycin] GI Intolerance     Past Medical History:   Diagnosis Date    A-fib     Acid reflux     Anemia 05/03/2016    Claustrophobia     Diabetes mellitus     Gallstones     Gout     Hyperlipidemia     Hypertension     Malignant neoplasm of upper lobe of right lung 3/17/2023    Pneumonia     Sleep apnea with use of continuous positive airway pressure (CPAP)      Past Surgical History:   Procedure Laterality Date    BACK SURGERY      x2    BLADDER SURGERY  02/2023    CATARACT EXTRACTION      CIRCUMCISION N/A 06/06/2019    Procedure: CIRCUMCISION;  Surgeon: Yon Sloan MD;  Location:  [facility];  Service: Urology    COLONOSCOPY      FINGER SURGERY Left     LUNG BIOPSY Right 03/13/2023    REPLACEMENT TOTAL KNEE Bilateral     URETHRAL DILATATION N/A 12/01/2022    Procedure: URETHRAL DILATATION with Balloon, TRANSURETHRAL RESECTION OF BLADDER TUMOR;  Surgeon: Yon Sloan MD;  Location:  [facility];  Service: Urology;  Laterality: N/A;    URETHROPLASTY  05/08/2024    [place]     Family History   Problem Relation Age of Onset    Colon cancer Mother     Prostate cancer Father     No Known Problems Maternal Grandmother     No Known Problems Maternal Grandfather     No Known Problems Paternal Grandmother     No Known Problems Paternal Grandfather       reports that he quit smoking about 28 years ago. His smoking use included cigarettes. He started smoking about 48 years ago. He  has a 20 pack-year smoking history. He has been exposed to tobacco smoke. He has never used smokeless tobacco. He reports current alcohol use. He reports that he does not use drugs.      Current Outpatient Medications:     albuterol sulfate  (90 Base) MCG/ACT inhaler, 2 sprays 2 minutes apart every 6 hours as needed, Disp: 18 g, Rfl: 5    allopurinol (Zyloprim) 100 MG tablet, Take 1 tablet by mouth Daily. With two tablets of 300 mg allopurinol, for total of 700 mg daily., Disp: 90 tablet, Rfl: 3    allopurinol (ZYLOPRIM) 300 MG tablet, Take 1 tablet by mouth 2 (Two) Times a Day., Disp: 180 tablet, Rfl: 3    aspirin 81 MG EC tablet, Take 1 tablet by mouth Daily., Disp: , Rfl:     carbidopa-levodopa (SINEMET)  MG per tablet, TAKE 1 TABLET BY MOUTH TWICE DAILY, Disp: 180 tablet, Rfl: 3    cefdinir (OMNICEF) 300 MG capsule, Take 1 capsule by mouth 2 (Two) Times a Day., Disp: 14 capsule, Rfl: 0    Cholecalciferol (VITAMIN D3) 2000 units capsule, Take 1 capsule by mouth Daily., Disp: , Rfl:     clobetasol propionate (TEMOVATE) 0.05 % cream, Apply topically to the appropriate area as directed, Disp: 30 g, Rfl: 1    colchicine 0.6 MG tablet, TAKE TWO TABLETS BY MOUTH NOW AND THEN TAKE ONE TABLET ONE HOUR LATER, THEN TAKE ONE TABLET DAILY UNTIL GONE, Disp: 10 tablet, Rfl: 5    ELIQUIS 5 MG tablet tablet, Take 1 tablet by mouth Every 12 (Twelve) Hours., Disp: , Rfl:     furosemide (LASIX) 40 MG tablet, Take 1 tablet by mouth Daily., Disp: 90 tablet, Rfl: 0    gabapentin (NEURONTIN) 300 MG capsule, Take 1 capsule by mouth 4 Times a Day., Disp: 120 capsule, Rfl: 4    glipizide (GLUCOTROL XL) 5 MG ER tablet, Take 1 tablet by mouth Daily., Disp: 90 tablet, Rfl: 3    glucose blood test strip, 1 each by Other route Daily. Use as instructed, Disp: 100 each, Rfl: 3    HYDROcodone-acetaminophen (NORCO)  MG per tablet, Take 1 tablet by mouth Every 6 (Six) Hours As Needed for Moderate Pain., Disp: 78 tablet, Rfl: 0     "indapamide (LOZOL) 2.5 MG tablet, Take 1 tablet by mouth Every Morning., Disp: 90 tablet, Rfl: 0    Lancet Devices (EASY TOUCH LANCING DEVICE) misc, USE TO TEST DAILY, Disp: , Rfl: 0    losartan (COZAAR) 100 MG tablet, 1 at bedtime for blood pressure, Disp: 90 tablet, Rfl: 3    lovastatin (MEVACOR) 10 MG tablet, Take 1 tablet by mouth every night at bedtime. (Patient taking differently: Take 1 tablet by mouth Every Other Day.), Disp: 90 tablet, Rfl: 2    metFORMIN (GLUCOPHAGE) 1000 MG tablet, TAKE 1 TABLET BY MOUTH 2 TIMES A DAY WITH MEALS., Disp: 180 tablet, Rfl: 2    naproxen sodium (ALEVE) 220 MG tablet, Take 1 tablet by mouth 2 (Two) Times a Day As Needed., Disp: , Rfl:     ONETOUCH DELICA LANCETS 33G misc, 1 each Daily., Disp: 100 each, Rfl: 3    oxyCODONE (ROXICODONE) 10 MG tablet, Take 1 tablet by mouth Every 6 (Six) Hours As Needed for Moderate Pain or Severe Pain., Disp: 90 tablet, Rfl: 0    Potassium 99 MG tablet, Take 1 tablet by mouth 2 (Two) Times a Day., Disp: , Rfl:     pramipexole (MIRAPEX) 0.25 MG tablet, TAKE 1-2 TABLETS BY MOUTH NIGHTLY FOR LEG CRAMPS, Disp: 180 tablet, Rfl: 1    albuterol (ACCUNEB) 1.25 MG/3ML nebulizer solution, Take 3 mL by nebulization Every 6 (Six) Hours As Needed for Shortness of Air. (Patient not taking: Reported on 8/29/2024), Disp: 180 each, Rfl: 2    bumetanide (BUMEX) 2 MG tablet, Take 1 tablet by mouth Daily., Disp: 15 tablet, Rfl: 0    guaiFENesin (Mucinex) 600 MG 12 hr tablet, Take 2 tablets by mouth 2 (Two) Times a Day. (Patient not taking: Reported on 8/29/2024), Disp: 28 tablet, Rfl: 0    OBJECTIVE:  /80 (BP Location: Left arm, Patient Position: Sitting, Cuff Size: Large Adult)   Pulse 88   Temp 98.4 °F (36.9 °C) (Temporal)   Ht 170.2 cm (67\")   Wt (!) 145 kg (320 lb)   SpO2 96%   BMI 50.12 kg/m²    Physical Exam  Vitals reviewed.   Constitutional:       Appearance: He is well-developed.   Cardiovascular:      Rate and Rhythm: Normal rate.   Pulmonary: "      Effort: Pulmonary effort is normal.   Musculoskeletal:      Right lower leg: Pitting Edema present.      Left lower le+ Pitting Edema present.      Comments: Legs remain erythematous. No blistering or weeping noted.    Neurological:      Mental Status: He is alert and oriented to person, place, and time.   Psychiatric:         Behavior: Behavior normal.         Assessment/Plan    Diagnoses and all orders for this visit:    1. Leg swelling (Primary)  -     Comprehensive Metabolic Panel  -     bumetanide (BUMEX) 2 MG tablet; Take 1 tablet by mouth Daily.  Dispense: 15 tablet; Refill: 0    2. Type 2 diabetes mellitus without complication, without long-term current use of insulin  -     glipizide (GLUCOTROL XL) 5 MG ER tablet; Take 1 tablet by mouth Daily.  Dispense: 90 tablet; Refill: 3    3. Acute gout of ankle, unspecified cause, unspecified laterality  -     colchicine 0.6 MG tablet; TAKE TWO TABLETS BY MOUTH NOW AND THEN TAKE ONE TABLET ONE HOUR LATER, THEN TAKE ONE TABLET DAILY UNTIL GONE  Dispense: 10 tablet; Refill: 5    Stop lasix and start bumex. Check renal function.     An After Visit Summary was printed and given to the patient at discharge.  Return in about 1 week (around 2024) for Recheck.       ANSHUL Noel 24    Electronically signed.

## 2024-08-30 LAB
ALBUMIN SERPL-MCNC: 4 G/DL (ref 3.5–5.2)
ALBUMIN/GLOB SERPL: 1.8 G/DL
ALP SERPL-CCNC: 85 U/L (ref 39–117)
ALT SERPL-CCNC: 10 U/L (ref 1–41)
AST SERPL-CCNC: 16 U/L (ref 1–40)
BILIRUB SERPL-MCNC: 0.3 MG/DL (ref 0–1.2)
BUN SERPL-MCNC: 27 MG/DL (ref 8–23)
BUN/CREAT SERPL: 25.2 (ref 7–25)
CALCIUM SERPL-MCNC: 9.5 MG/DL (ref 8.6–10.5)
CHLORIDE SERPL-SCNC: 98 MMOL/L (ref 98–107)
CO2 SERPL-SCNC: 32.3 MMOL/L (ref 22–29)
CREAT SERPL-MCNC: 1.07 MG/DL (ref 0.76–1.27)
EGFRCR SERPLBLD CKD-EPI 2021: 72.8 ML/MIN/1.73
GLOBULIN SER CALC-MCNC: 2.2 GM/DL
GLUCOSE SERPL-MCNC: 156 MG/DL (ref 65–99)
POTASSIUM SERPL-SCNC: 4.5 MMOL/L (ref 3.5–5.2)
PROT SERPL-MCNC: 6.2 G/DL (ref 6–8.5)
SODIUM SERPL-SCNC: 142 MMOL/L (ref 136–145)

## 2024-09-03 DIAGNOSIS — J18.9 PNEUMONIA DUE TO INFECTIOUS ORGANISM, UNSPECIFIED LATERALITY, UNSPECIFIED PART OF LUNG: ICD-10-CM

## 2024-09-03 RX ORDER — ALBUTEROL SULFATE 90 UG/1
AEROSOL, METERED RESPIRATORY (INHALATION)
Qty: 18 G | Refills: 5 | Status: SHIPPED | OUTPATIENT
Start: 2024-09-03 | End: 2024-09-05 | Stop reason: SDUPTHER

## 2024-09-03 RX ORDER — PRAMIPEXOLE DIHYDROCHLORIDE 0.25 MG/1
TABLET ORAL
Qty: 180 TABLET | Refills: 1 | Status: SHIPPED | OUTPATIENT
Start: 2024-09-03

## 2024-09-03 NOTE — TELEPHONE ENCOUNTER
Caller: JoniAdrienne    Relationship: Emergency Contact    Best call back number:  [phone]     Requested Prescriptions:   Requested Prescriptions     Pending Prescriptions Disp Refills    albuterol sulfate  (90 Base) MCG/ACT inhaler 18 g 5     Si sprays 2 minutes apart every 6 hours as needed    pramipexole (MIRAPEX) 0.25 MG tablet 180 tablet 1     Sig: TAKE 1-2 TABLETS BY MOUTH NIGHTLY FOR LEG CRAMPS        Pharmacy where request should be sent:  [facility]    Last office visit with prescribing clinician: 3/12/2024   Last telemedicine visit with prescribing clinician: Visit date not found   Next office visit with prescribing clinician: 10/1/2024     Additional details provided by patient:      Does the patient have less than a 3 day supply:  [x] Yes  [] No    Would you like a call back once the refill request has been completed: [] Yes [x] No    If the office needs to give you a call back, can they leave a voicemail: [] Yes [x] No    Deandre Salomon Rep   24 13:35 CDT

## 2024-09-05 ENCOUNTER — OFFICE VISIT (OUTPATIENT)
Dept: FAMILY MEDICINE CLINIC | Facility: CLINIC | Age: 74
End: 2024-09-05
Payer: MEDICARE

## 2024-09-05 VITALS
OXYGEN SATURATION: 97 % | WEIGHT: 315 LBS | DIASTOLIC BLOOD PRESSURE: 78 MMHG | HEIGHT: 67 IN | BODY MASS INDEX: 49.44 KG/M2 | SYSTOLIC BLOOD PRESSURE: 138 MMHG | HEART RATE: 60 BPM | TEMPERATURE: 97.8 F

## 2024-09-05 DIAGNOSIS — J18.9 PNEUMONIA DUE TO INFECTIOUS ORGANISM, UNSPECIFIED LATERALITY, UNSPECIFIED PART OF LUNG: ICD-10-CM

## 2024-09-05 DIAGNOSIS — G62.9 NEUROPATHY: ICD-10-CM

## 2024-09-05 DIAGNOSIS — I10 PRIMARY HYPERTENSION: ICD-10-CM

## 2024-09-05 DIAGNOSIS — M79.89 LEG SWELLING: ICD-10-CM

## 2024-09-05 DIAGNOSIS — R60.9 EDEMA, UNSPECIFIED TYPE: Primary | ICD-10-CM

## 2024-09-05 PROCEDURE — 1159F MED LIST DOCD IN RCRD: CPT | Performed by: NURSE PRACTITIONER

## 2024-09-05 PROCEDURE — 3078F DIAST BP <80 MM HG: CPT | Performed by: NURSE PRACTITIONER

## 2024-09-05 PROCEDURE — 99214 OFFICE O/P EST MOD 30 MIN: CPT | Performed by: NURSE PRACTITIONER

## 2024-09-05 PROCEDURE — 1160F RVW MEDS BY RX/DR IN RCRD: CPT | Performed by: NURSE PRACTITIONER

## 2024-09-05 PROCEDURE — 3075F SYST BP GE 130 - 139MM HG: CPT | Performed by: NURSE PRACTITIONER

## 2024-09-05 PROCEDURE — 1125F AMNT PAIN NOTED PAIN PRSNT: CPT | Performed by: NURSE PRACTITIONER

## 2024-09-05 PROCEDURE — 3044F HG A1C LEVEL LT 7.0%: CPT | Performed by: NURSE PRACTITIONER

## 2024-09-05 RX ORDER — BUMETANIDE 2 MG/1
2 TABLET ORAL DAILY
Qty: 90 TABLET | Refills: 2 | Status: SHIPPED | OUTPATIENT
Start: 2024-09-05

## 2024-09-05 RX ORDER — LOSARTAN POTASSIUM 100 MG/1
TABLET ORAL
Qty: 90 TABLET | Refills: 3 | Status: SHIPPED | OUTPATIENT
Start: 2024-09-05

## 2024-09-05 RX ORDER — GABAPENTIN 300 MG/1
300 CAPSULE ORAL 4 TIMES DAILY
Qty: 120 CAPSULE | Refills: 4 | Status: SHIPPED | OUTPATIENT
Start: 2024-09-05

## 2024-09-05 RX ORDER — ALBUTEROL SULFATE 90 UG/1
AEROSOL, METERED RESPIRATORY (INHALATION)
Qty: 18 G | Refills: 5 | Status: SHIPPED | OUTPATIENT
Start: 2024-09-05

## 2024-09-05 NOTE — PROGRESS NOTES
CC: recheck cellulitis    History:  Ephraim Quinones is a 74 y.o. male who presents today for evaluation of the above problems.      Edema has improved in lower extremities. Weight is down by 4 additional pounds and patient is visibly less swollen. Is wearing compression socks.     Additionally, patient states that he would like to go back to Norco instead of percocet as he feels like his pain control is longer lasting with that medication.     HPI  ROS:  Review of Systems   Cardiovascular:  Positive for leg swelling.       Allergies   Allergen Reactions    Adhesive Tape Other (See Comments)     Blisters and rips skin off    Simvastatin Other (See Comments)     Leg Cramps    Cleocin [Clindamycin] GI Intolerance     Past Medical History:   Diagnosis Date    A-fib     Acid reflux     Anemia 05/03/2016    Claustrophobia     Diabetes mellitus     Gallstones     Gout     Hyperlipidemia     Hypertension     Malignant neoplasm of upper lobe of right lung 3/17/2023    Pneumonia     Sleep apnea with use of continuous positive airway pressure (CPAP)      Past Surgical History:   Procedure Laterality Date    BACK SURGERY      x2    BLADDER SURGERY  02/2023    CATARACT EXTRACTION      CIRCUMCISION N/A 06/06/2019    Procedure: CIRCUMCISION;  Surgeon: Yon Sloan MD;  Location:  [facility];  Service: Urology    COLONOSCOPY      FINGER SURGERY Left     LUNG BIOPSY Right 03/13/2023    REPLACEMENT TOTAL KNEE Bilateral     URETHRAL DILATATION N/A 12/01/2022    Procedure: URETHRAL DILATATION with Balloon, TRANSURETHRAL RESECTION OF BLADDER TUMOR;  Surgeon: Yon Sloan MD;  Location:  [facility];  Service: Urology;  Laterality: N/A;    URETHROPLASTY  05/08/2024    [place]     Family History   Problem Relation Age of Onset    Colon cancer Mother     Prostate cancer Father     No Known Problems Maternal Grandmother     No Known Problems Maternal Grandfather     No Known Problems Paternal Grandmother     No Known  Problems Paternal Grandfather       reports that he quit smoking about 28 years ago. His smoking use included cigarettes. He started smoking about 48 years ago. He has a 20 pack-year smoking history. He has been exposed to tobacco smoke. He has never used smokeless tobacco. He reports current alcohol use. He reports that he does not use drugs.      Current Outpatient Medications:     albuterol sulfate  (90 Base) MCG/ACT inhaler, 2 sprays 2 minutes apart every 6 hours as needed, Disp: 18 g, Rfl: 5    allopurinol (Zyloprim) 100 MG tablet, Take 1 tablet by mouth Daily. With two tablets of 300 mg allopurinol, for total of 700 mg daily., Disp: 90 tablet, Rfl: 3    allopurinol (ZYLOPRIM) 300 MG tablet, Take 1 tablet by mouth 2 (Two) Times a Day., Disp: 180 tablet, Rfl: 3    aspirin 81 MG EC tablet, Take 1 tablet by mouth Daily., Disp: , Rfl:     bumetanide (BUMEX) 2 MG tablet, Take 1 tablet by mouth Daily., Disp: 90 tablet, Rfl: 2    carbidopa-levodopa (SINEMET)  MG per tablet, TAKE 1 TABLET BY MOUTH TWICE DAILY, Disp: 180 tablet, Rfl: 3    Cholecalciferol (VITAMIN D3) 2000 units capsule, Take 1 capsule by mouth Daily., Disp: , Rfl:     clobetasol propionate (TEMOVATE) 0.05 % cream, Apply topically to the appropriate area as directed, Disp: 30 g, Rfl: 1    colchicine 0.6 MG tablet, TAKE TWO TABLETS BY MOUTH NOW AND THEN TAKE ONE TABLET ONE HOUR LATER, THEN TAKE ONE TABLET DAILY UNTIL GONE, Disp: 10 tablet, Rfl: 5    ELIQUIS 5 MG tablet tablet, Take 1 tablet by mouth Every 12 (Twelve) Hours., Disp: , Rfl:     gabapentin (NEURONTIN) 300 MG capsule, Take 1 capsule by mouth 4 (Four) Times a Day., Disp: 120 capsule, Rfl: 4    glipizide (GLUCOTROL XL) 5 MG ER tablet, Take 1 tablet by mouth Daily., Disp: 90 tablet, Rfl: 3    glucose blood test strip, 1 each by Other route Daily. Use as instructed, Disp: 100 each, Rfl: 3    guaiFENesin (Mucinex) 600 MG 12 hr tablet, Take 2 tablets by mouth 2 (Two) Times a Day., Disp:  "28 tablet, Rfl: 0    indapamide (LOZOL) 2.5 MG tablet, Take 1 tablet by mouth Every Morning., Disp: 90 tablet, Rfl: 0    Lancet Devices (EASY TOUCH LANCING DEVICE) misc, USE TO TEST DAILY, Disp: , Rfl: 0    losartan (COZAAR) 100 MG tablet, 1 at bedtime for blood pressure, Disp: 90 tablet, Rfl: 3    lovastatin (MEVACOR) 10 MG tablet, Take 1 tablet by mouth every night at bedtime. (Patient taking differently: Take 1 tablet by mouth Every Other Day.), Disp: 90 tablet, Rfl: 2    metFORMIN (GLUCOPHAGE) 1000 MG tablet, TAKE 1 TABLET BY MOUTH 2 TIMES A DAY WITH MEALS., Disp: 180 tablet, Rfl: 2    naproxen sodium (ALEVE) 220 MG tablet, Take 1 tablet by mouth 2 (Two) Times a Day As Needed., Disp: , Rfl:     ONETOUCH DELICA LANCETS 33G misc, 1 each Daily., Disp: 100 each, Rfl: 3    oxyCODONE (ROXICODONE) 10 MG tablet, Take 1 tablet by mouth Every 6 (Six) Hours As Needed for Moderate Pain or Severe Pain., Disp: 90 tablet, Rfl: 0    Potassium 99 MG tablet, Take 1 tablet by mouth 2 (Two) Times a Day., Disp: , Rfl:     pramipexole (MIRAPEX) 0.25 MG tablet, TAKE 1-2 TABLETS BY MOUTH NIGHTLY FOR LEG CRAMPS, Disp: 180 tablet, Rfl: 1    albuterol (ACCUNEB) 1.25 MG/3ML nebulizer solution, Take 3 mL by nebulization Every 6 (Six) Hours As Needed for Shortness of Air. (Patient not taking: Reported on 9/5/2024), Disp: 180 each, Rfl: 2    HYDROcodone-acetaminophen (NORCO)  MG per tablet, Take 1 tablet by mouth Every 6 (Six) Hours As Needed for Moderate Pain. (Patient not taking: Reported on 9/5/2024), Disp: 78 tablet, Rfl: 0    OBJECTIVE:  /78 (BP Location: Left arm, Patient Position: Sitting, Cuff Size: Large Adult)   Pulse 60   Temp 97.8 °F (36.6 °C) (Temporal)   Ht 170.2 cm (67\")   Wt (!) 144 kg (316 lb 9.6 oz)   SpO2 97%   BMI 49.59 kg/m²    Physical Exam  Vitals reviewed.   Constitutional:       Appearance: He is well-developed.   Cardiovascular:      Rate and Rhythm: Normal rate.      Comments: Erythema has improved " in lower extremities, but is still present. No current sign of infection.   Pulmonary:      Effort: Pulmonary effort is normal.   Musculoskeletal:      Right lower le+      Left lower le+   Neurological:      Mental Status: He is alert and oriented to person, place, and time.   Psychiatric:         Behavior: Behavior normal.         Assessment/Plan    Diagnoses and all orders for this visit:    1. Edema, unspecified type (Primary)  -     Comprehensive Metabolic Panel    2. Neuropathy  -     gabapentin (NEURONTIN) 300 MG capsule; Take 1 capsule by mouth 4 (Four) Times a Day.  Dispense: 120 capsule; Refill: 4    3. Primary hypertension  -     losartan (COZAAR) 100 MG tablet; 1 at bedtime for blood pressure  Dispense: 90 tablet; Refill: 3    4. Pneumonia due to infectious organism, unspecified laterality, unspecified part of lung  -     albuterol sulfate  (90 Base) MCG/ACT inhaler; 2 sprays 2 minutes apart every 6 hours as needed  Dispense: 18 g; Refill: 5    5. Leg swelling  -     bumetanide (BUMEX) 2 MG tablet; Take 1 tablet by mouth Daily.  Dispense: 90 tablet; Refill: 2    Evaluate renal function. Plan to keep patient on bumex in place of lasix. Continue wearing compression socks.     Advised patient to call prior to refill on pain medication and we can change back to Norco.     An After Visit Summary was printed and given to the patient at discharge.  Return if symptoms worsen or fail to improve, for Next scheduled follow up.       ANSHUL Noel 24    Electronically signed.

## 2024-09-06 LAB
ALBUMIN SERPL-MCNC: 4.2 G/DL (ref 3.5–5.2)
ALBUMIN/GLOB SERPL: 2.2 G/DL
ALP SERPL-CCNC: 89 U/L (ref 39–117)
ALT SERPL-CCNC: 19 U/L (ref 1–41)
AST SERPL-CCNC: 34 U/L (ref 1–40)
BILIRUB SERPL-MCNC: 0.4 MG/DL (ref 0–1.2)
BUN SERPL-MCNC: 29 MG/DL (ref 8–23)
BUN/CREAT SERPL: 29.9 (ref 7–25)
CALCIUM SERPL-MCNC: 9.5 MG/DL (ref 8.6–10.5)
CHLORIDE SERPL-SCNC: 97 MMOL/L (ref 98–107)
CO2 SERPL-SCNC: 27 MMOL/L (ref 22–29)
CREAT SERPL-MCNC: 0.97 MG/DL (ref 0.76–1.27)
EGFRCR SERPLBLD CKD-EPI 2021: 81.9 ML/MIN/1.73
GLOBULIN SER CALC-MCNC: 1.9 GM/DL
GLUCOSE SERPL-MCNC: 182 MG/DL (ref 65–99)
POTASSIUM SERPL-SCNC: 4.3 MMOL/L (ref 3.5–5.2)
PROT SERPL-MCNC: 6.1 G/DL (ref 6–8.5)
SODIUM SERPL-SCNC: 138 MMOL/L (ref 136–145)

## 2024-09-09 DIAGNOSIS — E11.9 TYPE 2 DIABETES MELLITUS WITHOUT COMPLICATION, WITHOUT LONG-TERM CURRENT USE OF INSULIN: ICD-10-CM

## 2024-09-09 NOTE — TELEPHONE ENCOUNTER
Caller: Adrienne Quinones    Relationship: Emergency Contact    Best call back number: [phone]     Requested Prescriptions:   Requested Prescriptions     Pending Prescriptions Disp Refills    metFORMIN (GLUCOPHAGE) 1000 MG tablet 180 tablet 2     Sig: Take 1 tablet by mouth 2 (Two) Times a Day With Meals.        Pharmacy where request should be sent: [facility] #[id number] - [place], [place] - [facility] [phone] [facility] [phone]      Last office visit with prescribing clinician: 3/12/2024   Last telemedicine visit with prescribing clinician: Visit date not found   Next office visit with prescribing clinician: 10/1/2024     Additional details provided by patient: HAS 2 LEFT    Does the patient have less than a 3 day supply:  [x] Yes  [] No    Would you like a call back once the refill request has been completed: [] Yes [x] No    If the office needs to give you a call back, can they leave a voicemail: [] Yes [x] No    Deandre Buck Rep   09/09/24 15:31 CDT

## 2024-09-23 DIAGNOSIS — R52 PAIN: ICD-10-CM

## 2024-09-23 DIAGNOSIS — M54.40 CHRONIC MIDLINE LOW BACK PAIN WITH SCIATICA, SCIATICA LATERALITY UNSPECIFIED: ICD-10-CM

## 2024-09-23 DIAGNOSIS — G89.29 CHRONIC MIDLINE LOW BACK PAIN WITH SCIATICA, SCIATICA LATERALITY UNSPECIFIED: ICD-10-CM

## 2024-09-23 RX ORDER — OXYCODONE HYDROCHLORIDE 10 MG/1
10 TABLET ORAL EVERY 6 HOURS PRN
Qty: 90 TABLET | Refills: 0 | Status: SHIPPED | OUTPATIENT
Start: 2024-09-23

## 2024-09-24 ENCOUNTER — OFFICE VISIT (OUTPATIENT)
Dept: FAMILY MEDICINE CLINIC | Facility: CLINIC | Age: 74
End: 2024-09-24
Payer: MEDICARE

## 2024-09-24 VITALS
HEIGHT: 67 IN | SYSTOLIC BLOOD PRESSURE: 158 MMHG | BODY MASS INDEX: 49.44 KG/M2 | WEIGHT: 315 LBS | TEMPERATURE: 98.6 F | HEART RATE: 81 BPM | OXYGEN SATURATION: 95 % | DIASTOLIC BLOOD PRESSURE: 80 MMHG

## 2024-09-24 DIAGNOSIS — T14.8XXA ABRASION: ICD-10-CM

## 2024-09-24 DIAGNOSIS — E11.9 TYPE 2 DIABETES MELLITUS WITHOUT COMPLICATION, WITHOUT LONG-TERM CURRENT USE OF INSULIN: Primary | ICD-10-CM

## 2024-09-24 DIAGNOSIS — M10.9 ACUTE GOUT OF LEFT ANKLE, UNSPECIFIED CAUSE: ICD-10-CM

## 2024-09-24 PROCEDURE — 3077F SYST BP >= 140 MM HG: CPT | Performed by: NURSE PRACTITIONER

## 2024-09-24 PROCEDURE — 1160F RVW MEDS BY RX/DR IN RCRD: CPT | Performed by: NURSE PRACTITIONER

## 2024-09-24 PROCEDURE — 3079F DIAST BP 80-89 MM HG: CPT | Performed by: NURSE PRACTITIONER

## 2024-09-24 PROCEDURE — 99214 OFFICE O/P EST MOD 30 MIN: CPT | Performed by: NURSE PRACTITIONER

## 2024-09-24 PROCEDURE — 3044F HG A1C LEVEL LT 7.0%: CPT | Performed by: NURSE PRACTITIONER

## 2024-09-24 PROCEDURE — 1159F MED LIST DOCD IN RCRD: CPT | Performed by: NURSE PRACTITIONER

## 2024-09-24 PROCEDURE — 1125F AMNT PAIN NOTED PAIN PRSNT: CPT | Performed by: NURSE PRACTITIONER

## 2024-09-24 RX ORDER — ALLOPURINOL 100 MG/1
100 TABLET ORAL 2 TIMES DAILY
Start: 2024-09-24

## 2024-10-01 ENCOUNTER — OFFICE VISIT (OUTPATIENT)
Dept: FAMILY MEDICINE CLINIC | Facility: CLINIC | Age: 74
End: 2024-10-01
Payer: MEDICARE

## 2024-10-01 VITALS
DIASTOLIC BLOOD PRESSURE: 76 MMHG | BODY MASS INDEX: 49.44 KG/M2 | WEIGHT: 315 LBS | HEIGHT: 67 IN | TEMPERATURE: 97.5 F | HEART RATE: 80 BPM | RESPIRATION RATE: 22 BRPM | OXYGEN SATURATION: 96 % | SYSTOLIC BLOOD PRESSURE: 136 MMHG

## 2024-10-01 DIAGNOSIS — E78.5 HYPERLIPIDEMIA, UNSPECIFIED HYPERLIPIDEMIA TYPE: ICD-10-CM

## 2024-10-01 DIAGNOSIS — Z12.5 PROSTATE CANCER SCREENING: ICD-10-CM

## 2024-10-01 DIAGNOSIS — R53.83 FATIGUE, UNSPECIFIED TYPE: ICD-10-CM

## 2024-10-01 DIAGNOSIS — Z23 NEED FOR INFLUENZA VACCINATION: ICD-10-CM

## 2024-10-01 DIAGNOSIS — Z79.4 TYPE 2 DIABETES MELLITUS WITH DIABETIC NEUROPATHY, WITH LONG-TERM CURRENT USE OF INSULIN: Primary | ICD-10-CM

## 2024-10-01 DIAGNOSIS — M10.9 ACUTE GOUT OF LEFT ANKLE, UNSPECIFIED CAUSE: ICD-10-CM

## 2024-10-01 DIAGNOSIS — Z00.00 MEDICARE ANNUAL WELLNESS VISIT, SUBSEQUENT: ICD-10-CM

## 2024-10-01 DIAGNOSIS — E79.0 HYPERURICEMIA: ICD-10-CM

## 2024-10-01 DIAGNOSIS — E11.40 TYPE 2 DIABETES MELLITUS WITH DIABETIC NEUROPATHY, WITH LONG-TERM CURRENT USE OF INSULIN: Primary | ICD-10-CM

## 2024-10-01 DIAGNOSIS — M54.16 LUMBAR RADICULOPATHY: ICD-10-CM

## 2024-10-01 DIAGNOSIS — Z23 NEED FOR COVID-19 VACCINE: ICD-10-CM

## 2024-10-01 NOTE — PATIENT INSTRUCTIONS
Advance Care Planning and Advance Directives     You make decisions on a daily basis - decisions about where you want to live, your career, your home, your life. Perhaps one of the most important decisions you face is your choice for future medical care. Take time to talk with your family and your healthcare team and start planning today.  Advance Care Planning is a process that can help you:  Understand possible future healthcare decisions in light of your own experiences  Reflect on those decision in light of your goals and values  Discuss your decisions with those closest to you and the healthcare professionals that care for you  Make a plan by creating a document that reflects your wishes    Surrogate Decision Maker  In the event of a medical emergency, which has left you unable to communicate or to make your own decisions, you would need someone to make decisions for you.  It is important to discuss your preferences for medical treatment with this person while you are in good health.     Qualities of a surrogate decision maker:  Willing to take on this role and responsibility  Knows what you want for future medical care  Willing to follow your wishes even if they don't agree with them  Able to make difficult medical decisions under stressful circumstances    Advance Directives  These are legal documents you can create that will guide your healthcare team and decision maker(s) when needed. These documents can be stored in the electronic medical record.    Living Will - a legal document to guide your care if you have a terminal condition or a serious illness and are unable to communicate. States vary by statute in document names/types, but most forms may include one or more of the following:        -  Directions regarding life-prolonging treatments        -  Directions regarding artificially provided nutrition/hydration        -  Choosing a healthcare decision maker        -  Direction regarding organ/tissue  donation    Durable Power of  for Healthcare - this document names an -in-fact to make medical decisions for you, but it may also allow this person to make personal and financial decisions for you. Please seek the advice of an  if you need this type of document.    **Advance Directives are not required and no one may discriminate against you if you do not sign one.    Medical Orders  Many states allow specific forms/orders signed by your physician to record your wishes for medical treatment in your current state of health. This form, signed in personal communication with your physician, addresses resuscitation and other medical interventions that you may or may not want.      For more information or to schedule a time with a [facility] Advance Care Planning Facilitator contact: [facility] or call [phone] and someone will contact you directly.    Medicare Wellness  Personal Prevention Plan of Service     Date of Office Visit:    Encounter Provider:  Jim Hopkins MD  Place of Service:  [facility]  Patient Name: Ephraim Quinones  :  1950    As part of the Medicare Wellness portion of your visit today, we are providing you with this personalized preventive plan of services (PPPS). This plan is based upon recommendations of the [place] Preventive Services Task Force (USPSTF) and the Advisory Committee on Immunization Practices (ACIP).    This lists the preventive care services that should be considered, and provides dates of when you are due. Items listed as completed are up-to-date and do not require any further intervention.    Health Maintenance   Topic Date Due   • HEMOGLOBIN A1C  2024   • INFLUENZA VACCINE  2024   • COVID-19 Vaccine ( season) 2024   • BMI FOLLOWUP  2024   • URINE MICROALBUMIN  2024   • ZOSTER VACCINE (3 of 3) 10/01/2024 (Originally 2020)   • DIABETIC  EYE EXAM  02/21/2025 (Originally 11/14/2023)   • LIPID PANEL  03/12/2025   • ANNUAL WELLNESS VISIT  10/01/2025   • DIABETIC FOOT EXAM  10/01/2025   • COLORECTAL CANCER SCREENING  08/23/2027   • HEPATITIS C SCREENING  Completed   • Pneumococcal Vaccine 65+  Completed   • AAA SCREEN (ONE-TIME)  Completed   • TDAP/TD VACCINES  Discontinued   • LUNG CANCER SCREENING  Discontinued       Orders Placed This Encounter   Procedures   • MicroAlbumin, Urine, Random - Urine, Clean Catch     Order Specific Question:   Release to patient     Answer:   Routine Release [[id number]]   • TSH     Order Specific Question:   Release to patient     Answer:   Routine Release [[id number]]   • T4, free     Order Specific Question:   Release to patient     Answer:   Routine Release [[id number]]   • Comprehensive Metabolic Panel     Order Specific Question:   Release to patient     Answer:   Routine Release [[id number]]   • Hemoglobin A1c     Order Specific Question:   Release to patient     Answer:   Routine Release [[id number]]   • Lipid Panel     Order Specific Question:   Release to patient     Answer:   Routine Release [[id number]]   • PSA Screen     Order Specific Question:   Release to patient     Answer:   Routine Release [[id number]]   • Vitamin B12     Order Specific Question:   Release to patient     Answer:   Routine Release [[id number]]   • Folate     Order Specific Question:   Release to patient     Answer:   Routine Release [[id number]]   • Uric Acid     Order Specific Question:   Release to patient     Answer:   Routine Release [[id number]]   • POC Urinalysis Dipstick, Multipro     Order Specific Question:   Release to patient     Answer:   Routine Release [[id number]]   • CBC & Differential     Order Specific Question:   Manual Differential     Answer:   No     Order Specific Question:   Release to patient     Answer:   Routine Release [[id number]]       No follow-ups on file.

## 2024-10-01 NOTE — PROGRESS NOTES
Subjective   The ABCs of the Annual Wellness Visit  Medicare Wellness Visit      Ephraim Quinones is a 74 y.o. patient who presents for a Medicare Wellness Visit.    The following portions of the patient's history were reviewed and   updated as appropriate: allergies, current medications, past family history, past medical history, past social history, past surgical history, and problem list.    Compared to one year ago, the patient's physical   health is the same.  Compared to one year ago, the patient's mental   health is the same.    Recent Hospitalizations:  He was not admitted to the hospital during the last year.     Current Medical Providers:  Patient Care Team:  Jim Hopkins MD as PCP - [facility] [place], Noah Wild MD as Consulting Physician (Orthopedic Surgery)  Abdi Mcdonald MD as Surgeon (Cardiothoracic Surgery)  Jaya Coughlin MD (Urology)    Outpatient Medications Prior to Visit   Medication Sig Dispense Refill    albuterol (ACCUNEB) 1.25 MG/3ML nebulizer solution Take 3 mL by nebulization Every 6 (Six) Hours As Needed for Shortness of Air. 180 each 2    albuterol sulfate  (90 Base) MCG/ACT inhaler 2 sprays 2 minutes apart every 6 hours as needed 18 g 5    allopurinol (Zyloprim) 100 MG tablet Take 1 tablet by mouth 2 (Two) Times a Day. With two tablets of 300 mg allopurinol, for total of 800 mg daily.      allopurinol (ZYLOPRIM) 300 MG tablet Take 1 tablet by mouth 2 (Two) Times a Day. 180 tablet 3    aspirin 81 MG EC tablet Take 1 tablet by mouth Daily.      bumetanide (BUMEX) 2 MG tablet Take 1 tablet by mouth Daily. 90 tablet 2    carbidopa-levodopa (SINEMET)  MG per tablet TAKE 1 TABLET BY MOUTH TWICE DAILY 180 tablet 3    Cholecalciferol (VITAMIN D3) 2000 units capsule Take 1 capsule by mouth Daily.      clobetasol propionate (TEMOVATE) 0.05 % cream Apply topically to the appropriate area as directed 30 g 1    colchicine 0.6 MG tablet TAKE TWO TABLETS BY  MOUTH NOW AND THEN TAKE ONE TABLET ONE HOUR LATER, THEN TAKE ONE TABLET DAILY UNTIL GONE 10 tablet 5    ELIQUIS 5 MG tablet tablet Take 1 tablet by mouth Every 12 (Twelve) Hours.      gabapentin (NEURONTIN) 300 MG capsule Take 1 capsule by mouth 4 (Four) Times a Day. 120 capsule 4    glipizide (GLUCOTROL XL) 5 MG ER tablet Take 1 tablet by mouth Daily. 90 tablet 3    glucose blood test strip 1 each by Other route Daily. Use as instructed 100 each 3    guaiFENesin (Mucinex) 600 MG 12 hr tablet Take 2 tablets by mouth 2 (Two) Times a Day. 28 tablet 0    HYDROcodone-acetaminophen (NORCO)  MG per tablet Take 1 tablet by mouth Every 6 (Six) Hours As Needed for Moderate Pain. 78 tablet 0    indapamide (LOZOL) 2.5 MG tablet Take 1 tablet by mouth Every Morning. 90 tablet 0    Lancet Devices (EASY TOUCH LANCING DEVICE) misc USE TO TEST DAILY  0    losartan (COZAAR) 100 MG tablet 1 at bedtime for blood pressure 90 tablet 3    lovastatin (MEVACOR) 10 MG tablet Take 1 tablet by mouth every night at bedtime. (Patient taking differently: Take 1 tablet by mouth Every Other Day.) 90 tablet 2    naproxen sodium (ALEVE) 220 MG tablet Take 1 tablet by mouth 2 (Two) Times a Day As Needed.      ONETOUCH DELICA LANCETS 33G misc 1 each Daily. 100 each 3    oxyCODONE (ROXICODONE) 10 MG tablet Take 1 tablet by mouth Every 6 (Six) Hours As Needed for Moderate Pain or Severe Pain. 90 tablet 0    Potassium 99 MG tablet Take 1 tablet by mouth 2 (Two) Times a Day.      pramipexole (MIRAPEX) 0.25 MG tablet TAKE 1-2 TABLETS BY MOUTH NIGHTLY FOR LEG CRAMPS 180 tablet 1    metFORMIN (GLUCOPHAGE) 1000 MG tablet Take 1 tablet by mouth 2 (Two) Times a Day With Meals. (Patient not taking: Reported on 10/1/2024) 180 tablet 2     No facility-administered medications prior to visit.     Opioid medication/s are on active medication list.  and I have evaluated his active treatment plan and pain score trends (see table).  Vitals:    10/01/24 1011  "  PainSc:   5   PainLoc: Back     I have reviewed the chart for potential of high risk medication and harmful drug interactions in the elderly.        Aspirin is on active medication list. Aspirin use is indicated based on review of current medical condition/s. Pros and cons of this therapy have been discussed today. Benefits of this medication outweigh potential harm.  Patient has been encouraged to continue taking this medication.  .      Patient Active Problem List   Diagnosis    Type 2 diabetes mellitus without complication    HTN (hypertension)    A-fib    Hx of adenomatous colonic polyps    Morbidly obese    Phimosis    Sacroiliitis, not elsewhere classified    Post-traumatic bulbous urethral stricture    Malignant neoplasm of upper lobe of right lung     Advance Care Planning Advance Directive is not on file.  ACP discussion was declined by the patient. Patient does not have an advance directive, declines further assistance.            Objective   Vitals:    10/01/24 1011   BP: 136/76   BP Location: Left arm   Patient Position: Sitting   Cuff Size: Large Adult   Pulse: 80   Resp: 22   Temp: 97.5 °F (36.4 °C)   TempSrc: Temporal   SpO2: 96%   Weight: (!) 145 kg (318 lb 9.6 oz)   Height: 170.2 cm (67\")   PainSc:   5   PainLoc: Back       Estimated body mass index is 49.9 kg/m² as calculated from the following:    Height as of this encounter: 170.2 cm (67\").    Weight as of this encounter: 145 kg (318 lb 9.6 oz).    Class 3 Severe Obesity (BMI >=40). Obesity-related health conditions include the following: diabetes mellitus, dyslipidemias, and osteoarthritis. Obesity is unchanged. BMI is is above average; no BMI management plan is appropriate. We discussed portion control and increasing exercise.       Does the patient have evidence of cognitive impairment? No                                                                                                Health  Risk Assessment    Smoking Status:  Social History "     Tobacco Use   Smoking Status Former    Current packs/day: 0.00    Average packs/day: 1 pack/day for 20.0 years (20.0 ttl pk-yrs)    Types: Cigarettes    Start date: 1976    Quit date: 1996    Years since quittin.3    Passive exposure: Past   Smokeless Tobacco Never     Alcohol Consumption:  Social History     Substance and Sexual Activity   Alcohol Use Yes    Comment: occ       Fall Risk Screen  STEADI Fall Risk Assessment was completed, and patient is at LOW risk for falls.Assessment completed on:10/1/2024    Depression Screening:      10/1/2024    10:14 AM   PHQ-2/PHQ-9 Depression Screening   Little Interest or Pleasure in Doing Things 0-->not at all   Feeling Down, Depressed or Hopeless 0-->not at all   PHQ-9: Brief Depression Severity Measure Score 0     Health Habits and Functional and Cognitive Screening:      10/1/2024    10:15 AM   Functional & Cognitive Status   Do you have difficulty preparing food and eating? No   Do you have difficulty bathing yourself, getting dressed or grooming yourself? No   Do you have difficulty using the toilet? No   Do you have difficulty moving around from place to place? No   Do you have trouble with steps or getting out of a bed or a chair? No   Current Diet Well Balanced Diet   Dental Exam Up to date   Eye Exam Up to date   Exercise (times per week) 0 times per week   Current Exercises Include No Regular Exercise   Do you need help using the phone?  No   Are you deaf or do you have serious difficulty hearing?  No   Do you need help to go to places out of walking distance? No   Do you need help shopping? No   Do you need help preparing meals?  No   Do you need help with housework?  No   Do you need help with laundry? No   Do you need help taking your medications? No   Do you need help managing money? No   Do you ever drive or ride in a car without wearing a seat belt? No   Have you felt unusual stress, anger or loneliness in the last month? No   Who do you  live with? Spouse   If you need help, do you have trouble finding someone available to you? No   Have you been bothered in the last four weeks by sexual problems? No   Do you have difficulty concentrating, remembering or making decisions? No           Age-appropriate Screening Schedule:  Refer to the list below for future screening recommendations based on patient's age, sex and/or medical conditions. Orders for these recommended tests are listed in the plan section. The patient has been provided with a written plan.    Health Maintenance List  Health Maintenance   Topic Date Due    HEMOGLOBIN A1C  06/12/2024    INFLUENZA VACCINE  08/01/2024    COVID-19 Vaccine (11 - 2023-24 season) 09/01/2024    BMI FOLLOWUP  09/08/2024    ZOSTER VACCINE (3 of 3) 10/01/2024 (Originally 1/2/2020)    URINE MICROALBUMIN  10/19/2024 (Originally 9/12/2024)    DIABETIC EYE EXAM  02/21/2025 (Originally 11/14/2023)    LIPID PANEL  03/12/2025    ANNUAL WELLNESS VISIT  10/01/2025    DIABETIC FOOT EXAM  10/01/2025    COLORECTAL CANCER SCREENING  08/23/2027    HEPATITIS C SCREENING  Completed    Pneumococcal Vaccine 65+  Completed    AAA SCREEN (ONE-TIME)  Completed    TDAP/TD VACCINES  Discontinued    LUNG CANCER SCREENING  Discontinued                                                                                                                                                CMS Preventative Services Quick Reference  Risk Factors Identified During Encounter  Fall Risk-High or Moderate: Discussed Fall Prevention in the home    The above risks/problems have been discussed with the patient.  Pertinent information has been shared with the patient in the After Visit Summary.  An After Visit Summary and PPPS were made available to the patient.    Follow Up:   Next Medicare Wellness visit to be scheduled in 1 year.         Additional E&M Note during same encounter follows:  Patient has additional, significant, and separately identifiable  "condition(s)/problem(s) that require work above and beyond the Medicare Wellness Visit     Chief Complaint  Medicare Wellness-subsequent (Fasting)    Subjective   74-year-old patient for Medicare wellness exam      Nba is also being seen today for additional medical problem/s.    Review of Systems   Respiratory:  Negative for shortness of breath.         Lung cancer followed at [place]   Cardiovascular:  Negative for chest pain and leg swelling.   Gastrointestinal:  Positive for diarrhea.        Colonoscopy is up-to-date-stopped metformin because of diarrhea-he is going to try 1 a day   Endocrine: Negative for polydipsia, polyphagia and polyuria.   Musculoskeletal:  Positive for arthralgias and back pain.   All other systems reviewed and are negative.             Objective   Vital Signs:  /76 (BP Location: Left arm, Patient Position: Sitting, Cuff Size: Large Adult)   Pulse 80   Temp 97.5 °F (36.4 °C) (Temporal)   Resp 22   Ht 170.2 cm (67\")   Wt (!) 145 kg (318 lb 9.6 oz)   SpO2 96%   BMI 49.90 kg/m²   Physical Exam  Vitals and nursing note reviewed.   Constitutional:       Appearance: He is obese.   HENT:      Right Ear: Tympanic membrane and ear canal normal.      Left Ear: Tympanic membrane and ear canal normal.   Eyes:      Extraocular Movements: Extraocular movements intact.      Pupils: Pupils are equal, round, and reactive to light.   Neck:      Vascular: No carotid bruit.   Cardiovascular:      Rate and Rhythm: Normal rate and regular rhythm.      Pulses: Normal pulses.           Dorsalis pedis pulses are 1+ on the right side and 1+ on the left side.        Posterior tibial pulses are 1+ on the right side and 1+ on the left side.      Heart sounds: Normal heart sounds.   Pulmonary:      Effort: Pulmonary effort is normal.      Breath sounds: Normal breath sounds.   Abdominal:      Palpations: Abdomen is soft.      Comments: Pendulous   Genitourinary:     Comments: Deferred because of " age  Musculoskeletal:      Right lower leg: No edema.      Left lower leg: No edema.      Right foot: Normal range of motion. No deformity or bunion.      Left foot: Normal range of motion. No deformity or bunion.   Feet:      Right foot:      Skin integrity: Skin integrity normal.      Toenail Condition: Right toenails are normal.      Left foot:      Skin integrity: Skin integrity normal.      Toenail Condition: Left toenails are normal.   Lymphadenopathy:      Cervical: No cervical adenopathy.   Skin:     General: Skin is warm and dry.   Neurological:      General: No focal deficit present.      Mental Status: He is oriented to person, place, and time.   Psychiatric:         Mood and Affect: Mood normal.         Behavior: Behavior normal.         Thought Content: Thought content normal.         Judgment: Judgment normal.         The following data was reviewed by: Jim Hopkins MD on 10/01/2024:        Assessment and Plan       11/11/2022     9:00 AM 2/6/2023     8:00 AM 5/31/2023     9:00 AM 8/29/2023     8:00 AM 12/4/2023     8:00 AM 3/12/2024     8:00 AM 8/26/2024     8:00 AM   CONTROLLED SUBSTANCE TRACKING   Last [facility] 11/11/2022 2/6/2023 5/31/2023 8/29/2023 12/4/2023 3/12/2024 8/26/2024   Report Number  reviewed through epic  reviewed through epic reviewed through epic  reviewed through epic   Last UDS 12/1/2021 2/6/2023 2/6/2023 2/6/2023 2/6/2023 3/12/2024 3/12/2024   Last Controlled Substance Agreement 12/1/2021 2/6/2023 2/6/2023 2/6/2023 2/6/2023 3/12/2024 3/12/2024                   Type 2 diabetes mellitus without complication, without long-term current use of insulin    Acute gout of left ankle, unspecified cause    Neuropathy    Fatigue, unspecified type    Hyperlipidemia, unspecified hyperlipidemia type     Hyperuricemia    Prostate cancer screening    Medicare annual wellness visit, subsequent      Orders Placed This Encounter   Procedures    TSH     Order Specific Question:   Release to  patient     Answer:   Routine Release [1400000002]    T4, free     Order Specific Question:   Release to patient     Answer:   Routine Release [1400000002]    Comprehensive Metabolic Panel     Order Specific Question:   Release to patient     Answer:   Routine Release [1400000002]    Hemoglobin A1c     Order Specific Question:   Release to patient     Answer:   Routine Release [1400000002]    Lipid Panel     Order Specific Question:   Release to patient     Answer:   Routine Release [1400000002]    PSA Screen     Order Specific Question:   Release to patient     Answer:   Routine Release [1400000002]    Vitamin B12     Order Specific Question:   Release to patient     Answer:   Routine Release [1400000002]    Folate     Order Specific Question:   Release to patient     Answer:   Routine Release [1400000002]    Uric Acid     Order Specific Question:   Release to patient     Answer:   Routine Release [1400000002]    CBC & Differential     Order Specific Question:   Manual Differential     Answer:   No     Order Specific Question:   Release to patient     Answer:   Routine Release [1400000002]      Plan above-flu shot COVID shot-continue seeing [facility]       Follow Up   Return in about 6 months (around 4/1/2025), or if symptoms worsen or fail to improve.  Patient was given instructions and counseling regarding his condition or for health maintenance advice. Please see specific information pulled into the AVS if appropriate.

## 2024-10-02 LAB
ALBUMIN SERPL-MCNC: 4.1 G/DL (ref 3.5–5.2)
ALBUMIN/GLOB SERPL: 2.2 G/DL
ALP SERPL-CCNC: 101 U/L (ref 39–117)
ALT SERPL-CCNC: 25 U/L (ref 1–41)
AST SERPL-CCNC: 25 U/L (ref 1–40)
BASOPHILS # BLD AUTO: 0.05 10*3/MM3 (ref 0–0.2)
BASOPHILS NFR BLD AUTO: 0.7 % (ref 0–1.5)
BILIRUB SERPL-MCNC: 0.5 MG/DL (ref 0–1.2)
BUN SERPL-MCNC: 22 MG/DL (ref 8–23)
BUN/CREAT SERPL: 22.9 (ref 7–25)
CALCIUM SERPL-MCNC: 9.6 MG/DL (ref 8.6–10.5)
CHLORIDE SERPL-SCNC: 98 MMOL/L (ref 98–107)
CHOLEST SERPL-MCNC: 201 MG/DL (ref 0–200)
CO2 SERPL-SCNC: 28.9 MMOL/L (ref 22–29)
CREAT SERPL-MCNC: 0.96 MG/DL (ref 0.76–1.27)
EGFRCR SERPLBLD CKD-EPI 2021: 82.9 ML/MIN/1.73
EOSINOPHIL # BLD AUTO: 0.46 10*3/MM3 (ref 0–0.4)
EOSINOPHIL NFR BLD AUTO: 6.2 % (ref 0.3–6.2)
ERYTHROCYTE [DISTWIDTH] IN BLOOD BY AUTOMATED COUNT: 13 % (ref 12.3–15.4)
FOLATE SERPL-MCNC: 6.38 NG/ML (ref 4.78–24.2)
GLOBULIN SER CALC-MCNC: 1.9 GM/DL
GLUCOSE SERPL-MCNC: 291 MG/DL (ref 65–99)
HBA1C MFR BLD: 8.9 % (ref 4.8–5.6)
HCT VFR BLD AUTO: 44.6 % (ref 37.5–51)
HDLC SERPL-MCNC: 44 MG/DL (ref 40–60)
HGB BLD-MCNC: 15.1 G/DL (ref 13–17.7)
IMM GRANULOCYTES # BLD AUTO: 0.06 10*3/MM3 (ref 0–0.05)
IMM GRANULOCYTES NFR BLD AUTO: 0.8 % (ref 0–0.5)
LDLC SERPL CALC-MCNC: 124 MG/DL (ref 0–100)
LYMPHOCYTES # BLD AUTO: 1.2 10*3/MM3 (ref 0.7–3.1)
LYMPHOCYTES NFR BLD AUTO: 16.3 % (ref 19.6–45.3)
MCH RBC QN AUTO: 34 PG (ref 26.6–33)
MCHC RBC AUTO-ENTMCNC: 33.9 G/DL (ref 31.5–35.7)
MCV RBC AUTO: 100.5 FL (ref 79–97)
MONOCYTES # BLD AUTO: 0.46 10*3/MM3 (ref 0.1–0.9)
MONOCYTES NFR BLD AUTO: 6.2 % (ref 5–12)
NEUTROPHILS # BLD AUTO: 5.14 10*3/MM3 (ref 1.7–7)
NEUTROPHILS NFR BLD AUTO: 69.8 % (ref 42.7–76)
NRBC BLD AUTO-RTO: 0 /100 WBC (ref 0–0.2)
PLATELET # BLD AUTO: 249 10*3/MM3 (ref 140–450)
POTASSIUM SERPL-SCNC: 4.8 MMOL/L (ref 3.5–5.2)
PROT SERPL-MCNC: 6 G/DL (ref 6–8.5)
PSA SERPL-MCNC: 0.85 NG/ML (ref 0–4)
RBC # BLD AUTO: 4.44 10*6/MM3 (ref 4.14–5.8)
SODIUM SERPL-SCNC: 139 MMOL/L (ref 136–145)
T4 FREE SERPL-MCNC: 1.26 NG/DL (ref 0.92–1.68)
TRIGL SERPL-MCNC: 188 MG/DL (ref 0–150)
TSH SERPL DL<=0.005 MIU/L-ACNC: 1.04 UIU/ML (ref 0.27–4.2)
URATE SERPL-MCNC: 2.8 MG/DL (ref 3.4–7)
VIT B12 SERPL-MCNC: 523 PG/ML (ref 211–946)
VLDLC SERPL CALC-MCNC: 33 MG/DL (ref 5–40)
WBC # BLD AUTO: 7.37 10*3/MM3 (ref 3.4–10.8)

## 2024-10-04 DIAGNOSIS — E78.5 HYPERLIPIDEMIA, UNSPECIFIED HYPERLIPIDEMIA TYPE: Primary | ICD-10-CM

## 2024-10-04 RX ORDER — ATORVASTATIN CALCIUM 40 MG/1
40 TABLET, FILM COATED ORAL NIGHTLY
Qty: 90 TABLET | Refills: 3 | Status: SHIPPED | OUTPATIENT
Start: 2024-10-04

## 2024-10-10 ENCOUNTER — TELEPHONE (OUTPATIENT)
Dept: FAMILY MEDICINE CLINIC | Facility: CLINIC | Age: 74
End: 2024-10-10
Payer: MEDICARE

## 2024-10-10 DIAGNOSIS — E11.9 TYPE 2 DIABETES MELLITUS WITHOUT COMPLICATION, WITHOUT LONG-TERM CURRENT USE OF INSULIN: Primary | ICD-10-CM

## 2024-10-10 NOTE — TELEPHONE ENCOUNTER
Ephraim has had the same glucose monitor for about 15 years and needs a new one- he currently has EZ touch- she doesn't know what kind he needs or should get? Please advise and send in RX for them

## 2024-10-11 ENCOUNTER — TELEPHONE (OUTPATIENT)
Dept: FAMILY MEDICINE CLINIC | Facility: CLINIC | Age: 74
End: 2024-10-11
Payer: MEDICARE

## 2024-10-11 RX ORDER — LANCETS 30 GAUGE
1 EACH MISCELLANEOUS DAILY
Qty: 100 EACH | Refills: 2 | Status: SHIPPED | OUTPATIENT
Start: 2024-10-11

## 2024-10-11 RX ORDER — BLOOD-GLUCOSE METER
1 KIT MISCELLANEOUS DAILY
Qty: 1 EACH | Refills: 0 | Status: SHIPPED | OUTPATIENT
Start: 2024-10-11

## 2024-10-11 NOTE — TELEPHONE ENCOUNTER
Blood Sugar Log     10/5 pm 280  10/6 am 293  10/6 pm 251  10/7 am 319  10/7 pm 302  10/8 am 216  10/8 pm 265  10/9 am 272  10/9 pm 264  10/10 am 265  10/10 pm 231  10/11 am 275

## 2024-10-11 NOTE — TELEPHONE ENCOUNTER
I sent in a generic glucometer- typically the pharmacy will fill what brand insurance will cover..

## 2024-10-15 DIAGNOSIS — M54.40 CHRONIC MIDLINE LOW BACK PAIN WITH SCIATICA, SCIATICA LATERALITY UNSPECIFIED: ICD-10-CM

## 2024-10-15 DIAGNOSIS — G89.29 CHRONIC MIDLINE LOW BACK PAIN WITH SCIATICA, SCIATICA LATERALITY UNSPECIFIED: ICD-10-CM

## 2024-10-15 DIAGNOSIS — R52 PAIN: ICD-10-CM

## 2024-10-15 NOTE — TELEPHONE ENCOUNTER
Rx Refill Note  Requested Prescriptions     Pending Prescriptions Disp Refills    oxyCODONE (ROXICODONE) 10 MG tablet 90 tablet 0     Sig: Take 1 tablet by mouth Every 6 (Six) Hours As Needed for Moderate Pain or Severe Pain.      Last office visit with prescribing clinician: 10/1/2024   Last telemedicine visit with prescribing clinician: Visit date not found   Next office visit with prescribing clinician: 10/7/2025   CPE done     Drug thereapy appt 8/26/24  contract done 3/12/2024  UDS done 3/12/2024                      Would you like a call back once the refill request has been completed: [] Yes [] No    If the office needs to give you a call back, can they leave a voicemail: [] Yes [] No    Helena Gilmore MA  10/15/24, 16:35 CDT

## 2024-10-16 DIAGNOSIS — E11.9 TYPE 2 DIABETES MELLITUS WITHOUT COMPLICATION, WITHOUT LONG-TERM CURRENT USE OF INSULIN: Primary | ICD-10-CM

## 2024-10-16 RX ORDER — GLIPIZIDE 5 MG/1
10 TABLET, FILM COATED, EXTENDED RELEASE ORAL
Qty: 360 TABLET | Refills: 3 | Status: SHIPPED | OUTPATIENT
Start: 2024-10-16

## 2024-10-16 RX ORDER — OXYCODONE HYDROCHLORIDE 10 MG/1
10 TABLET ORAL EVERY 6 HOURS PRN
Qty: 90 TABLET | Refills: 0 | Status: SHIPPED | OUTPATIENT
Start: 2024-10-16

## 2024-10-16 NOTE — TELEPHONE ENCOUNTER
Wife called stating dose of glipizide had been increased to 2 twice daily.  Needs rx sent with new directions.    Rx Refill Note  Requested Prescriptions      No prescriptions requested or ordered in this encounter      Last office visit with prescribing clinician: 10/1/2024   Last telemedicine visit with prescribing clinician: Visit date not found   Next office visit with prescribing clinician: 10/7/2025       {TIP  Please add Last Relevant Lab 10/1/24    Helena Meehan MA  10/16/24, 13:28 CDT

## 2024-10-18 ENCOUNTER — TELEPHONE (OUTPATIENT)
Dept: FAMILY MEDICINE CLINIC | Facility: CLINIC | Age: 74
End: 2024-10-18
Payer: MEDICARE

## 2024-10-23 RX ORDER — CARBIDOPA AND LEVODOPA 25; 100 MG/1; MG/1
1 TABLET ORAL 2 TIMES DAILY
Qty: 180 TABLET | Refills: 3 | Status: SHIPPED | OUTPATIENT
Start: 2024-10-23

## 2024-10-23 NOTE — TELEPHONE ENCOUNTER
Rx Refill Note  Requested Prescriptions     Pending Prescriptions Disp Refills    carbidopa-levodopa (SINEMET)  MG per tablet 180 tablet 3     Sig: Take 1 tablet by mouth 2 (Two) Times a Day.      Last office visit with prescribing clinician: 10/1/2024   Last telemedicine visit with prescribing clinician: Visit date not found   Next office visit with prescribing clinician: 10/7/2025                         Would you like a call back once the refill request has been completed: [] Yes [] No    If the office needs to give you a call back, can they leave a voicemail: [] Yes [] No    Deandre Nassar Rep  10/23/24, 09:57 CDT

## 2024-11-07 DIAGNOSIS — M54.40 CHRONIC MIDLINE LOW BACK PAIN WITH SCIATICA, SCIATICA LATERALITY UNSPECIFIED: ICD-10-CM

## 2024-11-07 DIAGNOSIS — G89.29 CHRONIC MIDLINE LOW BACK PAIN WITH SCIATICA, SCIATICA LATERALITY UNSPECIFIED: ICD-10-CM

## 2024-11-07 DIAGNOSIS — R52 PAIN: ICD-10-CM

## 2024-11-08 DIAGNOSIS — G62.9 NEUROPATHY: ICD-10-CM

## 2024-11-08 DIAGNOSIS — M10.9 ACUTE GOUT OF LEFT ANKLE, UNSPECIFIED CAUSE: ICD-10-CM

## 2024-11-08 RX ORDER — ALLOPURINOL 300 MG/1
300 TABLET ORAL 2 TIMES DAILY
Qty: 180 TABLET | Refills: 3 | Status: SHIPPED | OUTPATIENT
Start: 2024-11-08

## 2024-11-08 RX ORDER — OXYCODONE HYDROCHLORIDE 10 MG/1
10 TABLET ORAL EVERY 6 HOURS PRN
Qty: 90 TABLET | Refills: 0 | Status: SHIPPED | OUTPATIENT
Start: 2024-11-08

## 2024-11-08 RX ORDER — GABAPENTIN 300 MG/1
300 CAPSULE ORAL 4 TIMES DAILY
Qty: 120 CAPSULE | Refills: 4 | Status: SHIPPED | OUTPATIENT
Start: 2024-11-08

## 2024-11-08 NOTE — TELEPHONE ENCOUNTER
Rx Refill Note  Requested Prescriptions     Pending Prescriptions Disp Refills    oxyCODONE (ROXICODONE) 10 MG tablet 90 tablet 0     Sig: Take 1 tablet by mouth Every 6 (Six) Hours As Needed for Moderate Pain or Severe Pain.      Last office visit with office: 8/26/24  Next office visit with office: 4/1/25    UDS: 3/12/24    DATE OF LAST REFILL: 10/16/24    Controlled Substance Agreement: up to date           {TIP  Is Refill Pharmacy correct?:  Helena Meehan MA  11/08/24, 07:44 CST

## 2024-11-08 NOTE — TELEPHONE ENCOUNTER
Rx Refill Note  Requested Prescriptions     Pending Prescriptions Disp Refills    gabapentin (NEURONTIN) 300 MG capsule 120 capsule 4     Sig: Take 1 capsule by mouth 4 (Four) Times a Day.    allopurinol (ZYLOPRIM) 300 MG tablet 180 tablet 3     Sig: Take 1 tablet by mouth 2 (Two) Times a Day.      Last office visit with office: 8/26/24  Next office visit with office: 4/1/25    UDS: 3/12/24    DATE OF LAST REFILL: 9/5/24    Controlled Substance Agreement: up to date           {TIP  Is Refill Pharmacy correct?:  Helena Meehan MA  11/08/24, 08:54 CST

## 2024-11-14 ENCOUNTER — PROCEDURE VISIT (OUTPATIENT)
Dept: FAMILY MEDICINE CLINIC | Facility: CLINIC | Age: 74
End: 2024-11-14
Payer: MEDICARE

## 2024-11-14 VITALS
DIASTOLIC BLOOD PRESSURE: 82 MMHG | WEIGHT: 315 LBS | OXYGEN SATURATION: 97 % | HEIGHT: 67 IN | HEART RATE: 70 BPM | SYSTOLIC BLOOD PRESSURE: 134 MMHG | BODY MASS INDEX: 49.44 KG/M2 | TEMPERATURE: 98 F

## 2024-11-14 DIAGNOSIS — L57.0 ACTINIC KERATOSIS: ICD-10-CM

## 2024-11-14 DIAGNOSIS — T78.40XA ALLERGY, UNSPECIFIED, INITIAL ENCOUNTER: ICD-10-CM

## 2024-11-14 DIAGNOSIS — M10.9 ACUTE GOUT OF ANKLE, UNSPECIFIED CAUSE, UNSPECIFIED LATERALITY: ICD-10-CM

## 2024-11-14 DIAGNOSIS — E11.9 TYPE 2 DIABETES MELLITUS WITHOUT COMPLICATION, WITHOUT LONG-TERM CURRENT USE OF INSULIN: ICD-10-CM

## 2024-11-14 DIAGNOSIS — K90.49 FOOD INTOLERANCE: Primary | ICD-10-CM

## 2024-11-14 DIAGNOSIS — B35.9 RINGWORM: ICD-10-CM

## 2024-11-14 DIAGNOSIS — I10 PRIMARY HYPERTENSION: ICD-10-CM

## 2024-11-14 RX ORDER — CLOTRIMAZOLE AND BETAMETHASONE DIPROPIONATE 10; .64 MG/G; MG/G
1 CREAM TOPICAL 2 TIMES DAILY
Qty: 15 G | Refills: 5 | Status: SHIPPED | OUTPATIENT
Start: 2024-11-14

## 2024-11-14 RX ORDER — INDAPAMIDE 2.5 MG/1
2.5 TABLET ORAL EVERY MORNING
Qty: 90 TABLET | Refills: 3 | Status: SHIPPED | OUTPATIENT
Start: 2024-11-14

## 2024-11-14 RX ORDER — COLCHICINE 0.6 MG/1
TABLET ORAL
Qty: 10 TABLET | Refills: 5 | Status: SHIPPED | OUTPATIENT
Start: 2024-11-14

## 2024-11-14 NOTE — PROGRESS NOTES
CC: Controlled med monitoring, gout, skin lesion    History:  Ephraim Quinones is a 74 y.o. male who presents today for evaluation of the above problems.      Patient complains of a red itchy spot on right thigh for about two months.  States that it is about dime sized, however, it is starting to get slightly larger.    Complains of gout  in toe. Needs colchicine refill. If he eats any pork, regardless of uric acid level, he develops symptoms.     UDS and contract are up-to-date.  It appears that his oxycodone has been refilled early recently by Dr. Hopkins. His gabapentin has been filled as scheduled.    Complains of skin lesion on the back of his head.  This has been present for quite some time, however, he is unable to see it.      Patient also states that his blood sugar has been running high.  Notes numbers around 200.  I had started him on Jardiance 25 mg, however, he was having dizziness with this so I decreased it down to 10 mg.  Last A1c was 8.9.  This was in early October.        2/6/2023     8:00 AM 5/31/2023     9:00 AM 8/29/2023     8:00 AM 12/4/2023     8:00 AM 3/12/2024     8:00 AM 8/26/2024     8:00 AM 11/14/2024     9:00 AM   CONTROLLED SUBSTANCE TRACKING   Last Gregory 2/6/2023 5/31/2023 8/29/2023 12/4/2023 3/12/2024 8/26/2024 11/14/2024   Report Number reviewed through epic  reviewed through epic reviewed through epic  reviewed through epic reviewed through [facility]   Last UDS 2/6/2023 2/6/2023 2/6/2023 2/6/2023 3/12/2024 3/12/2024 3/12/2024   Last Controlled Substance Agreement 2/6/2023 2/6/2023 2/6/2023 2/6/2023 3/12/2024 3/12/2024 3/12/2024         HPI  ROS:  Review of Systems   Musculoskeletal:  Positive for arthralgias.   Skin:         Rough skin lesion on back of head       Allergies   Allergen Reactions    Adhesive Tape Other (See Comments)     Blisters and rips skin off    Metformin Diarrhea    Simvastatin Other (See Comments)     Leg Cramps    Cleocin [Clindamycin] GI Intolerance     Past  Medical History:   Diagnosis Date    A-fib     Acid reflux     Anemia 05/03/2016    Claustrophobia     Diabetes mellitus     Gallstones     Gout     Hyperlipidemia     Hypertension     Malignant neoplasm of upper lobe of right lung 3/17/2023    Pneumonia     Sleep apnea with use of continuous positive airway pressure (CPAP)      Past Surgical History:   Procedure Laterality Date    BACK SURGERY      x2    BLADDER SURGERY  02/2023    CATARACT EXTRACTION      CIRCUMCISION N/A 06/06/2019    Procedure: CIRCUMCISION;  Surgeon: Yon Sloan MD;  Location:  [facility] OR;  Service: Urology    COLONOSCOPY      FINGER SURGERY Left     LUNG BIOPSY Right 03/13/2023    REPLACEMENT TOTAL KNEE Bilateral     URETHRAL DILATATION N/A 12/01/2022    Procedure: URETHRAL DILATATION with Balloon, TRANSURETHRAL RESECTION OF BLADDER TUMOR;  Surgeon: Yon Sloan MD;  Location:  [facility] OR;  Service: Urology;  Laterality: N/A;    URETHROPLASTY  05/08/2024    [place]     Family History   Problem Relation Age of Onset    Colon cancer Mother     Prostate cancer Father     No Known Problems Maternal Grandmother     No Known Problems Maternal Grandfather     No Known Problems Paternal Grandmother     No Known Problems Paternal Grandfather       reports that he quit smoking about 28 years ago. His smoking use included cigarettes. He started smoking about 48 years ago. He has a 20 pack-year smoking history. He has been exposed to tobacco smoke. He has never used smokeless tobacco. He reports current alcohol use. He reports that he does not use drugs.      Current Outpatient Medications:     albuterol (ACCUNEB) 1.25 MG/3ML nebulizer solution, Take 3 mL by nebulization Every 6 (Six) Hours As Needed for Shortness of Air., Disp: 180 each, Rfl: 2    albuterol sulfate  (90 Base) MCG/ACT inhaler, 2 sprays 2 minutes apart every 6 hours as needed, Disp: 18 g, Rfl: 5    allopurinol (ZYLOPRIM) 300 MG tablet, Take 1 tablet by mouth 2  (Two) Times a Day., Disp: 180 tablet, Rfl: 3    aspirin 81 MG EC tablet, Take 1 tablet by mouth Daily., Disp: , Rfl:     atorvastatin (Lipitor) 40 MG tablet, Take 1 tablet by mouth Every Night., Disp: 90 tablet, Rfl: 3    bumetanide (BUMEX) 2 MG tablet, Take 1 tablet by mouth Daily., Disp: 90 tablet, Rfl: 2    carbidopa-levodopa (SINEMET)  MG per tablet, Take 1 tablet by mouth 2 (Two) Times a Day., Disp: 180 tablet, Rfl: 3    Cholecalciferol (VITAMIN D3) 2000 units capsule, Take 1 capsule by mouth Daily., Disp: , Rfl:     clobetasol propionate (TEMOVATE) 0.05 % cream, Apply topically to the appropriate area as directed, Disp: 30 g, Rfl: 1    colchicine 0.6 MG tablet, TAKE TWO TABLETS BY MOUTH NOW AND THEN TAKE ONE TABLET ONE HOUR LATER, THEN TAKE ONE TABLET DAILY UNTIL GONE, Disp: 10 tablet, Rfl: 5    ELIQUIS 5 MG tablet tablet, Take 1 tablet by mouth Every 12 (Twelve) Hours., Disp: , Rfl:     empagliflozin (Jardiance) 10 MG tablet tablet, Take 1 tablet by mouth Daily., Disp: 15 tablet, Rfl: 0    empagliflozin (Jardiance) 25 MG tablet tablet, Take 1 tablet by mouth Daily., Disp: 90 tablet, Rfl: 2    gabapentin (NEURONTIN) 300 MG capsule, Take 1 capsule by mouth 4 (Four) Times a Day., Disp: 120 capsule, Rfl: 4    glipizide (GLUCOTROL XL) 5 MG ER tablet, Take 2 tablets by mouth 2 (Two) Times a Day Before Meals., Disp: 360 tablet, Rfl: 3    glucose blood test strip, 1 each by Other route Daily. Use as instructed, Disp: 100 each, Rfl: 3    glucose blood test strip, Use as instructed, Disp: 100 each, Rfl: 2    glucose monitor monitoring kit, Use 1 each Daily. Dx. E11.9, Disp: 1 each, Rfl: 0    guaiFENesin (Mucinex) 600 MG 12 hr tablet, Take 2 tablets by mouth 2 (Two) Times a Day., Disp: 28 tablet, Rfl: 0    HYDROcodone-acetaminophen (NORCO)  MG per tablet, Take 1 tablet by mouth Every 6 (Six) Hours As Needed for Moderate Pain., Disp: 78 tablet, Rfl: 0    indapamide (LOZOL) 2.5 MG tablet, Take 1 tablet by  "mouth Every Morning., Disp: 90 tablet, Rfl: 3    Lancet Devices (EASY TOUCH LANCING DEVICE) misc, USE TO TEST DAILY, Disp: , Rfl: 0    Lancets misc, Use 1 each Daily., Disp: 100 each, Rfl: 2    losartan (COZAAR) 100 MG tablet, 1 at bedtime for blood pressure, Disp: 90 tablet, Rfl: 3    metFORMIN (GLUCOPHAGE) 1000 MG tablet, Take 1 tablet by mouth Daily., Disp: , Rfl:     naproxen sodium (ALEVE) 220 MG tablet, Take 1 tablet by mouth 2 (Two) Times a Day As Needed., Disp: , Rfl:     ONETOUCH DELICA LANCETS 33G misc, 1 each Daily., Disp: 100 each, Rfl: 3    oxyCODONE (ROXICODONE) 10 MG tablet, Take 1 tablet by mouth Every 6 (Six) Hours As Needed for Moderate Pain or Severe Pain., Disp: 90 tablet, Rfl: 0    Potassium 99 MG tablet, Take 1 tablet by mouth 2 (Two) Times a Day., Disp: , Rfl:     pramipexole (MIRAPEX) 0.25 MG tablet, TAKE 1-2 TABLETS BY MOUTH NIGHTLY FOR LEG CRAMPS, Disp: 180 tablet, Rfl: 1    clotrimazole-betamethasone (LOTRISONE) 1-0.05 % cream, Apply 1 Application topically to the appropriate area as directed 2 (Two) Times a Day., Disp: 15 g, Rfl: 5    OBJECTIVE:  /82 (BP Location: Left arm, Patient Position: Sitting, Cuff Size: Large Adult)   Pulse 70   Temp 98 °F (36.7 °C) (Temporal)   Ht 170.2 cm (67\")   Wt (!) 145 kg (320 lb 6.4 oz)   SpO2 97%   BMI 50.18 kg/m²    Physical Exam  Vitals reviewed.   Constitutional:       Appearance: He is well-developed.   HENT:      Head:     Cardiovascular:      Rate and Rhythm: Normal rate.   Pulmonary:      Effort: Pulmonary effort is normal.   Neurological:      Mental Status: He is alert and oriented to person, place, and time.   Psychiatric:         Behavior: Behavior normal.         Assessment/Plan    Diagnoses and all orders for this visit:    1. Food intolerance (Primary)  -     Cancel: Alpha-Gal IgE Panel; Future  -     Alpha-Gal IgE Panel    2. Primary hypertension  -     indapamide (LOZOL) 2.5 MG tablet; Take 1 tablet by mouth Every Morning.  " Dispense: 90 tablet; Refill: 3    3. Allergy, unspecified, initial encounter  -     Cancel: Alpha-Gal IgE Panel; Future  -     Alpha-Gal IgE Panel    4. Acute gout of ankle, unspecified cause, unspecified laterality  -     colchicine 0.6 MG tablet; TAKE TWO TABLETS BY MOUTH NOW AND THEN TAKE ONE TABLET ONE HOUR LATER, THEN TAKE ONE TABLET DAILY UNTIL GONE  Dispense: 10 tablet; Refill: 5    5. Ringworm  -     clotrimazole-betamethasone (LOTRISONE) 1-0.05 % cream; Apply 1 Application topically to the appropriate area as directed 2 (Two) Times a Day.  Dispense: 15 g; Refill: 5    6. Type 2 diabetes mellitus without complication, without long-term current use of insulin  -     Hemoglobin A1c    7. Actinic keratosis  -     Destruction of Lesion    Contract and UDS are current.  Fill was less than 1 month on his oxycodone.  His med is written every 6 hours as needed and a quantity of 90.  States he has had to take some of his pain pills for gout pain.  His colchicine is refilled today. Gabapentin has been filled appropriately.  I am also going to check an alpha gal panel to see if there is any allergy to pork noted.    Will recheck his A1c today and make adjustments to meds as indicated.    An After Visit Summary was printed and given to the patient at discharge.  Return if symptoms worsen or fail to improve, for Next scheduled follow up.       ANSHUL Noel 11/14/24    Electronically signed.

## 2024-11-14 NOTE — PROGRESS NOTES
Procedure   Destruction of Lesion    Date/Time: 11/14/2024 10:16 AM    Performed by: Dolores Ba APRN  Authorized by: Dolores Ba APRN  Preparation: Patient was prepped and draped in the usual sterile fashion.  Local anesthesia used: yes  Anesthesia: local infiltration    Anesthesia:  Local anesthesia used: yes  Local Anesthetic: lidocaine 1% with epinephrine  Anesthetic total: 0.7 mL    Sedation:  Patient sedated: no    Patient tolerance: patient tolerated the procedure well with no immediate complications  Comments: Lesion removed with curette and sent for pathology.  Hemostasis achieved with hot cautery.  Antibiotic ointment applied to the lesion.

## 2024-11-15 DIAGNOSIS — E11.9 TYPE 2 DIABETES MELLITUS WITHOUT COMPLICATION, WITHOUT LONG-TERM CURRENT USE OF INSULIN: Primary | ICD-10-CM

## 2024-11-15 LAB — HBA1C MFR BLD: 8.4 % (ref 4.8–5.6)

## 2024-11-15 NOTE — PROGRESS NOTES
A1C is 8.4. Let's see if he can tolerate increasing the jardiance to 25 mg now that he has been on it for a bit. If not, we will need to make other adjustments. Repeat A1C in 1 month.

## 2024-11-17 LAB
ALPHA-GAL IGE QN: 0.12 KU/L
BEEF IGE QN: <0.1 KU/L
CONV CLASS DESCRIPTION: ABNORMAL
IGE SERPL-ACNC: 25 IU/ML (ref 6–495)
LAMB IGE QN: <0.1 KU/L
PORK IGE QN: <0.1 KU/L

## 2024-11-19 LAB
DX ICD CODE: NORMAL
DX ICD CODE: NORMAL
PATH REPORT.FINAL DX SPEC: NORMAL
PATH REPORT.GROSS SPEC: NORMAL
PATH REPORT.SITE OF ORIGIN SPEC: NORMAL
PATHOLOGIST NAME: NORMAL
PAYMENT PROCEDURE: NORMAL

## 2024-11-19 RX ORDER — APIXABAN 5 MG/1
5 TABLET, FILM COATED ORAL EVERY 12 HOURS SCHEDULED
Qty: 180 TABLET | Refills: 1 | Status: SHIPPED | OUTPATIENT
Start: 2024-11-19

## 2024-11-19 NOTE — TELEPHONE ENCOUNTER
Rx Refill Note  Requested Prescriptions     Pending Prescriptions Disp Refills    Eliquis 5 MG tablet tablet 60 tablet      Sig: Take 1 tablet by mouth Every 12 (Twelve) Hours.      Last office visit with prescribing clinician: 10/1/2024   Last telemedicine visit with prescribing clinician: Visit date not found   Next office visit with prescribing clinician: 10/7/2025       {TIP  Please add Last Relevant Lab 10/1/24    Helena Meehan MA  11/19/24, 16:32 CST

## 2024-11-21 DIAGNOSIS — E11.9 TYPE 2 DIABETES MELLITUS WITHOUT COMPLICATION, WITHOUT LONG-TERM CURRENT USE OF INSULIN: ICD-10-CM

## 2024-11-21 RX ORDER — LANCETS 33 GAUGE
1 EACH MISCELLANEOUS DAILY
Qty: 100 EACH | Refills: 3 | Status: SHIPPED | OUTPATIENT
Start: 2024-11-21

## 2024-11-21 NOTE — TELEPHONE ENCOUNTER
Rx Refill Note  Requested Prescriptions     Pending Prescriptions Disp Refills    OneTouch Delica Lancets 33G misc 100 each 3     Sig: Use 1 each Daily.    glucose blood test strip 100 each 3     Si each by Other route Daily. Use as instructed      Last office visit with prescribing clinician: 10/1/2024   Last telemedicine visit with prescribing clinician: Visit date not found   Next office visit with prescribing clinician: 10/7/2025                         Would you like a call back once the refill request has been completed: [] Yes [] No    If the office needs to give you a call back, can they leave a voicemail: [] Yes [] No    Deandre Nassar Rep  24, 11:13 CST

## 2024-11-27 DIAGNOSIS — R52 PAIN: ICD-10-CM

## 2024-11-27 RX ORDER — HYDROCODONE BITARTRATE AND ACETAMINOPHEN 10; 325 MG/1; MG/1
1 TABLET ORAL EVERY 6 HOURS PRN
Qty: 90 TABLET | Refills: 0 | Status: SHIPPED | OUTPATIENT
Start: 2024-11-27

## 2024-11-27 NOTE — TELEPHONE ENCOUNTER
Pt calling and needs refill--due on Friday.    Rx Refill Note  Requested Prescriptions     Pending Prescriptions Disp Refills    HYDROcodone-acetaminophen (NORCO)  MG per tablet 90 tablet 0     Sig: Take 1 tablet by mouth Every 6 (Six) Hours As Needed for Moderate Pain.      Last office visit with prescribing clinician: 10/1/2024   Last telemedicine visit with prescribing clinician: Visit date not found   Next office visit with prescribing clinician: 10/7/2025                         Would you like a call back once the refill request has been completed: [] Yes [] No    If the office needs to give you a call back, can they leave a voicemail: [] Yes [] No    Deandre Nassar Rep  11/27/24, 11:10 CST

## 2024-12-19 DIAGNOSIS — R52 PAIN: ICD-10-CM

## 2024-12-19 RX ORDER — HYDROCODONE BITARTRATE AND ACETAMINOPHEN 10; 325 MG/1; MG/1
1 TABLET ORAL EVERY 6 HOURS PRN
Qty: 90 TABLET | Refills: 0 | Status: SHIPPED | OUTPATIENT
Start: 2024-12-19

## 2024-12-19 NOTE — TELEPHONE ENCOUNTER
Rx Refill Note  Requested Prescriptions     Pending Prescriptions Disp Refills    HYDROcodone-acetaminophen (NORCO)  MG per tablet 90 tablet 0     Sig: Take 1 tablet by mouth Every 6 (Six) Hours As Needed for Moderate Pain.      Last office visit with prescribing clinician: 10/1/2024   Last telemedicine visit with prescribing clinician: Visit date not found   Next office visit with prescribing clinician: 10/7/2025   CPE done     Drug thereapy appt 11/14/24  contract done 3/12/2024  UDS done 3/12/2024                      Would you like a call back once the refill request has been completed: [] Yes [] No    If the office needs to give you a call back, can they leave a voicemail: [] Yes [] No    Helena Gilmore MA  12/19/24, 11:47 CST

## 2025-01-03 ENCOUNTER — OFFICE VISIT (OUTPATIENT)
Dept: FAMILY MEDICINE CLINIC | Facility: CLINIC | Age: 75
End: 2025-01-03
Payer: MEDICARE

## 2025-01-03 VITALS
HEIGHT: 67 IN | SYSTOLIC BLOOD PRESSURE: 128 MMHG | OXYGEN SATURATION: 98 % | BODY MASS INDEX: 49.44 KG/M2 | DIASTOLIC BLOOD PRESSURE: 80 MMHG | TEMPERATURE: 97.8 F | HEART RATE: 75 BPM | WEIGHT: 315 LBS

## 2025-01-03 DIAGNOSIS — L98.9 SKIN LESION: Primary | ICD-10-CM

## 2025-01-03 PROCEDURE — 3074F SYST BP LT 130 MM HG: CPT | Performed by: NURSE PRACTITIONER

## 2025-01-03 PROCEDURE — 3079F DIAST BP 80-89 MM HG: CPT | Performed by: NURSE PRACTITIONER

## 2025-01-03 PROCEDURE — 1125F AMNT PAIN NOTED PAIN PRSNT: CPT | Performed by: NURSE PRACTITIONER

## 2025-01-03 PROCEDURE — 99212 OFFICE O/P EST SF 10 MIN: CPT | Performed by: NURSE PRACTITIONER

## 2025-01-03 RX ORDER — PRAMIPEXOLE DIHYDROCHLORIDE 0.25 MG/1
TABLET ORAL
Qty: 180 TABLET | Refills: 1 | Status: SHIPPED | OUTPATIENT
Start: 2025-01-03

## 2025-01-03 NOTE — PROGRESS NOTES
CC: recheck skin lesion    History:  Ephraim Quinones is a 74 y.o. male who presents today for evaluation of the above problems.      Patient presents for recheck of scalp lesion that was removed approximately 6 weeks ago.  The pathology on the lesion was somewhat indeterminate. See below.    Diagnosis:   VERRUCOUS SQUAMOPROLIFERATIVE LESION WITH ATYPIA, SEE COMMENT BELOW.   COMMENT:   THE BIOPSY SPECIMEN TRANSECTS THE LESION, PRECLUDING FULL DIAGNOSTIC   STUDY.  THERE IS EPIDERMAL DISARRAY AND ATYPIA INVOLVING BASALLY LOCATED   KERATINOCYTES AT THE TRANSECTED TISSUE EDGE.  THE PATTERN WOULD MOST FAVOR   A VERRUCOUS HYPERTROPHIC ACTINIC KERATOSIS.  THE SURFACE OF A MORE   ATYPICAL SQUAMOUS LESION (SQUAMOUS CELL CARCINOMA) CANNOT BE EXCLUDED.  A   TRAUMATIZED VERRUCA WITH REACTIVE ATYPIA IS LESS FAVORED.   CLINICOPATHOLOGIC CORRELATION IS RECOMMENDED.  THE TRANSECTED TISSUE EDGE   IS INVOLVED.  ADDITIONAL TISSUE MAY ADD TO OR ALTER THE DIAGNOSIS.     HPI  ROS:  Review of Systems   Skin:  Negative for wound.       Allergies   Allergen Reactions    Adhesive Tape Other (See Comments)     Blisters and rips skin off    Metformin Diarrhea    Simvastatin Other (See Comments)     Leg Cramps    Cleocin [Clindamycin] GI Intolerance     Past Medical History:   Diagnosis Date    A-fib     Acid reflux     Anemia 05/03/2016    Claustrophobia     Diabetes mellitus     Gallstones     Gout     Hyperlipidemia     Hypertension     Malignant neoplasm of upper lobe of right lung 3/17/2023    Pneumonia     Sleep apnea with use of continuous positive airway pressure (CPAP)      Past Surgical History:   Procedure Laterality Date    BACK SURGERY      x2    BLADDER SURGERY  02/2023    CATARACT EXTRACTION      CIRCUMCISION N/A 06/06/2019    Procedure: CIRCUMCISION;  Surgeon: Yon Sloan MD;  Location: [facility] OR;  Service: Urology    COLONOSCOPY      FINGER SURGERY Left     LUNG BIOPSY Right 03/13/2023    REPLACEMENT TOTAL KNEE  Bilateral     URETHRAL DILATATION N/A 12/01/2022    Procedure: URETHRAL DILATATION with Balloon, TRANSURETHRAL RESECTION OF BLADDER TUMOR;  Surgeon: Yon Sloan MD;  Location: [facility];  Service: Urology;  Laterality: N/A;    URETHROPLASTY  05/08/2024    [place]     Family History   Problem Relation Age of Onset    Colon cancer Mother     Prostate cancer Father     No Known Problems Maternal Grandmother     No Known Problems Maternal Grandfather     No Known Problems Paternal Grandmother     No Known Problems Paternal Grandfather       reports that he quit smoking about 28 years ago. His smoking use included cigarettes. He started smoking about 48 years ago. He has a 20 pack-year smoking history. He has been exposed to tobacco smoke. He has never used smokeless tobacco. He reports current alcohol use. He reports that he does not use drugs.      Current Outpatient Medications:     albuterol sulfate  (90 Base) MCG/ACT inhaler, 2 sprays 2 minutes apart every 6 hours as needed, Disp: 18 g, Rfl: 5    allopurinol (ZYLOPRIM) 300 MG tablet, Take 1 tablet by mouth 2 (Two) Times a Day., Disp: 180 tablet, Rfl: 3    aspirin 81 MG EC tablet, Take 1 tablet by mouth Daily., Disp: , Rfl:     atorvastatin (Lipitor) 40 MG tablet, Take 1 tablet by mouth Every Night., Disp: 90 tablet, Rfl: 3    bumetanide (BUMEX) 2 MG tablet, Take 1 tablet by mouth Daily., Disp: 90 tablet, Rfl: 2    carbidopa-levodopa (SINEMET)  MG per tablet, Take 1 tablet by mouth 2 (Two) Times a Day., Disp: 180 tablet, Rfl: 3    Cholecalciferol (VITAMIN D3) 2000 units capsule, Take 1 capsule by mouth Daily., Disp: , Rfl:     clobetasol propionate (TEMOVATE) 0.05 % cream, Apply topically to the appropriate area as directed, Disp: 30 g, Rfl: 1    clotrimazole-betamethasone (LOTRISONE) 1-0.05 % cream, Apply 1 Application topically to the appropriate area as directed 2 (Two) Times a Day., Disp: 15 g, Rfl: 5    Eliquis 5 MG tablet tablet, Take  1 tablet by mouth Every 12 (Twelve) Hours., Disp: 180 tablet, Rfl: 1    empagliflozin (Jardiance) 25 MG tablet tablet, Take 1 tablet by mouth Daily., Disp: 90 tablet, Rfl: 2    gabapentin (NEURONTIN) 300 MG capsule, Take 1 capsule by mouth 4 (Four) Times a Day., Disp: 120 capsule, Rfl: 4    glipizide (GLUCOTROL XL) 5 MG ER tablet, Take 2 tablets by mouth 2 (Two) Times a Day Before Meals., Disp: 360 tablet, Rfl: 3    glucose blood test strip, 1 each by Other route Daily. Use as instructed, Disp: 100 each, Rfl: 3    glucose monitor monitoring kit, Use 1 each Daily. Dx. E11.9, Disp: 1 each, Rfl: 0    guaiFENesin (Mucinex) 600 MG 12 hr tablet, Take 2 tablets by mouth 2 (Two) Times a Day., Disp: 28 tablet, Rfl: 0    HYDROcodone-acetaminophen (NORCO)  MG per tablet, Take 1 tablet by mouth Every 6 (Six) Hours As Needed for Moderate Pain., Disp: 90 tablet, Rfl: 0    indapamide (LOZOL) 2.5 MG tablet, Take 1 tablet by mouth Every Morning., Disp: 90 tablet, Rfl: 3    Lancet Devices (EASY TOUCH LANCING DEVICE) misc, USE TO TEST DAILY, Disp: , Rfl: 0    Lancets misc, Use 1 each Daily., Disp: 100 each, Rfl: 2    losartan (COZAAR) 100 MG tablet, 1 at bedtime for blood pressure, Disp: 90 tablet, Rfl: 3    metFORMIN (GLUCOPHAGE) 1000 MG tablet, Take 1 tablet by mouth Daily., Disp: , Rfl:     naproxen sodium (ALEVE) 220 MG tablet, Take 1 tablet by mouth 2 (Two) Times a Day As Needed., Disp: , Rfl:     OneTouch Delica Lancets 33G misc, Use 1 each Daily., Disp: 100 each, Rfl: 3    oxyCODONE (ROXICODONE) 10 MG tablet, Take 1 tablet by mouth Every 6 (Six) Hours As Needed for Moderate Pain or Severe Pain., Disp: 90 tablet, Rfl: 0    Potassium 99 MG tablet, Take 1 tablet by mouth 2 (Two) Times a Day., Disp: , Rfl:     pramipexole (MIRAPEX) 0.25 MG tablet, TAKE 1-2 TABLETS BY MOUTH NIGHTLY FOR LEG CRAMPS, Disp: 180 tablet, Rfl: 1    albuterol (ACCUNEB) 1.25 MG/3ML nebulizer solution, Take 3 mL by nebulization Every 6 (Six) Hours As  "Needed for Shortness of Air. (Patient not taking: Reported on 1/3/2025), Disp: 180 each, Rfl: 2    colchicine 0.6 MG tablet, TAKE TWO TABLETS BY MOUTH NOW AND THEN TAKE ONE TABLET ONE HOUR LATER, THEN TAKE ONE TABLET DAILY UNTIL GONE (Patient not taking: Reported on 1/3/2025), Disp: 10 tablet, Rfl: 5    empagliflozin (Jardiance) 10 MG tablet tablet, Take 1 tablet by mouth Daily. (Patient not taking: Reported on 1/3/2025), Disp: 15 tablet, Rfl: 0    OBJECTIVE:  /80 (BP Location: Left arm, Patient Position: Sitting, Cuff Size: Large Adult)   Pulse 75   Temp 97.8 °F (36.6 °C) (Temporal)   Ht 170.2 cm (67\")   Wt (!) 145 kg (320 lb)   SpO2 98%   BMI 50.12 kg/m²    Physical Exam  Vitals reviewed.   Constitutional:       Appearance: He is well-developed.   HENT:      Head:      Comments: Scalp lesion fully healed with no residual scarring or lesion noted.  Cardiovascular:      Rate and Rhythm: Normal rate.   Pulmonary:      Effort: Pulmonary effort is normal.   Neurological:      Mental Status: He is alert and oriented to person, place, and time.   Psychiatric:         Behavior: Behavior normal.       Assessment/Plan    Diagnoses and all orders for this visit:    1. Skin lesion (Primary)    Other orders  -     pramipexole (MIRAPEX) 0.25 MG tablet; TAKE 1-2 TABLETS BY MOUTH NIGHTLY FOR LEG CRAMPS  Dispense: 180 tablet; Refill: 1    No additional intervention needed at this time.    An After Visit Summary was printed and given to the patient at discharge.  Return for Next scheduled follow up.       Dolores LANDERS 1/3/2025    Electronically signed.  "

## 2025-01-06 ENCOUNTER — OFFICE VISIT (OUTPATIENT)
Dept: FAMILY MEDICINE CLINIC | Facility: CLINIC | Age: 75
End: 2025-01-06
Payer: MEDICARE

## 2025-01-06 VITALS
WEIGHT: 315 LBS | TEMPERATURE: 97.5 F | BODY MASS INDEX: 49.44 KG/M2 | SYSTOLIC BLOOD PRESSURE: 153 MMHG | DIASTOLIC BLOOD PRESSURE: 83 MMHG | HEIGHT: 67 IN | OXYGEN SATURATION: 95 % | HEART RATE: 97 BPM

## 2025-01-06 DIAGNOSIS — R05.1 ACUTE COUGH: ICD-10-CM

## 2025-01-06 DIAGNOSIS — J40 BRONCHITIS: Primary | ICD-10-CM

## 2025-01-06 PROCEDURE — 87428 SARSCOV & INF VIR A&B AG IA: CPT | Performed by: NURSE PRACTITIONER

## 2025-01-06 PROCEDURE — 99213 OFFICE O/P EST LOW 20 MIN: CPT | Performed by: NURSE PRACTITIONER

## 2025-01-06 PROCEDURE — 1160F RVW MEDS BY RX/DR IN RCRD: CPT | Performed by: NURSE PRACTITIONER

## 2025-01-06 PROCEDURE — 3079F DIAST BP 80-89 MM HG: CPT | Performed by: NURSE PRACTITIONER

## 2025-01-06 PROCEDURE — 3077F SYST BP >= 140 MM HG: CPT | Performed by: NURSE PRACTITIONER

## 2025-01-06 PROCEDURE — 96372 THER/PROPH/DIAG INJ SC/IM: CPT | Performed by: NURSE PRACTITIONER

## 2025-01-06 PROCEDURE — 1125F AMNT PAIN NOTED PAIN PRSNT: CPT | Performed by: NURSE PRACTITIONER

## 2025-01-06 PROCEDURE — 3051F HG A1C>EQUAL 7.0%<8.0%: CPT | Performed by: NURSE PRACTITIONER

## 2025-01-06 PROCEDURE — 1159F MED LIST DOCD IN RCRD: CPT | Performed by: NURSE PRACTITIONER

## 2025-01-06 RX ORDER — AZITHROMYCIN 250 MG/1
TABLET, FILM COATED ORAL
Qty: 6 TABLET | Refills: 0 | Status: SHIPPED | OUTPATIENT
Start: 2025-01-06

## 2025-01-06 RX ORDER — TRIAMCINOLONE ACETONIDE 40 MG/ML
40 INJECTION, SUSPENSION INTRA-ARTICULAR; INTRAMUSCULAR ONCE
Status: COMPLETED | OUTPATIENT
Start: 2025-01-06 | End: 2025-01-06

## 2025-01-06 RX ORDER — DEXTROMETHORPHAN HYDROBROMIDE AND PROMETHAZINE HYDROCHLORIDE 15; 6.25 MG/5ML; MG/5ML
5 SYRUP ORAL 4 TIMES DAILY PRN
Qty: 180 ML | Refills: 0 | Status: SHIPPED | OUTPATIENT
Start: 2025-01-06

## 2025-01-06 RX ORDER — PREDNISONE 10 MG/1
TABLET ORAL
Qty: 21 TABLET | Refills: 0 | Status: SHIPPED | OUTPATIENT
Start: 2025-01-06

## 2025-01-06 RX ADMIN — TRIAMCINOLONE ACETONIDE 40 MG: 40 INJECTION, SUSPENSION INTRA-ARTICULAR; INTRAMUSCULAR at 13:13

## 2025-01-06 NOTE — PROGRESS NOTES
CC: URI    History:  Ephraim Quinones is a 74 y.o. male who presents today for evaluation of the above problems.        URI   This is a new problem. The current episode started in the past 7 days. The problem has been gradually worsening. There has been no fever. Associated symptoms include congestion and coughing. Associated symptoms comments: Shortness of breath. Treatments tried: Nebulizer treatments. The treatment provided mild relief.     ROS:  Review of Systems   HENT:  Positive for congestion.    Respiratory:  Positive for cough.        Allergies   Allergen Reactions    Adhesive Tape Other (See Comments)     Blisters and rips skin off    Metformin Diarrhea    Simvastatin Other (See Comments)     Leg Cramps    Cleocin [Clindamycin] GI Intolerance     Past Medical History:   Diagnosis Date    A-fib     Acid reflux     Anemia 05/03/2016    Claustrophobia     Diabetes mellitus     Gallstones     Gout     Hyperlipidemia     Hypertension     Malignant neoplasm of upper lobe of right lung 3/17/2023    Pneumonia     Sleep apnea with use of continuous positive airway pressure (CPAP)      Past Surgical History:   Procedure Laterality Date    BACK SURGERY      x2    BLADDER SURGERY  02/2023    CATARACT EXTRACTION      CIRCUMCISION N/A 06/06/2019    Procedure: CIRCUMCISION;  Surgeon: Yon Sloan MD;  Location: [facility] OR;  Service: Urology    COLONOSCOPY      FINGER SURGERY Left     LUNG BIOPSY Right 03/13/2023    REPLACEMENT TOTAL KNEE Bilateral     URETHRAL DILATATION N/A 12/01/2022    Procedure: URETHRAL DILATATION with Balloon, TRANSURETHRAL RESECTION OF BLADDER TUMOR;  Surgeon: Yon Sloan MD;  Location: [facility] OR;  Service: Urology;  Laterality: N/A;    URETHROPLASTY  05/08/2024    [place]     Family History   Problem Relation Age of Onset    Colon cancer Mother     Prostate cancer Father     No Known Problems Maternal Grandmother     No Known Problems Maternal Grandfather     No Known  Problems Paternal Grandmother     No Known Problems Paternal Grandfather       reports that he quit smoking about 28 years ago. His smoking use included cigarettes. He started smoking about 48 years ago. He has a 20 pack-year smoking history. He has been exposed to tobacco smoke. He has never used smokeless tobacco. He reports current alcohol use. He reports that he does not use drugs.      Current Outpatient Medications:     albuterol (ACCUNEB) 1.25 MG/3ML nebulizer solution, Take 3 mL by nebulization Every 6 (Six) Hours As Needed for Shortness of Air., Disp: 180 each, Rfl: 2    albuterol sulfate  (90 Base) MCG/ACT inhaler, 2 sprays 2 minutes apart every 6 hours as needed, Disp: 18 g, Rfl: 5    allopurinol (ZYLOPRIM) 300 MG tablet, Take 1 tablet by mouth 2 (Two) Times a Day., Disp: 180 tablet, Rfl: 3    aspirin 81 MG EC tablet, Take 1 tablet by mouth Daily., Disp: , Rfl:     atorvastatin (Lipitor) 40 MG tablet, Take 1 tablet by mouth Every Night., Disp: 90 tablet, Rfl: 3    bumetanide (BUMEX) 2 MG tablet, Take 1 tablet by mouth Daily., Disp: 90 tablet, Rfl: 2    carbidopa-levodopa (SINEMET)  MG per tablet, Take 1 tablet by mouth 2 (Two) Times a Day., Disp: 180 tablet, Rfl: 3    Cholecalciferol (VITAMIN D3) 2000 units capsule, Take 1 capsule by mouth Daily., Disp: , Rfl:     clobetasol propionate (TEMOVATE) 0.05 % cream, Apply topically to the appropriate area as directed, Disp: 30 g, Rfl: 1    clotrimazole-betamethasone (LOTRISONE) 1-0.05 % cream, Apply 1 Application topically to the appropriate area as directed 2 (Two) Times a Day., Disp: 15 g, Rfl: 5    colchicine 0.6 MG tablet, TAKE TWO TABLETS BY MOUTH NOW AND THEN TAKE ONE TABLET ONE HOUR LATER, THEN TAKE ONE TABLET DAILY UNTIL GONE, Disp: 10 tablet, Rfl: 5    Eliquis 5 MG tablet tablet, Take 1 tablet by mouth Every 12 (Twelve) Hours., Disp: 180 tablet, Rfl: 1    empagliflozin (Jardiance) 10 MG tablet tablet, Take 1 tablet by mouth Daily., Disp:  15 tablet, Rfl: 0    empagliflozin (Jardiance) 25 MG tablet tablet, Take 1 tablet by mouth Daily., Disp: 90 tablet, Rfl: 2    gabapentin (NEURONTIN) 300 MG capsule, Take 1 capsule by mouth 4 (Four) Times a Day., Disp: 120 capsule, Rfl: 4    glipizide (GLUCOTROL XL) 5 MG ER tablet, Take 2 tablets by mouth 2 (Two) Times a Day Before Meals., Disp: 360 tablet, Rfl: 3    glucose blood test strip, 1 each by Other route Daily. Use as instructed, Disp: 100 each, Rfl: 3    glucose monitor monitoring kit, Use 1 each Daily. Dx. E11.9, Disp: 1 each, Rfl: 0    guaiFENesin (Mucinex) 600 MG 12 hr tablet, Take 2 tablets by mouth 2 (Two) Times a Day., Disp: 28 tablet, Rfl: 0    HYDROcodone-acetaminophen (NORCO)  MG per tablet, Take 1 tablet by mouth Every 6 (Six) Hours As Needed for Moderate Pain., Disp: 90 tablet, Rfl: 0    indapamide (LOZOL) 2.5 MG tablet, Take 1 tablet by mouth Every Morning., Disp: 90 tablet, Rfl: 3    Lancet Devices (EASY TOUCH LANCING DEVICE) misc, USE TO TEST DAILY, Disp: , Rfl: 0    Lancets misc, Use 1 each Daily., Disp: 100 each, Rfl: 2    losartan (COZAAR) 100 MG tablet, 1 at bedtime for blood pressure, Disp: 90 tablet, Rfl: 3    metFORMIN (GLUCOPHAGE) 1000 MG tablet, Take 1 tablet by mouth Daily., Disp: , Rfl:     naproxen sodium (ALEVE) 220 MG tablet, Take 1 tablet by mouth 2 (Two) Times a Day As Needed., Disp: , Rfl:     OneTouch Delica Lancets 33G misc, Use 1 each Daily., Disp: 100 each, Rfl: 3    oxyCODONE (ROXICODONE) 10 MG tablet, Take 1 tablet by mouth Every 6 (Six) Hours As Needed for Moderate Pain or Severe Pain., Disp: 90 tablet, Rfl: 0    Potassium 99 MG tablet, Take 1 tablet by mouth 2 (Two) Times a Day., Disp: , Rfl:     pramipexole (MIRAPEX) 0.25 MG tablet, TAKE 1-2 TABLETS BY MOUTH NIGHTLY FOR LEG CRAMPS, Disp: 180 tablet, Rfl: 1    azithromycin (Zithromax) 250 MG tablet, Take 2 tablets the first day, then 1 tablet daily for 4 days., Disp: 6 tablet, Rfl: 0    predniSONE (DELTASONE) 10  "MG (21) dose pack, Use as directed on package, Disp: 21 tablet, Rfl: 0  No current facility-administered medications for this visit.    OBJECTIVE:  /83 (BP Location: Left arm, Patient Position: Sitting, Cuff Size: Large Adult)   Pulse 97   Temp 97.5 °F (36.4 °C) (Temporal)   Ht 170.2 cm (67\")   Wt (!) 143 kg (316 lb)   SpO2 95%   BMI 49.49 kg/m²    Physical Exam  Vitals reviewed.   Constitutional:       Appearance: He is well-developed.   Cardiovascular:      Rate and Rhythm: Normal rate and regular rhythm.      Heart sounds: Normal heart sounds.   Pulmonary:      Effort: Pulmonary effort is normal.      Breath sounds: Wheezing present.   Neurological:      Mental Status: He is alert and oriented to person, place, and time.   Psychiatric:         Behavior: Behavior normal.         Assessment/Plan    Diagnoses and all orders for this visit:    1. Bronchitis (Primary)  -     triamcinolone acetonide (KENALOG-40) injection 40 mg  -     azithromycin (Zithromax) 250 MG tablet; Take 2 tablets the first day, then 1 tablet daily for 4 days.  Dispense: 6 tablet; Refill: 0  -     predniSONE (DELTASONE) 10 MG (21) dose pack; Use as directed on package  Dispense: 21 tablet; Refill: 0    2. Acute cough  -     POCT SARS-CoV-2 Antigen SARAVANAN + Flu  -     triamcinolone acetonide (KENALOG-40) injection 40 mg  -     azithromycin (Zithromax) 250 MG tablet; Take 2 tablets the first day, then 1 tablet daily for 4 days.  Dispense: 6 tablet; Refill: 0  -     predniSONE (DELTASONE) 10 MG (21) dose pack; Use as directed on package  Dispense: 21 tablet; Refill: 0    Swab negative.  Mucinex and albuterol nebulizer treatments in addition to prescribed meds.    An After Visit Summary was printed and given to the patient at discharge.  Return if symptoms worsen or fail to improve, for Next scheduled follow up.       Dolores LANDERS 1/6/2025    Electronically signed.  "

## 2025-01-08 DIAGNOSIS — R52 PAIN: ICD-10-CM

## 2025-01-08 RX ORDER — HYDROCODONE BITARTRATE AND ACETAMINOPHEN 10; 325 MG/1; MG/1
1 TABLET ORAL EVERY 6 HOURS PRN
Qty: 90 TABLET | Refills: 0 | Status: SHIPPED | OUTPATIENT
Start: 2025-01-08

## 2025-01-08 NOTE — TELEPHONE ENCOUNTER
Rx Refill Note  Requested Prescriptions     Pending Prescriptions Disp Refills    HYDROcodone-acetaminophen (NORCO)  MG per tablet 90 tablet 0     Sig: Take 1 tablet by mouth Every 6 (Six) Hours As Needed for Moderate Pain.      Last office visit with office: 11/14/24  Next office visit with office: 4/1/25    UDS: 3/12/24    DATE OF LAST REFILL: 12/19/24    Controlled Substance Agreement: up to date           {TIP  Is Refill Pharmacy correct?:  Helena Meehan MA  01/08/25, 10:37 CST  11/

## 2025-01-15 ENCOUNTER — OFFICE VISIT (OUTPATIENT)
Dept: FAMILY MEDICINE CLINIC | Facility: CLINIC | Age: 75
End: 2025-01-15
Payer: MEDICARE

## 2025-01-15 VITALS
BODY MASS INDEX: 48.84 KG/M2 | WEIGHT: 311.2 LBS | SYSTOLIC BLOOD PRESSURE: 149 MMHG | HEART RATE: 74 BPM | TEMPERATURE: 98 F | HEIGHT: 67 IN | DIASTOLIC BLOOD PRESSURE: 75 MMHG | OXYGEN SATURATION: 97 %

## 2025-01-15 DIAGNOSIS — J40 BRONCHITIS: Primary | ICD-10-CM

## 2025-01-15 PROCEDURE — 1160F RVW MEDS BY RX/DR IN RCRD: CPT | Performed by: NURSE PRACTITIONER

## 2025-01-15 PROCEDURE — 96372 THER/PROPH/DIAG INJ SC/IM: CPT | Performed by: NURSE PRACTITIONER

## 2025-01-15 PROCEDURE — 1125F AMNT PAIN NOTED PAIN PRSNT: CPT | Performed by: NURSE PRACTITIONER

## 2025-01-15 PROCEDURE — 3051F HG A1C>EQUAL 7.0%<8.0%: CPT | Performed by: NURSE PRACTITIONER

## 2025-01-15 PROCEDURE — 1159F MED LIST DOCD IN RCRD: CPT | Performed by: NURSE PRACTITIONER

## 2025-01-15 PROCEDURE — 3077F SYST BP >= 140 MM HG: CPT | Performed by: NURSE PRACTITIONER

## 2025-01-15 PROCEDURE — 3078F DIAST BP <80 MM HG: CPT | Performed by: NURSE PRACTITIONER

## 2025-01-15 PROCEDURE — 99213 OFFICE O/P EST LOW 20 MIN: CPT | Performed by: NURSE PRACTITIONER

## 2025-01-15 RX ORDER — TRIAMCINOLONE ACETONIDE 40 MG/ML
40 INJECTION, SUSPENSION INTRA-ARTICULAR; INTRAMUSCULAR ONCE
Status: COMPLETED | OUTPATIENT
Start: 2025-01-15 | End: 2025-01-15

## 2025-01-15 RX ORDER — CEFDINIR 300 MG/1
300 CAPSULE ORAL 2 TIMES DAILY
Qty: 14 CAPSULE | Refills: 0 | Status: SHIPPED | OUTPATIENT
Start: 2025-01-15

## 2025-01-15 RX ORDER — IPRATROPIUM BROMIDE AND ALBUTEROL SULFATE 2.5; .5 MG/3ML; MG/3ML
3 SOLUTION RESPIRATORY (INHALATION)
Qty: 120 ML | Refills: 0 | Status: SHIPPED | OUTPATIENT
Start: 2025-01-15

## 2025-01-15 RX ORDER — PREDNISONE 20 MG/1
40 TABLET ORAL DAILY
Qty: 10 TABLET | Refills: 0 | Status: SHIPPED | OUTPATIENT
Start: 2025-01-15

## 2025-01-15 RX ADMIN — TRIAMCINOLONE ACETONIDE 40 MG: 40 INJECTION, SUSPENSION INTRA-ARTICULAR; INTRAMUSCULAR at 08:55

## 2025-01-15 NOTE — PROGRESS NOTES
CC: URI Follow up    History:  Ephraim Quinones is a 74 y.o. male who presents today for evaluation of the above problems.      Patient was seen on January 6 for other upper respiratory infection.  Was treated with azithromycin and Kenalog injection and prednisone Dosepak.  Returns today with no improvement in symptoms.  States that he has been increasingly short of breath.  Cough  Associated symptoms include shortness of breath and wheezing.     ROS:  Review of Systems   Respiratory:  Positive for cough, shortness of breath and wheezing.        Allergies   Allergen Reactions    Adhesive Tape Other (See Comments)     Blisters and rips skin off    Metformin Diarrhea    Simvastatin Other (See Comments)     Leg Cramps    Cleocin [Clindamycin] GI Intolerance     Past Medical History:   Diagnosis Date    A-fib     Acid reflux     Anemia 05/03/2016    Claustrophobia     Diabetes mellitus     Gallstones     Gout     Hyperlipidemia     Hypertension     Malignant neoplasm of upper lobe of right lung 3/17/2023    Pneumonia     Sleep apnea with use of continuous positive airway pressure (CPAP)      Past Surgical History:   Procedure Laterality Date    BACK SURGERY      x2    BLADDER SURGERY  02/2023    CATARACT EXTRACTION      CIRCUMCISION N/A 06/06/2019    Procedure: CIRCUMCISION;  Surgeon: Yon Sloan MD;  Location: [facility] OR;  Service: Urology    COLONOSCOPY      FINGER SURGERY Left     LUNG BIOPSY Right 03/13/2023    REPLACEMENT TOTAL KNEE Bilateral     URETHRAL DILATATION N/A 12/01/2022    Procedure: URETHRAL DILATATION with Balloon, TRANSURETHRAL RESECTION OF BLADDER TUMOR;  Surgeon: Yon Sloan MD;  Location: [facility] OR;  Service: Urology;  Laterality: N/A;    URETHROPLASTY  05/08/2024    [place]     Family History   Problem Relation Age of Onset    Colon cancer Mother     Prostate cancer Father     No Known Problems Maternal Grandmother     No Known Problems Maternal Grandfather     No Known  Problems Paternal Grandmother     No Known Problems Paternal Grandfather       reports that he quit smoking about 28 years ago. His smoking use included cigarettes. He started smoking about 48 years ago. He has a 20 pack-year smoking history. He has been exposed to tobacco smoke. He has never used smokeless tobacco. He reports current alcohol use. He reports that he does not use drugs.      Current Outpatient Medications:     albuterol (ACCUNEB) 1.25 MG/3ML nebulizer solution, Take 3 mL by nebulization Every 6 (Six) Hours As Needed for Shortness of Air., Disp: 180 each, Rfl: 2    albuterol sulfate  (90 Base) MCG/ACT inhaler, 2 sprays 2 minutes apart every 6 hours as needed, Disp: 18 g, Rfl: 5    allopurinol (ZYLOPRIM) 300 MG tablet, Take 1 tablet by mouth 2 (Two) Times a Day., Disp: 180 tablet, Rfl: 3    aspirin 81 MG EC tablet, Take 1 tablet by mouth Daily., Disp: , Rfl:     atorvastatin (Lipitor) 40 MG tablet, Take 1 tablet by mouth Every Night., Disp: 90 tablet, Rfl: 3    bumetanide (BUMEX) 2 MG tablet, Take 1 tablet by mouth Daily., Disp: 90 tablet, Rfl: 2    carbidopa-levodopa (SINEMET)  MG per tablet, Take 1 tablet by mouth 2 (Two) Times a Day., Disp: 180 tablet, Rfl: 3    Cholecalciferol (VITAMIN D3) 2000 units capsule, Take 1 capsule by mouth Daily., Disp: , Rfl:     clobetasol propionate (TEMOVATE) 0.05 % cream, Apply topically to the appropriate area as directed, Disp: 30 g, Rfl: 1    clotrimazole-betamethasone (LOTRISONE) 1-0.05 % cream, Apply 1 Application topically to the appropriate area as directed 2 (Two) Times a Day., Disp: 15 g, Rfl: 5    colchicine 0.6 MG tablet, TAKE TWO TABLETS BY MOUTH NOW AND THEN TAKE ONE TABLET ONE HOUR LATER, THEN TAKE ONE TABLET DAILY UNTIL GONE, Disp: 10 tablet, Rfl: 5    Eliquis 5 MG tablet tablet, Take 1 tablet by mouth Every 12 (Twelve) Hours., Disp: 180 tablet, Rfl: 1    empagliflozin (Jardiance) 10 MG tablet tablet, Take 1 tablet by mouth Daily., Disp:  15 tablet, Rfl: 0    gabapentin (NEURONTIN) 300 MG capsule, Take 1 capsule by mouth 4 (Four) Times a Day., Disp: 120 capsule, Rfl: 4    glipizide (GLUCOTROL XL) 5 MG ER tablet, Take 2 tablets by mouth 2 (Two) Times a Day Before Meals., Disp: 360 tablet, Rfl: 3    glucose blood test strip, 1 each by Other route Daily. Use as instructed, Disp: 100 each, Rfl: 3    glucose monitor monitoring kit, Use 1 each Daily. Dx. E11.9, Disp: 1 each, Rfl: 0    guaiFENesin (Mucinex) 600 MG 12 hr tablet, Take 2 tablets by mouth 2 (Two) Times a Day., Disp: 28 tablet, Rfl: 0    HYDROcodone-acetaminophen (NORCO)  MG per tablet, Take 1 tablet by mouth Every 6 (Six) Hours As Needed for Moderate Pain., Disp: 90 tablet, Rfl: 0    indapamide (LOZOL) 2.5 MG tablet, Take 1 tablet by mouth Every Morning., Disp: 90 tablet, Rfl: 3    Lancet Devices (EASY TOUCH LANCING DEVICE) misc, USE TO TEST DAILY, Disp: , Rfl: 0    Lancets misc, Use 1 each Daily., Disp: 100 each, Rfl: 2    losartan (COZAAR) 100 MG tablet, 1 at bedtime for blood pressure, Disp: 90 tablet, Rfl: 3    metFORMIN (GLUCOPHAGE) 1000 MG tablet, Take 1 tablet by mouth Daily., Disp: , Rfl:     naproxen sodium (ALEVE) 220 MG tablet, Take 1 tablet by mouth 2 (Two) Times a Day As Needed., Disp: , Rfl:     OneTouch Delica Lancets 33G misc, Use 1 each Daily., Disp: 100 each, Rfl: 3    Potassium 99 MG tablet, Take 1 tablet by mouth 2 (Two) Times a Day., Disp: , Rfl:     pramipexole (MIRAPEX) 0.25 MG tablet, TAKE 1-2 TABLETS BY MOUTH NIGHTLY FOR LEG CRAMPS, Disp: 180 tablet, Rfl: 1    cefdinir (OMNICEF) 300 MG capsule, Take 1 capsule by mouth 2 (Two) Times a Day., Disp: 14 capsule, Rfl: 0    ipratropium-albuterol (DUO-NEB) 0.5-2.5 mg/3 ml nebulizer, Take 3 mL by nebulization 4 (Four) Times a Day., Disp: 120 mL, Rfl: 0    oxyCODONE (ROXICODONE) 10 MG tablet, Take 1 tablet by mouth Every 6 (Six) Hours As Needed for Moderate Pain or Severe Pain. (Patient not taking: Reported on 1/15/2025),  "Disp: 90 tablet, Rfl: 0    predniSONE (DELTASONE) 20 MG tablet, Take 2 tablets by mouth Daily., Disp: 10 tablet, Rfl: 0    promethazine-dextromethorphan (PROMETHAZINE-DM) 6.25-15 MG/5ML syrup, Take 5 mL by mouth 4 (Four) Times a Day As Needed for Cough. (Patient not taking: Reported on 1/15/2025), Disp: 180 mL, Rfl: 0    Current Facility-Administered Medications:     triamcinolone acetonide (KENALOG-40) injection 40 mg, 40 mg, Intramuscular, Once, Dolores Ba APRN    OBJECTIVE:  /75 (BP Location: Left arm, Patient Position: Sitting, Cuff Size: Large Adult)   Pulse 74   Temp 98 °F (36.7 °C) (Temporal)   Ht 170.2 cm (67\")   Wt (!) 141 kg (311 lb 3.2 oz)   SpO2 97%   BMI 48.74 kg/m²    Physical Exam  Vitals reviewed.   Constitutional:       Appearance: He is well-developed.   Cardiovascular:      Rate and Rhythm: Normal rate and regular rhythm.      Heart sounds: Normal heart sounds.   Pulmonary:      Effort: Pulmonary effort is normal.      Breath sounds: Wheezing present.   Neurological:      Mental Status: He is alert and oriented to person, place, and time.   Psychiatric:         Behavior: Behavior normal.         Assessment/Plan    Diagnoses and all orders for this visit:    1. Bronchitis (Primary)  -     cefdinir (OMNICEF) 300 MG capsule; Take 1 capsule by mouth 2 (Two) Times a Day.  Dispense: 14 capsule; Refill: 0  -     predniSONE (DELTASONE) 20 MG tablet; Take 2 tablets by mouth Daily.  Dispense: 10 tablet; Refill: 0  -     ipratropium-albuterol (DUO-NEB) 0.5-2.5 mg/3 ml nebulizer; Take 3 mL by nebulization 4 (Four) Times a Day.  Dispense: 120 mL; Refill: 0  -     triamcinolone acetonide (KENALOG-40) injection 40 mg        An After Visit Summary was printed and given to the patient at discharge.  Return if symptoms worsen or fail to improve, for Next scheduled follow up.       Dolores LANDERS 1/15/2025    Electronically signed.  "

## 2025-01-27 ENCOUNTER — OFFICE VISIT (OUTPATIENT)
Dept: FAMILY MEDICINE CLINIC | Facility: CLINIC | Age: 75
End: 2025-01-27
Payer: MEDICARE

## 2025-01-27 VITALS
HEART RATE: 83 BPM | OXYGEN SATURATION: 94 % | RESPIRATION RATE: 18 BRPM | SYSTOLIC BLOOD PRESSURE: 155 MMHG | WEIGHT: 315 LBS | TEMPERATURE: 98.6 F | HEIGHT: 71 IN | BODY MASS INDEX: 44.1 KG/M2 | DIASTOLIC BLOOD PRESSURE: 90 MMHG

## 2025-01-27 DIAGNOSIS — J40 BRONCHITIS: Primary | ICD-10-CM

## 2025-01-27 DIAGNOSIS — R05.2 SUBACUTE COUGH: ICD-10-CM

## 2025-01-27 PROCEDURE — 1126F AMNT PAIN NOTED NONE PRSNT: CPT | Performed by: NURSE PRACTITIONER

## 2025-01-27 PROCEDURE — 3051F HG A1C>EQUAL 7.0%<8.0%: CPT | Performed by: NURSE PRACTITIONER

## 2025-01-27 PROCEDURE — 3077F SYST BP >= 140 MM HG: CPT | Performed by: NURSE PRACTITIONER

## 2025-01-27 PROCEDURE — 87428 SARSCOV & INF VIR A&B AG IA: CPT | Performed by: NURSE PRACTITIONER

## 2025-01-27 PROCEDURE — 1160F RVW MEDS BY RX/DR IN RCRD: CPT | Performed by: NURSE PRACTITIONER

## 2025-01-27 PROCEDURE — 3080F DIAST BP >= 90 MM HG: CPT | Performed by: NURSE PRACTITIONER

## 2025-01-27 PROCEDURE — 99213 OFFICE O/P EST LOW 20 MIN: CPT | Performed by: NURSE PRACTITIONER

## 2025-01-27 PROCEDURE — 1159F MED LIST DOCD IN RCRD: CPT | Performed by: NURSE PRACTITIONER

## 2025-01-27 NOTE — PROGRESS NOTES
CC:   Chief Complaint   Patient presents with    Cough     X3 weeks.  Scheduled for a surgery on Wednesday        History:  Ephraim Quinones is a 74 y.o. male who presents today for evaluation of the above problems.      HPI  Patient presents for cough-ongoing for 3 to 4 weeks.  He has been treated with 2 rounds of antibiotic and 2 rounds of oral steroid and steroid injection. Has duoneb to use as needed. States he is feeling better. He still has an occasional cough but improved. He has surgery on Wednesday for resection of bladder tumor. Patient wanted to make sure he was doing ok for that upcoming procedure. Denies any difficulty breathing. Afebrile.     CT of chest done on 1/22/2025 with no signs of pneumonia.     Allergies   Allergen Reactions    Adhesive Tape Other (See Comments)     Blisters and rips skin off    Metformin Diarrhea    Simvastatin Other (See Comments)     Leg Cramps    Cleocin [Clindamycin] GI Intolerance     Past Medical History:   Diagnosis Date    A-fib     Acid reflux     Anemia 05/03/2016    Claustrophobia     Diabetes mellitus     Gallstones     Gout     Hyperlipidemia     Hypertension     Malignant neoplasm of upper lobe of right lung 3/17/2023    Pneumonia     Sleep apnea with use of continuous positive airway pressure (CPAP)      Past Surgical History:   Procedure Laterality Date    BACK SURGERY      x2    BLADDER SURGERY  02/2023    CATARACT EXTRACTION      CIRCUMCISION N/A 06/06/2019    Procedure: CIRCUMCISION;  Surgeon: Yon Sloan MD;  Location:  [facility] OR;  Service: Urology    COLONOSCOPY      FINGER SURGERY Left     LUNG BIOPSY Right 03/13/2023    REPLACEMENT TOTAL KNEE Bilateral     URETHRAL DILATATION N/A 12/01/2022    Procedure: URETHRAL DILATATION with Balloon, TRANSURETHRAL RESECTION OF BLADDER TUMOR;  Surgeon: Yon Sloan MD;  Location:  [facility] OR;  Service: Urology;  Laterality: N/A;    URETHROPLASTY  05/08/2024    [place]     Family History    Problem Relation Age of Onset    Colon cancer Mother     Prostate cancer Father     No Known Problems Maternal Grandmother     No Known Problems Maternal Grandfather     No Known Problems Paternal Grandmother     No Known Problems Paternal Grandfather       reports that he quit smoking about 28 years ago. His smoking use included cigarettes. He started smoking about 48 years ago. He has a 20 pack-year smoking history. He has been exposed to tobacco smoke. He has never used smokeless tobacco. He reports current alcohol use. He reports that he does not use drugs.      Current Outpatient Medications:     albuterol (ACCUNEB) 1.25 MG/3ML nebulizer solution, Take 3 mL by nebulization Every 6 (Six) Hours As Needed for Shortness of Air., Disp: 180 each, Rfl: 2    albuterol sulfate  (90 Base) MCG/ACT inhaler, 2 sprays 2 minutes apart every 6 hours as needed, Disp: 18 g, Rfl: 5    allopurinol (ZYLOPRIM) 300 MG tablet, Take 1 tablet by mouth 2 (Two) Times a Day., Disp: 180 tablet, Rfl: 3    aspirin 81 MG EC tablet, Take 1 tablet by mouth Daily., Disp: , Rfl:     atorvastatin (Lipitor) 40 MG tablet, Take 1 tablet by mouth Every Night., Disp: 90 tablet, Rfl: 3    bumetanide (BUMEX) 2 MG tablet, Take 1 tablet by mouth Daily., Disp: 90 tablet, Rfl: 2    carbidopa-levodopa (SINEMET)  MG per tablet, Take 1 tablet by mouth 2 (Two) Times a Day., Disp: 180 tablet, Rfl: 3    Cholecalciferol (VITAMIN D3) 2000 units capsule, Take 1 capsule by mouth Daily., Disp: , Rfl:     clobetasol propionate (TEMOVATE) 0.05 % cream, Apply topically to the appropriate area as directed, Disp: 30 g, Rfl: 1    clotrimazole-betamethasone (LOTRISONE) 1-0.05 % cream, Apply 1 Application topically to the appropriate area as directed 2 (Two) Times a Day., Disp: 15 g, Rfl: 5    colchicine 0.6 MG tablet, TAKE TWO TABLETS BY MOUTH NOW AND THEN TAKE ONE TABLET ONE HOUR LATER, THEN TAKE ONE TABLET DAILY UNTIL GONE, Disp: 10 tablet, Rfl: 5    Eliquis 5  MG tablet tablet, Take 1 tablet by mouth Every 12 (Twelve) Hours., Disp: 180 tablet, Rfl: 1    empagliflozin (Jardiance) 10 MG tablet tablet, Take 1 tablet by mouth Daily., Disp: 15 tablet, Rfl: 0    gabapentin (NEURONTIN) 300 MG capsule, Take 1 capsule by mouth 4 (Four) Times a Day., Disp: 120 capsule, Rfl: 4    glipizide (GLUCOTROL XL) 5 MG ER tablet, Take 2 tablets by mouth 2 (Two) Times a Day Before Meals., Disp: 360 tablet, Rfl: 3    glucose blood test strip, 1 each by Other route Daily. Use as instructed, Disp: 100 each, Rfl: 3    glucose monitor monitoring kit, Use 1 each Daily. Dx. E11.9, Disp: 1 each, Rfl: 0    guaiFENesin (Mucinex) 600 MG 12 hr tablet, Take 2 tablets by mouth 2 (Two) Times a Day., Disp: 28 tablet, Rfl: 0    HYDROcodone-acetaminophen (NORCO)  MG per tablet, Take 1 tablet by mouth Every 6 (Six) Hours As Needed for Moderate Pain., Disp: 90 tablet, Rfl: 0    indapamide (LOZOL) 2.5 MG tablet, Take 1 tablet by mouth Every Morning., Disp: 90 tablet, Rfl: 3    ipratropium-albuterol (DUO-NEB) 0.5-2.5 mg/3 ml nebulizer, Take 3 mL by nebulization 4 (Four) Times a Day., Disp: 120 mL, Rfl: 0    Lancet Devices (EASY TOUCH LANCING DEVICE) misc, USE TO TEST DAILY, Disp: , Rfl: 0    Lancets misc, Use 1 each Daily., Disp: 100 each, Rfl: 2    losartan (COZAAR) 100 MG tablet, 1 at bedtime for blood pressure, Disp: 90 tablet, Rfl: 3    metFORMIN (GLUCOPHAGE) 1000 MG tablet, Take 1 tablet by mouth Daily., Disp: , Rfl:     naproxen sodium (ALEVE) 220 MG tablet, Take 1 tablet by mouth 2 (Two) Times a Day As Needed., Disp: , Rfl:     OneTouch Delica Lancets 33G misc, Use 1 each Daily., Disp: 100 each, Rfl: 3    oxyCODONE (ROXICODONE) 10 MG tablet, Take 1 tablet by mouth Every 6 (Six) Hours As Needed for Moderate Pain or Severe Pain., Disp: 90 tablet, Rfl: 0    Potassium 99 MG tablet, Take 1 tablet by mouth 2 (Two) Times a Day., Disp: , Rfl:     pramipexole (MIRAPEX) 0.25 MG tablet, TAKE 1-2 TABLETS BY MOUTH  "NIGHTLY FOR LEG CRAMPS, Disp: 180 tablet, Rfl: 1    OBJECTIVE:  /90 (BP Location: Left arm, Patient Position: Sitting, Cuff Size: Large Adult)   Pulse 83   Temp 98.6 °F (37 °C) (Temporal)   Resp 18   Ht 180.3 cm (71\")   Wt (!) 144 kg (317 lb)   SpO2 94%   BMI 44.21 kg/m²    Estimated body mass index is 44.21 kg/m² as calculated from the following:    Height as of this encounter: 180.3 cm (71\").    Weight as of this encounter: 144 kg (317 lb).                  Physical Exam  Vitals reviewed.   Constitutional:       General: He is not in acute distress.     Appearance: Normal appearance. He is obese.   HENT:      Head: Normocephalic and atraumatic.      Right Ear: Tympanic membrane, ear canal and external ear normal.      Left Ear: Tympanic membrane, ear canal and external ear normal.      Mouth/Throat:      Mouth: Mucous membranes are moist.      Pharynx: Oropharynx is clear.   Cardiovascular:      Rate and Rhythm: Normal rate and regular rhythm.   Pulmonary:      Effort: No respiratory distress.      Breath sounds: Normal breath sounds. No wheezing or rales.   Lymphadenopathy:      Cervical: No cervical adenopathy.   Skin:     General: Skin is warm and dry.   Neurological:      Mental Status: He is alert and oriented to person, place, and time.   Psychiatric:         Mood and Affect: Mood normal.         Behavior: Behavior normal.              Assessment/Plan    Diagnoses and all orders for this visit:    1. Bronchitis (Primary)    2. Subacute cough  -     POCT SARS-CoV-2 + Flu Antigen SARAVANAN    Negative for flu and COVID.  Symptoms are much improved per patient.  Unremarkable exam.  Discussed lingering cough with bronchitis.  Continue to treat symptoms and stay well-hydrated.  All questions answered.            An After Visit Summary was printed and given to the patient at discharge.  Return if symptoms worsen or fail to improve.           "

## 2025-01-29 DIAGNOSIS — R52 PAIN: ICD-10-CM

## 2025-01-29 DIAGNOSIS — G62.9 NEUROPATHY: ICD-10-CM

## 2025-01-29 RX ORDER — HYDROCODONE BITARTRATE AND ACETAMINOPHEN 10; 325 MG/1; MG/1
1 TABLET ORAL EVERY 6 HOURS PRN
Qty: 90 TABLET | Refills: 0 | Status: SHIPPED | OUTPATIENT
Start: 2025-01-29

## 2025-01-29 RX ORDER — GABAPENTIN 300 MG/1
300 CAPSULE ORAL 4 TIMES DAILY
Qty: 120 CAPSULE | Refills: 4 | Status: SHIPPED | OUTPATIENT
Start: 2025-01-29

## 2025-01-29 NOTE — TELEPHONE ENCOUNTER
Rx Refill Note  Requested Prescriptions     Pending Prescriptions Disp Refills    HYDROcodone-acetaminophen (NORCO)  MG per tablet 90 tablet 0     Sig: Take 1 tablet by mouth Every 6 (Six) Hours As Needed for Moderate Pain.      Last office visit with office: 11/14/24  Next office visit with office: 2/10/25    UDS: 3/12/24    DATE OF LAST REFILL: 1/8/25    Controlled Substance Agreement: up to date           {TIP  Is Refill Pharmacy correct?:  Helena Meehan MA  01/29/25, 15:49 CST

## 2025-01-29 NOTE — TELEPHONE ENCOUNTER
Rx Refill Note  Requested Prescriptions     Pending Prescriptions Disp Refills    gabapentin (NEURONTIN) 300 MG capsule 120 capsule 4     Sig: Take 1 capsule by mouth 4 (Four) Times a Day.      Last office visit with office: 11/14/24  Next office visit with office: 2/10/25    UDS: 3/12/24    DATE OF LAST REFILL: 11/8/24    Controlled Substance Agreement: up to date           {TIP  Is Refill Pharmacy correct?:  Helena Meehan MA  01/29/25, 15:48 CST

## 2025-02-01 DIAGNOSIS — M10.9 ACUTE GOUT OF ANKLE, UNSPECIFIED CAUSE, UNSPECIFIED LATERALITY: ICD-10-CM

## 2025-02-03 RX ORDER — COLCHICINE 0.6 MG/1
TABLET ORAL
Qty: 10 TABLET | Refills: 5 | Status: SHIPPED | OUTPATIENT
Start: 2025-02-03

## 2025-02-03 NOTE — TELEPHONE ENCOUNTER
Rx Refill Note  Requested Prescriptions     Pending Prescriptions Disp Refills    colchicine 0.6 MG tablet 10 tablet 5     Sig: TAKE TWO TABLETS BY MOUTH NOW AND THEN TAKE ONE TABLET ONE HOUR LATER, THEN TAKE ONE TABLET DAILY UNTIL GONE      Last office visit with prescribing clinician: 1/15/2025   Last telemedicine visit with prescribing clinician: Visit date not found   Next office visit with prescribing clinician: 2/10/2025       {TIP  Please add Last Relevant Lab 10/1/24    Helena Meehan MA  02/03/25, 16:44 CST

## 2025-02-08 DIAGNOSIS — J40 BRONCHITIS: ICD-10-CM

## 2025-02-10 RX ORDER — IPRATROPIUM BROMIDE AND ALBUTEROL SULFATE 2.5; .5 MG/3ML; MG/3ML
SOLUTION RESPIRATORY (INHALATION)
Qty: 360 ML | Refills: 1 | Status: SHIPPED | OUTPATIENT
Start: 2025-02-10

## 2025-02-10 NOTE — TELEPHONE ENCOUNTER
Rx Refill Note  Requested Prescriptions     Pending Prescriptions Disp Refills    ipratropium-albuterol (DUO-NEB) 0.5-2.5 mg/3 ml nebulizer [Pharmacy Med Name: IPRAT-ALBUT 0.5-3(2.5) MG/3 ML] 360 mL      Sig: INHALE 3 ML BY NEBULIZATION 4 TIMES A DAY      Last office visit with prescribing clinician: 1/15/2025   Last telemedicine visit with prescribing clinician: Visit date not found   Next office visit with prescribing clinician: 2/17/2025       Helena Meehan MA  02/10/25, 07:59 CST

## 2025-02-17 ENCOUNTER — OFFICE VISIT (OUTPATIENT)
Dept: FAMILY MEDICINE CLINIC | Facility: CLINIC | Age: 75
End: 2025-02-17
Payer: MEDICARE

## 2025-02-17 VITALS
TEMPERATURE: 98.4 F | HEIGHT: 71 IN | BODY MASS INDEX: 44.1 KG/M2 | WEIGHT: 315 LBS | OXYGEN SATURATION: 96 % | HEART RATE: 90 BPM | DIASTOLIC BLOOD PRESSURE: 80 MMHG | SYSTOLIC BLOOD PRESSURE: 143 MMHG

## 2025-02-17 DIAGNOSIS — I10 PRIMARY HYPERTENSION: ICD-10-CM

## 2025-02-17 DIAGNOSIS — M46.1 SACROILIITIS, NOT ELSEWHERE CLASSIFIED: Primary | ICD-10-CM

## 2025-02-17 DIAGNOSIS — Z51.81 THERAPEUTIC DRUG MONITORING: ICD-10-CM

## 2025-02-17 DIAGNOSIS — E11.9 TYPE 2 DIABETES MELLITUS WITHOUT COMPLICATION, WITHOUT LONG-TERM CURRENT USE OF INSULIN: ICD-10-CM

## 2025-02-17 PROCEDURE — 3077F SYST BP >= 140 MM HG: CPT | Performed by: NURSE PRACTITIONER

## 2025-02-17 PROCEDURE — 1159F MED LIST DOCD IN RCRD: CPT | Performed by: NURSE PRACTITIONER

## 2025-02-17 PROCEDURE — 3051F HG A1C>EQUAL 7.0%<8.0%: CPT | Performed by: NURSE PRACTITIONER

## 2025-02-17 PROCEDURE — 1125F AMNT PAIN NOTED PAIN PRSNT: CPT | Performed by: NURSE PRACTITIONER

## 2025-02-17 PROCEDURE — 3079F DIAST BP 80-89 MM HG: CPT | Performed by: NURSE PRACTITIONER

## 2025-02-17 PROCEDURE — 99213 OFFICE O/P EST LOW 20 MIN: CPT | Performed by: NURSE PRACTITIONER

## 2025-02-17 PROCEDURE — 1160F RVW MEDS BY RX/DR IN RCRD: CPT | Performed by: NURSE PRACTITIONER

## 2025-02-17 RX ORDER — INDAPAMIDE 2.5 MG/1
2.5 TABLET ORAL EVERY MORNING
Qty: 90 TABLET | Refills: 3 | Status: SHIPPED | OUTPATIENT
Start: 2025-02-17

## 2025-02-17 NOTE — PROGRESS NOTES
CC: Controlled medication monitoring    History:  Ephraim Quinones is a 74 y.o. male who presents today for evaluation of the above problems.      Patient presents for controlled medication monitoring for his gabapentin and hydrocodone.  Reports that medications continue to work well.  Denies any current issues.  Gregory is reviewed and appropriate.  Contract is due to be renewed today.  Urine drug screen is also due.        5/31/2023     9:00 AM 8/29/2023     8:00 AM 12/4/2023     8:00 AM 3/12/2024     8:00 AM 8/26/2024     8:00 AM 11/14/2024     9:00 AM 2/17/2025     8:00 AM   CONTROLLED SUBSTANCE TRACKING   Last Gregory 5/31/2023 8/29/2023 12/4/2023 3/12/2024 8/26/2024 11/14/2024 2/17/2025   Report Number  reviewed through epic reviewed through epic  reviewed through epic reviewed through [facility] reviewed through [facility]   Last UDS 2/6/2023 2/6/2023 2/6/2023 3/12/2024 3/12/2024 3/12/2024 2/17/2025   Last Controlled Substance Agreement 2/6/2023 2/6/2023 2/6/2023 3/12/2024 3/12/2024 3/12/2024 2/17/2025         HPI  ROS:  Review of Systems   Musculoskeletal:  Positive for back pain.       Allergies   Allergen Reactions   • Adhesive Tape Other (See Comments)     Blisters and rips skin off   • Metformin Diarrhea   • Simvastatin Other (See Comments)     Leg Cramps   • Cleocin [Clindamycin] GI Intolerance     Past Medical History:   Diagnosis Date   • A-fib    • Acid reflux    • Anemia 05/03/2016   • Claustrophobia    • Diabetes mellitus    • Gallstones    • Gout    • Hyperlipidemia    • Hypertension    • Malignant neoplasm of upper lobe of right lung 3/17/2023   • Pneumonia    • Sleep apnea with use of continuous positive airway pressure (CPAP)      Past Surgical History:   Procedure Laterality Date   • BACK SURGERY      x2   • BLADDER SURGERY  02/2023   • CATARACT EXTRACTION     • CIRCUMCISION N/A 06/06/2019    Procedure: CIRCUMCISION;  Surgeon: Yon Sloan MD;  Location: [facility] OR;  Service: Urology   •  COLONOSCOPY     • FINGER SURGERY Left    • LUNG BIOPSY Right 03/13/2023   • REPLACEMENT TOTAL KNEE Bilateral    • URETHRAL DILATATION N/A 12/01/2022    Procedure: URETHRAL DILATATION with Balloon, TRANSURETHRAL RESECTION OF BLADDER TUMOR;  Surgeon: Yon Sloan MD;  Location: [facility];  Service: Urology;  Laterality: N/A;   • URETHROPLASTY  05/08/2024    [place]     Family History   Problem Relation Age of Onset   • Colon cancer Mother    • Prostate cancer Father    • No Known Problems Maternal Grandmother    • No Known Problems Maternal Grandfather    • No Known Problems Paternal Grandmother    • No Known Problems Paternal Grandfather       reports that he quit smoking about 28 years ago. His smoking use included cigarettes. He started smoking about 48 years ago. He has a 20 pack-year smoking history. He has been exposed to tobacco smoke. He has never used smokeless tobacco. He reports current alcohol use. He reports that he does not use drugs.      Current Outpatient Medications:   •  albuterol (ACCUNEB) 1.25 MG/3ML nebulizer solution, Take 3 mL by nebulization Every 6 (Six) Hours As Needed for Shortness of Air., Disp: 180 each, Rfl: 2  •  albuterol sulfate  (90 Base) MCG/ACT inhaler, 2 sprays 2 minutes apart every 6 hours as needed, Disp: 18 g, Rfl: 5  •  allopurinol (ZYLOPRIM) 300 MG tablet, Take 1 tablet by mouth 2 (Two) Times a Day., Disp: 180 tablet, Rfl: 3  •  aspirin 81 MG EC tablet, Take 1 tablet by mouth Daily., Disp: , Rfl:   •  atorvastatin (Lipitor) 40 MG tablet, Take 1 tablet by mouth Every Night., Disp: 90 tablet, Rfl: 3  •  bumetanide (BUMEX) 2 MG tablet, Take 1 tablet by mouth Daily., Disp: 90 tablet, Rfl: 2  •  carbidopa-levodopa (SINEMET)  MG per tablet, Take 1 tablet by mouth 2 (Two) Times a Day., Disp: 180 tablet, Rfl: 3  •  Cholecalciferol (VITAMIN D3) 2000 units capsule, Take 1 capsule by mouth Daily., Disp: , Rfl:   •  clobetasol propionate (TEMOVATE) 0.05 % cream,  Apply topically to the appropriate area as directed, Disp: 30 g, Rfl: 1  •  clotrimazole-betamethasone (LOTRISONE) 1-0.05 % cream, Apply 1 Application topically to the appropriate area as directed 2 (Two) Times a Day., Disp: 15 g, Rfl: 5  •  colchicine 0.6 MG tablet, TAKE TWO TABLETS BY MOUTH NOW AND THEN TAKE ONE TABLET ONE HOUR LATER, THEN TAKE ONE TABLET DAILY UNTIL GONE, Disp: 10 tablet, Rfl: 5  •  Eliquis 5 MG tablet tablet, Take 1 tablet by mouth Every 12 (Twelve) Hours., Disp: 180 tablet, Rfl: 1  •  empagliflozin (Jardiance) 10 MG tablet tablet, Take 1 tablet by mouth Daily., Disp: 15 tablet, Rfl: 0  •  gabapentin (NEURONTIN) 300 MG capsule, Take 1 capsule by mouth 4 (Four) Times a Day., Disp: 120 capsule, Rfl: 4  •  glipizide (GLUCOTROL XL) 5 MG ER tablet, Take 2 tablets by mouth 2 (Two) Times a Day Before Meals., Disp: 360 tablet, Rfl: 3  •  glucose blood test strip, 1 each by Other route Daily. Use as instructed, Disp: 100 each, Rfl: 3  •  glucose monitor monitoring kit, Use 1 each Daily. Dx. E11.9, Disp: 1 each, Rfl: 0  •  guaiFENesin (Mucinex) 600 MG 12 hr tablet, Take 2 tablets by mouth 2 (Two) Times a Day., Disp: 28 tablet, Rfl: 0  •  HYDROcodone-acetaminophen (NORCO)  MG per tablet, Take 1 tablet by mouth Every 6 (Six) Hours As Needed for Moderate Pain., Disp: 90 tablet, Rfl: 0  •  indapamide (LOZOL) 2.5 MG tablet, Take 1 tablet by mouth Every Morning., Disp: 90 tablet, Rfl: 3  •  ipratropium-albuterol (DUO-NEB) 0.5-2.5 mg/3 ml nebulizer, INHALE 3 ML BY NEBULIZATION 4 TIMES A DAY, Disp: 360 mL, Rfl: 1  •  Lancet Devices (EASY TOUCH LANCING DEVICE) misc, USE TO TEST DAILY, Disp: , Rfl: 0  •  Lancets misc, Use 1 each Daily., Disp: 100 each, Rfl: 2  •  losartan (COZAAR) 100 MG tablet, 1 at bedtime for blood pressure, Disp: 90 tablet, Rfl: 3  •  metFORMIN (GLUCOPHAGE) 1000 MG tablet, Take 1 tablet by mouth Daily., Disp: , Rfl:   •  naproxen sodium (ALEVE) 220 MG tablet, Take 1 tablet by mouth 2  "(Two) Times a Day As Needed., Disp: , Rfl:   •  OneTouch Delica Lancets 33G misc, Use 1 each Daily., Disp: 100 each, Rfl: 3  •  Potassium 99 MG tablet, Take 1 tablet by mouth 2 (Two) Times a Day., Disp: , Rfl:   •  pramipexole (MIRAPEX) 0.25 MG tablet, TAKE 1-2 TABLETS BY MOUTH NIGHTLY FOR LEG CRAMPS, Disp: 180 tablet, Rfl: 1  •  oxyCODONE (ROXICODONE) 10 MG tablet, Take 1 tablet by mouth Every 6 (Six) Hours As Needed for Moderate Pain or Severe Pain. (Patient not taking: Reported on 2/17/2025), Disp: 90 tablet, Rfl: 0    OBJECTIVE:  /80 (BP Location: Left arm, Patient Position: Sitting, Cuff Size: Large Adult)   Pulse 90   Temp 98.4 °F (36.9 °C) (Temporal)   Ht 180.3 cm (71\")   Wt (!) 145 kg (319 lb 3.2 oz)   SpO2 96%   BMI 44.52 kg/m²    Physical Exam  Vitals reviewed.   Constitutional:       Appearance: He is well-developed.   Cardiovascular:      Rate and Rhythm: Normal rate.   Pulmonary:      Effort: Pulmonary effort is normal.   Neurological:      Mental Status: He is alert and oriented to person, place, and time.   Psychiatric:         Behavior: Behavior normal.       Assessment/Plan    Diagnoses and all orders for this visit:    1. Sacroiliitis, not elsewhere classified (Primary)    2. Primary hypertension  -     indapamide (LOZOL) 2.5 MG tablet; Take 1 tablet by mouth Every Morning.  Dispense: 90 tablet; Refill: 3    3. Therapeutic drug monitoring  -     ToxAssure Flex 23, Ur -    Continue gabapentin and hydrocodone.  Contract renewed.  Urine drug screen today.    An After Visit Summary was printed and given to the patient at discharge.  Return if symptoms worsen or fail to improve, for Next scheduled follow up.       Dolores LANDERS 2/17/2025    Electronically signed.  "

## 2025-02-20 DIAGNOSIS — R52 PAIN: ICD-10-CM

## 2025-02-20 NOTE — TELEPHONE ENCOUNTER
Rx Refill Note  Requested Prescriptions     Pending Prescriptions Disp Refills    HYDROcodone-acetaminophen (NORCO)  MG per tablet 90 tablet 0     Sig: Take 1 tablet by mouth Every 6 (Six) Hours As Needed for Moderate Pain.      Last office visit with prescribing clinician: 10/1/2024   Last telemedicine visit with prescribing clinician: Visit date not found   Next office visit with prescribing clinician: 10/7/2025   CPE done 10/01/2024                  {TIP  Is Refill Pharmacy correct?: yes    Would you like a call back once the refill request has been completed: [] Yes [] No    If the office needs to give you a call back, can they leave a voicemail: [] Yes [] No    Helena Gilmore MA  02/20/25, 13:09 CST

## 2025-02-21 DIAGNOSIS — R52 PAIN: ICD-10-CM

## 2025-02-21 DIAGNOSIS — G62.9 NEUROPATHY: ICD-10-CM

## 2025-02-21 RX ORDER — GABAPENTIN 300 MG/1
300 CAPSULE ORAL 4 TIMES DAILY
Qty: 120 CAPSULE | Refills: 4 | OUTPATIENT
Start: 2025-02-21

## 2025-02-21 RX ORDER — HYDROCODONE BITARTRATE AND ACETAMINOPHEN 10; 325 MG/1; MG/1
1 TABLET ORAL EVERY 6 HOURS PRN
Qty: 90 TABLET | Refills: 0 | Status: SHIPPED | OUTPATIENT
Start: 2025-02-21

## 2025-02-21 RX ORDER — HYDROCODONE BITARTRATE AND ACETAMINOPHEN 10; 325 MG/1; MG/1
1 TABLET ORAL EVERY 6 HOURS PRN
Qty: 90 TABLET | Refills: 0 | Status: SHIPPED | OUTPATIENT
Start: 2025-02-27 | End: 2025-02-21

## 2025-02-21 NOTE — TELEPHONE ENCOUNTER
Rx Refill Note  Requested Prescriptions     Pending Prescriptions Disp Refills    gabapentin (NEURONTIN) 300 MG capsule 120 capsule 4     Sig: Take 1 capsule by mouth 4 (Four) Times a Day.      Last office visit with office: 2/17/25  Next office visit with office: 4/1/25    UDS: 2/17/25    DATE OF LAST REFILL: 1/29/25 #120 4 RF    Controlled Substance Agreement: up to date           {TIP  Is Refill Pharmacy correct?:  Helena Meehan MA  02/21/25, 12:17 CST

## 2025-02-22 LAB
ALBUMIN/CREAT UR: 16 MG/G CREAT (ref 0–29)
CREAT UR-MCNC: 67 MG/DL
MICROALBUMIN UR-MCNC: 10.8 UG/ML

## 2025-02-24 RX ORDER — HYDROCODONE BITARTRATE AND ACETAMINOPHEN 10; 325 MG/1; MG/1
1 TABLET ORAL EVERY 6 HOURS PRN
Qty: 90 TABLET | Refills: 0 | OUTPATIENT
Start: 2025-02-24

## 2025-02-24 NOTE — TELEPHONE ENCOUNTER
Rx Refill Note  Requested Prescriptions     Pending Prescriptions Disp Refills    HYDROcodone-acetaminophen (NORCO)  MG per tablet [Pharmacy Med Name: HYDROCODONE-ACETAMINOPHEN  MG] 90 tablet 0     Sig: TAKE 1 TABLET BY MOUTH EVERY 6 HOURS AS NEEDED FOR MODERATE PAIN.      Last office visit with office: 2/17/25  Next office visit with office: 4/1/25    UDS: 2/17/25    DATE OF LAST REFILL: 2/21/25 - sent to [facility] and per my chart message from patient refill needs to be sent to [facility].    Controlled Substance Agreement: up to date           {TIP  Is Refill Pharmacy correct?:  Helena Meehan MA  02/24/25, 07:37 CST

## 2025-02-24 NOTE — TELEPHONE ENCOUNTER
Granddaughter sent me a message Friday evening about this going to [facility]- I told her to just pick it up from there so he shouldn't need this filled. Unless they were unable to get it from [facility].. ?

## 2025-02-28 LAB
1OH-MIDAZOLAM UR QL SCN: NOT DETECTED NG/MG CREAT
6MAM UR QL SCN: NEGATIVE NG/ML
7AMINOCLONAZEPAM/CREAT UR: NOT DETECTED NG/MG CREAT
A-OH ALPRAZ/CREAT UR: NOT DETECTED NG/MG CREAT
A-OH-TRIAZOLAM/CREAT UR CFM: NOT DETECTED NG/MG CREAT
ACP UR QL CFM: NOT DETECTED
ALPRAZ/CREAT UR CFM: NOT DETECTED NG/MG CREAT
AMPHETAMINES UR QL SCN: NEGATIVE NG/ML
APAP UR QL SCN: NORMAL UG/ML
APAP UR QL: NORMAL
APAP UR-MCNC: PRESENT UG/ML
BARBITURATES UR QL SCN: NEGATIVE NG/ML
BENZODIAZ SCN METH UR: NEGATIVE
BUPRENORPHINE UR QL SCN: NEGATIVE
BUPRENORPHINE/CREAT UR: NOT DETECTED NG/MG CREAT
CANNABINOIDS UR QL SCN: NEGATIVE NG/ML
CARISOPRODOL UR QL: NEGATIVE NG/ML
CLONAZEPAM/CREAT UR CFM: NOT DETECTED NG/MG CREAT
COCAINE+BZE UR QL SCN: NEGATIVE NG/ML
CODEINE UR CFM-MCNC: NOT DETECTED NG/MG CREAT
CREAT UR-MCNC: 68 MG/DL
D-METHORPHAN UR-MCNC: NOT DETECTED NG/ML
D-METHORPHAN+LEVORPHANOL UR QL: NOT DETECTED
DESALKYLFLURAZ/CREAT UR: NOT DETECTED NG/MG CREAT
DHC/CREAT UR: 153 NG/MG CREAT
DIAZEPAM/CREAT UR: NOT DETECTED NG/MG CREAT
ETG ETS UR QL CFM: NORMAL
ETHANOL UR QL SCN: NEGATIVE G/DL
ETHANOL UR QL SCN: NORMAL NG/ML
ETHYL GLUCURONIDE UR CFM-MCNC: NORMAL NG/MG CREAT
ETHYL SULFATE UR CFM-MCNC: 4109 NG/MG CREAT
FENTANYL CTO UR SCN-MCNC: NEGATIVE NG/ML
FENTANYL/CREAT UR: NOT DETECTED NG/MG CREAT
FLUNITRAZEPAM UR QL SCN: NOT DETECTED NG/MG CREAT
GABAPENTIN UR CFM-MCNC: PRESENT NG/ML
GABAPENTIN UR QL CFM: NORMAL
GABAPENTIN UR-MCNC: NORMAL UG/ML
HALLUCINOGENS UR: NEGATIVE
HYDROCODONE UR CFM-MCNC: 326 NG/MG CREAT
HYDROMORPHONE UR CFM-MCNC: 257 NG/MG CREAT
HYPNOTICS UR QL SCN: NEGATIVE
KETAMINE UR QL: NOT DETECTED
LORAZEPAM/CREAT UR: NOT DETECTED NG/MG CREAT
MEPERIDINE UR QL SCN: NEGATIVE NG/ML
METHADONE UR QL SCN: NEGATIVE NG/ML
METHADONE+METAB UR QL SCN: NEGATIVE NG/ML
MIDAZOLAM/CREAT UR CFM: NOT DETECTED NG/MG CREAT
MISCELLANEOUS, UR: NEGATIVE
MORPHINE UR CFM-MCNC: NOT DETECTED NG/MG CREAT
NORBUPRENORPHINE/CREAT UR: NOT DETECTED NG/MG CREAT
NORCODEINE/CREAT UR CFM: NOT DETECTED NG/MG CREAT
NORDIAZEPAM/CREAT UR: NOT DETECTED NG/MG CREAT
NORFENTANYL/CREAT UR: NOT DETECTED NG/MG CREAT
NORFLUNITRAZEPAM UR-MCNC: NOT DETECTED NG/MG CREAT
NORHYDROCODONE UR CFM-MCNC: 725 NG/MG CREAT
NORKETAMINE UR-MCNC: NOT DETECTED UG/ML
NORMORPHINE UR-MCNC: NOT DETECTED NG/MG CREAT
OPIATES UR QL CFM: NORMAL
OPIATES UR SCN-MCNC: NORMAL NG/ML
OXAZEPAM/CREAT UR: NOT DETECTED NG/MG CREAT
OXYCODONE CTO UR SCN-MCNC: NEGATIVE NG/ML
PCP UR QL SCN: NEGATIVE NG/ML
PRESCRIBED MEDICATIONS: NORMAL
PROPOXYPH UR QL SCN: NEGATIVE NG/ML
TAPENTADOL CTO UR SCN-MCNC: NEGATIVE NG/ML
TEMAZEPAM/CREAT UR: NOT DETECTED NG/MG CREAT
TRAMADOL UR QL SCN: NEGATIVE NG/ML
ZALEPLON UR-MCNC: NOT DETECTED NG/ML
ZOLPIDEM PHENYL-4-CARB UR QL SCN: NOT DETECTED
ZOLPIDEM UR QL SCN: NOT DETECTED
ZOPICLONE-N-OXIDE UR-MCNC: NOT DETECTED NG/ML

## 2025-03-13 DIAGNOSIS — R52 PAIN: ICD-10-CM

## 2025-03-13 RX ORDER — HYDROCODONE BITARTRATE AND ACETAMINOPHEN 10; 325 MG/1; MG/1
1 TABLET ORAL EVERY 6 HOURS PRN
Qty: 90 TABLET | Refills: 0 | OUTPATIENT
Start: 2025-03-13

## 2025-03-13 NOTE — TELEPHONE ENCOUNTER
Rx Refill Note  Requested Prescriptions     Refused Prescriptions Disp Refills    HYDROcodone-acetaminophen (Norco)  MG per tablet 90 tablet 0     Sig: Take 1 tablet by mouth Every 6 (Six) Hours As Needed for Moderate Pain.     Refused By: HELENA GILMORE     Reason for Refusal: Patient has requested refill too soon      Last office visit with prescribing clinician: 2/17/2025   Last telemedicine visit with prescribing clinician: Visit date not found   Next office visit with prescribing clinician: 4/1/2025                         Would you like a call back once the refill request has been completed: [] Yes [] No    If the office needs to give you a call back, can they leave a voicemail: [] Yes [] No    Helena Gilmore MA  03/13/25, 15:36 CDT

## 2025-03-17 DIAGNOSIS — R52 PAIN: ICD-10-CM

## 2025-03-17 RX ORDER — HYDROCODONE BITARTRATE AND ACETAMINOPHEN 10; 325 MG/1; MG/1
1 TABLET ORAL EVERY 6 HOURS PRN
Qty: 90 TABLET | Refills: 0 | Status: SHIPPED | OUTPATIENT
Start: 2025-03-17

## 2025-03-17 NOTE — TELEPHONE ENCOUNTER
Caller: Rebecca Bermudez    Relationship: Emergency Contact    Best call back number: [phone]     What is the best time to reach you: ANY    Who are you requesting to speak with (clinical staff, provider,  specific staff member): CLINICAL    Do you know the name of the person who called: GRANDDAUGHTER    What was the call regarding: JUST WANTED TO CONFIRM REQUEST WAS RECEIVED    Is it okay if the provider responds through MyChart: YES OR CALL BACK

## 2025-03-17 NOTE — TELEPHONE ENCOUNTER
Rx Refill Note  Requested Prescriptions     Pending Prescriptions Disp Refills    HYDROcodone-acetaminophen (Norco)  MG per tablet 90 tablet 0     Sig: Take 1 tablet by mouth Every 6 (Six) Hours As Needed for Moderate Pain.      Last office visit with office: 2/17/25  Next office visit with office: 4/1/25    UDS: 2/17/25    DATE OF LAST REFILL: 2/21/25    Controlled Substance Agreement: up to date           {TIP  Is Refill Pharmacy correct?:  Helena Meehan MA  03/17/25, 14:17 CDT

## 2025-04-01 ENCOUNTER — TELEPHONE (OUTPATIENT)
Dept: FAMILY MEDICINE CLINIC | Facility: CLINIC | Age: 75
End: 2025-04-01
Payer: MEDICARE

## 2025-04-01 NOTE — TELEPHONE ENCOUNTER
Phone attempt for NO show appt--spoke with granddaughter-she will have patient call office to reschedule appt.

## 2025-04-03 ENCOUNTER — OFFICE VISIT (OUTPATIENT)
Dept: FAMILY MEDICINE CLINIC | Facility: CLINIC | Age: 75
End: 2025-04-03
Payer: MEDICARE

## 2025-04-03 VITALS
BODY MASS INDEX: 44.1 KG/M2 | WEIGHT: 315 LBS | HEIGHT: 71 IN | TEMPERATURE: 98.7 F | DIASTOLIC BLOOD PRESSURE: 76 MMHG | HEART RATE: 77 BPM | SYSTOLIC BLOOD PRESSURE: 121 MMHG | OXYGEN SATURATION: 98 %

## 2025-04-03 DIAGNOSIS — I10 PRIMARY HYPERTENSION: Primary | ICD-10-CM

## 2025-04-03 DIAGNOSIS — E11.9 TYPE 2 DIABETES MELLITUS WITHOUT COMPLICATION, WITHOUT LONG-TERM CURRENT USE OF INSULIN: ICD-10-CM

## 2025-04-03 DIAGNOSIS — T14.8XXA ABRASION: ICD-10-CM

## 2025-04-03 DIAGNOSIS — E66.01 MORBIDLY OBESE: ICD-10-CM

## 2025-04-03 RX ORDER — ALLOPURINOL 100 MG/1
TABLET ORAL
COMMUNITY
Start: 2025-03-30

## 2025-04-03 NOTE — PROGRESS NOTES
CC: 6 month follow up - HTN, DM; left leg injury    History:  Ephraim Quinones is a 75 y.o. male who presents today for evaluation of the above problems.      Patient presents for 6 month check on t2dm, HTN. Reports that he is doing well. BP is appropriate. In going over meds there is some discrepancy regarding what diuretic he is taking. He believes he is taking lasix instead of bumex.  States that he scraped his leg on a tractor clutch. Has been keeping the area bandaged during he day and open to air at night and using triple antibiotic ointment. He does have swelling in his lower legs with chronic erythematous discoloration.     HPI  ROS:  Review of Systems   Respiratory:  Negative for shortness of breath.    Cardiovascular:  Positive for leg swelling. Negative for chest pain.   Skin:         Abrasion on left shin       Allergies   Allergen Reactions    Adhesive Tape Other (See Comments)     Blisters and rips skin off    Metformin Diarrhea    Simvastatin Other (See Comments)     Leg Cramps    Cleocin [Clindamycin] GI Intolerance     Past Medical History:   Diagnosis Date    A-fib     Acid reflux     Anemia 05/03/2016    Claustrophobia     Diabetes mellitus     Gallstones     Gout     Hyperlipidemia     Hypertension     Malignant neoplasm of upper lobe of right lung 3/17/2023    Pneumonia     Sleep apnea with use of continuous positive airway pressure (CPAP)      Past Surgical History:   Procedure Laterality Date    BACK SURGERY      x2    BLADDER SURGERY  02/2023    CATARACT EXTRACTION      CIRCUMCISION N/A 06/06/2019    Procedure: CIRCUMCISION;  Surgeon: Yon Sloan MD;  Location:  [facility] OR;  Service: Urology    COLONOSCOPY      FINGER SURGERY Left     LUNG BIOPSY Right 03/13/2023    REPLACEMENT TOTAL KNEE Bilateral     URETHRAL DILATATION N/A 12/01/2022    Procedure: URETHRAL DILATATION with Balloon, TRANSURETHRAL RESECTION OF BLADDER TUMOR;  Surgeon: Yon Sloan MD;  Location:  [facility] OR;   Service: Urology;  Laterality: N/A;    URETHROPLASTY  05/08/2024    [place]     Family History   Problem Relation Age of Onset    Colon cancer Mother     Prostate cancer Father     No Known Problems Maternal Grandmother     No Known Problems Maternal Grandfather     No Known Problems Paternal Grandmother     No Known Problems Paternal Grandfather       reports that he quit smoking about 28 years ago. His smoking use included cigarettes. He started smoking about 48 years ago. He has a 20 pack-year smoking history. He has been exposed to tobacco smoke. He has never used smokeless tobacco. He reports current alcohol use. He reports that he does not use drugs.      Current Outpatient Medications:     albuterol sulfate  (90 Base) MCG/ACT inhaler, 2 sprays 2 minutes apart every 6 hours as needed, Disp: 18 g, Rfl: 5    allopurinol (ZYLOPRIM) 100 MG tablet, TAKE 1 TABLET BY MOUTH EVERY DAY WITH 2 TABLETS OF 300MG ALLOPURINOL FOR TOTAL OF 700MG/DAY, Disp: , Rfl:     allopurinol (ZYLOPRIM) 300 MG tablet, Take 1 tablet by mouth 2 (Two) Times a Day., Disp: 180 tablet, Rfl: 3    aspirin 81 MG EC tablet, Take 1 tablet by mouth Daily., Disp: , Rfl:     atorvastatin (Lipitor) 40 MG tablet, Take 1 tablet by mouth Every Night., Disp: 90 tablet, Rfl: 3    bumetanide (BUMEX) 2 MG tablet, Take 1 tablet by mouth Daily., Disp: 90 tablet, Rfl: 2    carbidopa-levodopa (SINEMET)  MG per tablet, Take 1 tablet by mouth 2 (Two) Times a Day., Disp: 180 tablet, Rfl: 3    Cholecalciferol (VITAMIN D3) 2000 units capsule, Take 1 capsule by mouth Daily., Disp: , Rfl:     clobetasol propionate (TEMOVATE) 0.05 % cream, Apply topically to the appropriate area as directed, Disp: 30 g, Rfl: 1    clotrimazole-betamethasone (LOTRISONE) 1-0.05 % cream, Apply 1 Application topically to the appropriate area as directed 2 (Two) Times a Day., Disp: 15 g, Rfl: 5    Eliquis 5 MG tablet tablet, Take 1 tablet by mouth Every 12 (Twelve) Hours., Disp:  180 tablet, Rfl: 1    gabapentin (NEURONTIN) 300 MG capsule, Take 1 capsule by mouth 4 (Four) Times a Day., Disp: 120 capsule, Rfl: 4    glipizide (GLUCOTROL XL) 5 MG ER tablet, Take 2 tablets by mouth 2 (Two) Times a Day Before Meals., Disp: 360 tablet, Rfl: 3    glucose blood test strip, 1 each by Other route Daily. Use as instructed, Disp: 100 each, Rfl: 3    glucose monitor monitoring kit, Use 1 each Daily. Dx. E11.9, Disp: 1 each, Rfl: 0    guaiFENesin (Mucinex) 600 MG 12 hr tablet, Take 2 tablets by mouth 2 (Two) Times a Day., Disp: 28 tablet, Rfl: 0    HYDROcodone-acetaminophen (Norco)  MG per tablet, Take 1 tablet by mouth Every 6 (Six) Hours As Needed for Moderate Pain., Disp: 90 tablet, Rfl: 0    indapamide (LOZOL) 2.5 MG tablet, Take 1 tablet by mouth Every Morning., Disp: 90 tablet, Rfl: 3    Lancet Devices (EASY TOUCH LANCING DEVICE) misc, USE TO TEST DAILY, Disp: , Rfl: 0    Lancets misc, Use 1 each Daily., Disp: 100 each, Rfl: 2    losartan (COZAAR) 100 MG tablet, 1 at bedtime for blood pressure, Disp: 90 tablet, Rfl: 3    metFORMIN (GLUCOPHAGE) 1000 MG tablet, Take 1 tablet by mouth Daily., Disp: , Rfl:     naproxen sodium (ALEVE) 220 MG tablet, Take 1 tablet by mouth 2 (Two) Times a Day As Needed., Disp: , Rfl:     OneTouch Delica Lancets 33G misc, Use 1 each Daily., Disp: 100 each, Rfl: 3    Potassium 99 MG tablet, Take 1 tablet by mouth 2 (Two) Times a Day., Disp: , Rfl:     pramipexole (MIRAPEX) 0.25 MG tablet, TAKE 1-2 TABLETS BY MOUTH NIGHTLY FOR LEG CRAMPS, Disp: 180 tablet, Rfl: 1    albuterol (ACCUNEB) 1.25 MG/3ML nebulizer solution, Take 3 mL by nebulization Every 6 (Six) Hours As Needed for Shortness of Air. (Patient not taking: Reported on 4/3/2025), Disp: 180 each, Rfl: 2    colchicine 0.6 MG tablet, TAKE TWO TABLETS BY MOUTH NOW AND THEN TAKE ONE TABLET ONE HOUR LATER, THEN TAKE ONE TABLET DAILY UNTIL GONE (Patient not taking: Reported on 4/3/2025), Disp: 10 tablet, Rfl: 5     "empagliflozin (JARDIANCE) 25 MG tablet tablet, Take 1 tablet by mouth Daily., Disp: , Rfl:     ipratropium-albuterol (DUO-NEB) 0.5-2.5 mg/3 ml nebulizer, INHALE 3 ML BY NEBULIZATION 4 TIMES A DAY (Patient not taking: Reported on 4/3/2025), Disp: 360 mL, Rfl: 1    OBJECTIVE:  /76 (BP Location: Left arm, Patient Position: Sitting, Cuff Size: Large Adult)   Pulse 77   Temp 98.7 °F (37.1 °C) (Temporal)   Ht 180.3 cm (71\")   Wt (!) 145 kg (320 lb 3.2 oz)   SpO2 98%   BMI 44.66 kg/m²    Physical Exam  Vitals reviewed.   Constitutional:       Appearance: He is well-developed.   Cardiovascular:      Rate and Rhythm: Normal rate and regular rhythm.      Heart sounds: Normal heart sounds.   Pulmonary:      Effort: Pulmonary effort is normal.      Breath sounds: Normal breath sounds.   Musculoskeletal:      Right lower le+ Pitting      Left lower le+ Pitting   Skin:     Comments: Abrasion on left shin. No drainage. No warmth to surrounding tissues   Neurological:      Mental Status: He is alert and oriented to person, place, and time.   Psychiatric:         Behavior: Behavior normal.         Assessment/Plan    Diagnoses and all orders for this visit:    1. Primary hypertension (Primary)  -     Comprehensive Metabolic Panel  -     Lipid Panel    2. Type 2 diabetes mellitus without complication, without long-term current use of insulin  -     Lipid Panel  -     Hemoglobin A1c    3. Morbidly obese  -     Lipid Panel    4. Abrasion    Abrasion does not appear to be infected. Continue to monitor at home. Labs today. BP appropriate. Patient will contact office to advise on what diuretic he is taking.     An After Visit Summary was printed and given to the patient at discharge.  Return in about 6 months (around 10/3/2025) for Annual physical.       ANSHUL Noel 25    Electronically signed.  "

## 2025-04-04 LAB
ALBUMIN SERPL-MCNC: 3.9 G/DL (ref 3.5–5.2)
ALBUMIN/GLOB SERPL: 2 G/DL
ALP SERPL-CCNC: 74 U/L (ref 39–117)
ALT SERPL-CCNC: 17 U/L (ref 1–41)
AST SERPL-CCNC: 23 U/L (ref 1–40)
BILIRUB SERPL-MCNC: 0.6 MG/DL (ref 0–1.2)
BUN SERPL-MCNC: 19 MG/DL (ref 8–23)
BUN/CREAT SERPL: 21.8 (ref 7–25)
CALCIUM SERPL-MCNC: 9.4 MG/DL (ref 8.6–10.5)
CHLORIDE SERPL-SCNC: 99 MMOL/L (ref 98–107)
CHOLEST SERPL-MCNC: 186 MG/DL (ref 0–200)
CO2 SERPL-SCNC: 25 MMOL/L (ref 22–29)
CREAT SERPL-MCNC: 0.87 MG/DL (ref 0.76–1.27)
EGFRCR SERPLBLD CKD-EPI 2021: 90 ML/MIN/1.73
GLOBULIN SER CALC-MCNC: 2 GM/DL
GLUCOSE SERPL-MCNC: 149 MG/DL (ref 65–99)
HBA1C MFR BLD: 7.5 % (ref 4.8–5.6)
HDLC SERPL-MCNC: 46 MG/DL (ref 40–60)
LDLC SERPL CALC-MCNC: 112 MG/DL (ref 0–100)
POTASSIUM SERPL-SCNC: 4.2 MMOL/L (ref 3.5–5.2)
PROT SERPL-MCNC: 5.9 G/DL (ref 6–8.5)
SODIUM SERPL-SCNC: 137 MMOL/L (ref 136–145)
TRIGL SERPL-MCNC: 156 MG/DL (ref 0–150)
VLDLC SERPL CALC-MCNC: 28 MG/DL (ref 5–40)

## 2025-04-07 ENCOUNTER — OFFICE VISIT (OUTPATIENT)
Dept: FAMILY MEDICINE CLINIC | Facility: CLINIC | Age: 75
End: 2025-04-07
Payer: MEDICARE

## 2025-04-07 VITALS
TEMPERATURE: 98.3 F | SYSTOLIC BLOOD PRESSURE: 140 MMHG | OXYGEN SATURATION: 97 % | BODY MASS INDEX: 44.1 KG/M2 | WEIGHT: 315 LBS | HEIGHT: 71 IN | HEART RATE: 79 BPM | DIASTOLIC BLOOD PRESSURE: 82 MMHG | RESPIRATION RATE: 22 BRPM

## 2025-04-07 DIAGNOSIS — R06.2 WHEEZING: Primary | ICD-10-CM

## 2025-04-07 DIAGNOSIS — J06.9 UPPER RESPIRATORY TRACT INFECTION, UNSPECIFIED TYPE: ICD-10-CM

## 2025-04-07 DIAGNOSIS — R05.3 CHRONIC COUGH: ICD-10-CM

## 2025-04-07 PROCEDURE — 1126F AMNT PAIN NOTED NONE PRSNT: CPT | Performed by: NURSE PRACTITIONER

## 2025-04-07 PROCEDURE — 3077F SYST BP >= 140 MM HG: CPT | Performed by: NURSE PRACTITIONER

## 2025-04-07 PROCEDURE — 3051F HG A1C>EQUAL 7.0%<8.0%: CPT | Performed by: NURSE PRACTITIONER

## 2025-04-07 PROCEDURE — 99213 OFFICE O/P EST LOW 20 MIN: CPT | Performed by: NURSE PRACTITIONER

## 2025-04-07 PROCEDURE — 1160F RVW MEDS BY RX/DR IN RCRD: CPT | Performed by: NURSE PRACTITIONER

## 2025-04-07 PROCEDURE — 1159F MED LIST DOCD IN RCRD: CPT | Performed by: NURSE PRACTITIONER

## 2025-04-07 PROCEDURE — 3079F DIAST BP 80-89 MM HG: CPT | Performed by: NURSE PRACTITIONER

## 2025-04-07 RX ORDER — PREDNISONE 10 MG/1
TABLET ORAL
Qty: 21 TABLET | Refills: 0 | Status: SHIPPED | OUTPATIENT
Start: 2025-04-07

## 2025-04-07 RX ORDER — TRIAMCINOLONE ACETONIDE 40 MG/ML
80 INJECTION, SUSPENSION INTRA-ARTICULAR; INTRAMUSCULAR ONCE
Status: COMPLETED | OUTPATIENT
Start: 2025-04-07 | End: 2025-04-07

## 2025-04-07 RX ADMIN — TRIAMCINOLONE ACETONIDE 80 MG: 40 INJECTION, SUSPENSION INTRA-ARTICULAR; INTRAMUSCULAR at 11:18

## 2025-04-07 NOTE — PROGRESS NOTES
CC:   Chief Complaint   Patient presents with    Shortness of Breath     Mucinex not helping        History:  Ephraim Quinones is a 75 y.o. male who presents today for evaluation of the above problems.      HPI    Shortness of breath and cough.  Reports this has been ongoing for a few months now.  Patient was seen in January and treated with steroid injection, steroid pills and azithromycin.  States the medicine does help briefly but then continues to feel very tight in his lungs.  States shortness of breath and wheezing worse with exertion.  Patient goes to pulmonology yearly for follow-up after tumor removed from right upper lobe of lungs.  CT of chest done on 1/22/2025 and lungs were stable at that time.  Patient states he is using his albuterol inhaler 2-3 times a day and albuterol nebulizer at night with some relief of wheezing.  He is also taking Mucinex to try to get secretions up with no improvement in symptoms.  Afebrile.  Denies any head congestion.  Denies any swelling or chest pain.      Allergies   Allergen Reactions    Adhesive Tape Other (See Comments)     Blisters and rips skin off    Metformin Diarrhea    Simvastatin Other (See Comments)     Leg Cramps    Cleocin [Clindamycin] GI Intolerance     Past Medical History:   Diagnosis Date    A-fib     Acid reflux     Anemia 05/03/2016    Claustrophobia     Diabetes mellitus     Gallstones     Gout     Hyperlipidemia     Hypertension     Malignant neoplasm of upper lobe of right lung 3/17/2023    Pneumonia     Sleep apnea with use of continuous positive airway pressure (CPAP)      Past Surgical History:   Procedure Laterality Date    BACK SURGERY      x2    BLADDER SURGERY  02/2023    CATARACT EXTRACTION      CIRCUMCISION N/A 06/06/2019    Procedure: CIRCUMCISION;  Surgeon: Yon Sloan MD;  Location: [facility];  Service: Urology    COLONOSCOPY      FINGER SURGERY Left     LUNG BIOPSY Right 03/13/2023    REPLACEMENT TOTAL KNEE Bilateral      URETHRAL DILATATION N/A 12/01/2022    Procedure: URETHRAL DILATATION with Balloon, TRANSURETHRAL RESECTION OF BLADDER TUMOR;  Surgeon: Yon Sloan MD;  Location: [facility];  Service: Urology;  Laterality: N/A;    URETHROPLASTY  05/08/2024    [place]     Family History   Problem Relation Age of Onset    Colon cancer Mother     Prostate cancer Father     No Known Problems Maternal Grandmother     No Known Problems Maternal Grandfather     No Known Problems Paternal Grandmother     No Known Problems Paternal Grandfather       reports that he quit smoking about 28 years ago. His smoking use included cigarettes. He started smoking about 48 years ago. He has a 20 pack-year smoking history. He has been exposed to tobacco smoke. He has never used smokeless tobacco. He reports current alcohol use. He reports that he does not use drugs.      Current Outpatient Medications:     albuterol (ACCUNEB) 1.25 MG/3ML nebulizer solution, Take 3 mL by nebulization Every 6 (Six) Hours As Needed for Shortness of Air., Disp: 180 each, Rfl: 2    albuterol sulfate  (90 Base) MCG/ACT inhaler, 2 sprays 2 minutes apart every 6 hours as needed, Disp: 18 g, Rfl: 5    allopurinol (ZYLOPRIM) 100 MG tablet, TAKE 1 TABLET BY MOUTH EVERY DAY WITH 2 TABLETS OF 300MG ALLOPURINOL FOR TOTAL OF 700MG/DAY, Disp: , Rfl:     allopurinol (ZYLOPRIM) 300 MG tablet, Take 1 tablet by mouth 2 (Two) Times a Day., Disp: 180 tablet, Rfl: 3    aspirin 81 MG EC tablet, Take 1 tablet by mouth Daily., Disp: , Rfl:     atorvastatin (Lipitor) 40 MG tablet, Take 1 tablet by mouth Every Night., Disp: 90 tablet, Rfl: 3    bumetanide (BUMEX) 2 MG tablet, Take 1 tablet by mouth Daily., Disp: 90 tablet, Rfl: 2    carbidopa-levodopa (SINEMET)  MG per tablet, Take 1 tablet by mouth 2 (Two) Times a Day., Disp: 180 tablet, Rfl: 3    Cholecalciferol (VITAMIN D3) 2000 units capsule, Take 1 capsule by mouth Daily., Disp: , Rfl:     clobetasol propionate  (TEMOVATE) 0.05 % cream, Apply topically to the appropriate area as directed, Disp: 30 g, Rfl: 1    clotrimazole-betamethasone (LOTRISONE) 1-0.05 % cream, Apply 1 Application topically to the appropriate area as directed 2 (Two) Times a Day., Disp: 15 g, Rfl: 5    colchicine 0.6 MG tablet, TAKE TWO TABLETS BY MOUTH NOW AND THEN TAKE ONE TABLET ONE HOUR LATER, THEN TAKE ONE TABLET DAILY UNTIL GONE, Disp: 10 tablet, Rfl: 5    Eliquis 5 MG tablet tablet, Take 1 tablet by mouth Every 12 (Twelve) Hours., Disp: 180 tablet, Rfl: 1    empagliflozin (JARDIANCE) 25 MG tablet tablet, Take 1 tablet by mouth Daily., Disp: , Rfl:     gabapentin (NEURONTIN) 300 MG capsule, Take 1 capsule by mouth 4 (Four) Times a Day., Disp: 120 capsule, Rfl: 4    glipizide (GLUCOTROL XL) 5 MG ER tablet, Take 2 tablets by mouth 2 (Two) Times a Day Before Meals., Disp: 360 tablet, Rfl: 3    glucose blood test strip, 1 each by Other route Daily. Use as instructed, Disp: 100 each, Rfl: 3    glucose monitor monitoring kit, Use 1 each Daily. Dx. E11.9, Disp: 1 each, Rfl: 0    guaiFENesin (Mucinex) 600 MG 12 hr tablet, Take 2 tablets by mouth 2 (Two) Times a Day., Disp: 28 tablet, Rfl: 0    HYDROcodone-acetaminophen (Norco)  MG per tablet, Take 1 tablet by mouth Every 6 (Six) Hours As Needed for Moderate Pain., Disp: 90 tablet, Rfl: 0    indapamide (LOZOL) 2.5 MG tablet, Take 1 tablet by mouth Every Morning., Disp: 90 tablet, Rfl: 3    ipratropium-albuterol (DUO-NEB) 0.5-2.5 mg/3 ml nebulizer, INHALE 3 ML BY NEBULIZATION 4 TIMES A DAY, Disp: 360 mL, Rfl: 1    Lancet Devices (EASY TOUCH LANCING DEVICE) misc, USE TO TEST DAILY, Disp: , Rfl: 0    Lancets misc, Use 1 each Daily., Disp: 100 each, Rfl: 2    losartan (COZAAR) 100 MG tablet, 1 at bedtime for blood pressure, Disp: 90 tablet, Rfl: 3    metFORMIN (GLUCOPHAGE) 1000 MG tablet, Take 1 tablet by mouth Daily., Disp: , Rfl:     naproxen sodium (ALEVE) 220 MG tablet, Take 1 tablet by mouth 2 (Two)  "Times a Day As Needed., Disp: , Rfl:     OneTouch Delica Lancets 33G misc, Use 1 each Daily., Disp: 100 each, Rfl: 3    Potassium 99 MG tablet, Take 1 tablet by mouth 2 (Two) Times a Day., Disp: , Rfl:     pramipexole (MIRAPEX) 0.25 MG tablet, TAKE 1-2 TABLETS BY MOUTH NIGHTLY FOR LEG CRAMPS, Disp: 180 tablet, Rfl: 1    predniSONE (DELTASONE) 10 MG (21) dose pack, Use as directed on package, start on Tuesday, Disp: 21 tablet, Rfl: 0    Current Facility-Administered Medications:     triamcinolone acetonide (KENALOG-40) injection 80 mg, 80 mg, Intramuscular, Once, Jung, ANSHUL Gomez    OBJECTIVE:  /82 (BP Location: Left arm, Patient Position: Sitting, Cuff Size: Large Adult)   Pulse 79   Temp 98.3 °F (36.8 °C) (Temporal)   Resp 22   Ht 180.3 cm (71\")   Wt (!) 146 kg (321 lb)   SpO2 97%   BMI 44.77 kg/m²    Estimated body mass index is 44.77 kg/m² as calculated from the following:    Height as of this encounter: 180.3 cm (71\").    Weight as of this encounter: 146 kg (321 lb).                  Physical Exam  Vitals reviewed.   Constitutional:       General: He is not in acute distress.     Appearance: Normal appearance. He is obese.   HENT:      Head: Normocephalic and atraumatic.      Right Ear: Tympanic membrane, ear canal and external ear normal.      Left Ear: Tympanic membrane, ear canal and external ear normal.      Mouth/Throat:      Mouth: Mucous membranes are moist.      Pharynx: Oropharynx is clear.   Cardiovascular:      Rate and Rhythm: Normal rate and regular rhythm.      Heart sounds: Normal heart sounds.   Pulmonary:      Breath sounds: Wheezing (inspiratory and expiratory wheezes throughout) present. No rales.   Lymphadenopathy:      Cervical: No cervical adenopathy.   Skin:     General: Skin is warm and dry.   Neurological:      Mental Status: He is alert and oriented to person, place, and time.   Psychiatric:         Mood and Affect: Mood normal.         Behavior: Behavior normal.      "         Assessment/Plan    Diagnoses and all orders for this visit:    1. Wheezing (Primary)  -     XR Chest PA & Lateral; Future  -     triamcinolone acetonide (KENALOG-40) injection 80 mg  -     predniSONE (DELTASONE) 10 MG (21) dose pack; Use as directed on package, start on Tuesday  Dispense: 21 tablet; Refill: 0    2. Chronic cough  -     XR Chest PA & Lateral; Future    3. Upper respiratory tract infection, unspecified type    Will evaluate further with chest xray- encouraged him to go today to get that done. Steroid to help with inflammation in lungs, continue albuterol PRN and mucinex. May need to add daily inhaler to help with symptoms- will review xray first. If worsening symptoms- f/u or go to ER            An After Visit Summary was printed and given to the patient at discharge.  Return if symptoms worsen or fail to improve.

## 2025-04-08 DIAGNOSIS — R52 PAIN: ICD-10-CM

## 2025-04-08 NOTE — TELEPHONE ENCOUNTER
Rx Refill Note  Requested Prescriptions     Pending Prescriptions Disp Refills    HYDROcodone-acetaminophen (Norco)  MG per tablet 90 tablet 0     Sig: Take 1 tablet by mouth Every 6 (Six) Hours As Needed for Moderate Pain.      Last office visit with office: 2/17/25  Next office visit with office: 4/24/25    UDS: 2/17/25    DATE OF LAST REFILL: 3/17/25    Controlled Substance Agreement: up to date           {TIP  Is Refill Pharmacy correct?:  Helena Meehan MA  04/08/25, 16:27 CDT

## 2025-04-09 RX ORDER — HYDROCODONE BITARTRATE AND ACETAMINOPHEN 10; 325 MG/1; MG/1
1 TABLET ORAL EVERY 6 HOURS PRN
Qty: 90 TABLET | Refills: 0 | Status: SHIPPED | OUTPATIENT
Start: 2025-04-09

## 2025-04-28 ENCOUNTER — OFFICE VISIT (OUTPATIENT)
Dept: FAMILY MEDICINE CLINIC | Facility: CLINIC | Age: 75
End: 2025-04-28
Payer: MEDICARE

## 2025-04-28 VITALS
HEIGHT: 71 IN | WEIGHT: 315 LBS | DIASTOLIC BLOOD PRESSURE: 80 MMHG | BODY MASS INDEX: 44.1 KG/M2 | TEMPERATURE: 98 F | OXYGEN SATURATION: 97 % | HEART RATE: 76 BPM | SYSTOLIC BLOOD PRESSURE: 129 MMHG

## 2025-04-28 DIAGNOSIS — R25.2 MUSCLE CRAMPS: ICD-10-CM

## 2025-04-28 DIAGNOSIS — R06.2 WHEEZING: ICD-10-CM

## 2025-04-28 DIAGNOSIS — R05.3 CHRONIC COUGH: Primary | ICD-10-CM

## 2025-04-28 PROCEDURE — 1125F AMNT PAIN NOTED PAIN PRSNT: CPT | Performed by: NURSE PRACTITIONER

## 2025-04-28 PROCEDURE — 3074F SYST BP LT 130 MM HG: CPT | Performed by: NURSE PRACTITIONER

## 2025-04-28 PROCEDURE — 99213 OFFICE O/P EST LOW 20 MIN: CPT | Performed by: NURSE PRACTITIONER

## 2025-04-28 PROCEDURE — 3051F HG A1C>EQUAL 7.0%<8.0%: CPT | Performed by: NURSE PRACTITIONER

## 2025-04-28 PROCEDURE — 1159F MED LIST DOCD IN RCRD: CPT | Performed by: NURSE PRACTITIONER

## 2025-04-28 PROCEDURE — 1160F RVW MEDS BY RX/DR IN RCRD: CPT | Performed by: NURSE PRACTITIONER

## 2025-04-28 PROCEDURE — 3079F DIAST BP 80-89 MM HG: CPT | Performed by: NURSE PRACTITIONER

## 2025-04-28 RX ORDER — ALBUTEROL SULFATE 90 UG/1
INHALANT RESPIRATORY (INHALATION)
Qty: 18 G | Refills: 5 | Status: SHIPPED | OUTPATIENT
Start: 2025-04-28

## 2025-04-28 NOTE — PROGRESS NOTES
CC:   Chief Complaint   Patient presents with    Wheezing     2 week follow up        History:  Ephraim Quinones is a 75 y.o. male who presents today for evaluation of the above problems.      HPI    Patient presents for 2-week follow-up on wheezing.  Reports he is feeling much better and steroids along with albuterol inhaler improves symptoms.  Denies any coughing.  Patient had chest x-ray 2 weeks ago and normal.  Reports he uses his albuterol inhaler about 2 times a day.    Patient mentioned having muscle cramps in hands and legs-worse at night.  Taking prescribed medication for this with little relief of symptoms.  Reviewed labs with patient and medications.  Discussed him trying to take a magnesium pill at night to see if helps with symptoms as well.  If continues can discuss at appointment on the 16th for further workup.      Allergies   Allergen Reactions    Adhesive Tape Other (See Comments)     Blisters and rips skin off    Metformin Diarrhea    Simvastatin Other (See Comments)     Leg Cramps    Cleocin [Clindamycin] GI Intolerance     Past Medical History:   Diagnosis Date    A-fib     Acid reflux     Anemia 05/03/2016    Claustrophobia     Diabetes mellitus     Gallstones     Gout     Hyperlipidemia     Hypertension     Malignant neoplasm of upper lobe of right lung 3/17/2023    Pneumonia     Sleep apnea with use of continuous positive airway pressure (CPAP)      Past Surgical History:   Procedure Laterality Date    BACK SURGERY      x2    BLADDER SURGERY  02/2023    CATARACT EXTRACTION      CIRCUMCISION N/A 06/06/2019    Procedure: CIRCUMCISION;  Surgeon: Yon Sloan MD;  Location: [facility] OR;  Service: Urology    COLONOSCOPY      FINGER SURGERY Left     LUNG BIOPSY Right 03/13/2023    REPLACEMENT TOTAL KNEE Bilateral     URETHRAL DILATATION N/A 12/01/2022    Procedure: URETHRAL DILATATION with Balloon, TRANSURETHRAL RESECTION OF BLADDER TUMOR;  Surgeon: Yon Sloan MD;  Location:   [facility] OR;  Service: Urology;  Laterality: N/A;    URETHROPLASTY  05/08/2024    [place]     Family History   Problem Relation Age of Onset    Colon cancer Mother     Prostate cancer Father     No Known Problems Maternal Grandmother     No Known Problems Maternal Grandfather     No Known Problems Paternal Grandmother     No Known Problems Paternal Grandfather       reports that he quit smoking about 28 years ago. His smoking use included cigarettes. He started smoking about 48 years ago. He has a 20 pack-year smoking history. He has been exposed to tobacco smoke. He has never used smokeless tobacco. He reports current alcohol use. He reports that he does not use drugs.      Current Outpatient Medications:     albuterol (ACCUNEB) 1.25 MG/3ML nebulizer solution, Take 3 mL by nebulization Every 6 (Six) Hours As Needed for Shortness of Air., Disp: 180 each, Rfl: 2    albuterol sulfate  (90 Base) MCG/ACT inhaler, 2 sprays 2 minutes apart every 6 hours as needed, Disp: 18 g, Rfl: 5    allopurinol (ZYLOPRIM) 100 MG tablet, TAKE 1 TABLET BY MOUTH EVERY DAY WITH 2 TABLETS OF 300MG ALLOPURINOL FOR TOTAL OF 700MG/DAY, Disp: , Rfl:     allopurinol (ZYLOPRIM) 300 MG tablet, Take 1 tablet by mouth 2 (Two) Times a Day., Disp: 180 tablet, Rfl: 3    aspirin 81 MG EC tablet, Take 1 tablet by mouth Daily., Disp: , Rfl:     atorvastatin (Lipitor) 40 MG tablet, Take 1 tablet by mouth Every Night., Disp: 90 tablet, Rfl: 3    bumetanide (BUMEX) 2 MG tablet, Take 1 tablet by mouth Daily., Disp: 90 tablet, Rfl: 2    carbidopa-levodopa (SINEMET)  MG per tablet, Take 1 tablet by mouth 2 (Two) Times a Day., Disp: 180 tablet, Rfl: 3    Cholecalciferol (VITAMIN D3) 2000 units capsule, Take 1 capsule by mouth Daily., Disp: , Rfl:     clobetasol propionate (TEMOVATE) 0.05 % cream, Apply topically to the appropriate area as directed, Disp: 30 g, Rfl: 1    clotrimazole-betamethasone (LOTRISONE) 1-0.05 % cream, Apply 1 Application  topically to the appropriate area as directed 2 (Two) Times a Day., Disp: 15 g, Rfl: 5    colchicine 0.6 MG tablet, TAKE TWO TABLETS BY MOUTH NOW AND THEN TAKE ONE TABLET ONE HOUR LATER, THEN TAKE ONE TABLET DAILY UNTIL GONE, Disp: 10 tablet, Rfl: 5    Eliquis 5 MG tablet tablet, Take 1 tablet by mouth Every 12 (Twelve) Hours., Disp: 180 tablet, Rfl: 1    empagliflozin (JARDIANCE) 25 MG tablet tablet, Take 1 tablet by mouth Daily., Disp: , Rfl:     gabapentin (NEURONTIN) 300 MG capsule, Take 1 capsule by mouth 4 (Four) Times a Day., Disp: 120 capsule, Rfl: 4    glipizide (GLUCOTROL XL) 5 MG ER tablet, Take 2 tablets by mouth 2 (Two) Times a Day Before Meals., Disp: 360 tablet, Rfl: 3    glucose blood test strip, 1 each by Other route Daily. Use as instructed, Disp: 100 each, Rfl: 3    glucose monitor monitoring kit, Use 1 each Daily. Dx. E11.9, Disp: 1 each, Rfl: 0    guaiFENesin (Mucinex) 600 MG 12 hr tablet, Take 2 tablets by mouth 2 (Two) Times a Day., Disp: 28 tablet, Rfl: 0    HYDROcodone-acetaminophen (Norco)  MG per tablet, Take 1 tablet by mouth Every 6 (Six) Hours As Needed for Moderate Pain., Disp: 90 tablet, Rfl: 0    indapamide (LOZOL) 2.5 MG tablet, Take 1 tablet by mouth Every Morning., Disp: 90 tablet, Rfl: 3    ipratropium-albuterol (DUO-NEB) 0.5-2.5 mg/3 ml nebulizer, INHALE 3 ML BY NEBULIZATION 4 TIMES A DAY, Disp: 360 mL, Rfl: 1    Lancet Devices (EASY TOUCH LANCING DEVICE) misc, USE TO TEST DAILY, Disp: , Rfl: 0    Lancets misc, Use 1 each Daily., Disp: 100 each, Rfl: 2    losartan (COZAAR) 100 MG tablet, 1 at bedtime for blood pressure, Disp: 90 tablet, Rfl: 3    metFORMIN (GLUCOPHAGE) 1000 MG tablet, Take 1 tablet by mouth Daily., Disp: , Rfl:     naproxen sodium (ALEVE) 220 MG tablet, Take 1 tablet by mouth 2 (Two) Times a Day As Needed., Disp: , Rfl:     OneTouch Delica Lancets 33G misc, Use 1 each Daily., Disp: 100 each, Rfl: 3    Potassium 99 MG tablet, Take 1 tablet by mouth 2 (Two)  "Times a Day., Disp: , Rfl:     pramipexole (MIRAPEX) 0.25 MG tablet, TAKE 1-2 TABLETS BY MOUTH NIGHTLY FOR LEG CRAMPS, Disp: 180 tablet, Rfl: 1    OBJECTIVE:  /80 (BP Location: Left arm, Patient Position: Sitting, Cuff Size: Large Adult)   Pulse 76   Temp 98 °F (36.7 °C) (Temporal)   Ht 180.3 cm (71\")   Wt (!) 143 kg (316 lb 3.2 oz)   SpO2 97%   BMI 44.10 kg/m²    Estimated body mass index is 44.1 kg/m² as calculated from the following:    Height as of this encounter: 180.3 cm (71\").    Weight as of this encounter: 143 kg (316 lb 3.2 oz).                  Physical Exam  Vitals reviewed.   Constitutional:       General: He is not in acute distress.     Appearance: Normal appearance. He is obese.   HENT:      Head: Normocephalic and atraumatic.   Cardiovascular:      Rate and Rhythm: Normal rate and regular rhythm.   Pulmonary:      Effort: No respiratory distress.      Breath sounds: Normal breath sounds. No wheezing or rales.   Musculoskeletal:         General: Normal range of motion.      Right lower leg: Edema present.      Left lower leg: Edema present.   Neurological:      Mental Status: He is alert and oriented to person, place, and time.   Psychiatric:         Mood and Affect: Mood normal.         Behavior: Behavior normal.              Assessment/Plan    Diagnoses and all orders for this visit:    1. Chronic cough (Primary)    2. Wheezing  -     albuterol sulfate  (90 Base) MCG/ACT inhaler; 2 sprays 2 minutes apart every 6 hours as needed  Dispense: 18 g; Refill: 5    3. Muscle cramps    Cough and wheezing have improved. Continue albuterol inhaler as needed. Encouraged weight loss. Has lost 5 lbs since last visit.   Encouraged patient to try adding a magnesium supplement at bedtime to see if improves symptoms. Has appt in May 16th- can f/u regarding cramps at that time if persistent            An After Visit Summary was printed and given to the patient at discharge.  Return if symptoms " worsen or fail to improve.

## 2025-04-29 DIAGNOSIS — R52 PAIN: ICD-10-CM

## 2025-04-29 DIAGNOSIS — G62.9 NEUROPATHY: ICD-10-CM

## 2025-04-30 RX ORDER — EMPAGLIFLOZIN 10 MG/1
10 TABLET, FILM COATED ORAL DAILY
Qty: 15 TABLET | Refills: 0 | OUTPATIENT
Start: 2025-04-30

## 2025-04-30 RX ORDER — GABAPENTIN 300 MG/1
300 CAPSULE ORAL 4 TIMES DAILY
Qty: 120 CAPSULE | Refills: 4 | Status: SHIPPED | OUTPATIENT
Start: 2025-04-30

## 2025-04-30 RX ORDER — HYDROCODONE BITARTRATE AND ACETAMINOPHEN 10; 325 MG/1; MG/1
1 TABLET ORAL EVERY 6 HOURS PRN
Qty: 90 TABLET | Refills: 0 | Status: SHIPPED | OUTPATIENT
Start: 2025-04-30

## 2025-04-30 NOTE — TELEPHONE ENCOUNTER
Rx Refill Note  Requested Prescriptions     Pending Prescriptions Disp Refills    HYDROcodone-acetaminophen (Norco)  MG per tablet 90 tablet 0     Sig: Take 1 tablet by mouth Every 6 (Six) Hours As Needed for Moderate Pain.      Last office visit with office: 2/17/25  Next office visit with office: 5/16/25    UDS: 2/17/25    DATE OF LAST REFILL: 4/9/25    Controlled Substance Agreement: up to date           {TIP  Is Refill Pharmacy correct?:  Helena Meehan MA  04/30/25, 07:59 CDT

## 2025-04-30 NOTE — TELEPHONE ENCOUNTER
Rx Refill Note  Requested Prescriptions     Pending Prescriptions Disp Refills    gabapentin (NEURONTIN) 300 MG capsule 120 capsule 4     Sig: Take 1 capsule by mouth 4 (Four) Times a Day.      Last office visit with office: 2/17/25  Next office visit with office: 5/16/25    UDS: 2/17/25    DATE OF LAST REFILL: 1/29/25    Controlled Substance Agreement: up to date           {TIP  Is Refill Pharmacy correct?:  Helena Meehan MA  04/30/25, 07:58 CDT

## 2025-04-30 NOTE — TELEPHONE ENCOUNTER
The original prescription was discontinued on 4/3/2025 by Helena Meehan MA for the following reason: Dose adjustment. Renewing this prescription may not be appropriate.

## 2025-05-01 DIAGNOSIS — M10.9 ACUTE GOUT OF ANKLE, UNSPECIFIED CAUSE, UNSPECIFIED LATERALITY: ICD-10-CM

## 2025-05-01 NOTE — TELEPHONE ENCOUNTER
Rx Refill Note  Requested Prescriptions     Pending Prescriptions Disp Refills    colchicine 0.6 MG tablet 30 tablet 0     Sig: TAKE TWO TABLETS BY MOUTH NOW AND THEN TAKE ONE TABLET ONE HOUR LATER, THEN TAKE ONE TABLET DAILY UNTIL GONE      Last office visit with prescribing clinician: 10/1/2024   Last telemedicine visit with prescribing clinician: Visit date not found   Next office visit with prescribing clinician: 10/7/2025                         Would you like a call back once the refill request has been completed: [] Yes [] No    If the office needs to give you a call back, can they leave a voicemail: [] Yes [] No    Deandre Nassar Rep  05/01/25, 16:19 CDT

## 2025-05-02 RX ORDER — COLCHICINE 0.6 MG/1
TABLET ORAL
Qty: 30 TABLET | Refills: 0 | Status: SHIPPED | OUTPATIENT
Start: 2025-05-02

## 2025-05-16 ENCOUNTER — TELEPHONE (OUTPATIENT)
Dept: FAMILY MEDICINE CLINIC | Facility: CLINIC | Age: 75
End: 2025-05-16
Payer: MEDICARE

## 2025-05-21 DIAGNOSIS — R52 PAIN: ICD-10-CM

## 2025-05-21 RX ORDER — HYDROCODONE BITARTRATE AND ACETAMINOPHEN 10; 325 MG/1; MG/1
1 TABLET ORAL EVERY 6 HOURS PRN
Qty: 90 TABLET | Refills: 0 | Status: SHIPPED | OUTPATIENT
Start: 2025-05-21

## 2025-05-21 NOTE — TELEPHONE ENCOUNTER
Rx Refill Note  Requested Prescriptions     Pending Prescriptions Disp Refills    HYDROcodone-acetaminophen (Norco)  MG per tablet 90 tablet 0     Sig: Take 1 tablet by mouth Every 6 (Six) Hours As Needed for Moderate Pain.      Last office visit with office: 04/28/25  Next office visit with office: 10/07/25    UDS: 02/17/25    DATE OF LAST REFILL: 04/30/25    Controlled Substance Agreement: up to date      Alejandra Rolle MA  05/21/25, 11:15 CDT

## 2025-05-23 NOTE — TELEPHONE ENCOUNTER
Pt states he is out of meds and pharmacy told him too soon to fill.  Has been taking 800mg allopurinol daily for a pretty good while.  Ok for me to change directions and send in 100mg for 2 daily? Has nearly a bottle full of 300mg.      Was told 800 mg is the max dose.     [facility]

## 2025-05-27 RX ORDER — ALLOPURINOL 100 MG/1
200 TABLET ORAL DAILY
Qty: 180 TABLET | Refills: 1 | Status: SHIPPED | OUTPATIENT
Start: 2025-05-27

## 2025-06-05 ENCOUNTER — OFFICE VISIT (OUTPATIENT)
Dept: FAMILY MEDICINE CLINIC | Facility: CLINIC | Age: 75
End: 2025-06-05
Payer: MEDICARE

## 2025-06-05 VITALS
SYSTOLIC BLOOD PRESSURE: 124 MMHG | TEMPERATURE: 97.3 F | HEART RATE: 79 BPM | HEIGHT: 71 IN | OXYGEN SATURATION: 94 % | DIASTOLIC BLOOD PRESSURE: 78 MMHG | BODY MASS INDEX: 44.1 KG/M2 | WEIGHT: 315 LBS

## 2025-06-05 DIAGNOSIS — R06.02 SHORTNESS OF BREATH: Primary | ICD-10-CM

## 2025-06-05 RX ORDER — TRIAMCINOLONE ACETONIDE 40 MG/ML
40 INJECTION, SUSPENSION INTRA-ARTICULAR; INTRAMUSCULAR ONCE
Status: COMPLETED | OUTPATIENT
Start: 2025-06-05 | End: 2025-06-05

## 2025-06-05 RX ORDER — PREDNISONE 20 MG/1
TABLET ORAL
Qty: 9 TABLET | Refills: 0 | Status: SHIPPED | OUTPATIENT
Start: 2025-06-05

## 2025-06-05 RX ADMIN — TRIAMCINOLONE ACETONIDE 40 MG: 40 INJECTION, SUSPENSION INTRA-ARTICULAR; INTRAMUSCULAR at 09:07

## 2025-06-05 NOTE — PROGRESS NOTES
CC: shortness of breath, cough, wheezing    History:  Ephraim Quinones is a 75 y.o. male who presents today for evaluation of the above problems.          Shortness of Breath  Chronicity:  New  Onset:  1 to 4 weeks ago  Frequency:  Constantly  Progression since onset:  Worsening  Fever:  No fever  Associated symptoms: no congestion, no fever and no sore throat    Aggravating factors:  Nothing  Treatments tried: mucinex and inhaler.  Improvement on treatment:  No relief    ROS:  Review of Systems   Constitutional:  Negative for fever.   HENT:  Negative for congestion and sore throat.    Respiratory:  Positive for cough and shortness of breath.        Allergies   Allergen Reactions    Adhesive Tape Other (See Comments)     Blisters and rips skin off    Metformin Diarrhea    Simvastatin Other (See Comments)     Leg Cramps    Cleocin [Clindamycin] GI Intolerance     Past Medical History:   Diagnosis Date    A-fib     Acid reflux     Anemia 05/03/2016    Claustrophobia     Diabetes mellitus     Gallstones     Gout     Hyperlipidemia     Hypertension     Malignant neoplasm of upper lobe of right lung 3/17/2023    Pneumonia     Sleep apnea with use of continuous positive airway pressure (CPAP)      Past Surgical History:   Procedure Laterality Date    BACK SURGERY      x2    BLADDER SURGERY  02/2023    CATARACT EXTRACTION      CIRCUMCISION N/A 06/06/2019    Procedure: CIRCUMCISION;  Surgeon: Yon Sloan MD;  Location:  [facility];  Service: Urology    COLONOSCOPY      FINGER SURGERY Left     LUNG BIOPSY Right 03/13/2023    REPLACEMENT TOTAL KNEE Bilateral     URETHRAL DILATATION N/A 12/01/2022    Procedure: URETHRAL DILATATION with Balloon, TRANSURETHRAL RESECTION OF BLADDER TUMOR;  Surgeon: Yon Sloan MD;  Location:  [facility];  Service: Urology;  Laterality: N/A;    URETHROPLASTY  05/08/2024    [place]     Family History   Problem Relation Age of Onset    Colon cancer Mother     Prostate cancer  Father     No Known Problems Maternal Grandmother     No Known Problems Maternal Grandfather     No Known Problems Paternal Grandmother     No Known Problems Paternal Grandfather       reports that he quit smoking about 29 years ago. His smoking use included cigarettes. He started smoking about 49 years ago. He has a 20 pack-year smoking history. He has been exposed to tobacco smoke. He has never used smokeless tobacco. He reports current alcohol use. He reports that he does not use drugs.      Current Outpatient Medications:     albuterol (ACCUNEB) 1.25 MG/3ML nebulizer solution, Take 3 mL by nebulization Every 6 (Six) Hours As Needed for Shortness of Air., Disp: 180 each, Rfl: 2    albuterol sulfate  (90 Base) MCG/ACT inhaler, 2 sprays 2 minutes apart every 6 hours as needed, Disp: 18 g, Rfl: 5    allopurinol (ZYLOPRIM) 100 MG tablet, Take 2 tablets by mouth Daily., Disp: 180 tablet, Rfl: 1    allopurinol (ZYLOPRIM) 300 MG tablet, Take 1 tablet by mouth 2 (Two) Times a Day., Disp: 180 tablet, Rfl: 3    aspirin 81 MG EC tablet, Take 1 tablet by mouth Daily., Disp: , Rfl:     atorvastatin (Lipitor) 40 MG tablet, Take 1 tablet by mouth Every Night., Disp: 90 tablet, Rfl: 3    bumetanide (BUMEX) 2 MG tablet, Take 1 tablet by mouth Daily., Disp: 90 tablet, Rfl: 2    carbidopa-levodopa (SINEMET)  MG per tablet, Take 1 tablet by mouth 2 (Two) Times a Day., Disp: 180 tablet, Rfl: 3    Cholecalciferol (VITAMIN D3) 2000 units capsule, Take 1 capsule by mouth Daily., Disp: , Rfl:     clobetasol propionate (TEMOVATE) 0.05 % cream, Apply topically to the appropriate area as directed, Disp: 30 g, Rfl: 1    clotrimazole-betamethasone (LOTRISONE) 1-0.05 % cream, Apply 1 Application topically to the appropriate area as directed 2 (Two) Times a Day., Disp: 15 g, Rfl: 5    colchicine 0.6 MG tablet, TAKE TWO TABLETS BY MOUTH NOW AND THEN TAKE ONE TABLET ONE HOUR LATER, THEN TAKE ONE TABLET DAILY UNTIL GONE, Disp: 30  tablet, Rfl: 0    Eliquis 5 MG tablet tablet, Take 1 tablet by mouth Every 12 (Twelve) Hours., Disp: 180 tablet, Rfl: 1    empagliflozin (JARDIANCE) 25 MG tablet tablet, Take 1 tablet by mouth Daily., Disp: , Rfl:     gabapentin (NEURONTIN) 300 MG capsule, Take 1 capsule by mouth 4 (Four) Times a Day., Disp: 120 capsule, Rfl: 4    glipizide (GLUCOTROL XL) 5 MG ER tablet, Take 2 tablets by mouth 2 (Two) Times a Day Before Meals., Disp: 360 tablet, Rfl: 3    glucose blood test strip, 1 each by Other route Daily. Use as instructed, Disp: 100 each, Rfl: 3    glucose monitor monitoring kit, Use 1 each Daily. Dx. E11.9, Disp: 1 each, Rfl: 0    guaiFENesin (Mucinex) 600 MG 12 hr tablet, Take 2 tablets by mouth 2 (Two) Times a Day., Disp: 28 tablet, Rfl: 0    HYDROcodone-acetaminophen (Norco)  MG per tablet, Take 1 tablet by mouth Every 6 (Six) Hours As Needed for Moderate Pain., Disp: 90 tablet, Rfl: 0    indapamide (LOZOL) 2.5 MG tablet, Take 1 tablet by mouth Every Morning., Disp: 90 tablet, Rfl: 3    ipratropium-albuterol (DUO-NEB) 0.5-2.5 mg/3 ml nebulizer, INHALE 3 ML BY NEBULIZATION 4 TIMES A DAY, Disp: 360 mL, Rfl: 1    Lancet Devices (EASY TOUCH LANCING DEVICE) misc, USE TO TEST DAILY, Disp: , Rfl: 0    Lancets misc, Use 1 each Daily., Disp: 100 each, Rfl: 2    losartan (COZAAR) 100 MG tablet, 1 at bedtime for blood pressure, Disp: 90 tablet, Rfl: 3    metFORMIN (GLUCOPHAGE) 1000 MG tablet, Take 1 tablet by mouth Daily., Disp: , Rfl:     naproxen sodium (ALEVE) 220 MG tablet, Take 1 tablet by mouth 2 (Two) Times a Day As Needed., Disp: , Rfl:     OneTouch Delica Lancets 33G misc, Use 1 each Daily., Disp: 100 each, Rfl: 3    Potassium 99 MG tablet, Take 1 tablet by mouth 2 (Two) Times a Day., Disp: , Rfl:     pramipexole (MIRAPEX) 0.25 MG tablet, TAKE 1-2 TABLETS BY MOUTH NIGHTLY FOR LEG CRAMPS, Disp: 180 tablet, Rfl: 1    predniSONE (DELTASONE) 20 MG tablet, Take two tablets daily for 3 days and then one  "tablet daily until gone., Disp: 9 tablet, Rfl: 0    Current Facility-Administered Medications:     triamcinolone acetonide (KENALOG-40) injection 40 mg, 40 mg, Intramuscular, Once, Dolores Ba, APRN    OBJECTIVE:  /78 (BP Location: Left arm, Patient Position: Sitting, Cuff Size: Large Adult)   Pulse 79   Temp 97.3 °F (36.3 °C) (Infrared)   Ht 180.3 cm (71\")   Wt (!) 148 kg (326 lb)   SpO2 94%   BMI 45.47 kg/m²    Physical Exam  Vitals reviewed.   Constitutional:       Appearance: He is well-developed.   Cardiovascular:      Rate and Rhythm: Normal rate.   Pulmonary:      Effort: Pulmonary effort is normal.      Breath sounds: Wheezing present.   Neurological:      Mental Status: He is alert and oriented to person, place, and time.   Psychiatric:         Behavior: Behavior normal.         Assessment/Plan    Diagnoses and all orders for this visit:    1. Shortness of breath (Primary)  -     Comprehensive Metabolic Panel  -     proBNP  -     CBC & Differential  -     triamcinolone acetonide (KENALOG-40) injection 40 mg  -     predniSONE (DELTASONE) 20 MG tablet; Take two tablets daily for 3 days and then one tablet daily until gone.  Dispense: 9 tablet; Refill: 0    Increase bumex to twice a day for one week. If symptoms persist return to clinic.     An After Visit Summary was printed and given to the patient at discharge.  Return if symptoms worsen or fail to improve, for Next scheduled follow up.       ANSHUL Noel 6/5/25    Electronically signed.  "

## 2025-06-06 LAB
ALBUMIN SERPL-MCNC: 3.9 G/DL (ref 3.5–5.2)
ALBUMIN/GLOB SERPL: 1.9 G/DL
ALP SERPL-CCNC: 82 U/L (ref 39–117)
ALT SERPL-CCNC: 13 U/L (ref 1–41)
AST SERPL-CCNC: 21 U/L (ref 1–40)
BASOPHILS # BLD AUTO: 0.06 10*3/MM3 (ref 0–0.2)
BASOPHILS NFR BLD AUTO: 0.9 % (ref 0–1.5)
BILIRUB SERPL-MCNC: 0.3 MG/DL (ref 0–1.2)
BUN SERPL-MCNC: 17 MG/DL (ref 8–23)
BUN/CREAT SERPL: 18.7 (ref 7–25)
CALCIUM SERPL-MCNC: 9.5 MG/DL (ref 8.6–10.5)
CHLORIDE SERPL-SCNC: 100 MMOL/L (ref 98–107)
CO2 SERPL-SCNC: 23.3 MMOL/L (ref 22–29)
CREAT SERPL-MCNC: 0.91 MG/DL (ref 0.76–1.27)
EGFRCR SERPLBLD CKD-EPI 2021: 87.9 ML/MIN/1.73
EOSINOPHIL # BLD AUTO: 0.36 10*3/MM3 (ref 0–0.4)
EOSINOPHIL NFR BLD AUTO: 5.6 % (ref 0.3–6.2)
ERYTHROCYTE [DISTWIDTH] IN BLOOD BY AUTOMATED COUNT: 13.2 % (ref 12.3–15.4)
GLOBULIN SER CALC-MCNC: 2.1 GM/DL
GLUCOSE SERPL-MCNC: 241 MG/DL (ref 65–99)
HCT VFR BLD AUTO: 46.2 % (ref 37.5–51)
HGB BLD-MCNC: 15.7 G/DL (ref 13–17.7)
IMM GRANULOCYTES # BLD AUTO: 0.07 10*3/MM3 (ref 0–0.05)
IMM GRANULOCYTES NFR BLD AUTO: 1.1 % (ref 0–0.5)
LYMPHOCYTES # BLD AUTO: 0.87 10*3/MM3 (ref 0.7–3.1)
LYMPHOCYTES NFR BLD AUTO: 13.4 % (ref 19.6–45.3)
MCH RBC QN AUTO: 34.2 PG (ref 26.6–33)
MCHC RBC AUTO-ENTMCNC: 34 G/DL (ref 31.5–35.7)
MCV RBC AUTO: 100.7 FL (ref 79–97)
MONOCYTES # BLD AUTO: 0.43 10*3/MM3 (ref 0.1–0.9)
MONOCYTES NFR BLD AUTO: 6.6 % (ref 5–12)
NEUTROPHILS # BLD AUTO: 4.69 10*3/MM3 (ref 1.7–7)
NEUTROPHILS NFR BLD AUTO: 72.4 % (ref 42.7–76)
NRBC BLD AUTO-RTO: 0 /100 WBC (ref 0–0.2)
NT-PROBNP SERPL-MCNC: 584 PG/ML (ref 0–486)
PLATELET # BLD AUTO: 216 10*3/MM3 (ref 140–450)
POTASSIUM SERPL-SCNC: 4.3 MMOL/L (ref 3.5–5.2)
PROT SERPL-MCNC: 6 G/DL (ref 6–8.5)
RBC # BLD AUTO: 4.59 10*6/MM3 (ref 4.14–5.8)
SODIUM SERPL-SCNC: 137 MMOL/L (ref 136–145)
WBC # BLD AUTO: 6.48 10*3/MM3 (ref 3.4–10.8)

## 2025-06-10 DIAGNOSIS — R52 PAIN: ICD-10-CM

## 2025-06-10 RX ORDER — HYDROCODONE BITARTRATE AND ACETAMINOPHEN 10; 325 MG/1; MG/1
1 TABLET ORAL EVERY 6 HOURS PRN
Qty: 90 TABLET | Refills: 0 | Status: SHIPPED | OUTPATIENT
Start: 2025-06-10

## 2025-06-10 NOTE — TELEPHONE ENCOUNTER
Rx Refill Note  Requested Prescriptions     Pending Prescriptions Disp Refills    HYDROcodone-acetaminophen (Norco)  MG per tablet 90 tablet 0     Sig: Take 1 tablet by mouth Every 6 (Six) Hours As Needed for Moderate Pain.      Last office visit with office: 2/17/25  Next office visit with office: 10/7/25    UDS: 2/17/25    DATE OF LAST REFILL: 5/21/25    Controlled Substance Agreement: up to date           {TIP  Is Refill Pharmacy correct?:  Helena Meehan MA  06/10/25, 07:35 CDT

## 2025-06-19 ENCOUNTER — OFFICE VISIT (OUTPATIENT)
Dept: FAMILY MEDICINE CLINIC | Facility: CLINIC | Age: 75
End: 2025-06-19
Payer: MEDICARE

## 2025-06-19 VITALS
DIASTOLIC BLOOD PRESSURE: 68 MMHG | SYSTOLIC BLOOD PRESSURE: 122 MMHG | TEMPERATURE: 97.3 F | HEART RATE: 79 BPM | BODY MASS INDEX: 44.1 KG/M2 | HEIGHT: 71 IN | OXYGEN SATURATION: 95 % | WEIGHT: 315 LBS | RESPIRATION RATE: 22 BRPM

## 2025-06-19 DIAGNOSIS — H61.21 EXCESSIVE WAX IN RIGHT EAR: Primary | ICD-10-CM

## 2025-06-19 RX ORDER — NEOMYCIN SULFATE, POLYMYXIN B SULFATE AND HYDROCORTISONE 10; 3.5; 1 MG/ML; MG/ML; [USP'U]/ML
SUSPENSION/ DROPS AURICULAR (OTIC)
Qty: 10 ML | Refills: 0 | Status: SHIPPED | OUTPATIENT
Start: 2025-06-19

## 2025-06-19 RX ORDER — CARBIDOPA AND LEVODOPA 25; 100 MG/1; MG/1
1 TABLET ORAL 2 TIMES DAILY
Qty: 180 TABLET | Refills: 3 | Status: SHIPPED | OUTPATIENT
Start: 2025-06-19

## 2025-06-19 NOTE — PROGRESS NOTES
5/31/2023     9:00 AM 8/29/2023     8:00 AM 12/4/2023     8:00 AM 3/12/2024     8:00 AM 8/26/2024     8:00 AM 11/14/2024     9:00 AM 2/17/2025     8:00 AM   CONTROLLED SUBSTANCE TRACKING   Last Gregory 5/31/2023 8/29/2023 12/4/2023 3/12/2024 8/26/2024 11/14/2024 2/17/2025   Report Number  reviewed through epic reviewed through epic  reviewed through [facility] through [facility] through [facility]   Last UDS 2/6/2023 2/6/2023 2/6/2023 3/12/2024 3/12/2024 3/12/2024 2/17/2025   Last Controlled Substance Agreement 2/6/2023 2/6/2023 2/6/2023 3/12/2024 3/12/2024 3/12/2024 2/17/2025     Procedure   Ear Cerumen Removal    Date/Time: 6/19/2025 1:18 PM    Performed by: Jim Hopkins MD  Authorized by: Jim Hopkins MD  Anesthetic total: 0 drops  Location details: right ear  Patient tolerance: patient tolerated the procedure well with no immediate complications  Comments: Most of wax removed from right ear-small amount near eardrum  Procedure type: irrigation   Sedation:  Patient sedated: no

## 2025-06-24 DIAGNOSIS — I10 PRIMARY HYPERTENSION: ICD-10-CM

## 2025-06-24 RX ORDER — LOSARTAN POTASSIUM 100 MG/1
100 TABLET ORAL
Qty: 90 TABLET | Refills: 3 | Status: SHIPPED | OUTPATIENT
Start: 2025-06-24

## 2025-06-24 NOTE — TELEPHONE ENCOUNTER
Rx Refill Note  Requested Prescriptions     Pending Prescriptions Disp Refills    losartan (COZAAR) 100 MG tablet [Pharmacy Med Name: LOSARTAN POTASSIUM 100 MG TAB] 90 tablet 3     Sig: TAKE 1 TABLET AT BEDTIME      Last office visit with prescribing clinician: 6/19/2025   Last telemedicine visit with prescribing clinician: Visit date not found   Next office visit with prescribing clinician: 10/7/25    {TIP  Please add Last Relevant Lab 6/5/25    Helena Meehan MA  06/24/25, 12:29 CDT

## 2025-07-03 DIAGNOSIS — R52 PAIN: ICD-10-CM

## 2025-07-03 RX ORDER — HYDROCODONE BITARTRATE AND ACETAMINOPHEN 10; 325 MG/1; MG/1
1 TABLET ORAL EVERY 6 HOURS PRN
Qty: 90 TABLET | Refills: 0 | Status: SHIPPED | OUTPATIENT
Start: 2025-07-03

## 2025-07-03 NOTE — TELEPHONE ENCOUNTER
Rx Refill Note  Requested Prescriptions     Pending Prescriptions Disp Refills    HYDROcodone-acetaminophen (Norco)  MG per tablet 90 tablet 0     Sig: Take 1 tablet by mouth Every 6 (Six) Hours As Needed for Moderate Pain.      Last office visit with prescribing clinician: 6/19/2025   Last telemedicine visit with prescribing clinician: Visit date not found   Next office visit with prescribing clinician: 10/7/2025                         Would you like a call back once the refill request has been completed: [] Yes [] No    If the office needs to give you a call back, can they leave a voicemail: [] Yes [] No    Helena Gilmore MA  07/03/25, 14:26 CDT

## 2025-07-11 DIAGNOSIS — G62.9 NEUROPATHY: ICD-10-CM

## 2025-07-11 RX ORDER — GABAPENTIN 300 MG/1
300 CAPSULE ORAL 4 TIMES DAILY
Qty: 120 CAPSULE | Refills: 2 | Status: SHIPPED | OUTPATIENT
Start: 2025-07-11

## 2025-07-11 NOTE — TELEPHONE ENCOUNTER
Rx Refill Note  Requested Prescriptions     Pending Prescriptions Disp Refills    gabapentin (NEURONTIN) 300 MG capsule [Pharmacy Med Name: GABAPENTIN 300 MG CAPSULE] 120 capsule 4     Sig: TAKE 1 CAPSULE BY MOUTH 4 TIMES A DAY.      Last office visit with prescribing clinician: 6/19/2025   Last telemedicine visit with prescribing clinician: Visit date not found   Next office visit with prescribing clinician: 10/7/2025     Drug thereapy appt 06/19/2025  contract done 2/17/25  UDS done 2/17/25                      Would you like a call back once the refill request has been completed: [] Yes [] No    If the office needs to give you a call back, can they leave a voicemail: [] Yes [] No    Helena Gilmore MA  07/11/25, 15:14 CDT

## 2025-07-25 ENCOUNTER — TRANSCRIBE ORDERS (OUTPATIENT)
Dept: ADMINISTRATIVE | Facility: HOSPITAL | Age: 75
End: 2025-07-25
Payer: MEDICARE

## 2025-07-25 DIAGNOSIS — K76.9 LIVER LESION: Primary | ICD-10-CM

## 2025-07-27 DIAGNOSIS — R52 PAIN: ICD-10-CM

## 2025-07-28 RX ORDER — HYDROCODONE BITARTRATE AND ACETAMINOPHEN 10; 325 MG/1; MG/1
1 TABLET ORAL EVERY 6 HOURS PRN
Qty: 90 TABLET | Refills: 0 | Status: SHIPPED | OUTPATIENT
Start: 2025-07-28

## 2025-07-28 NOTE — TELEPHONE ENCOUNTER
Rx Refill Note  Requested Prescriptions     Pending Prescriptions Disp Refills    HYDROcodone-acetaminophen (Norco)  MG per tablet 90 tablet 0     Sig: Take 1 tablet by mouth Every 6 (Six) Hours As Needed for Moderate Pain.      Last office visit with office: 6/19/25  Next office visit with office: 10/7/25    UDS: 2/17/25    DATE OF LAST REFILL: 7/3/25    Controlled Substance Agreement: up to date           {TIP  Is Refill Pharmacy correct?:  Helena Meehan MA  07/28/25, 07:43 CDT

## 2025-08-02 DIAGNOSIS — J40 BRONCHITIS: ICD-10-CM

## 2025-08-05 RX ORDER — IPRATROPIUM BROMIDE AND ALBUTEROL SULFATE 2.5; .5 MG/3ML; MG/3ML
SOLUTION RESPIRATORY (INHALATION)
Qty: 360 ML | Refills: 1 | Status: SHIPPED | OUTPATIENT
Start: 2025-08-05

## 2025-08-11 DIAGNOSIS — R06.2 WHEEZING: ICD-10-CM

## 2025-08-11 RX ORDER — ALBUTEROL SULFATE 90 UG/1
INHALANT RESPIRATORY (INHALATION)
Qty: 18 G | Refills: 5 | Status: SHIPPED | OUTPATIENT
Start: 2025-08-11

## 2025-08-12 DIAGNOSIS — G62.9 NEUROPATHY: ICD-10-CM

## 2025-08-12 RX ORDER — GABAPENTIN 300 MG/1
300 CAPSULE ORAL 4 TIMES DAILY
Qty: 120 CAPSULE | Refills: 2 | Status: SHIPPED | OUTPATIENT
Start: 2025-08-12

## 2025-08-13 ENCOUNTER — OFFICE VISIT (OUTPATIENT)
Dept: FAMILY MEDICINE CLINIC | Facility: CLINIC | Age: 75
End: 2025-08-13
Payer: MEDICARE

## 2025-08-13 VITALS
DIASTOLIC BLOOD PRESSURE: 76 MMHG | BODY MASS INDEX: 44.1 KG/M2 | WEIGHT: 315 LBS | HEIGHT: 71 IN | OXYGEN SATURATION: 95 % | RESPIRATION RATE: 15 BRPM | SYSTOLIC BLOOD PRESSURE: 124 MMHG | TEMPERATURE: 98.6 F | HEART RATE: 79 BPM

## 2025-08-13 DIAGNOSIS — R25.2 MUSCLE CRAMPS: Primary | ICD-10-CM

## 2025-08-13 DIAGNOSIS — E11.9 TYPE 2 DIABETES MELLITUS WITHOUT COMPLICATION, WITHOUT LONG-TERM CURRENT USE OF INSULIN: ICD-10-CM

## 2025-08-13 DIAGNOSIS — C34.11 MALIGNANT NEOPLASM OF UPPER LOBE OF RIGHT LUNG: ICD-10-CM

## 2025-08-14 LAB
ALBUMIN SERPL-MCNC: 4.2 G/DL (ref 3.5–5.2)
ALBUMIN/GLOB SERPL: 1.9 G/DL
ALP SERPL-CCNC: 93 U/L (ref 39–117)
ALT SERPL-CCNC: 22 U/L (ref 1–41)
AST SERPL-CCNC: 17 U/L (ref 1–40)
BASOPHILS # BLD AUTO: 0.07 10*3/MM3 (ref 0–0.2)
BASOPHILS NFR BLD AUTO: 1.1 % (ref 0–1.5)
BILIRUB SERPL-MCNC: 0.4 MG/DL (ref 0–1.2)
BUN SERPL-MCNC: 20 MG/DL (ref 8–23)
BUN/CREAT SERPL: 29.4 (ref 7–25)
CALCIUM SERPL-MCNC: 9.8 MG/DL (ref 8.6–10.5)
CHLORIDE SERPL-SCNC: 100 MMOL/L (ref 98–107)
CK SERPL-CCNC: 55 U/L (ref 20–200)
CO2 SERPL-SCNC: 24.4 MMOL/L (ref 22–29)
CREAT SERPL-MCNC: 0.68 MG/DL (ref 0.76–1.27)
EGFRCR SERPLBLD CKD-EPI 2021: 96.9 ML/MIN/1.73
EOSINOPHIL # BLD AUTO: 0.37 10*3/MM3 (ref 0–0.4)
EOSINOPHIL NFR BLD AUTO: 5.6 % (ref 0.3–6.2)
ERYTHROCYTE [DISTWIDTH] IN BLOOD BY AUTOMATED COUNT: 13.2 % (ref 12.3–15.4)
GLOBULIN SER CALC-MCNC: 2.2 GM/DL
GLUCOSE SERPL-MCNC: 187 MG/DL (ref 65–99)
HBA1C MFR BLD: 7.9 % (ref 4.8–5.6)
HCT VFR BLD AUTO: 47.4 % (ref 37.5–51)
HGB BLD-MCNC: 16.4 G/DL (ref 13–17.7)
IMM GRANULOCYTES # BLD AUTO: 0.1 10*3/MM3 (ref 0–0.05)
IMM GRANULOCYTES NFR BLD AUTO: 1.5 % (ref 0–0.5)
LYMPHOCYTES # BLD AUTO: 0.96 10*3/MM3 (ref 0.7–3.1)
LYMPHOCYTES NFR BLD AUTO: 14.6 % (ref 19.6–45.3)
MCH RBC QN AUTO: 34.4 PG (ref 26.6–33)
MCHC RBC AUTO-ENTMCNC: 34.6 G/DL (ref 31.5–35.7)
MCV RBC AUTO: 99.4 FL (ref 79–97)
MONOCYTES # BLD AUTO: 0.44 10*3/MM3 (ref 0.1–0.9)
MONOCYTES NFR BLD AUTO: 6.7 % (ref 5–12)
NEUTROPHILS # BLD AUTO: 4.64 10*3/MM3 (ref 1.7–7)
NEUTROPHILS NFR BLD AUTO: 70.5 % (ref 42.7–76)
NRBC BLD AUTO-RTO: 0 /100 WBC (ref 0–0.2)
PLATELET # BLD AUTO: 208 10*3/MM3 (ref 140–450)
POTASSIUM SERPL-SCNC: 3.8 MMOL/L (ref 3.5–5.2)
PROT SERPL-MCNC: 6.4 G/DL (ref 6–8.5)
RBC # BLD AUTO: 4.77 10*6/MM3 (ref 4.14–5.8)
SODIUM SERPL-SCNC: 138 MMOL/L (ref 136–145)
WBC # BLD AUTO: 6.58 10*3/MM3 (ref 3.4–10.8)

## 2025-08-15 ENCOUNTER — RESULTS FOLLOW-UP (OUTPATIENT)
Dept: FAMILY MEDICINE CLINIC | Facility: CLINIC | Age: 75
End: 2025-08-15
Payer: MEDICARE

## 2025-08-15 DIAGNOSIS — E11.9 TYPE 2 DIABETES MELLITUS WITHOUT COMPLICATION, WITHOUT LONG-TERM CURRENT USE OF INSULIN: Primary | ICD-10-CM

## 2025-08-18 DIAGNOSIS — R52 PAIN: ICD-10-CM

## 2025-08-18 RX ORDER — HYDROCODONE BITARTRATE AND ACETAMINOPHEN 10; 325 MG/1; MG/1
1 TABLET ORAL EVERY 6 HOURS PRN
Qty: 90 TABLET | Refills: 0 | Status: SHIPPED | OUTPATIENT
Start: 2025-08-18

## 2025-08-20 ENCOUNTER — OFFICE VISIT (OUTPATIENT)
Dept: FAMILY MEDICINE CLINIC | Facility: CLINIC | Age: 75
End: 2025-08-20
Payer: MEDICARE

## 2025-08-20 ENCOUNTER — APPOINTMENT (OUTPATIENT)
Dept: GENERAL RADIOLOGY | Facility: HOSPITAL | Age: 75
End: 2025-08-20
Payer: MEDICARE

## 2025-08-20 ENCOUNTER — HOSPITAL ENCOUNTER (INPATIENT)
Facility: HOSPITAL | Age: 75
LOS: 2 days | Discharge: HOME | End: 2025-08-22
Admitting: STUDENT IN AN ORGANIZED HEALTH CARE EDUCATION/TRAINING PROGRAM
Payer: MEDICARE

## 2025-08-20 ENCOUNTER — APPOINTMENT (OUTPATIENT)
Dept: CT IMAGING | Facility: HOSPITAL | Age: 75
End: 2025-08-20
Payer: MEDICARE

## 2025-08-20 VITALS
HEIGHT: 71 IN | BODY MASS INDEX: 44.1 KG/M2 | TEMPERATURE: 98.7 F | HEART RATE: 90 BPM | WEIGHT: 315 LBS | DIASTOLIC BLOOD PRESSURE: 114 MMHG | OXYGEN SATURATION: 93 % | SYSTOLIC BLOOD PRESSURE: 179 MMHG

## 2025-08-20 DIAGNOSIS — R82.90 ABNORMAL URINALYSIS: ICD-10-CM

## 2025-08-20 DIAGNOSIS — R06.02 SHORTNESS OF BREATH: Primary | ICD-10-CM

## 2025-08-20 DIAGNOSIS — E11.9 TYPE 2 DIABETES MELLITUS WITHOUT COMPLICATION, WITHOUT LONG-TERM CURRENT USE OF INSULIN: ICD-10-CM

## 2025-08-20 PROBLEM — I50.9 CHF, ACUTE ON CHRONIC: Status: ACTIVE | Noted: 2025-08-20

## 2025-08-20 PROBLEM — I50.9 CHF EXACERBATION: Status: ACTIVE | Noted: 2025-08-20

## 2025-08-20 LAB
BILIRUB BLD-MCNC: NEGATIVE MG/DL
CLARITY, POC: CLEAR
COLOR UR: YELLOW
GLUCOSE UR STRIP-MCNC: ABNORMAL MG/DL
KETONES UR QL: ABNORMAL
LEUKOCYTE EST, POC: NEGATIVE
NITRITE UR-MCNC: NEGATIVE MG/ML
PH UR: 7.5 [PH] (ref 5–8)
PROT UR STRIP-MCNC: ABNORMAL MG/DL
RBC # UR STRIP: ABNORMAL /UL
SP GR UR: 1.02 (ref 1–1.03)
UROBILINOGEN UR QL: NORMAL

## 2025-08-20 PROCEDURE — 1125F AMNT PAIN NOTED PAIN PRSNT: CPT | Performed by: NURSE PRACTITIONER

## 2025-08-20 PROCEDURE — 3080F DIAST BP >= 90 MM HG: CPT | Performed by: NURSE PRACTITIONER

## 2025-08-20 PROCEDURE — 3077F SYST BP >= 140 MM HG: CPT | Performed by: NURSE PRACTITIONER

## 2025-08-20 PROCEDURE — 99213 OFFICE O/P EST LOW 20 MIN: CPT | Performed by: NURSE PRACTITIONER

## 2025-08-20 PROCEDURE — 3051F HG A1C>EQUAL 7.0%<8.0%: CPT | Performed by: NURSE PRACTITIONER

## 2025-08-20 PROCEDURE — 81003 URINALYSIS AUTO W/O SCOPE: CPT | Performed by: NURSE PRACTITIONER

## 2025-08-20 PROCEDURE — 93000 ELECTROCARDIOGRAM COMPLETE: CPT | Performed by: NURSE PRACTITIONER

## 2025-08-20 RX ORDER — SEMAGLUTIDE 1.34 MG/ML
0.25 INJECTION, SOLUTION SUBCUTANEOUS WEEKLY
Qty: 1.5 ML | Refills: 0 | Status: ON HOLD | OUTPATIENT
Start: 2025-08-20 | End: 2025-08-21

## 2025-08-21 ENCOUNTER — APPOINTMENT (OUTPATIENT)
Dept: ULTRASOUND IMAGING | Facility: HOSPITAL | Age: 75
End: 2025-08-21
Payer: MEDICARE

## 2025-08-21 ENCOUNTER — PRIOR AUTHORIZATION (OUTPATIENT)
Dept: FAMILY MEDICINE CLINIC | Facility: CLINIC | Age: 75
End: 2025-08-21
Payer: MEDICARE

## 2025-08-21 ENCOUNTER — APPOINTMENT (OUTPATIENT)
Dept: CARDIOLOGY | Facility: HOSPITAL | Age: 75
End: 2025-08-21
Payer: MEDICARE

## 2025-08-21 PROBLEM — J81.0 ACUTE PULMONARY EDEMA: Status: ACTIVE | Noted: 2025-08-21

## 2025-08-21 PROBLEM — I16.1 HYPERTENSIVE EMERGENCY: Status: ACTIVE | Noted: 2025-08-21

## 2025-08-21 LAB
ALBUMIN SERPL-MCNC: 4 G/DL (ref 3.5–5.2)
ALBUMIN/GLOB SERPL: 1.6 G/DL
ALP SERPL-CCNC: 77 U/L (ref 39–117)
ALT SERPL-CCNC: 26 U/L (ref 1–41)
AST SERPL-CCNC: 14 U/L (ref 1–40)
BILIRUB SERPL-MCNC: 1 MG/DL (ref 0–1.2)
BUN SERPL-MCNC: 13 MG/DL (ref 8–23)
BUN/CREAT SERPL: 18.3 (ref 7–25)
CALCIUM SERPL-MCNC: 9.6 MG/DL (ref 8.6–10.5)
CHLORIDE SERPL-SCNC: 102 MMOL/L (ref 98–107)
CO2 SERPL-SCNC: 25.4 MMOL/L (ref 22–29)
CREAT SERPL-MCNC: 0.71 MG/DL (ref 0.76–1.27)
EGFRCR SERPLBLD CKD-EPI 2021: 95.7 ML/MIN/1.73
GLOBULIN SER CALC-MCNC: 2.5 GM/DL
GLUCOSE SERPL-MCNC: 197 MG/DL (ref 65–99)
NT-PROBNP SERPL-MCNC: 2104 PG/ML (ref 0–486)
POTASSIUM SERPL-SCNC: 3.9 MMOL/L (ref 3.5–5.2)
PROT SERPL-MCNC: 6.5 G/DL (ref 6–8.5)
SODIUM SERPL-SCNC: 140 MMOL/L (ref 136–145)

## 2025-08-22 ENCOUNTER — READMISSION MANAGEMENT (OUTPATIENT)
Dept: CALL CENTER | Facility: HOSPITAL | Age: 75
End: 2025-08-22
Payer: MEDICARE

## 2025-08-22 LAB
BACTERIA UR CULT: NORMAL
BACTERIA UR CULT: NORMAL

## 2025-08-25 ENCOUNTER — TRANSITIONAL CARE MANAGEMENT TELEPHONE ENCOUNTER (OUTPATIENT)
Dept: CALL CENTER | Facility: HOSPITAL | Age: 75
End: 2025-08-25
Payer: MEDICARE

## 2025-08-25 ENCOUNTER — OFFICE VISIT (OUTPATIENT)
Dept: WOUND CARE | Facility: HOSPITAL | Age: 75
End: 2025-08-25
Payer: MEDICARE

## 2025-08-25 PROCEDURE — G0463 HOSPITAL OUTPT CLINIC VISIT: HCPCS

## 2025-08-26 ENCOUNTER — TRANSCRIBE ORDERS (OUTPATIENT)
Dept: ADMINISTRATIVE | Facility: HOSPITAL | Age: 75
End: 2025-08-26
Payer: MEDICARE

## 2025-08-26 DIAGNOSIS — R60.0 LOCALIZED EDEMA: Primary | ICD-10-CM

## 2025-08-26 DIAGNOSIS — I73.9 PERIPHERAL VASCULAR DISEASE, UNSPECIFIED: ICD-10-CM

## 2025-08-27 ENCOUNTER — OFFICE VISIT (OUTPATIENT)
Dept: FAMILY MEDICINE CLINIC | Facility: CLINIC | Age: 75
End: 2025-08-27
Payer: MEDICARE

## 2025-08-27 VITALS
HEIGHT: 67 IN | HEART RATE: 82 BPM | OXYGEN SATURATION: 98 % | TEMPERATURE: 98.6 F | WEIGHT: 315 LBS | SYSTOLIC BLOOD PRESSURE: 136 MMHG | BODY MASS INDEX: 49.44 KG/M2 | DIASTOLIC BLOOD PRESSURE: 88 MMHG

## 2025-08-27 DIAGNOSIS — I50.9 CONGESTIVE HEART FAILURE, UNSPECIFIED HF CHRONICITY, UNSPECIFIED HEART FAILURE TYPE: Primary | ICD-10-CM

## 2025-08-27 RX ORDER — INDAPAMIDE 2.5 MG/1
2.5 TABLET ORAL EVERY MORNING
Qty: 90 TABLET | Refills: 2 | Status: SHIPPED | OUTPATIENT
Start: 2025-08-27

## 2025-08-27 RX ORDER — SEMAGLUTIDE 0.68 MG/ML
0.25 INJECTION, SOLUTION SUBCUTANEOUS WEEKLY
COMMUNITY
Start: 2025-08-21

## 2025-08-27 RX ORDER — BUMETANIDE 2 MG/1
2 TABLET ORAL DAILY
Qty: 90 TABLET | Refills: 2 | Status: SHIPPED | OUTPATIENT
Start: 2025-08-27

## 2025-08-28 LAB
ALBUMIN SERPL-MCNC: 3.8 G/DL (ref 3.5–5.2)
ALBUMIN/GLOB SERPL: 1.8 G/DL
ALP SERPL-CCNC: 85 U/L (ref 39–117)
ALT SERPL-CCNC: 19 U/L (ref 1–41)
AST SERPL-CCNC: 19 U/L (ref 1–40)
BILIRUB SERPL-MCNC: 0.3 MG/DL (ref 0–1.2)
BUN SERPL-MCNC: 17 MG/DL (ref 8–23)
BUN/CREAT SERPL: 20 (ref 7–25)
CALCIUM SERPL-MCNC: 8.9 MG/DL (ref 8.6–10.5)
CHLORIDE SERPL-SCNC: 103 MMOL/L (ref 98–107)
CO2 SERPL-SCNC: 26 MMOL/L (ref 22–29)
CREAT SERPL-MCNC: 0.85 MG/DL (ref 0.76–1.27)
EGFRCR SERPLBLD CKD-EPI 2021: 90.6 ML/MIN/1.73
GLOBULIN SER CALC-MCNC: 2.1 GM/DL
GLUCOSE SERPL-MCNC: 167 MG/DL (ref 65–99)
NT-PROBNP SERPL-MCNC: 906 PG/ML (ref 0–486)
POTASSIUM SERPL-SCNC: 4.3 MMOL/L (ref 3.5–5.2)
PROT SERPL-MCNC: 5.9 G/DL (ref 6–8.5)
SODIUM SERPL-SCNC: 143 MMOL/L (ref 136–145)

## (undated) DEVICE — DRSNG GZ PETROLTM CURAD OVRWRP 1/2X72IN STRL

## (undated) DEVICE — PAD MINOR UNIVERSAL: Brand: MEDLINE INDUSTRIES, INC.

## (undated) DEVICE — COVER LT HNDL CAM BLU DISP W/ SURG CTRL

## (undated) DEVICE — SURGICAL PROCEDURE PACK KNEE TOT DBD CDS LOURDES HOSP LF

## (undated) DEVICE — SUTURE VCRL SZ 2-0 L36IN ABSRB UD L36MM CT-1 1/2 CIR J945H

## (undated) DEVICE — LARGE BONE, HALL BLADE, OSCILLATING, 19.5 X 105 X 1.27: Brand: HALL

## (undated) DEVICE — SOL IRR NACL 0.9PCT 3000ML

## (undated) DEVICE — LARGE BONE HALL BLADE, RECIPROCATOR, 12.5 X 76 X 1.27 MM: Brand: HALL

## (undated) DEVICE — BANDAGE,GAUZE,CONFORMING,1"X75",STRL,LF: Brand: MEDLINE

## (undated) DEVICE — SOLUTION IV 250ML 0.9% SOD CHL PH 5 INJ USP VIAFLX PLAS

## (undated) DEVICE — ST DIL URETH 8TO24F

## (undated) DEVICE — GLV SURG BIOGEL M LTX PF 7 1/2

## (undated) DEVICE — TRY PREP SCRB VAG PVP

## (undated) DEVICE — BIPOLAR SEALER 23-112-1 AQM 6.0: Brand: AQUAMANTYS ®

## (undated) DEVICE — DOVER HYDROGEL COATED LATEX FOLEY CATHETER, 5 ML, 3-WAY 18 FR/CH (6.0 MM): Brand: DOVER

## (undated) DEVICE — EVAC BLDR UROVAC W ADAPT

## (undated) DEVICE — ZIMMER® STERILE DISPOSABLE TOURNIQUET CUFF WITH PLC, DUAL PORT, SINGLE BLADDER, 34 IN. (86 CM)

## (undated) DEVICE — GOWN,PRECEPT,XLNG/XXLARGE,STRL: Brand: MEDLINE

## (undated) DEVICE — STERILE POLYISOPRENE POWDER-FREE SURGICAL GLOVES: Brand: PROTEXIS

## (undated) DEVICE — SUTURE VCRL SZ 2-0 L27IN ABSRB UD L26MM SH 1/2 CIR J417H

## (undated) DEVICE — THREE QUARTER SHEET: Brand: CONVERTORS

## (undated) DEVICE — CUTTING ELECTRODE MONO 24FR 12/30°  FOR RESECTOSCOPES, TELESCOPE Ø 4 MM, FOR SHEATHS, INTERMITTENT/CONTINUOUS IRRIGATION, 24/26 FR, LOOP: ROUND, WIRE Ø 0.25 MM, FORK COLOR YELLOW, STEM COLOR TRANSPARENT, PACK = 10 PCS, FOR SHARK RESECTOSCOPE, STERILE, FOR SINGLE USE: Brand: SHARK

## (undated) DEVICE — BHS TURNOVER CYSTO: Brand: MEDLINE INDUSTRIES, INC.

## (undated) DEVICE — SUTURE VCRL SZ 1 L18IN ABSRB UD L36MM CT-1 1/2 CIR J841D

## (undated) DEVICE — PK CYSTO 30

## (undated) DEVICE — ARM BOARD PAD: Brand: DEVON

## (undated) DEVICE — GLOVE SURG SZ 8.5 L11.6IN FNGR THK12.6MIL CUF THK8.3MIL BRN

## (undated) DEVICE — PK TURNOVER RM ADV

## (undated) DEVICE — SOLUTION IV IRRIG POUR BRL 0.9% SODIUM CHL 2F7124

## (undated) DEVICE — PLUG,CATHETER,DRAINAGE PROTECTOR,TUBE: Brand: MEDLINE

## (undated) DEVICE — COAG ELECTRODE MONO 22-24FR 12/30°  FOR RESECTOSCOPES, TELESCOPE Ø 4 MM, FOR SHEATHS, INTERMITTENT/CONTINUOUS IRRIGATION, 22/24/26 FR, ELECTRODE SHAPE: BALL, FORK COLOR GREEN, STEM COLOR TRANSPARENT, PACK = 5 PCS, FOR SHARK RESECTOSCOPE, STERILE, FOR SINGLE USE: Brand: SHARK

## (undated) DEVICE — MCLASS OSCILLATING SAW BLADE 19 X 1.27 (0.050") X 90 MM: Brand: MCLASS

## (undated) DEVICE — BOWL BNE CEM MIX AND SPAT DISP

## (undated) DEVICE — SYSTEM SKIN CLSR 22CM DERMBND PRINEO

## (undated) DEVICE — BALLOON DILATATION CATHETER KIT: Brand: UROMAX ULTRA KIT

## (undated) DEVICE — BLADE RMR L51MM PAT PILOT H

## (undated) DEVICE — Z INACTIVE USE 2660664 SOLUTION IRRIG 3000ML 0.9% SOD CHL USP UROMATIC PLAS CONT

## (undated) DEVICE — SOL IRR SORBITOL 3PCT BG 3000ML

## (undated) DEVICE — NON-WOVEN ADHESIVE WOUND DRESSING: Brand: PRIMAPORE ADHESIVE DRESSING 30*10CM

## (undated) DEVICE — SYRINGE,PISTON,IRRIGATION,60ML,STERILE: Brand: MEDLINE

## (undated) DEVICE — BUCK FEMORAL CEMENT RESTRICTOR 25MM: Brand: BUCK

## (undated) DEVICE — SUT GUT CHRM 3/0 SH 27IN G122H

## (undated) DEVICE — GLOVE SURG SZ 75 L12IN FNGR THK87MIL DK GRN LTX FREE ISOLEX

## (undated) DEVICE — 3M™ STERI-DRAPE™ INSTRUMENT POUCH 1018: Brand: STERI-DRAPE™